# Patient Record
Sex: MALE | Race: WHITE | NOT HISPANIC OR LATINO | Employment: OTHER | URBAN - METROPOLITAN AREA
[De-identification: names, ages, dates, MRNs, and addresses within clinical notes are randomized per-mention and may not be internally consistent; named-entity substitution may affect disease eponyms.]

---

## 2017-11-06 ENCOUNTER — HOSPITAL ENCOUNTER (INPATIENT)
Facility: HOSPITAL | Age: 62
LOS: 1 days | DRG: 871 | End: 2017-11-07
Attending: INTERNAL MEDICINE | Admitting: INTERNAL MEDICINE
Payer: MEDICARE

## 2017-11-06 ENCOUNTER — APPOINTMENT (EMERGENCY)
Dept: RADIOLOGY | Facility: HOSPITAL | Age: 62
DRG: 871 | End: 2017-11-06
Payer: MEDICARE

## 2017-11-06 ENCOUNTER — APPOINTMENT (INPATIENT)
Dept: RADIOLOGY | Facility: HOSPITAL | Age: 62
DRG: 871 | End: 2017-11-06
Payer: MEDICARE

## 2017-11-06 ENCOUNTER — APPOINTMENT (INPATIENT)
Dept: RADIOLOGY | Facility: HOSPITAL | Age: 62
DRG: 871 | End: 2017-11-06
Attending: INTERNAL MEDICINE
Payer: MEDICARE

## 2017-11-06 ENCOUNTER — APPOINTMENT (INPATIENT)
Dept: CT IMAGING | Facility: HOSPITAL | Age: 62
DRG: 871 | End: 2017-11-06
Payer: MEDICARE

## 2017-11-06 ENCOUNTER — APPOINTMENT (INPATIENT)
Dept: NON INVASIVE DIAGNOSTICS | Facility: HOSPITAL | Age: 62
DRG: 871 | End: 2017-11-06
Payer: MEDICARE

## 2017-11-06 DIAGNOSIS — M81.0 OSTEOPOROSIS: Chronic | ICD-10-CM

## 2017-11-06 DIAGNOSIS — A41.9 SEPSIS (HCC): ICD-10-CM

## 2017-11-06 DIAGNOSIS — R60.0 EDEMA EXTREMITIES: ICD-10-CM

## 2017-11-06 DIAGNOSIS — J18.9 PNEUMONIA INVOLVING LEFT LUNG: ICD-10-CM

## 2017-11-06 DIAGNOSIS — D72.829 LEUKOCYTOSIS, UNSPECIFIED TYPE: ICD-10-CM

## 2017-11-06 DIAGNOSIS — N39.8 VOIDING DYSFUNCTION: ICD-10-CM

## 2017-11-06 DIAGNOSIS — J18.9 PNEUMONIA: Primary | ICD-10-CM

## 2017-11-06 PROBLEM — J94.9: Status: ACTIVE | Noted: 2017-11-06

## 2017-11-06 PROBLEM — M40.209 KYPHOSIS: Chronic | Status: ACTIVE | Noted: 2017-11-06

## 2017-11-06 LAB
ALBUMIN SERPL BCP-MCNC: 3.2 G/DL (ref 3.5–5)
ALP SERPL-CCNC: 113 U/L (ref 46–116)
ALT SERPL W P-5'-P-CCNC: 23 U/L (ref 12–78)
ANION GAP BLD CALC-SCNC: 14 MMOL/L (ref 4–13)
ANION GAP SERPL CALCULATED.3IONS-SCNC: 12 MMOL/L (ref 4–13)
APPEARANCE FLD: ABNORMAL
APTT PPP: 37 SECONDS (ref 23–35)
ARTERIAL PATENCY WRIST A: ABNORMAL
AST SERPL W P-5'-P-CCNC: 19 U/L (ref 5–45)
BACTERIA UR QL AUTO: ABNORMAL /HPF
BASE EXCESS BLDA CALC-SCNC: 1 MMOL/L (ref -2–3)
BASOPHILS # BLD MANUAL: 0.37 THOUSAND/UL (ref 0–0.1)
BASOPHILS NFR MAR MANUAL: 1 % (ref 0–1)
BILIRUB SERPL-MCNC: 0.8 MG/DL (ref 0.2–1)
BILIRUB UR QL STRIP: NEGATIVE
BUN BLD-MCNC: 24 MG/DL (ref 5–25)
BUN SERPL-MCNC: 24 MG/DL (ref 5–25)
CA-I BLD-SCNC: 1.21 MMOL/L (ref 1.12–1.32)
CALCIUM SERPL-MCNC: 9.5 MG/DL (ref 8.3–10.1)
CHLORIDE BLD-SCNC: 96 MMOL/L (ref 100–108)
CHLORIDE SERPL-SCNC: 96 MMOL/L (ref 100–108)
CLARITY UR: ABNORMAL
CO2 SERPL-SCNC: 27 MMOL/L (ref 21–32)
COLOR FLD: ABNORMAL
COLOR UR: YELLOW
CREAT BLD-MCNC: 0.9 MG/DL (ref 0.6–1.3)
CREAT SERPL-MCNC: 1.05 MG/DL (ref 0.6–1.3)
EOSINOPHIL # BLD MANUAL: 0 THOUSAND/UL (ref 0–0.4)
EOSINOPHIL NFR BLD MANUAL: 0 % (ref 0–6)
ERYTHROCYTE [DISTWIDTH] IN BLOOD BY AUTOMATED COUNT: 13.9 % (ref 11.6–15.1)
FINE GRAN CASTS URNS QL MICRO: ABNORMAL /LPF
FIO2 GAS DIL.REBREATH: 29 L
GFR SERPL CREATININE-BSD FRML MDRD: 76 ML/MIN/1.73SQ M
GFR SERPL CREATININE-BSD FRML MDRD: 91 ML/MIN/1.73SQ M
GLUCOSE FLD-MCNC: 97 MG/DL
GLUCOSE SERPL-MCNC: 139 MG/DL (ref 65–140)
GLUCOSE SERPL-MCNC: 144 MG/DL (ref 65–140)
GLUCOSE UR STRIP-MCNC: NEGATIVE MG/DL
HCO3 BLDA-SCNC: 26 MMOL/L (ref 22–28)
HCT VFR BLD AUTO: 38.4 % (ref 36.5–49.3)
HCT VFR BLD CALC: 41 % (ref 36.5–49.3)
HGB BLD-MCNC: 12.9 G/DL (ref 12–17)
HGB BLDA-MCNC: 13.9 G/DL (ref 12–17)
HGB UR QL STRIP.AUTO: ABNORMAL
HYALINE CASTS #/AREA URNS LPF: ABNORMAL /LPF
INR PPP: 1.22 (ref 0.86–1.16)
KETONES UR STRIP-MCNC: NEGATIVE MG/DL
L PNEUMO1 AG UR QL IA.RAPID: NEGATIVE
LACTATE SERPL-SCNC: 1.1 MMOL/L (ref 0.5–2)
LACTATE SERPL-SCNC: 2.1 MMOL/L (ref 0.5–2)
LEUKOCYTE ESTERASE UR QL STRIP: NEGATIVE
LYMPHOCYTES # BLD AUTO: 1.83 THOUSAND/UL (ref 0.6–4.47)
LYMPHOCYTES # BLD AUTO: 5 % (ref 14–44)
LYMPHOCYTES NFR BLD AUTO: 1 %
MAGNESIUM SERPL-MCNC: 2.3 MG/DL (ref 1.6–2.6)
MCH RBC QN AUTO: 30 PG (ref 26.8–34.3)
MCHC RBC AUTO-ENTMCNC: 33.6 G/DL (ref 31.4–37.4)
MCV RBC AUTO: 89 FL (ref 82–98)
MONOCYTES # BLD AUTO: 1.46 THOUSAND/UL (ref 0–1.22)
MONOCYTES NFR BLD: 4 % (ref 4–12)
NEUTROPHILS # BLD MANUAL: 32.96 THOUSAND/UL (ref 1.85–7.62)
NEUTS BAND NFR FLD MANUAL: 2 %
NEUTS SEG NFR BLD AUTO: 90 % (ref 43–75)
NEUTS SEG NFR BLD AUTO: 97 %
NITRITE UR QL STRIP: NEGATIVE
NON-SQ EPI CELLS URNS QL MICRO: ABNORMAL /HPF
NT-PROBNP SERPL-MCNC: 383 PG/ML
PCO2 BLD: 27 MMOL/L (ref 21–32)
PCO2 BLD: 29 MMOL/L (ref 21–32)
PCO2 BLD: 39.8 MM HG (ref 36–44)
PH BLD: 7.42 [PH] (ref 7.35–7.45)
PH BODY FLUID: 9
PH UR STRIP.AUTO: 6 [PH] (ref 4.5–8)
PLATELET # BLD AUTO: 597 THOUSANDS/UL (ref 149–390)
PLATELET BLD QL SMEAR: ABNORMAL
PMV BLD AUTO: 8.3 FL (ref 8.9–12.7)
PO2 BLD: 69 MM HG (ref 75–129)
POTASSIUM BLD-SCNC: 4.1 MMOL/L (ref 3.5–5.3)
POTASSIUM SERPL-SCNC: 4.3 MMOL/L (ref 3.5–5.3)
PROT SERPL-MCNC: 7.9 G/DL (ref 6.4–8.2)
PROT UR STRIP-MCNC: ABNORMAL MG/DL
PROTHROMBIN TIME: 15.2 SECONDS (ref 12.1–14.4)
RBC # BLD AUTO: 4.3 MILLION/UL (ref 3.88–5.62)
RBC #/AREA URNS AUTO: ABNORMAL /HPF
RBC MORPH BLD: NORMAL
SAMPLE SITE: ABNORMAL
SAO2 % BLD FROM PO2: 94 % (ref 95–98)
SITE: ABNORMAL
SODIUM BLD-SCNC: 134 MMOL/L (ref 136–145)
SODIUM SERPL-SCNC: 135 MMOL/L (ref 136–145)
SP GR UR STRIP.AUTO: 1.01 (ref 1–1.03)
SPECIMEN SOURCE: ABNORMAL
SPECIMEN SOURCE: ABNORMAL
TOTAL CELLS COUNTED SPEC: 100
TOTAL CELLS COUNTED SPEC: 100
TROPONIN I SERPL-MCNC: <0.02 NG/ML
UROBILINOGEN UR QL STRIP.AUTO: 0.2 E.U./DL
WBC # BLD AUTO: 36.62 THOUSAND/UL (ref 4.31–10.16)
WBC # FLD MANUAL: 878 /UL
WBC #/AREA URNS AUTO: ABNORMAL /HPF

## 2017-11-06 PROCEDURE — 88112 CYTOPATH CELL ENHANCE TECH: CPT | Performed by: INTERNAL MEDICINE

## 2017-11-06 PROCEDURE — 85610 PROTHROMBIN TIME: CPT | Performed by: NURSE PRACTITIONER

## 2017-11-06 PROCEDURE — 87102 FUNGUS ISOLATION CULTURE: CPT | Performed by: INTERNAL MEDICINE

## 2017-11-06 PROCEDURE — 87206 SMEAR FLUORESCENT/ACID STAI: CPT | Performed by: INTERNAL MEDICINE

## 2017-11-06 PROCEDURE — 80047 BASIC METABLC PNL IONIZED CA: CPT

## 2017-11-06 PROCEDURE — 83986 ASSAY PH BODY FLUID NOS: CPT | Performed by: INTERNAL MEDICINE

## 2017-11-06 PROCEDURE — 32557 INSERT CATH PLEURA W/ IMAGE: CPT

## 2017-11-06 PROCEDURE — 71260 CT THORAX DX C+: CPT

## 2017-11-06 PROCEDURE — 87116 MYCOBACTERIA CULTURE: CPT | Performed by: INTERNAL MEDICINE

## 2017-11-06 PROCEDURE — 84166 PROTEIN E-PHORESIS/URINE/CSF: CPT | Performed by: INTERNAL MEDICINE

## 2017-11-06 PROCEDURE — 86334 IMMUNOFIX E-PHORESIS SERUM: CPT | Performed by: INTERNAL MEDICINE

## 2017-11-06 PROCEDURE — 85007 BL SMEAR W/DIFF WBC COUNT: CPT | Performed by: NURSE PRACTITIONER

## 2017-11-06 PROCEDURE — 85014 HEMATOCRIT: CPT

## 2017-11-06 PROCEDURE — 80053 COMPREHEN METABOLIC PANEL: CPT | Performed by: NURSE PRACTITIONER

## 2017-11-06 PROCEDURE — 83883 ASSAY NEPHELOMETRY NOT SPEC: CPT | Performed by: INTERNAL MEDICINE

## 2017-11-06 PROCEDURE — 89051 BODY FLUID CELL COUNT: CPT | Performed by: INTERNAL MEDICINE

## 2017-11-06 PROCEDURE — 71010 HB CHEST X-RAY 1 VIEW FRONTAL (PORTABLE): CPT

## 2017-11-06 PROCEDURE — 93005 ELECTROCARDIOGRAM TRACING: CPT

## 2017-11-06 PROCEDURE — 84484 ASSAY OF TROPONIN QUANT: CPT | Performed by: NURSE PRACTITIONER

## 2017-11-06 PROCEDURE — 87205 SMEAR GRAM STAIN: CPT | Performed by: NURSE PRACTITIONER

## 2017-11-06 PROCEDURE — 99152 MOD SED SAME PHYS/QHP 5/>YRS: CPT

## 2017-11-06 PROCEDURE — 87070 CULTURE OTHR SPECIMN AEROBIC: CPT | Performed by: INTERNAL MEDICINE

## 2017-11-06 PROCEDURE — 96365 THER/PROPH/DIAG IV INF INIT: CPT

## 2017-11-06 PROCEDURE — 36600 WITHDRAWAL OF ARTERIAL BLOOD: CPT

## 2017-11-06 PROCEDURE — 36415 COLL VENOUS BLD VENIPUNCTURE: CPT | Performed by: NURSE PRACTITIONER

## 2017-11-06 PROCEDURE — 83605 ASSAY OF LACTIC ACID: CPT | Performed by: NURSE PRACTITIONER

## 2017-11-06 PROCEDURE — 0W9B30Z DRAINAGE OF LEFT PLEURAL CAVITY WITH DRAINAGE DEVICE, PERCUTANEOUS APPROACH: ICD-10-PCS | Performed by: RADIOLOGY

## 2017-11-06 PROCEDURE — 85730 THROMBOPLASTIN TIME PARTIAL: CPT | Performed by: NURSE PRACTITIONER

## 2017-11-06 PROCEDURE — 84165 PROTEIN E-PHORESIS SERUM: CPT | Performed by: INTERNAL MEDICINE

## 2017-11-06 PROCEDURE — 87205 SMEAR GRAM STAIN: CPT | Performed by: INTERNAL MEDICINE

## 2017-11-06 PROCEDURE — 83735 ASSAY OF MAGNESIUM: CPT | Performed by: NURSE PRACTITIONER

## 2017-11-06 PROCEDURE — 83880 ASSAY OF NATRIURETIC PEPTIDE: CPT | Performed by: NURSE PRACTITIONER

## 2017-11-06 PROCEDURE — 88305 TISSUE EXAM BY PATHOLOGIST: CPT | Performed by: INTERNAL MEDICINE

## 2017-11-06 PROCEDURE — 85027 COMPLETE CBC AUTOMATED: CPT | Performed by: NURSE PRACTITIONER

## 2017-11-06 PROCEDURE — 82945 GLUCOSE OTHER FLUID: CPT | Performed by: INTERNAL MEDICINE

## 2017-11-06 PROCEDURE — 87449 NOS EACH ORGANISM AG IA: CPT | Performed by: INTERNAL MEDICINE

## 2017-11-06 PROCEDURE — C1769 GUIDE WIRE: HCPCS

## 2017-11-06 PROCEDURE — 96375 TX/PRO/DX INJ NEW DRUG ADDON: CPT

## 2017-11-06 PROCEDURE — 82803 BLOOD GASES ANY COMBINATION: CPT

## 2017-11-06 PROCEDURE — C1729 CATH, DRAINAGE: HCPCS

## 2017-11-06 PROCEDURE — 87070 CULTURE OTHR SPECIMN AEROBIC: CPT | Performed by: NURSE PRACTITIONER

## 2017-11-06 PROCEDURE — 81001 URINALYSIS AUTO W/SCOPE: CPT | Performed by: NURSE PRACTITIONER

## 2017-11-06 PROCEDURE — 83615 LACTATE (LD) (LDH) ENZYME: CPT | Performed by: INTERNAL MEDICINE

## 2017-11-06 PROCEDURE — 99285 EMERGENCY DEPT VISIT HI MDM: CPT

## 2017-11-06 PROCEDURE — 94760 N-INVAS EAR/PLS OXIMETRY 1: CPT

## 2017-11-06 PROCEDURE — 93970 EXTREMITY STUDY: CPT

## 2017-11-06 PROCEDURE — 87040 BLOOD CULTURE FOR BACTERIA: CPT | Performed by: NURSE PRACTITIONER

## 2017-11-06 RX ORDER — FENTANYL 50 UG/H
1 PATCH TRANSDERMAL
Status: DISCONTINUED | OUTPATIENT
Start: 2017-11-06 | End: 2017-11-06

## 2017-11-06 RX ORDER — LEVOFLOXACIN 5 MG/ML
750 INJECTION, SOLUTION INTRAVENOUS ONCE
Status: COMPLETED | OUTPATIENT
Start: 2017-11-06 | End: 2017-11-06

## 2017-11-06 RX ORDER — ACETAMINOPHEN 325 MG/1
650 TABLET ORAL EVERY 6 HOURS PRN
Status: DISCONTINUED | OUTPATIENT
Start: 2017-11-06 | End: 2017-11-07 | Stop reason: HOSPADM

## 2017-11-06 RX ORDER — DOCUSATE SODIUM 100 MG/1
100 CAPSULE, LIQUID FILLED ORAL 2 TIMES DAILY
Status: DISCONTINUED | OUTPATIENT
Start: 2017-11-06 | End: 2017-11-06

## 2017-11-06 RX ORDER — FENTANYL CITRATE 50 UG/ML
INJECTION, SOLUTION INTRAMUSCULAR; INTRAVENOUS CODE/TRAUMA/SEDATION MEDICATION
Status: COMPLETED | OUTPATIENT
Start: 2017-11-06 | End: 2017-11-06

## 2017-11-06 RX ORDER — ONDANSETRON 2 MG/ML
4 INJECTION INTRAMUSCULAR; INTRAVENOUS EVERY 4 HOURS PRN
Status: DISCONTINUED | OUTPATIENT
Start: 2017-11-06 | End: 2017-11-06

## 2017-11-06 RX ORDER — DIAZEPAM 5 MG/1
5 TABLET ORAL
Status: DISCONTINUED | OUTPATIENT
Start: 2017-11-06 | End: 2017-11-07 | Stop reason: HOSPADM

## 2017-11-06 RX ORDER — ACETAMINOPHEN 325 MG/1
650 TABLET ORAL ONCE
Status: COMPLETED | OUTPATIENT
Start: 2017-11-06 | End: 2017-11-06

## 2017-11-06 RX ORDER — HYDROCODONE BITARTRATE AND ACETAMINOPHEN 5; 325 MG/1; MG/1
1 TABLET ORAL EVERY 6 HOURS PRN
Status: DISCONTINUED | OUTPATIENT
Start: 2017-11-06 | End: 2017-11-07 | Stop reason: HOSPADM

## 2017-11-06 RX ORDER — ALBUTEROL SULFATE 2.5 MG/3ML
2.5 SOLUTION RESPIRATORY (INHALATION) EVERY 6 HOURS PRN
Status: DISCONTINUED | OUTPATIENT
Start: 2017-11-06 | End: 2017-11-07 | Stop reason: HOSPADM

## 2017-11-06 RX ORDER — 0.9 % SODIUM CHLORIDE 0.9 %
3 VIAL (ML) INJECTION AS NEEDED
Status: DISCONTINUED | OUTPATIENT
Start: 2017-11-06 | End: 2017-11-07 | Stop reason: HOSPADM

## 2017-11-06 RX ORDER — ONDANSETRON 2 MG/ML
4 INJECTION INTRAMUSCULAR; INTRAVENOUS EVERY 6 HOURS PRN
Status: DISCONTINUED | OUTPATIENT
Start: 2017-11-06 | End: 2017-11-07 | Stop reason: HOSPADM

## 2017-11-06 RX ORDER — DOCUSATE SODIUM 100 MG/1
100 CAPSULE, LIQUID FILLED ORAL 2 TIMES DAILY
COMMUNITY
End: 2017-11-22 | Stop reason: HOSPADM

## 2017-11-06 RX ORDER — DOCUSATE SODIUM 100 MG/1
100 CAPSULE, LIQUID FILLED ORAL 2 TIMES DAILY
Status: DISCONTINUED | OUTPATIENT
Start: 2017-11-06 | End: 2017-11-07 | Stop reason: HOSPADM

## 2017-11-06 RX ORDER — ONDANSETRON 2 MG/ML
4 INJECTION INTRAMUSCULAR; INTRAVENOUS ONCE
Status: COMPLETED | OUTPATIENT
Start: 2017-11-06 | End: 2017-11-06

## 2017-11-06 RX ORDER — OFLOXACIN 3 MG/ML
1 SOLUTION/ DROPS OPHTHALMIC 4 TIMES DAILY
Status: ON HOLD | COMMUNITY
End: 2017-11-06 | Stop reason: ALTCHOICE

## 2017-11-06 RX ORDER — FENTANYL 50 UG/H
1 PATCH TRANSDERMAL
COMMUNITY
End: 2017-11-22 | Stop reason: HOSPADM

## 2017-11-06 RX ORDER — DIAZEPAM 5 MG/1
5 TABLET ORAL
COMMUNITY
End: 2017-11-22 | Stop reason: HOSPADM

## 2017-11-06 RX ORDER — MIDAZOLAM HYDROCHLORIDE 1 MG/ML
INJECTION INTRAMUSCULAR; INTRAVENOUS CODE/TRAUMA/SEDATION MEDICATION
Status: COMPLETED | OUTPATIENT
Start: 2017-11-06 | End: 2017-11-06

## 2017-11-06 RX ORDER — HYDROCODONE BITARTRATE AND ACETAMINOPHEN 10; 325 MG/15ML; MG/15ML
SOLUTION ORAL EVERY 6 HOURS PRN
COMMUNITY
End: 2017-11-22 | Stop reason: HOSPADM

## 2017-11-06 RX ORDER — MORPHINE SULFATE 4 MG/ML
4 INJECTION, SOLUTION INTRAMUSCULAR; INTRAVENOUS EVERY 4 HOURS PRN
Status: DISCONTINUED | OUTPATIENT
Start: 2017-11-06 | End: 2017-11-07 | Stop reason: HOSPADM

## 2017-11-06 RX ORDER — FENTANYL 50 UG/H
1 PATCH TRANSDERMAL
Status: DISCONTINUED | OUTPATIENT
Start: 2017-11-07 | End: 2017-11-07 | Stop reason: HOSPADM

## 2017-11-06 RX ORDER — SODIUM CHLORIDE 9 MG/ML
125 INJECTION, SOLUTION INTRAVENOUS CONTINUOUS
Status: DISCONTINUED | OUTPATIENT
Start: 2017-11-06 | End: 2017-11-07 | Stop reason: HOSPADM

## 2017-11-06 RX ORDER — CIPROFLOXACIN AND DEXAMETHASONE 3; 1 MG/ML; MG/ML
4 SUSPENSION/ DROPS AURICULAR (OTIC) 2 TIMES DAILY
Status: ON HOLD | COMMUNITY
End: 2017-11-06 | Stop reason: ALTCHOICE

## 2017-11-06 RX ORDER — B-COMPLEX WITH VITAMIN C
1 TABLET ORAL 2 TIMES DAILY WITH MEALS
Status: DISCONTINUED | OUTPATIENT
Start: 2017-11-06 | End: 2017-11-07 | Stop reason: HOSPADM

## 2017-11-06 RX ADMIN — HYDROMORPHONE HYDROCHLORIDE 1 MG: 1 INJECTION, SOLUTION INTRAMUSCULAR; INTRAVENOUS; SUBCUTANEOUS at 12:25

## 2017-11-06 RX ADMIN — MIDAZOLAM HYDROCHLORIDE 0.5 MG: 1 INJECTION, SOLUTION INTRAMUSCULAR; INTRAVENOUS at 15:10

## 2017-11-06 RX ADMIN — ONDANSETRON 4 MG: 2 INJECTION INTRAMUSCULAR; INTRAVENOUS at 10:47

## 2017-11-06 RX ADMIN — SODIUM CHLORIDE 1000 ML: 0.9 INJECTION, SOLUTION INTRAVENOUS at 12:25

## 2017-11-06 RX ADMIN — SODIUM CHLORIDE 125 ML/HR: 0.9 INJECTION, SOLUTION INTRAVENOUS at 13:18

## 2017-11-06 RX ADMIN — ACETAMINOPHEN 650 MG: 325 TABLET ORAL at 11:38

## 2017-11-06 RX ADMIN — SODIUM CHLORIDE 1000 ML: 0.9 INJECTION, SOLUTION INTRAVENOUS at 10:44

## 2017-11-06 RX ADMIN — MORPHINE SULFATE 4 MG: 4 INJECTION, SOLUTION INTRAMUSCULAR; INTRAVENOUS at 17:59

## 2017-11-06 RX ADMIN — METRONIDAZOLE 500 MG: 500 INJECTION, SOLUTION INTRAVENOUS at 17:53

## 2017-11-06 RX ADMIN — DIAZEPAM 5 MG: 5 TABLET ORAL at 23:29

## 2017-11-06 RX ADMIN — LEVOFLOXACIN 750 MG: 5 INJECTION, SOLUTION INTRAVENOUS at 10:51

## 2017-11-06 RX ADMIN — MORPHINE SULFATE 4 MG: 4 INJECTION, SOLUTION INTRAMUSCULAR; INTRAVENOUS at 10:50

## 2017-11-06 RX ADMIN — CALCIUM CARBONATE 500 MG (1,250 MG)-VITAMIN D3 200 UNIT TABLET 1 TABLET: at 17:53

## 2017-11-06 RX ADMIN — HYDROMORPHONE HYDROCHLORIDE 0.5 MG: 1 INJECTION, SOLUTION INTRAMUSCULAR; INTRAVENOUS; SUBCUTANEOUS at 11:08

## 2017-11-06 RX ADMIN — MORPHINE SULFATE 4 MG: 4 INJECTION, SOLUTION INTRAMUSCULAR; INTRAVENOUS at 21:59

## 2017-11-06 RX ADMIN — IOHEXOL 85 ML: 350 INJECTION, SOLUTION INTRAVENOUS at 12:43

## 2017-11-06 RX ADMIN — DOCUSATE SODIUM 100 MG: 100 CAPSULE, LIQUID FILLED ORAL at 17:53

## 2017-11-06 RX ADMIN — FENTANYL CITRATE 50 MCG: 50 INJECTION, SOLUTION INTRAMUSCULAR; INTRAVENOUS at 15:10

## 2017-11-06 NOTE — ED NOTES
Pt present to ED with kyphosis (reports is his norm), positioned in bed with many pillow for comfort        Oriana Pacheco, BETSY  11/06/17 2685

## 2017-11-06 NOTE — PLAN OF CARE
PAIN - ADULT     Verbalizes/displays adequate comfort level or baseline comfort level Progressing        RESPIRATORY - ADULT     Achieves optimal ventilation and oxygenation Progressing        SAFETY ADULT     Patient will remain free of falls Progressing     Maintain or return to baseline ADL function Progressing     Maintain or return mobility status to optimal level Progressing

## 2017-11-06 NOTE — H&P
H&P Exam - Carolina Hassan 57 y o  male MRN: [id number]    Unit/Bed#: CARINE Encounter: [id number]      Assessment/Plan     Assessment:  Patient Active Problem List   Diagnosis    Pneumonia    Sepsis ([address] )    Pleural disorder    Pedal edema    Osteoporosis    Kyphosis    Leukocytosis         Plan:  1  Left-sided pneumonia with possible pleural effusion and pleuritic pain-CT is ordered await those results-will also consult ID and Pulmonary given his markedly elevated white count at 30,000   Does have a penicillin allergy so is being started on high-dose Levaquin at 7:50 a m  Q 24  2  Sepsis-IV antibiotics and IV fluid boluses are ordered-serial lactate levels to be performed  3  Pedal edema-suspect component of possible CHF versus venous insufficiency-will check echocardiogram as I am hearing S3 on exam and possible MR murmur  4  Osteoporosis with severe kyphosis and multiple compression fractures-will consult Endocrinology regarding the osteoporosis issues-will also check serum protein electrophoresis and urine protein electrophoresis    History of Present Illness     HPI:  Carolina Hassan is a 57 y o  male who presents with shortness of breath and left-sided chest pain which has been present for the last 4 days  He reports that he started with a cough and discolored green sputum several months ago but he never sought medical attention for that  That had been intermittent but now over the last several days he has noted more thick sputum and began with left-sided chest pain  Reports in the 1970s he had had 1 episode of pneumonia but has never had a diagnosis of asthma or COPD or issues with his long since then  Patient has severe kyphosis and multiple compression fractures in the past for which she is on chronic pain meds including a fentanyl patch    He reports that he had been seen by endocrine for workup for osteoporosis in the past but currently is just on calcium and vitamin D and apparently was never on any other supportive meds such as bisphosphonates  He reports that he had a workup in the past for suspected multiple myeloma by Dr Fabiola Posey  but bone marrow biopsy apparently did not confirm this in approximately 2000    Review of Systems   Constitutional: Positive for chills, fatigue and fever  HENT: Negative for congestion, ear pain, sinus pressure and sore throat  Eyes: Negative  Respiratory: Positive for cough and shortness of breath  Cardiovascular: Positive for chest pain  Left-sided pleuritic pain x4 days   Gastrointestinal: Negative  Endocrine: Negative for polyuria  Had previous endocrine evaluation with Dr subramanian in approximately 2008 with episodes of spinal fracture/osteoporosis-apparently had a bone marrow biopsy to rule out multiple myeloma   Genitourinary: Negative  Musculoskeletal: Positive for back pain  Chronic back pain on fentanyl patch and hydrocodone-follows with Dr Lamin gutiérrez his primary physician for this   Neurological: Positive for weakness  Negative for dizziness  Hematological: Negative  Psychiatric/Behavioral: Negative  All other systems reviewed and are negative  Historical Information   Past Medical History:   Diagnosis Date    History of spinal fracture     Kyphosis     Loss of appetite     Loss of weight     Osteoporosis      History reviewed  No pertinent surgical history  Social History   History   Alcohol Use No     History   Drug Use No     History   Smoking Status    Never Smoker   Smokeless Tobacco    Never Used     Family History: History reviewed  No pertinent family history  Meds/Allergies   PTA meds:   Prior to Admission Medications   Prescriptions Last Dose Informant Patient Reported? Taking?    Calcium Carbonate-Vitamin D (CALCIUM 500 + D PO)   Yes Yes   Sig: Take by mouth   HYDROcodone-acetaminophen (ZAMCET)  MG/15ML solution   Yes Yes   Sig: Take by mouth every 6 (six) hours as needed for moderate pain   Omega-3 Fatty Acids (FISH OIL PO)   Yes Yes   Sig: Take by mouth   ciprofloxacin-dexamethasone (CIPRODEX) otic suspension   Yes Yes   Si drops 2 (two) times a day   docusate sodium (COLACE) 100 mg capsule   Yes Yes   Sig: Take 100 mg by mouth 2 (two) times a day   fentaNYL (DURAGESIC) 50 mcg/hr   Yes Yes   Sig: Place 1 patch on the skin every third day   ofloxacin (OCUFLOX) 0 3 % ophthalmic solution   Yes Yes   Si drop 4 (four) times a day      Facility-Administered Medications: None     Allergies   Allergen Reactions    Asa [Aspirin]     Carbapenems     Cephalosporins     Penicillins        Objective   Vitals: Blood pressure 154/77, pulse 101, temperature 98 8 °F (37 1 °C), temperature source Tympanic, resp  rate (!) 24, height 5' 8" (1 727 m), weight 57 2 kg (126 lb), SpO2 99 %  Intake/Output Summary (Last 24 hours) at 17 1241  Last data filed at 17 1224   Gross per 24 hour   Intake             1000 ml   Output                0 ml   Net             1000 ml       Invasive Devices     Peripheral Intravenous Line            Peripheral IV 17 Right Antecubital less than 1 day                Physical Exam   Constitutional: He is oriented to person, place, and time  He appears distressed  Thin ill-appearing male with pain with deep respirations or movement in bed   HENT:   Head: Atraumatic  Mouth/Throat: Oropharynx is clear and moist  No oropharyngeal exudate  Eyes: Conjunctivae are normal  Right eye exhibits no discharge  Left eye exhibits no discharge  No scleral icterus  Neck: No JVD present  No tracheal deviation present  Cardiovascular: Normal rate and regular rhythm  Exam reveals gallop  Exam reveals no friction rub  Murmur heard  Positive S3 gallop-grade 1/6 systolic murmur in the mitral region   Pulmonary/Chest: He is in respiratory distress  He has no wheezes  Decreased breath sounds in the left base- rare upper airway cough   Abdominal: Soft  He exhibits no distension  There is no tenderness  There is no rebound  Musculoskeletal: He exhibits edema  He exhibits no tenderness  Bilateral leg edema to below the knees +3  Some superficial veins noted but no erythema   Neurological: He is alert and oriented to person, place, and time  Coordination normal    Skin: No erythema  No pallor  Psychiatric: He has a normal mood and affect  His behavior is normal  Judgment and thought content normal    Nursing note and vitals reviewed  Lab Results: I have personally reviewed pertinent reports  Imaging: I have personally reviewed pertinent reports  EKG, Pathology, and Other Studies: I have personally reviewed pertinent reports  Code Status: No Order  Advance Directive and Living Will:      Power of :    POLST:    Patient will require greater than 2 midnight stay due to the complexity of his illness  Counseling / Coordination of Care  Total floor / unit time spent today 40 minutes    Greater than 50% of total time was spent with the patient and review records from Dr Anel Montgomery office

## 2017-11-06 NOTE — SOCIAL WORK
Met with patient  Explained role of care management  Patient lives alone in a second floor apartment with 13 ELIF  He is independent adl's, is able to do steps, does not use an assistive device, drives, is on SSI disability  DME - back brace  Past services - denies  He plans on returning home at discharge and at this time does not feel that he will have any needs  Await PT recommendation  Will follow

## 2017-11-06 NOTE — CONSULTS
Consultation - Julianne David 57 y o  male MRN: [id number]    Unit/Bed#: [address] [place] [address] [id number] Encounter: [id number]      Assessment/Plan     Assessment:  Osteoporosis    Plan:  Secondary work up for osteoporosis started by primary including SPEP, UPEP, CMP, PTH, 25 vitD, P underway  I provided information in his discharge papers to follow up with Endocrinology for evaluation of bone density and other secondary work up before osteoporosis treatment initiated  Corrected calcium witht he low albumin on the higher side but may be due to immobilization  Would recheck as outpatient and consider iCa check  Inpatient consult to Endocrinology  Consult performed by: Mesfin Valenzuela ordered by: Tasha Lopez          CC: Osteoporosis    History of Present Illness     HPI: Julianne David is a 57y o  year old male with history of osteoporosis, kyphosis, compression fracture who presents to the hospital with productive cough, fever, chills  He was found to have pneumonia and parapneumonic effusion  He is being followed by Pulmonary, Infectious Disease, Neurology in addition to the primary team   He reports around 2008, he suffered a fracture in his right upper extremity after a fall on ice  He also suffered compression fractures with minimal movement around that time  Since that time he was at other compression fractures  He reports having a DEXA scan in the past that showed severe osteoporosis  He was advised to take a medication which was a daily injection (likely Forteo), but he did not end up taking it  Patient is being transported to [facility] at this time  Review of Systems   Constitutional: Positive for fever  Negative for appetite change and chills  HENT: Negative for congestion and trouble swallowing  Eyes: Negative for visual disturbance  Respiratory: Positive for cough and shortness of breath  Cardiovascular: Positive for chest pain  Negative for palpitations and leg swelling  Gastrointestinal: Negative for abdominal pain, constipation, diarrhea, nausea and vomiting  Endocrine: Negative for polydipsia and polyuria  Genitourinary: Negative for difficulty urinating and frequency  Musculoskeletal: Positive for arthralgias and back pain  Skin: Negative for rash  Neurological: Positive for weakness  Negative for dizziness  Psychiatric/Behavioral: Negative for agitation and confusion  Historical Information   Past Medical History:   Diagnosis Date    History of spinal fracture     Kyphosis     Loss of appetite     Loss of weight     Osteoporosis      History reviewed  No pertinent surgical history  Social History   History   Alcohol Use No     History   Drug Use No     History   Smoking Status    Never Smoker   Smokeless Tobacco    Never Used     Family History: History reviewed  No pertinent family history      Meds/Allergies   Current Facility-Administered Medications   Medication Dose Route Frequency Provider Last Rate Last Dose    acetaminophen (TYLENOL) tablet 650 mg  650 mg Oral Q6H PRN Scarlett Fly, DO        albuterol inhalation solution 2 5 mg  2 5 mg Nebulization Q6H PRN Trinh Zaidi,         calcium carbonate-vitamin D (OSCAL-D) 500 mg-200 units per tablet 1 tablet  1 tablet Oral BID With Meals Scarlett Fly, DO        docusate sodium (COLACE) capsule 100 mg  100 mg Oral BID Scarlett Fly, DO        enoxaparin (LOVENOX) subcutaneous injection 40 mg  40 mg Subcutaneous Daily Scarlett Fly, DO        [START ON 11/7/2017] fentaNYL (DURAGESIC) 50 mcg/hr TD 72 hr patch 1 patch  1 patch Transdermal Q48H Scarlett Fly, DO        fentanyl citrate (PF) 100 MCG/2ML   Intravenous Code/Trauma/Sedation Med William Pedro DO   50 mcg at 11/06/17 1510    HYDROcodone-acetaminophen (NORCO) 5-325 mg per tablet 1 tablet  1 tablet Oral Q6H PRN Scarlett Fly, DO        HYDROmorphone (DILAUDID) 1 mg/mL injection 0 5 mg  0 5 mg Intravenous Q3H PRN MARY Monet        [START ON 11/7/2017] levofloxacin (LEVAQUIN) tablet 750 mg  750 mg Oral Q24H Clovia Clines, DO        midazolam (VERSED) injection    Code/Trauma/Sedation Med Hoy Sides, DO   0 5 mg at 11/06/17 1510    morphine (PF) 4 mg/mL injection 4 mg  4 mg Intravenous Q4H PRN Geradine Ruts, CRNP   4 mg at 11/06/17 1050    ondansetron (ZOFRAN) injection 4 mg  4 mg Intravenous Q6H PRN Scarlett Fly, DO        sodium chloride (PF) 0 9 % injection 3 mL  3 mL Intravenous PRN Geradine Ruts, CRNP        sodium chloride 0 9 % infusion  125 mL/hr Intravenous Continuous Scarlett Fly,  mL/hr at 11/06/17 1318 125 mL/hr at 11/06/17 1318     Allergies   Allergen Reactions    Asa [Aspirin]     Carbapenems     Cephalosporins     Penicillins        Objective   Vitals: Blood pressure 150/89, pulse 93, temperature 98 8 °F (37 1 °C), temperature source Oral, resp  rate 18, height 5' 9 6" (1 768 m), weight 60 kg (132 lb 4 4 oz), SpO2 99 %  Intake/Output Summary (Last 24 hours) at 11/06/17 1516  Last data filed at 11/06/17 1224   Gross per 24 hour   Intake             1000 ml   Output                0 ml   Net             1000 ml     Invasive Devices     Peripheral Intravenous Line            Peripheral IV 11/06/17 Right Antecubital less than 1 day                Physical Exam   Constitutional: He is oriented to person, place, and time  He appears distressed  HENT:   Head: Normocephalic and atraumatic  Mouth/Throat: No oropharyngeal exudate  Eyes: Conjunctivae and EOM are normal  Pupils are equal, round, and reactive to light  No scleral icterus  Neck: Normal range of motion  Neck supple  No thyromegaly present  Cardiovascular: Normal rate and regular rhythm  Pulmonary/Chest: Effort normal and breath sounds normal  No respiratory distress  Abdominal: Soft  Bowel sounds are normal  He exhibits no distension  There is no tenderness  Musculoskeletal: Normal range of motion  He exhibits deformity  He exhibits no edema  Thoracic kyphosis  Lymphadenopathy:     He has no cervical adenopathy  Neurological: He is alert and oriented to person, place, and time  He exhibits normal muscle tone  Skin: Skin is warm and dry  No rash noted  He is not diaphoretic  Psychiatric: He has a normal mood and affect  His behavior is normal        The history was obtained from the review of the chart, patient  Lab Results:       Lab Results   Component Value Date    WBC 36 62 (HH) 11/06/2017    HGB 13 9 11/06/2017    HCT 38 4 11/06/2017    MCV 89 11/06/2017     (H) 11/06/2017     Lab Results   Component Value Date/Time    BUN 24 11/06/2017 10:37 AM     (L) 11/06/2017 10:37 AM    K 4 3 11/06/2017 10:37 AM    CL 96 (L) 11/06/2017 10:37 AM    CO2 27 11/06/2017 10:37 AM    CREATININE 1 05 11/06/2017 10:37 AM    AST 19 11/06/2017 10:37 AM    ALT 23 11/06/2017 10:37 AM     No results for input(s): CHOL, HDL, LDL, TRIG, VLDL in the last 72 hours  No results found for: Arlis Slocumb  No results found for: POCGLU    Imaging Studies: I have personally reviewed pertinent reports  CT chest with contrast [[id number]] Collected: 11/06/17 1315   Order Status: Completed Updated: 11/06/17 1327   Narrative:     CT CHEST WITH IV CONTRAST    INDICATION:  Chest wall pain  COMPARISON: CT chest 8/16/2011  TECHNIQUE: CT examination of the chest was performed      Reformatted images were created in axial, sagittal, and coronal planes       Radiation dose length product (DLP) for this visit:  134 04 mGy-cm    This examination, like all CT scans performed in the [facility], was performed utilizing techniques to minimize radiation dose exposure, including the use of iterative   reconstruction and automated exposure control  IV Contrast:  85 mL of iohexol (OMNIPAQUE)         FINDINGS:    LUNGS:  Left basilar consolidation concerning for pneumonia   Emphysematous changes are noted in the upper lobes      PLEURA:  Large left pleural effusion  HEART/GREAT VESSELS:  Unremarkable for patient's age  MEDIASTINUM AND RAYNA:  Unremarkable  CHEST WALL AND LOWER NECK:       Normal     VISUALIZED STRUCTURES IN THE UPPER ABDOMEN:  Hypodensity in the right lobe of the liver likely a cyst measuring 8 mm   Bilateral simple renal cysts   Upper abdomen otherwise unremarkable  OSSEOUS STRUCTURES:  Severe kyphosis of the thoracic spine with multiple age indeterminate compression deformities   No acute rib fracture  Impression:       Left lower lobe consolidation consistent with pneumonia with large parapneumonic pleural effusion  Liver and bilateral renal cysts  Severe kyphosis with multiple compression deformities in the thoracic spine which are age indeterminate

## 2017-11-06 NOTE — CASE MANAGEMENT
Initial Clinical Review    Admission: Date/Time/Statement: 11/6/17 @ 1157     Orders Placed This Encounter   Procedures    Inpatient Admission (expected length of stay for this patient is greater than two midnights)     Standing Status:   Standing     Number of Occurrences:   1     Order Specific Question:   Admitting Physician     Answer:   Lolita Henry [[id number]]     Order Specific Question:   Level of Care     Answer:   Med Surg [16]     Order Specific Question:   Estimated length of stay     Answer:   More than 2 Midnights     Order Specific Question:   Certification     Answer:   I certify that inpatient services are medically necessary for this patient for a duration of greater than two midnights  See H&P and MD Progress Notes for additional information about the patient's course of treatment  ED: Date/Time/Mode of Arrival:   ED Arrival Information     Expected Arrival Acuity Means of Arrival Escorted By Service Admission Type    - 11/6/2017 10:20 Emergent [facility]    Arrival Complaint    SOB, chest pain          Chief Complaint:   Chief Complaint   Patient presents with    Shortness of Breath     Patient states he has pain when he takes a deep breath  Has been coughing up green phlem  Has been going on for a f ew days        History of Illness: 57 y o  male who presents with shortness of breath and left-sided chest pain which has been present for the last 4 days  He reports that he started with a cough and discolored green sputum several months ago but he never sought medical attention for that  That had been intermittent but now over the last several days he has noted more thick sputum and began with left-sided chest pain    Reports in the 1970s he had had 1 episode of pneumonia but has never had a diagnosis of asthma or COPD or issues with his long since then      Patient has severe kyphosis and multiple compression fractures in the past for which she is on chronic pain meds including a fentanyl patch  He reports that he had been seen by endocrine for workup for osteoporosis in the past but currently is just on calcium and vitamin D and apparently was never on any other supportive meds such as bisphosphonates  He reports that he had a workup in the past for suspected multiple myeloma by Dr Crescencio Garcia  but bone marrow biopsy apparently did not confirm this in approximately 2000    ED Vital Signs:   ED Triage Vitals   Temperature Pulse Respirations Blood Pressure SpO2   11/06/17 1032 11/06/17 1032 11/06/17 1032 11/06/17 1045 11/06/17 1045   99 6 °F (37 6 °C) (!) 109 22 (!) 194/81 99 %      Temp Source Heart Rate Source Patient Position - Orthostatic VS BP Location FiO2 (%)   11/06/17 1032 11/06/17 1032 11/06/17 1045 11/06/17 1045 --   Oral Monitor Lying Left arm       Pain Score       11/06/17 1032       Worst Possible Pain        Wt Readings from Last 1 Encounters:   11/06/17 60 kg (132 lb 4 4 oz)       Vital Signs (abnormal):  Sustained pulse 100 - 109  Respiratory rate 24 - 26  Maximum /81    Abnormal Labs/Diagnostic Test Results:   Wbc 36 62  Lactic acid 2 1  NT-proBNP 383  INR 1 22  Na 135, cl 96  Albumin 3 2  Bun 24  Ct chest - Left lower lobe consolidation consistent with pneumonia with large parapneumonic pleural effusion  Liver and bilateral renal cysts  Severe kyphosis with multiple compression deformities in the thoracic spine which are age indeterminate  CxR- Moderate to large left pleural effusion with overlying airspace opacity which could represent atelectasis or pneumonia  ED Treatment: blood cultures  Oxygen at 2 liters     Medication Administration from 11/06/2017 1020 to 11/06/2017 1252       Date/Time Order Dose Route Action Action by Comments     11/06/2017 1224 sodium chloride 0 9 % bolus 1,000 mL 0 mL Intravenous Stopped Jp Herrera RN      11/06/2017 1044 sodium chloride 0 9 % bolus 1,000 mL 1,000 mL Intravenous New Bag Nieves VANG Batsheva Islas, RN      11/06/2017 1225 sodium chloride 0 9 % bolus 1,000 mL 1,000 mL Intravenous New Bag [facility], RN      11/06/2017 1051 levofloxacin (LEVAQUIN) IVPB (premix) 750 mg 750 mg Intravenous [address] [facility], RN      11/06/2017 1050 morphine (PF) 4 mg/mL injection 4 mg 4 mg Intravenous Given Roselynn Setting, RN      11/06/2017 1047 ondansetron (ZOFRAN) injection 4 mg 4 mg Intravenous Given [facility], RN      11/06/2017 1108 HYDROmorphone (DILAUDID) 1 mg/mL injection 0 5 mg 0 5 mg Intravenous Given Roselynn Setting, RN      11/06/2017 1138 acetaminophen (TYLENOL) tablet 650 mg 650 mg Oral Given Roselynn Setting, RN      11/06/2017 1225 HYDROmorphone (DILAUDID) 1 mg/mL injection 1 mg 1 mg Intravenous Given Roselynn Setting, RN      11/06/2017 1243 iohexol (OMNIPAQUE) 350 MG/ML injection (MULTI-DOSE) 85 mL 85 mL Intravenous Given Rashad Lynn           Past Medical/Surgical History: Active Ambulatory Problems     Diagnosis Date Noted    No Active Ambulatory Problems     Resolved Ambulatory Problems     Diagnosis Date Noted    No Resolved Ambulatory Problems     Past Medical History:   Diagnosis Date    History of spinal fracture     Kyphosis     Loss of appetite     Loss of weight     Osteoporosis        Admitting Diagnosis: Chest pain [R07 9]  Pneumonia [J18 9]  SOB (shortness of breath) [R06 02]  Osteoporosis [M81 0]  Leukocytosis, unspecified type [D72 829]    Age/Sex: 57 y o  male    Assessment/Plan:   Left-sided pneumonia with possible pleural effusion and pleuritic pain-CT is ordered await those results-will also consult ID and Pulmonary given his markedly elevated white count at 30,000   Does have a penicillin allergy so is being started on high-dose Levaquin at 7:50 a m  Q 24  2  Sepsis-IV antibiotics and IV fluid boluses are ordered-serial lactate levels to be performed  3    Pedal edema-suspect component of possible CHF versus venous insufficiency-will check echocardiogram as I am hearing S3 on exam and possible MR murmur  4    Osteoporosis with severe kyphosis and multiple compression fractures-will consult Endocrinology regarding the osteoporosis issues-will also check serum protein electrophoresis and urine protein electrophoresis    Admission Orders:  11/6/2017  1157 INPATIENT   Scheduled Meds:   calcium carbonate-vitamin D 1 tablet Oral BID With Meals   docusate sodium 100 mg Oral BID   enoxaparin 40 mg Subcutaneous Daily   [START ON 11/7/2017] fentaNYL 1 patch Transdermal Q48H   [START ON 11/7/2017] levofloxacin 750 mg Oral Q24H   sodium chloride 1,000 mL Intravenous Once     Continuous Infusions:   sodium chloride 125 mL/hr Last Rate: 125 mL/hr (11/06/17 1318)     PRN Meds:   acetaminophen    albuterol    HYDROcodone-acetaminophen    HYDROmorphone    morphine injection 4 mg iv - used x 1      ondansetron    Insert peripheral IV **AND** sodium chloride (PF)     OTHER ORDERS: respiratory protocol - titrated from 2 liters to room air  Pulse oximetry room air and ambulation  IR   Consult pulmonary; ID; endocrinology  Echo  PT/OT

## 2017-11-06 NOTE — CONSULTS
Pulmonary Consultation   Ozzy Gallagher 57 y o  male MRN: [id number]  Unit/Bed#: [address] [place] [address] [id number] Encounter: [id number]      Reason for consultation: pneumonia    Requesting physician: Dr Karol Galvez:   1  Acute hypoxic respiratory insufficiency  · Titrate O2 to keep SpO2 >88%  · IS, OOB-Chair    2  Abnormal CT Chest - left sided infiltrate with atelectasis and loculated pleural effusion    3  Left sided pneumonia - community acquired vs aspiration given history and narcotic/benzo use  · Continue levofloxacin 750mg daily  · Added flagyl pending ID evaluation  · Agree with strep/legionella ag  · Trend temp, WBC, follow cultures and pleural fluid analysis    4  Loculated left pleural effusion  · Continue CT to [id number] suction  · Repeat CXR in AM  · Follow pleural fluid analysis including cultures and cytology  · May need TPA instillation and/or Thoracic surgery evaluation for VATS - though severe kyphosis complicates any surgical interventions    5  Sepsis - POA secondary to pneumonia and effusion    6  Severe protein calorie malnutrition - Per primary service, nutrition  · Age appropriate cancer screening    7  Severe osteoporosis with kyphosis    8  Chronic opioid and benzodiazepine dependence    History of Present Illness   HPI:  Ozzy Gallagher is a 57 y o  male who has history of severe kyphosis with osteoporosis and thoracic compression fractures and subsequent chronic pain with chronic opioid and benzodiazepine dependence presented for increased left sided pleuritic chest pain and dyspnea  He reports having productive cough for past few months (2+ per his report) with yellow/green sputum  He noted intermittent right sided chest discomfort but felt that was related to his chronic pain  He noted left sided pleurisy and dyspnea starting Friday and progressing thru the weekend  He reported poor sleep and inability to find position of comfort and prompted ED evaluation    He reported being given zpack which he completed last week without improvement in sputum production  He denied rigors, no abdominal pain, no recent gross aspiration events or dysphagia  He did feel he awoke with brackish taste in his mouth a few months ago and may have aspirated at that time  He reported some sensation of night sweats past few months  He reported always being "thin" but could not quantify weight loss  He denied prior history of lung disorders but did have pneumonia in 1970's  He lives alone and performs all his ADLs independently  He has noted increased LE edema but could not quantify over what period of time  He denied LE pain  He denied changes in bowel habits  I was contacted by Dr Bhakti Jean while patient was in ED  Images reviewed and give loculation I recommended IR CT placement for drainage  He has had 10Fr Chest tube placed by IR - bloody fluid reported, quantity not reported  Review of systems:  12 point review of systems was completed and was otherwise negative except as listed in HPI  Historical Information   Past Medical History:   Diagnosis Date    History of spinal fracture     Kyphosis     Loss of appetite     Loss of weight     Osteoporosis      History reviewed  No pertinent surgical history  History reviewed  No pertinent family history   denied any family history of respiratory disorders    Occupational History: retired     Social History: lifelong nonsmoker, lives alone, no pets or exotic animal exposures    Meds/Allergies   Current Facility-Administered Medications   Medication Dose Route Frequency    acetaminophen (TYLENOL) tablet 650 mg  650 mg Oral Q6H PRN    albuterol inhalation solution 2 5 mg  2 5 mg Nebulization Q6H PRN    calcium carbonate-vitamin D (OSCAL-D) 500 mg-200 units per tablet 1 tablet  1 tablet Oral BID With Meals    docusate sodium (COLACE) capsule 100 mg  100 mg Oral BID    enoxaparin (LOVENOX) subcutaneous injection 40 mg  40 mg Subcutaneous Daily    [START ON 11/7/2017] fentaNYL (DURAGESIC) 50 mcg/hr TD 72 hr patch 1 patch  1 patch Transdermal Q48H    HYDROcodone-acetaminophen (NORCO) 5-325 mg per tablet 1 tablet  1 tablet Oral Q6H PRN    HYDROmorphone (DILAUDID) 1 mg/mL injection 0 5 mg  0 5 mg Intravenous Q3H PRN    [START ON 11/7/2017] levofloxacin (LEVAQUIN) tablet 750 mg  750 mg Oral Q24H    metroNIDAZOLE (FLAGYL) IVPB (premix) 500 mg  500 mg Intravenous Q8H    morphine (PF) 4 mg/mL injection 4 mg  4 mg Intravenous Q4H PRN    ondansetron (ZOFRAN) injection 4 mg  4 mg Intravenous Q6H PRN    sodium chloride (PF) 0 9 % injection 3 mL  3 mL Intravenous PRN    sodium chloride 0 9 % infusion  125 mL/hr Intravenous Continuous     Prescriptions Prior to Admission   Medication    Calcium Carbonate-Vitamin D (CALCIUM 500 + D PO)    ciprofloxacin-dexamethasone (CIPRODEX) otic suspension    docusate sodium (COLACE) 100 mg capsule    fentaNYL (DURAGESIC) 50 mcg/hr    HYDROcodone-acetaminophen (ZAMCET)  MG/15ML solution    ofloxacin (OCUFLOX) 0 3 % ophthalmic solution    Omega-3 Fatty Acids (FISH OIL PO)     Allergies   Allergen Reactions    Asa [Aspirin]     Carbapenems     Cephalosporins     Penicillins        Vitals: Blood pressure 155/76, pulse 93, temperature 97 7 °F (36 5 °C), temperature source Oral, resp  rate 18, height 5' 9 6" (1 768 m), weight 60 kg (132 lb 4 4 oz), SpO2 98 % , 2LNC, Body mass index is 19 2 kg/m²        Intake/Output Summary (Last 24 hours) at 11/06/17 1653  Last data filed at 11/06/17 1224   Gross per 24 hour   Intake             1000 ml   Output                0 ml   Net             1000 ml       Physical Exam  General: Frail cachetic male appearing older than stated age, severe kyphosis awake alert and oriented x 3, conversant without conversational dyspnea  HEENT:  PERRL, Sclera noninjected, nonicteric OU, Mucous membranes moist, aphthous ulcer on lower lip, normal dentition  NECK: Trachea midline, no accessory muscle use, no stridor, no cervical or supraclavicular adenopathy, JVP not elevated  CARDIAC: Reg, single s1/2, intermittent S3 vs split S2, no gross murmurs appreciated  PULM: diminished BS left lower 1/2 hemithorax, right lung clear, no wheeze, left CT in place on suction, no output, no air leak  CHEST: severe kyphosis  ABD: Normoactive bowel sounds, soft nontender, nondistended, no rebound, no rigidity, no guarding  EXT: No cyanosis, no clubbing, 2+ edema to knees b/l with CVS changes  SKIN:  CVS changes with lichenification on LEs as listed above  NEURO: no focal neurologic deficits, moving all extremities appropriately    Labs: I have personally reviewed pertinent lab results  Results from last 7 days  Lab Units 11/06/17  1043 11/06/17  1037   WBC Thousand/uL  --  36 62*   HEMOGLOBIN g/dL  --  12 9   I STAT HEMOGLOBIN g/dl 13 9  --    HEMATOCRIT %  --  38 4   PLATELETS Thousands/uL  --  597*   MONO PCT MAN %  --  4      Results from last 7 days  Lab Units 11/06/17  1043 11/06/17  1037   SODIUM mmol/L  --  135*   POTASSIUM mmol/L  --  4 3   CHLORIDE mmol/L  --  96*   CO2 mmol/L  --  27   BUN mg/dL  --  24   CREATININE mg/dL  --  1 05   CALCIUM mg/dL  --  9 5   TOTAL PROTEIN g/dL  --  7 9   BILIRUBIN TOTAL mg/dL  --  0 80   ALK PHOS U/L  --  113   ALT U/L  --  23   AST U/L  --  19   GLUCOSE RANDOM mg/dL  --  139   GLUCOSE, ISTAT mg/dl 144*  --        Results from last 7 days  Lab Units 11/06/17  1037   MAGNESIUM mg/dL 2 3            Results from last 7 days  Lab Units 11/06/17  1037   INR  1 22*   PTT seconds 37*       Results from last 7 days  Lab Units 11/06/17  1125   LACTIC ACID mmol/L 1 1       0  Lab Value Date/Time   TROPONINI <0 02 11/06/2017 1037     Pleural fluid analysis pending - bloody turbid fluid reported     ABG 7 42/40/69    Imaging and other studies: I have personally reviewed pertinent reports     and I have personally reviewed pertinent films in PACS   CXR - images personally reviewed - noted kyphoscoliosis with large left sided pleural effusion and ASD, post IR chest tube in place w/o significant drainage of fluid - no PTX    CT chest - images personally reviewed - left basilar infiltrate and component of atelectasis with loculated left pleural effusion, right hemithorax clear, severe kyphosis with numerous compression fractures, liver and renal cysts    Pulmonary function testing: none available    EKG, Pathology, and Other Studies: I have personally reviewed pertinent reports  and I have personally reviewed pertinent films in PACS  TTE pending    Code Status: Level 1 - Full Code      Eros Delatorre DO, [facility]

## 2017-11-06 NOTE — SEDATION DOCUMENTATION
10 f chest tube inserted into L pneumo  Will attempt to send for all labs as ordered, however, very little fluid collected from sample as it is very loculated  Will contact lab as how to best to proceed with samples provide all the labs ordered

## 2017-11-06 NOTE — ED PROVIDER NOTES
History  Chief Complaint   Patient presents with    Shortness of Breath     Patient states he has pain when he takes a deep breath  Has been coughing up green phlem  Has been going on for a f ew days      Pt presents with chief complaint of L anterior/axillary chest pain that is constant in nature, increases with inspiration, nonproductive cough, night sweats, weakness            Prior to Admission Medications   Prescriptions Last Dose Informant Patient Reported? Taking? Calcium Carbonate-Vitamin D (CALCIUM 500 + D PO)   Yes Yes   Sig: Take by mouth   HYDROcodone-acetaminophen (ZAMCET)  MG/15ML solution   Yes Yes   Sig: Take by mouth every 6 (six) hours as needed for moderate pain   Omega-3 Fatty Acids (FISH OIL PO)   Yes Yes   Sig: Take by mouth   ciprofloxacin-dexamethasone (CIPRODEX) otic suspension   Yes Yes   Si drops 2 (two) times a day   docusate sodium (COLACE) 100 mg capsule   Yes Yes   Sig: Take 100 mg by mouth 2 (two) times a day   fentaNYL (DURAGESIC) 50 mcg/hr   Yes Yes   Sig: Place 1 patch on the skin every third day   ofloxacin (OCUFLOX) 0 3 % ophthalmic solution   Yes Yes   Si drop 4 (four) times a day      Facility-Administered Medications: None       Past Medical History:   Diagnosis Date    History of spinal fracture     Kyphosis     Loss of appetite     Loss of weight     Osteoporosis        History reviewed  No pertinent surgical history  History reviewed  No pertinent family history  I have reviewed and agree with the history as documented  Social History   Substance Use Topics    Smoking status: Never Smoker    Smokeless tobacco: Never Used    Alcohol use No        Review of Systems   Constitutional: Positive for chills, fever and unexpected weight change  Respiratory: Positive for cough and shortness of breath  Cardiovascular: Positive for chest pain  Gastrointestinal: Positive for nausea         Physical Exam  ED Triage Vitals   Temperature Pulse Respirations Blood Pressure SpO2   11/06/17 1032 11/06/17 1032 11/06/17 1032 11/06/17 1045 11/06/17 1045   99 6 °F (37 6 °C) (!) 109 22 (!) 194/81 99 %      Temp Source Heart Rate Source Patient Position - Orthostatic VS BP Location FiO2 (%)   11/06/17 1032 11/06/17 1032 11/06/17 1045 11/06/17 1045 --   Oral Monitor Lying Left arm       Pain Score       11/06/17 1032       Worst Possible Pain           Orthostatic Vital Signs  Vitals:    11/06/17 1130 11/06/17 1145 11/06/17 1200 11/06/17 1215   BP: 155/72 152/84 154/77    Pulse: 100 100 100 101   Patient Position - Orthostatic VS: Lying Lying Lying        Physical Exam   Constitutional: He is oriented to person, place, and time  He appears well-developed and well-nourished  HENT:   Head: Normocephalic and atraumatic  Eyes: Conjunctivae and EOM are normal  Pupils are equal, round, and reactive to light  Neck: Normal range of motion  Neck supple  Cardiovascular: Normal rate, regular rhythm, normal heart sounds and intact distal pulses  Exam reveals no gallop and no friction rub  No murmur heard  Pulmonary/Chest: Effort normal and breath sounds normal          He exhibits deformity  Abdominal: Soft  He exhibits no distension  There is no tenderness  Musculoskeletal: Normal range of motion  He exhibits no edema  Neurological: He is alert and oriented to person, place, and time  Skin: Skin is warm and dry  Capillary refill takes less than 2 seconds  Nursing note and vitals reviewed        ED Medications  Medications   sodium chloride (PF) 0 9 % injection 3 mL (not administered)   sodium chloride 0 9 % bolus 1,000 mL (0 mL Intravenous Stopped 11/6/17 1224)     Followed by   sodium chloride 0 9 % bolus 1,000 mL (1,000 mL Intravenous New Bag 11/6/17 1225)   morphine (PF) 4 mg/mL injection 4 mg (4 mg Intravenous Given 11/6/17 1050)   levofloxacin (LEVAQUIN) IVPB (premix) 750 mg (750 mg Intravenous New Bag 11/6/17 1051)   ondansetron (ZOFRAN) injection 4 mg (4 mg Intravenous Given 11/6/17 1047)   HYDROmorphone (DILAUDID) 1 mg/mL injection 0 5 mg (0 5 mg Intravenous Given 11/6/17 1108)   acetaminophen (TYLENOL) tablet 650 mg (650 mg Oral Given 11/6/17 1138)   HYDROmorphone (DILAUDID) 1 mg/mL injection 1 mg (1 mg Intravenous Given 11/6/17 1225)       Diagnostic Studies  Results Reviewed     Procedure Component Value Units Date/Time    Lactic acid x2 [[id number]]  (Normal) Collected:  11/06/17 1125    Lab Status:  Final result Specimen:  Blood from Arm, Left Updated:  11/06/17 1202     LACTIC ACID 1 1 mmol/L     Narrative:         Result may be elevated if tourniquet was used during collection  CBC and differential [[id number]]  (Abnormal) Collected:  11/06/17 1037    Lab Status:  Final result Specimen:  Blood from Arm, Right Updated:  11/06/17 1123     WBC 36 62 (HH) Thousand/uL      RBC 4 30 Million/uL      Hemoglobin 12 9 g/dL      Hematocrit 38 4 %      MCV 89 fL      MCH 30 0 pg      MCHC 33 6 g/dL      RDW 13 9 %      MPV 8 3 (L) fL      Platelets 576 (H) Thousands/uL     Lactic acid x2 [[id number]]  (Abnormal) Collected:  11/06/17 1037    Lab Status:  Final result Specimen:  Blood from Arm, Right Updated:  11/06/17 1115     LACTIC ACID 2 1 (HH) mmol/L     Narrative:         Result may be elevated if tourniquet was used during collection  NT-BNP PRO [[id number]]  (Abnormal) Collected:  11/06/17 1037    Lab Status:  Final result Specimen:  Blood from Arm, Right Updated:  11/06/17 1115     NT-proBNP 383 (H) pg/mL     Protime-INR [[id number]]  (Abnormal) Collected:  11/06/17 1037    Lab Status:  Final result Specimen:  Blood from Arm, Right Updated:  11/06/17 1112     Protime 15 2 (H) seconds      INR 1 22 (H)    APTT [[id number]]  (Abnormal) Collected:  11/06/17 1037    Lab Status:  Final result Specimen:  Blood from Arm, Right Updated:  11/06/17 1112     PTT 37 (H) seconds     Narrative:          Therapeutic Heparin Range = 60-90 seconds    Troponin I [[id number]] (Normal) Collected:  11/06/17 1037    Lab Status:  Final result Specimen:  Blood from Arm, Right Updated:  11/06/17 1111     Troponin I <0 02 ng/mL     Narrative:         [employer] Chemistry analyzer 99% cutoff is > 0 04 ng/mL in network labs    o cTnI 99% cutoff is useful only when applied to patients in the clinical setting of myocardial ischemia  o cTnI 99% cutoff should be interpreted in the context of clinical history, ECG findings and possibly cardiac imaging to establish correct diagnosis  o cTnI 99% cutoff may be suggestive but clearly not indicative of a coronary event without the clinical setting of myocardial ischemia  Comprehensive metabolic panel [[id number]]  (Abnormal) Collected:  11/06/17 1037    Lab Status:  Final result Specimen:  Blood from Arm, Right Updated:  11/06/17 1109     Sodium 135 (L) mmol/L      Potassium 4 3 mmol/L      Chloride 96 (L) mmol/L      CO2 27 mmol/L      Anion Gap 12 mmol/L      BUN 24 mg/dL      Creatinine 1 05 mg/dL      Glucose 139 mg/dL      Calcium 9 5 mg/dL      AST 19 U/L      ALT 23 U/L      Alkaline Phosphatase 113 U/L      Total Protein 7 9 g/dL      Albumin 3 2 (L) g/dL      Total Bilirubin 0 80 mg/dL      eGFR 76 ml/min/1 73sq m     Narrative:         National Kidney Disease Education Program recommendations are as follows:  GFR calculation is accurate only with a steady state creatinine  Chronic Kidney disease less than 60 ml/min/1 73 sq  meters  Kidney failure less than 15 ml/min/1 73 sq  meters  Magnesium [[id number]]  (Normal) Collected:  11/06/17 1037    Lab Status:  Final result Specimen:  Blood from Arm, Right Updated:  11/06/17 1109     Magnesium 2 3 mg/dL     Blood culture #2 [[id number]] Collected:  11/06/17 1037    Lab Status: In process Specimen:  Blood from Arm, Left Updated:  11/06/17 1049    Blood culture #1 [[id number]] Collected:  11/06/17 1038    Lab Status:   In process Specimen:  Blood from Arm, Right Updated:  11/06/17 1049    POCT Chem 8+ [[id number]]  (Abnormal) Collected:  11/06/17 1043    Lab Status:  Final result Updated:  11/06/17 1049     SODIUM, I-STAT 134 (L) mmol/l      Potassium, i-STAT 4 1 mmol/L      Chloride, istat 96 (L) mmol/L      CO2, i-STAT 29 mmol/L      Anion Gap, Istat 14 (H) mmol/L      Calcium, Ionized i-STAT 1 21 mmol/L      BUN, I-STAT 24 mg/dl      Creatinine, i-STAT 0 9 mg/dl      eGFR 91 ml/min/1 73sq m      Glucose, i-STAT 144 (H) mg/dl      Hct, i-STAT 41 %      Hgb, i-STAT 13 9 g/dl      Specimen Type VENOUS    UA w Reflex to Microscopic w Reflex to Culture [[id number]]     Lab Status:  No result Specimen:  Urine from Urine, Clean Catch     Sputum culture and Gram stain [[id number]]     Lab Status:  No result Specimen:  Sputum                  XR chest portable - 1 view    (Results Pending)   CT chest with contrast    (Results Pending)              Procedures  Procedures       Phone Contacts  ED Phone Contact    ED Course  ED Course as of Nov 06 1242   [facility] Nov 06, 2017   1047 PROCEDURE  ECG 12 Lead Documentation  11/6/2017,Time:1032  ECG reviewed by Antonieta HERNANDEZ  Interpretation: ST    QRS 90  QTc 425  ST elevation none  ST depression none  Pacing none                                MDM  Number of Diagnoses or Management Options  Diagnosis management comments: Large LLL pna, possible effusion/loculation, ct chest to determine extent of pna, ? massess  Wbc 36    No hx ca but did have a Multiple myeloma r/o in past   Admit to medicine   Consult pulmonary  Records received from pcp       Amount and/or Complexity of Data Reviewed  Clinical lab tests: reviewed and ordered  Tests in the radiology section of CPT®: ordered and reviewed  Decide to obtain previous medical records or to obtain history from someone other than the patient: yes    Patient Progress  Patient progress: improved    CritCare Time    Disposition  Final diagnoses:   Pneumonia   Leukocytosis, unspecified type   Osteoporosis     Time reflects when diagnosis was documented in both MDM as applicable and the Disposition within this note     Time User Action Codes Description Comment    11/6/2017 12:31 PM Radha Pierce Add [J18 9] Pneumonia     11/6/2017 12:31 PM Radha Pierce Modify [J18 9] Pneumonia     11/6/2017 12:31 PM Radha Pierce Modify [J18 9] Pneumonia     11/6/2017 12:32 PM Radha Gamboa Add [D72 829] Leukocytosis, unspecified type     11/6/2017 12:32 PM Radha Pierce Modify [D72 829] Leukocytosis, unspecified type     11/6/2017 12:38 PM Radha Pierce Add [M81 0] Osteoporosis     11/6/2017 12:38 PM Radha Pierce Modify [M81 0] Osteoporosis     11/6/2017 12:38 PM Nilam Gamboa Modify [M81 0] Osteoporosis       ED Disposition     ED Disposition Condition Comment    Admit  Case was discussed with Cooper and the patient's admission status was agreed to be Admission Status: inpatient status to the service of Dr Yamileth Todd   Follow-up Information    None       Patient's Medications   Discharge Prescriptions    No medications on file     No discharge procedures on file      ED Provider  Electronically Signed by           MARY Whitfield  11/06/17 [id number], Noam Hunter  11/06/17 1249

## 2017-11-06 NOTE — BRIEF OP NOTE (RAD/CATH)
Left Chest Tube Placement Procedure Note    PATIENT NAME: Alice Grove  : 1955  MRN: [id number]     Pre-op Diagnosis:   1  Pneumonia    2  Leukocytosis, unspecified type    3  Osteoporosis    4  Edema extremities      Post-op Diagnosis:   1  Pneumonia    2  Leukocytosis, unspecified type    3  Osteoporosis    4  Edema extremities        Surgeon:   Jaime Seth DO  Assistants:     No qualified resident was available  Estimated Blood Loss: None  Findings: Complex loculated/septated left pleural effusion  10F pigtail chest tube placed  Connected to Pleurovac  Specimens: Bloody left pleural fluid  Complications:  None apparent      Anesthesia: Local    Jaime Seth DO     Date: 2017  Time: 3:28 PM

## 2017-11-07 ENCOUNTER — APPOINTMENT (INPATIENT)
Dept: RADIOLOGY | Facility: HOSPITAL | Age: 62
DRG: 871 | End: 2017-11-07
Payer: MEDICARE

## 2017-11-07 ENCOUNTER — HOSPITAL ENCOUNTER (INPATIENT)
Facility: HOSPITAL | Age: 62
LOS: 15 days | Discharge: NURSING, REHAB OR LONG-TERM CARE | DRG: 853 | End: 2017-11-22
Attending: INTERNAL MEDICINE | Admitting: INTERNAL MEDICINE
Payer: MEDICARE

## 2017-11-07 ENCOUNTER — APPOINTMENT (INPATIENT)
Dept: NON INVASIVE DIAGNOSTICS | Facility: HOSPITAL | Age: 62
DRG: 871 | End: 2017-11-07
Payer: MEDICARE

## 2017-11-07 ENCOUNTER — GENERIC CONVERSION - ENCOUNTER (OUTPATIENT)
Dept: OTHER | Facility: OTHER | Age: 62
End: 2017-11-07

## 2017-11-07 ENCOUNTER — APPOINTMENT (INPATIENT)
Dept: RADIOLOGY | Facility: HOSPITAL | Age: 62
DRG: 853 | End: 2017-11-07
Payer: MEDICARE

## 2017-11-07 VITALS
BODY MASS INDEX: 18.94 KG/M2 | HEIGHT: 70 IN | SYSTOLIC BLOOD PRESSURE: 152 MMHG | OXYGEN SATURATION: 95 % | WEIGHT: 132.28 LBS | DIASTOLIC BLOOD PRESSURE: 80 MMHG | TEMPERATURE: 98.4 F | RESPIRATION RATE: 18 BRPM | HEART RATE: 170 BPM

## 2017-11-07 DIAGNOSIS — R60.9 EDEMA: ICD-10-CM

## 2017-11-07 DIAGNOSIS — I48.91 ATRIAL FIBRILLATION WITH RVR (HCC): ICD-10-CM

## 2017-11-07 DIAGNOSIS — F32.A DEPRESSION: ICD-10-CM

## 2017-11-07 DIAGNOSIS — R60.0 EDEMA EXTREMITIES: ICD-10-CM

## 2017-11-07 DIAGNOSIS — J90 LOCULATED PLEURAL EFFUSION: Primary | ICD-10-CM

## 2017-11-07 DIAGNOSIS — L60.2 LONG TOENAIL: ICD-10-CM

## 2017-11-07 DIAGNOSIS — R45.851 SUICIDAL IDEATIONS: ICD-10-CM

## 2017-11-07 DIAGNOSIS — E43 SEVERE PROTEIN-CALORIE MALNUTRITION (HCC): Chronic | ICD-10-CM

## 2017-11-07 DIAGNOSIS — J18.9 PNEUMONIA: ICD-10-CM

## 2017-11-07 DIAGNOSIS — D72.829 LEUKOCYTOSIS, UNSPECIFIED TYPE: ICD-10-CM

## 2017-11-07 PROBLEM — L40.9 PSORIASIS: Chronic | Status: ACTIVE | Noted: 2017-11-07

## 2017-11-07 PROBLEM — R33.9 URINARY RETENTION: Status: ACTIVE | Noted: 2017-11-07

## 2017-11-07 PROBLEM — F11.20 CONTINUOUS OPIOID DEPENDENCE (HCC): Chronic | Status: ACTIVE | Noted: 2017-11-07

## 2017-11-07 PROBLEM — N39.8 VOIDING DYSFUNCTION: Chronic | Status: ACTIVE | Noted: 2017-11-07

## 2017-11-07 PROBLEM — R31.9 HEMATURIA: Status: ACTIVE | Noted: 2017-11-07

## 2017-11-07 PROBLEM — G89.29 OTHER CHRONIC PAIN: Chronic | Status: ACTIVE | Noted: 2017-11-07

## 2017-11-07 LAB
25(OH)D3 SERPL-MCNC: 31.9 NG/ML (ref 30–100)
ALBUMIN SERPL BCP-MCNC: 2.3 G/DL (ref 3.5–5)
ALP SERPL-CCNC: 82 U/L (ref 46–116)
ALT SERPL W P-5'-P-CCNC: 17 U/L (ref 12–78)
ANION GAP SERPL CALCULATED.3IONS-SCNC: 8 MMOL/L (ref 4–13)
AST SERPL W P-5'-P-CCNC: 11 U/L (ref 5–45)
ATRIAL RATE: 113 BPM
BILIRUB SERPL-MCNC: 0.8 MG/DL (ref 0.2–1)
BUN SERPL-MCNC: 17 MG/DL (ref 5–25)
CALCIUM SERPL-MCNC: 9.3 MG/DL (ref 8.3–10.1)
CHLORIDE SERPL-SCNC: 101 MMOL/L (ref 100–108)
CO2 SERPL-SCNC: 27 MMOL/L (ref 21–32)
CREAT SERPL-MCNC: 0.75 MG/DL (ref 0.6–1.3)
ERYTHROCYTE [DISTWIDTH] IN BLOOD BY AUTOMATED COUNT: 13.9 % (ref 11.6–15.1)
FLUAV AG SPEC QL: NORMAL
FLUBV AG SPEC QL: NORMAL
GFR SERPL CREATININE-BSD FRML MDRD: 98 ML/MIN/1.73SQ M
GLUCOSE SERPL-MCNC: 115 MG/DL (ref 65–140)
HCT VFR BLD AUTO: 37.5 % (ref 36.5–49.3)
HGB BLD-MCNC: 12.5 G/DL (ref 12–17)
KAPPA LC FREE SER-MCNC: 19.2 MG/L (ref 3.3–19.4)
KAPPA LC FREE/LAMBDA FREE SER: 1.22 {RATIO} (ref 0.26–1.65)
LAMBDA LC FREE SERPL-MCNC: 15.8 MG/L (ref 5.7–26.3)
MCH RBC QN AUTO: 29.6 PG (ref 26.8–34.3)
MCHC RBC AUTO-ENTMCNC: 33.3 G/DL (ref 31.4–37.4)
MCV RBC AUTO: 89 FL (ref 82–98)
P AXIS: 52 DEGREES
PHOSPHATE SERPL-MCNC: 2.7 MG/DL (ref 2.3–4.1)
PLATELET # BLD AUTO: 556 THOUSANDS/UL (ref 149–390)
PMV BLD AUTO: 8.7 FL (ref 8.9–12.7)
POTASSIUM SERPL-SCNC: 4.3 MMOL/L (ref 3.5–5.3)
PR INTERVAL: 128 MS
PROT SERPL-MCNC: 6.9 G/DL (ref 6.4–8.2)
PSA SERPL-MCNC: 1.4 NG/ML (ref 0–4)
PTH-INTACT SERPL-MCNC: 15.5 PG/ML (ref 14–72)
QRS AXIS: 63 DEGREES
QRSD INTERVAL: 90 MS
QT INTERVAL: 310 MS
QTC INTERVAL: 425 MS
RBC # BLD AUTO: 4.23 MILLION/UL (ref 3.88–5.62)
RSV B RNA SPEC QL NAA+PROBE: NORMAL
S PNEUM AG UR QL: NEGATIVE
SODIUM SERPL-SCNC: 136 MMOL/L (ref 136–145)
T WAVE AXIS: 59 DEGREES
VENTRICULAR RATE: 113 BPM
WBC # BLD AUTO: 26.92 THOUSAND/UL (ref 4.31–10.16)

## 2017-11-07 PROCEDURE — 87798 DETECT AGENT NOS DNA AMP: CPT | Performed by: INTERNAL MEDICINE

## 2017-11-07 PROCEDURE — 80053 COMPREHEN METABOLIC PANEL: CPT | Performed by: INTERNAL MEDICINE

## 2017-11-07 PROCEDURE — 84100 ASSAY OF PHOSPHORUS: CPT | Performed by: INTERNAL MEDICINE

## 2017-11-07 PROCEDURE — 71010 HB CHEST X-RAY 1 VIEW FRONTAL (PORTABLE): CPT

## 2017-11-07 PROCEDURE — 83970 ASSAY OF PARATHORMONE: CPT | Performed by: INTERNAL MEDICINE

## 2017-11-07 PROCEDURE — 93306 TTE W/DOPPLER COMPLETE: CPT

## 2017-11-07 PROCEDURE — 82306 VITAMIN D 25 HYDROXY: CPT | Performed by: INTERNAL MEDICINE

## 2017-11-07 PROCEDURE — 85027 COMPLETE CBC AUTOMATED: CPT | Performed by: INTERNAL MEDICINE

## 2017-11-07 PROCEDURE — 93005 ELECTROCARDIOGRAM TRACING: CPT | Performed by: INTERNAL MEDICINE

## 2017-11-07 PROCEDURE — G0103 PSA SCREENING: HCPCS | Performed by: INTERNAL MEDICINE

## 2017-11-07 PROCEDURE — 3E0L3GC INTRODUCTION OF OTHER THERAPEUTIC SUBSTANCE INTO PLEURAL CAVITY, PERCUTANEOUS APPROACH: ICD-10-PCS | Performed by: INTERNAL MEDICINE

## 2017-11-07 RX ORDER — VANCOMYCIN HYDROCHLORIDE 1 G/200ML
15 INJECTION, SOLUTION INTRAVENOUS EVERY 12 HOURS
Status: DISCONTINUED | OUTPATIENT
Start: 2017-11-07 | End: 2017-11-08

## 2017-11-07 RX ORDER — MORPHINE SULFATE 4 MG/ML
4 INJECTION, SOLUTION INTRAMUSCULAR; INTRAVENOUS EVERY 4 HOURS PRN
Status: DISCONTINUED | OUTPATIENT
Start: 2017-11-07 | End: 2017-11-08

## 2017-11-07 RX ORDER — METAPROTERENOL SULFATE 10 MG
TABLET ORAL
COMMUNITY
End: 2017-11-22 | Stop reason: HOSPADM

## 2017-11-07 RX ORDER — ACETAMINOPHEN 325 MG/1
650 TABLET ORAL EVERY 6 HOURS PRN
Status: CANCELLED | OUTPATIENT
Start: 2017-11-07

## 2017-11-07 RX ORDER — DIAZEPAM 5 MG/1
5 TABLET ORAL
Status: DISCONTINUED | OUTPATIENT
Start: 2017-11-07 | End: 2017-11-08

## 2017-11-07 RX ORDER — POLYETHYLENE GLYCOL 3350 17 G/17G
17 POWDER, FOR SOLUTION ORAL DAILY PRN
Status: DISCONTINUED | OUTPATIENT
Start: 2017-11-07 | End: 2017-11-22 | Stop reason: HOSPADM

## 2017-11-07 RX ORDER — METRONIDAZOLE 500 MG/1
500 TABLET ORAL EVERY 8 HOURS SCHEDULED
Status: DISCONTINUED | OUTPATIENT
Start: 2017-11-07 | End: 2017-11-19

## 2017-11-07 RX ORDER — TAMSULOSIN HYDROCHLORIDE 0.4 MG/1
0.4 CAPSULE ORAL
Status: CANCELLED | OUTPATIENT
Start: 2017-11-07

## 2017-11-07 RX ORDER — DILTIAZEM HYDROCHLORIDE 5 MG/ML
15 INJECTION INTRAVENOUS ONCE
Status: COMPLETED | OUTPATIENT
Start: 2017-11-07 | End: 2017-11-07

## 2017-11-07 RX ORDER — CLOBETASOL PROPIONATE 0.5 MG/G
1 CREAM TOPICAL EVERY 12 HOURS SCHEDULED
Status: DISCONTINUED | OUTPATIENT
Start: 2017-11-07 | End: 2017-11-22 | Stop reason: HOSPADM

## 2017-11-07 RX ORDER — DIAZEPAM 5 MG/1
5 TABLET ORAL
Status: CANCELLED | OUTPATIENT
Start: 2017-11-07

## 2017-11-07 RX ORDER — POLYETHYLENE GLYCOL 3350 17 G/17G
17 POWDER, FOR SOLUTION ORAL DAILY PRN
Status: DISCONTINUED | OUTPATIENT
Start: 2017-11-07 | End: 2017-11-07 | Stop reason: HOSPADM

## 2017-11-07 RX ORDER — FENTANYL 50 UG/H
1 PATCH TRANSDERMAL
Status: DISCONTINUED | OUTPATIENT
Start: 2017-11-09 | End: 2017-11-22 | Stop reason: HOSPADM

## 2017-11-07 RX ORDER — MORPHINE SULFATE 4 MG/ML
4 INJECTION, SOLUTION INTRAMUSCULAR; INTRAVENOUS EVERY 4 HOURS PRN
Status: CANCELLED | OUTPATIENT
Start: 2017-11-07

## 2017-11-07 RX ORDER — CLOBETASOL PROPIONATE 0.5 MG/G
CREAM TOPICAL 2 TIMES DAILY
Status: CANCELLED | OUTPATIENT
Start: 2017-11-07

## 2017-11-07 RX ORDER — 0.9 % SODIUM CHLORIDE 0.9 %
3 VIAL (ML) INJECTION AS NEEDED
Status: CANCELLED | OUTPATIENT
Start: 2017-11-07

## 2017-11-07 RX ORDER — VANCOMYCIN HYDROCHLORIDE 1 G/200ML
15 INJECTION, SOLUTION INTRAVENOUS EVERY 12 HOURS
Status: CANCELLED | OUTPATIENT
Start: 2017-11-07

## 2017-11-07 RX ORDER — METRONIDAZOLE 250 MG/1
500 TABLET ORAL EVERY 8 HOURS SCHEDULED
Status: CANCELLED | OUTPATIENT
Start: 2017-11-07

## 2017-11-07 RX ORDER — AMOXICILLIN 250 MG
1 CAPSULE ORAL ONCE
Status: COMPLETED | OUTPATIENT
Start: 2017-11-07 | End: 2017-11-07

## 2017-11-07 RX ORDER — ALBUTEROL SULFATE 2.5 MG/3ML
2.5 SOLUTION RESPIRATORY (INHALATION) EVERY 6 HOURS PRN
Status: CANCELLED | OUTPATIENT
Start: 2017-11-07

## 2017-11-07 RX ORDER — SODIUM CHLORIDE 9 MG/ML
125 INJECTION, SOLUTION INTRAVENOUS CONTINUOUS
Status: DISCONTINUED | OUTPATIENT
Start: 2017-11-07 | End: 2017-11-08

## 2017-11-07 RX ORDER — 0.9 % SODIUM CHLORIDE 0.9 %
3 VIAL (ML) INJECTION AS NEEDED
Status: DISCONTINUED | OUTPATIENT
Start: 2017-11-07 | End: 2017-11-22 | Stop reason: HOSPADM

## 2017-11-07 RX ORDER — AZTREONAM 2 G/1
2000 INJECTION, POWDER, LYOPHILIZED, FOR SOLUTION INTRAMUSCULAR; INTRAVENOUS EVERY 8 HOURS
Status: DISCONTINUED | OUTPATIENT
Start: 2017-11-07 | End: 2017-11-07

## 2017-11-07 RX ORDER — DOCUSATE SODIUM 100 MG/1
200 CAPSULE, LIQUID FILLED ORAL 2 TIMES DAILY
Status: DISCONTINUED | OUTPATIENT
Start: 2017-11-07 | End: 2017-11-22 | Stop reason: HOSPADM

## 2017-11-07 RX ORDER — HYDROCODONE BITARTRATE AND ACETAMINOPHEN 5; 325 MG/1; MG/1
1 TABLET ORAL EVERY 6 HOURS PRN
Status: CANCELLED | OUTPATIENT
Start: 2017-11-07

## 2017-11-07 RX ORDER — HYDROCODONE BITARTRATE AND ACETAMINOPHEN 5; 325 MG/1; MG/1
1 TABLET ORAL EVERY 6 HOURS PRN
Status: DISCONTINUED | OUTPATIENT
Start: 2017-11-07 | End: 2017-11-11

## 2017-11-07 RX ORDER — ALBUTEROL SULFATE 2.5 MG/3ML
2.5 SOLUTION RESPIRATORY (INHALATION) EVERY 6 HOURS PRN
Status: DISCONTINUED | OUTPATIENT
Start: 2017-11-07 | End: 2017-11-15

## 2017-11-07 RX ORDER — B-COMPLEX WITH VITAMIN C
1 TABLET ORAL 2 TIMES DAILY WITH MEALS
Status: CANCELLED | OUTPATIENT
Start: 2017-11-07

## 2017-11-07 RX ORDER — FENTANYL 50 UG/H
1 PATCH TRANSDERMAL
Status: CANCELLED | OUTPATIENT
Start: 2017-11-09

## 2017-11-07 RX ORDER — CIPROFLOXACIN AND DEXAMETHASONE 3; 1 MG/ML; MG/ML
4 SUSPENSION/ DROPS AURICULAR (OTIC) 2 TIMES DAILY
COMMUNITY
End: 2017-11-22 | Stop reason: HOSPADM

## 2017-11-07 RX ORDER — DOCUSATE SODIUM 100 MG/1
100 CAPSULE, LIQUID FILLED ORAL 2 TIMES DAILY
Status: CANCELLED | OUTPATIENT
Start: 2017-11-07

## 2017-11-07 RX ORDER — SODIUM CHLORIDE 9 MG/ML
125 INJECTION, SOLUTION INTRAVENOUS CONTINUOUS
Status: CANCELLED | OUTPATIENT
Start: 2017-11-07

## 2017-11-07 RX ORDER — POLYETHYLENE GLYCOL 3350 17 G/17G
17 POWDER, FOR SOLUTION ORAL DAILY PRN
Status: CANCELLED | OUTPATIENT
Start: 2017-11-07

## 2017-11-07 RX ORDER — ONDANSETRON 2 MG/ML
4 INJECTION INTRAMUSCULAR; INTRAVENOUS EVERY 6 HOURS PRN
Status: DISCONTINUED | OUTPATIENT
Start: 2017-11-07 | End: 2017-11-07

## 2017-11-07 RX ORDER — METRONIDAZOLE 250 MG/1
500 TABLET ORAL EVERY 8 HOURS SCHEDULED
Status: DISCONTINUED | OUTPATIENT
Start: 2017-11-07 | End: 2017-11-07 | Stop reason: HOSPADM

## 2017-11-07 RX ORDER — ONDANSETRON 2 MG/ML
4 INJECTION INTRAMUSCULAR; INTRAVENOUS EVERY 6 HOURS PRN
Status: CANCELLED | OUTPATIENT
Start: 2017-11-07

## 2017-11-07 RX ORDER — VANCOMYCIN HYDROCHLORIDE 1 G/200ML
15 INJECTION, SOLUTION INTRAVENOUS EVERY 12 HOURS
Status: DISCONTINUED | OUTPATIENT
Start: 2017-11-07 | End: 2017-11-07 | Stop reason: HOSPADM

## 2017-11-07 RX ORDER — B-COMPLEX WITH VITAMIN C
1 TABLET ORAL 2 TIMES DAILY WITH MEALS
Status: DISCONTINUED | OUTPATIENT
Start: 2017-11-07 | End: 2017-11-22 | Stop reason: HOSPADM

## 2017-11-07 RX ORDER — ACETAMINOPHEN 325 MG/1
650 TABLET ORAL EVERY 6 HOURS PRN
Status: DISCONTINUED | OUTPATIENT
Start: 2017-11-07 | End: 2017-11-11

## 2017-11-07 RX ORDER — TAMSULOSIN HYDROCHLORIDE 0.4 MG/1
0.4 CAPSULE ORAL
Status: DISCONTINUED | OUTPATIENT
Start: 2017-11-07 | End: 2017-11-22 | Stop reason: HOSPADM

## 2017-11-07 RX ORDER — CLOBETASOL PROPIONATE 0.5 MG/G
CREAM TOPICAL 2 TIMES DAILY
Status: DISCONTINUED | OUTPATIENT
Start: 2017-11-07 | End: 2017-11-07 | Stop reason: HOSPADM

## 2017-11-07 RX ORDER — TAMSULOSIN HYDROCHLORIDE 0.4 MG/1
0.4 CAPSULE ORAL
Status: DISCONTINUED | OUTPATIENT
Start: 2017-11-07 | End: 2017-11-07 | Stop reason: HOSPADM

## 2017-11-07 RX ORDER — OFLOXACIN 3 MG/ML
1 SOLUTION/ DROPS OPHTHALMIC 4 TIMES DAILY
COMMUNITY
End: 2017-11-22 | Stop reason: HOSPADM

## 2017-11-07 RX ADMIN — SODIUM CHLORIDE 125 ML/HR: 0.9 INJECTION, SOLUTION INTRAVENOUS at 08:59

## 2017-11-07 RX ADMIN — HYDROMORPHONE HYDROCHLORIDE 0.5 MG: 1 INJECTION, SOLUTION INTRAMUSCULAR; INTRAVENOUS; SUBCUTANEOUS at 21:34

## 2017-11-07 RX ADMIN — CALCIUM CARBONATE 500 MG (1,250 MG)-VITAMIN D3 200 UNIT TABLET 1 TABLET: at 08:01

## 2017-11-07 RX ADMIN — METRONIDAZOLE 500 MG: 250 TABLET ORAL at 13:35

## 2017-11-07 RX ADMIN — MORPHINE SULFATE 4 MG: 4 INJECTION INTRAVENOUS at 20:42

## 2017-11-07 RX ADMIN — ACETAMINOPHEN 325 MG: 325 TABLET ORAL at 01:31

## 2017-11-07 RX ADMIN — STANDARDIZED SENNA CONCENTRATE AND DOCUSATE SODIUM 1 TABLET: 8.6; 5 TABLET, FILM COATED ORAL at 12:38

## 2017-11-07 RX ADMIN — AZTREONAM 2000 MG: 2 INJECTION, POWDER, LYOPHILIZED, FOR SOLUTION INTRAMUSCULAR; INTRAVENOUS at 12:06

## 2017-11-07 RX ADMIN — FENTANYL 1 PATCH: 50 PATCH, EXTENDED RELEASE TRANSDERMAL at 08:01

## 2017-11-07 RX ADMIN — DOCUSATE SODIUM 200 MG: 100 CAPSULE, LIQUID FILLED ORAL at 18:27

## 2017-11-07 RX ADMIN — TAMSULOSIN HYDROCHLORIDE 0.4 MG: 0.4 CAPSULE ORAL at 18:27

## 2017-11-07 RX ADMIN — DOCUSATE SODIUM 100 MG: 100 CAPSULE, LIQUID FILLED ORAL at 08:01

## 2017-11-07 RX ADMIN — DIAZEPAM 5 MG: 5 TABLET ORAL at 23:46

## 2017-11-07 RX ADMIN — METRONIDAZOLE 500 MG: 500 INJECTION, SOLUTION INTRAVENOUS at 01:03

## 2017-11-07 RX ADMIN — DILTIAZEM HYDROCHLORIDE 15 MG: 5 INJECTION INTRAVENOUS at 15:31

## 2017-11-07 RX ADMIN — HYDROCODONE BITARTRATE AND ACETAMINOPHEN 1 TABLET: 5; 325 TABLET ORAL at 01:30

## 2017-11-07 RX ADMIN — MORPHINE SULFATE 4 MG: 4 INJECTION, SOLUTION INTRAMUSCULAR; INTRAVENOUS at 08:34

## 2017-11-07 RX ADMIN — ACETAMINOPHEN 650 MG: 325 TABLET, FILM COATED ORAL at 23:41

## 2017-11-07 RX ADMIN — DILTIAZEM HYDROCHLORIDE 5 MG/HR: 5 INJECTION INTRAVENOUS at 15:43

## 2017-11-07 RX ADMIN — VANCOMYCIN HYDROCHLORIDE 1000 MG: 1 INJECTION, SOLUTION INTRAVENOUS at 10:45

## 2017-11-07 RX ADMIN — ENOXAPARIN SODIUM 40 MG: 40 INJECTION SUBCUTANEOUS at 08:01

## 2017-11-07 RX ADMIN — CLOBETASOL PROPIONATE: 0.5 CREAM TOPICAL at 12:07

## 2017-11-07 RX ADMIN — SODIUM CHLORIDE 125 ML/HR: 0.9 INJECTION, SOLUTION INTRAVENOUS at 18:12

## 2017-11-07 RX ADMIN — MORPHINE SULFATE 4 MG: 4 INJECTION, SOLUTION INTRAMUSCULAR; INTRAVENOUS at 13:36

## 2017-11-07 RX ADMIN — CALCIUM CARBONATE 500 MG (1,250 MG)-VITAMIN D3 200 UNIT TABLET 1 TABLET: at 18:27

## 2017-11-07 RX ADMIN — DILTIAZEM HYDROCHLORIDE 12.5 MG/HR: 5 INJECTION INTRAVENOUS at 18:34

## 2017-11-07 NOTE — PLAN OF CARE
DISCHARGE PLANNING - CARE MANAGEMENT     Discharge to post-acute care or home with appropriate resources Progressing        Nutrition/Hydration-ADULT     Nutrient/Hydration intake appropriate for improving, restoring or maintaining nutritional needs Progressing        PAIN - ADULT     Verbalizes/displays adequate comfort level or baseline comfort level Progressing        RESPIRATORY - ADULT     Achieves optimal ventilation and oxygenation Progressing        SAFETY ADULT     Patient will remain free of falls Progressing     Maintain or return to baseline ADL function Progressing     Maintain or return mobility status to optimal level Progressing

## 2017-11-07 NOTE — EMTALA/ACUTE CARE TRANSFER
[address] [id number]  Dept: [phone]      ACUTE CARE TRANSFER CONSENT    NAME Julianne David                                         1955                              MRN [id number]    I have been informed of my rights regarding examination, treatment, and transfer   by Dr Jp Salcedo DO    Benefits:      Risks:        Transfer Request:  I acknowledge that my medical condition has been evaluated and explained to me by the treating physician or other qualified medical person and/or my attending physician who has recommended and offered to me further medical examination and treatment  I understand the Hospital's obligation with respect to the treatment and stabilization of my medical condition  I nevertheless request to be transferred  I release the Hospital, the doctor, and any other persons caring for me from all responsibility or liability for any injury or ill effects that may result from my transfer and agree to accept all responsibility for the consequences of my choice to transfer, rather than receive stabilizing treatment at the Hospital  I understand that because the transfer is my request, my insurance may not provide reimbursement for the services  The Hospital will assist and direct me and my family in how to make arrangements for transfer, but the hospital is not liable for any fees charged by the transport service  In spite of this understanding, I refuse to consent to further medical examination and treatment which has been offered to me, and request transfer to    I authorize the performance of emergency medical procedures and treatments upon me in both transit and upon arrival at the receiving facility  Additionally, I authorize the release of any and all medical records to the receiving facility and request they be transported with me, if possible      I authorize the performance of emergency medical procedures and treatments upon me in both transit and upon arrival at the receiving facility  Additionally, I authorize the release of any and all medical records to the receiving facility and request they be transported with me, if possible  I understand that the safest mode of transportation during a medical emergency is an ambulance and that the Hospital advocates the use of this mode of transport  Risks of traveling to the receiving facility by car, including absence of medical control, life sustaining equipment, such as oxygen, and medical personnel has been explained to me and I fully understand them  (TUNDE CORRECT BOX BELOW)  [  ]  I consent to the stated transfer and to be transported by ambulance/helicopter  [  ]  I consent to the stated transfer, but refuse transportation by ambulance and accept full responsibility for my transportation by car  I understand the risks of non-ambulance transfers and I exonerate the Hospital and its staff from any deterioration in my condition that results from this refusal     X___________________________________________    DATE  17  TIME________  Signature of patient or legally responsible individual signing on patient behalf           RELATIONSHIP TO PATIENT_________________________          Provider Certification    NAME Gaby Roy                                        [facility] 1955                              MRN [id number]    A medical screening exam was performed on the above named patient    Based on the examination:    Condition Necessitating Transfer loculated pleural effusion    Patient Condition:      Reason for Transfer:      Transfer Requirements: Facility     · Space available and qualified personnel available for treatment as acknowledged by    · Agreed to accept transfer and to provide appropriate medical treatment as acknowledged by          · Appropriate medical records of the examination and treatment of the patient are provided at the time of transfer   STAFF INITIAL WHEN COMPLETED _______  · Transfer will be performed by qualified personnel from    and appropriate transfer equipment as required, including the use of necessary and appropriate life support measures  Provider Certification: I have examined the patient and explained the following risks and benefits of being transferred/refusing transfer to the patient/family:         Based on these reasonable risks and benefits to the patient and/or the unborn child(doug), and based upon the information available at the time of the patients examination, I certify that the medical benefits reasonably to be expected from the provision of appropriate medical treatments at another medical facility outweigh the increasing risks, if any, to the individuals medical condition, and in the case of labor to the unborn child, from effecting the transfer      X_______________________HAILEY Martinez MD_____________________ DATE 11/07/17        TIME_______      ORIGINAL - SEND TO MEDICAL RECORDS   COPY - SEND WITH PATIENT DURING TRANSFER

## 2017-11-07 NOTE — PROGRESS NOTES
Ems here to transport [facility], when hooked to EMS monitor pt was noted to have a rapid heart rate with HR 170s-180s  bp 150s/80s  Pt offers no complaints, dr Kishan Galvan made aware  Received orders for cardizem bolus and gtt to be started prior to transport  Pt also placed on 2L O2 at that time

## 2017-11-07 NOTE — CONSULTS
Consultation - Urology   Valerie Vasquez 57 y o  male MRN: [id number]  Unit/Bed#: [address] [place] [address] 208-02 Encounter: [id number]      Assessment/Plan      Assessment:  1  Acute urinary retention - multifactorial in origin including constipation, narcotics, sedimentary and likely BPH   2  Microscopic hematuria   Plan:  1  Place farr catheter  This is the best option at this time regarding his current situation and upcoming transport to [facility] today  PVR is now 300 cc from 169 cc around 0830  He is uncomfortable  Constipation and narcotics present  2  Aggressive bowel regimen  Can either start this now or after transfer to [facility]  3  Start Flomax  4  Limit narcotic use if possible   5  Voiding trial after constipation is treated and patient is feeling clinically improved re pneumonia and chest tube  6  Encourage ambulation well clinically appropriate   7  Outpatient follow up for microscopic hematuria and BPH  No evidence of UTI  History of Present Illness   Attending: Armando Ho DO  Reason for Consult / Principal Problem: urinary retention   HPI: Valerie Vasquez is a 57y o  year old male who presents with difficulty urinating and microscopic hematuria  He reports difficulty urinating since his current hospital admission with pneumonia  His baseline voiding is tolerable but admits to frequency and nocturia  He has not seen Urology in "many years " He also admits to constipation, last bowel movement was three days ago  He is on chronic narcotics for musculoskeletal pain  He denies gross hematuria, prior retention and recurrent UTI       Inpatient consult to Urology  Consult performed by: Ritu Rivera ordered by: Rylee Talavera          Review of Systems as above     Historical Information   Past Medical History:   Diagnosis Date    History of spinal fracture     Kyphosis     Loss of appetite     Loss of weight     Osteoarthritis     Osteoporosis      Past Surgical History: Procedure Laterality Date    FINGER SURGERY      SHOULDER DEBRIDEMENT       Social History   History   Alcohol Use No     History   Drug Use No     History   Smoking Status    Never Smoker   Smokeless Tobacco    Never Used     Family History: non-contributory    Meds/Allergies   all current active meds have been reviewed and current meds:   Current Facility-Administered Medications   Medication Dose Route Frequency    acetaminophen (TYLENOL) tablet 650 mg  650 mg Oral Q6H PRN    albuterol inhalation solution 2 5 mg  2 5 mg Nebulization Q6H PRN    aztreonam (AZACTAM) 2,000 mg in sodium chloride 0 9 % 100 mL IVPB  2,000 mg Intravenous Q8H    calcium carbonate-vitamin D (OSCAL-D) 500 mg-200 units per tablet 1 tablet  1 tablet Oral BID With Meals    clobetasol (TEMOVATE) 0 05 % cream   Topical BID    diazepam (VALIUM) tablet 5 mg  5 mg Oral HS    docusate sodium (COLACE) capsule 100 mg  100 mg Oral BID    enoxaparin (LOVENOX) subcutaneous injection 40 mg  40 mg Subcutaneous Daily    fentaNYL (DURAGESIC) 50 mcg/hr TD 72 hr patch 1 patch  1 patch Transdermal Q48H    HYDROcodone-acetaminophen (NORCO) 5-325 mg per tablet 1 tablet  1 tablet Oral Q6H PRN    HYDROmorphone (DILAUDID) 1 mg/mL injection 0 5 mg  0 5 mg Intravenous Q3H PRN    metroNIDAZOLE (FLAGYL) tablet 500 mg  500 mg Oral Q8H [facility]    morphine (PF) 4 mg/mL injection 4 mg  4 mg Intravenous Q4H PRN    ondansetron (ZOFRAN) injection 4 mg  4 mg Intravenous Q6H PRN    polyethylene glycol (MIRALAX) packet 17 g  17 g Oral Daily PRN    senna-docusate sodium (SENOKOT S) 8 6-50 mg per tablet 1 tablet  1 tablet Oral Once    sodium chloride (PF) 0 9 % injection 3 mL  3 mL Intravenous PRN    sodium chloride 0 9 % infusion  125 mL/hr Intravenous Continuous    vancomycin (VANCOCIN) IVPB (premix) 1,000 mg  15 mg/kg Intravenous Q12H     Allergies   Allergen Reactions    Asa [Aspirin]     Carbapenems     Cephalosporins     Penicillins        Objective Vitals: Blood pressure 154/80, pulse (!) 106, temperature 98 4 °F (36 9 °C), temperature source Oral, resp  rate 18, height 5' 9 6" (1 768 m), weight 60 kg (132 lb 4 4 oz), SpO2 95 %  I/O last 24 hours: In: 6332 [P O :420; I V :1000; IV Piggyback:1100]  Out: 850 [Urine:850]    Invasive Devices     Peripheral Intravenous Line            Peripheral IV 11/07/17 Left Forearm less than 1 day          Drain            Chest Tube 1 Left 10 Fr  less than 1 day                Physical Exam     General: mild distress, awake and alert x 3, well-developed   Resp: comfortable, no use of accessory muscles, L chest tube present   Skin: no rash, lesions or edema noted   Psych: normal mood and affect  Normal behavior   : patient actively trying to void during exam  No scrotal edema, penis circumcised, no rash, lesions, ulcers, erythema or urethral discharge        Lab Results: I have personally reviewed pertinent reports  Imaging Studies: I have personally reviewed pertinent reports  EKG, Pathology, and Other Studies: I have personally reviewed pertinent reports  Code Status: Level 1 - Full Code    Counseling / Coordination of Care  Total floor / unit time spent today 30 minutes  Greater than 50% of total time was spent with the patient and / or family counseling and / or coordination of care  A description of the counseling / coordination of care

## 2017-11-07 NOTE — H&P
History and Physical - [facility]    Patient Information: Sylvia Purdy 57 y o  male MRN: [id number]  Unit/Bed#: [facility] 420-01 Encounter: [id number]  Admitting Physician: Yunior Torres MD  PCP: Pj Valadez MD  Date of Admission:  11/07/17    Assessment:  Principal Problem:  ·  Sepsis  d/t pneumonia with loculated parapneumonic effusion, POA - s/p chest tube placement on 11/6/17 prior to transfer  Unclear if TPA was done prior to transfer  Urine Lg, strept Pneumo negative, Influenza/RSV panel negative  Blood cultures negative x 24hrs  Sputum cultures 2+GPC in pairs, 2+GNR    Active Problems:  · Sinus tachycardia - in the setting of above  Patient was started on Cardizem infusion prior to transfer  Patient went back into rapid afib shortly after presentation  Echo revealed EF 69%, grade 1 diastolic dysfunction  On IVF  · Pedal edema - chronic  · Osteoporosis  · Severe Kyphosis with multiple age indeterminate compression deformities in the thoracic spine  · Leukocytosis - d/t pneumonia/effusion  · Urinary retention - was able to void on arrival to floor  Monitor with PVR  Hold of Urology re consult on transfer for now  · Severe protein-calorie malnutrition ([facility] [address] )  · Severe Osteoporosis - was seen by Endocrinology at [facility] and secondary work up for osteoporosis done with results pending  Recommended for outpatient Endocrinology f/u   See consult notes  · Continuous opioid dependence (HCC)  · Other chronic pain    Plan:  · Admit to telemetry, >2MN anticipated  · Continue IV antibiotics with Aztreonam, Vanco, Flagyl pending ID input  · Obtain pulmonary and Thoracic surgery evaluation, Nutrition eval  · Continue pain meds and home meds (patient reports he takes 2 100mg tabs Colace twice daily), continue Cardizem infusion  · Check PVR, urinary retention protocol, hold off on urology re consult for now  · F/u on pleural fluid cultures  · Further plans as in admit orders    VTE Prophylaxis: Enoxaparin (Lovenox)  / sequential compression device     Code Status: Full Code    POLST: POLST form is not discussed and not completed at this time  Anticipated Length of Stay:  Patient will be admitted on an Inpatient basis with an anticipated length of stay of  > 2 midnights  Justification for Hospital Stay: PNA with loculated parapneumonic effusion    Chief Complaint:     Left pleural effusion    History of Present Illness:  Maggie Nielsen is a 57 y o  male who initially presents to [address] [place] [address] with several days history of cough productive of green sputum, sob and chest pain  CT of the chest revealed "Left lower lobe consolidation consistent with pneumonia with large parapneumonic pleural effusion" Patient had chest tube place with minimal drainage and is now transferred to \[facility]\"" for TPA and thoracic surgery evaluation  Main complaint at this time is pain which is chronic  He has been able to void after arrival  He has sob with exertion and mild chest pain  He was recently treated with a 5days course of Azithromycin  He was evaluated by ID at [facility] and started on Aztrteonam, Vanco and flagyl  Review of Systems   Constitutional: Positive for activity change, fatigue and fever  Negative for appetite change  HENT: Negative  Respiratory: Positive for cough and shortness of breath  Cardiovascular: Positive for chest pain and leg swelling  Negative for palpitations  Gastrointestinal: Negative for abdominal distention, abdominal pain, constipation, diarrhea, nausea and vomiting  Genitourinary: Positive for difficulty urinating  Musculoskeletal: Positive for arthralgias, back pain and gait problem  Neurological: Negative for dizziness, syncope and headaches  Psychiatric/Behavioral: The patient is nervous/anxious          Past Medical History:   Diagnosis Date    History of spinal fracture     Kyphosis     Loss of appetite     Loss of weight     Osteoarthritis     Osteoporosis        Past Surgical History:   Procedure Laterality Date    FINGER SURGERY      SHOULDER DEBRIDEMENT         Family History:  non-contributory    Home Meds:  all medications and allergies reviewed    Allergies: Allergies   Allergen Reactions    Asa [Aspirin]     Carbapenems     Cephalosporins     Penicillins          Marital Status:      History   Alcohol Use No     History   Smoking Status    Never Smoker   Smokeless Tobacco    Never Used     History   Drug Use No           Physical Exam:   Vitals:   Blood Pressure: 144/77 (11/07/17 1720)  Pulse: 105 (11/07/17 1720)  Temperature: 99 2 °F (37 3 °C) (11/07/17 1720)  Temp Source: Oral (11/07/17 1720)  SpO2: 94 % (11/07/17 1720)    Physical Exam   Constitutional: He is oriented to person, place, and time  He appears cachectic  He appears ill  No distress  HENT:   Head: Normocephalic  Eyes: Conjunctivae and EOM are normal  Right eye exhibits no discharge  Left eye exhibits no discharge  No scleral icterus  Neck: Decreased range of motion present  Cardiovascular: Regular rhythm and normal heart sounds  Tachycardia present  No murmur heard  Pulmonary/Chest: Effort normal  No accessory muscle usage  No respiratory distress  He has decreased breath sounds in the right lower field, the left middle field and the left lower field  Left chest tube in place   Abdominal: Soft  Bowel sounds are normal  He exhibits no distension  There is no tenderness  Musculoskeletal: He exhibits edema  He exhibits no tenderness  Thoracic back: He exhibits deformity  Severe Kyphosis, Chronic stasis changes bilateral LE   Neurological: He is alert and oriented to person, place, and time  Skin: Skin is warm and dry  He is not diaphoretic  Psychiatric: He has a normal mood and affect  His behavior is normal    Vitals reviewed  Lab Results: I have personally reviewed pertinent reports          Results from last 7 days  Lab Units 11/07/17  0513  11/06/17  1037   WBC Thousand/uL 26 92*  --  36 62*   HEMOGLOBIN g/dL 12 5  --  12 9   I STAT HEMOGLOBIN   --   < >  --    HEMATOCRIT % 37 5  --  38 4   PLATELETS Thousands/uL 556*  --  597*   LYMPHO PCT %  --   --  5*   MONO PCT MAN %  --   --  4   EOSINO PCT MANUAL %  --   --  0   < > = values in this interval not displayed  Results from last 7 days  Lab Units 11/07/17  0513   SODIUM mmol/L 136   POTASSIUM mmol/L 4 3   CHLORIDE mmol/L 101   CO2 mmol/L 27   BUN mg/dL 17   CREATININE mg/dL 0 75   CALCIUM mg/dL 9 3   TOTAL PROTEIN g/dL 6 9   BILIRUBIN TOTAL mg/dL 0 80   ALK PHOS U/L 82   ALT U/L 17   AST U/L 11   GLUCOSE RANDOM mg/dL 115       Results from last 7 days  Lab Units 11/06/17  1037   INR  1 22*       Imaging: I have personally reviewed pertinent reports  Xr Chest Portable    Result Date: 11/7/2017  Narrative: CHEST  INDICATION: Shortness of breath  COMPARISON:  11/6/2017  VIEWS:   AP frontal; 1 image FINDINGS:    The cardiomediastinal silhouette is stable  Left pleural catheter is again seen  Stable left greater than right pleural effusions are identified with associated bibasilar consolidation  Osseous structures are age appropriate  Impression: Stable left greater than right pleural effusions with associated bibasilar consolidation  Workstation performed: [id number]     Xr Chest 1 View Portable [employer]    Result Date: 11/6/2017  Narrative: CHEST INDICATION:  Status post placement of left pleural catheter COMPARISON:  None VIEWS:   AP frontal IMAGES:  1 FINDINGS:     Cardiomediastinal silhouette appears unremarkable  A left pleural catheter is noted medially in the left thorax  No pneumothorax is seen  Left-sided effusion is slightly smaller than the previous examination  Loculated fluid is identified in the left upper posterior pleural space  Visualized osseous structures appear within normal limits for the patient's age  Impression: Status post placement of left pleural catheter    No pneumothorax Workstation performed: [id number]     Xr Chest Portable - 1 View    Result Date: 11/6/2017  Narrative: CHEST INDICATION:  Cough  Fever  COMPARISON:  March 29, 2012  VIEWS:   AP frontal IMAGES:  1 FINDINGS:     Heart shadow is obscured by adjacent opacity  Moderate to large left pleural effusion is noted  Airspace opacity in the left mid and lower chest could represent compressive atelectasis or left basilar pneumonia  Aerated right lung appears clear  Biapical pleural-parenchymal scarring is noted  No  pneumothorax  Visualized osseous structures appear within normal limits for the patient's age  Impression: Moderate to large left pleural effusion with overlying airspace opacity which could represent atelectasis or pneumonia  Workstation performed: [id number]     Ct Chest With Contrast    Result Date: 11/6/2017  Narrative: CT CHEST WITH IV CONTRAST INDICATION:  Chest wall pain  COMPARISON: CT chest 8/16/2011  TECHNIQUE: CT examination of the chest was performed  Reformatted images were created in axial, sagittal, and coronal planes  Radiation dose length product (DLP) for this visit:  134 04 mGy-cm   This examination, like all CT scans performed in the [facility], was performed utilizing techniques to minimize radiation dose exposure, including the use of iterative  reconstruction and automated exposure control  IV Contrast:  85 mL of iohexol (OMNIPAQUE)     FINDINGS: LUNGS:  Left basilar consolidation concerning for pneumonia  Emphysematous changes are noted in the upper lobes  PLEURA:  Large left pleural effusion  HEART/GREAT VESSELS:  Unremarkable for patient's age  MEDIASTINUM AND RAYNA:  Unremarkable  CHEST WALL AND LOWER NECK:       Normal  VISUALIZED STRUCTURES IN THE UPPER ABDOMEN:  Hypodensity in the right lobe of the liver likely a cyst measuring 8 mm  Bilateral simple renal cysts  Upper abdomen otherwise unremarkable   OSSEOUS STRUCTURES:  Severe kyphosis of the thoracic spine with multiple age indeterminate compression deformities  No acute rib fracture  Impression: Left lower lobe consolidation consistent with pneumonia with large parapneumonic pleural effusion  Liver and bilateral renal cysts  Severe kyphosis with multiple compression deformities in the thoracic spine which are age indeterminate  Workstation performed: [id number]     Vascular Results    Result Date: 11/7/2017  Narrative: Ordered by an unspecified provider  Ir Chest Tube    Result Date: 11/7/2017  Narrative: INDICATION: Loculated left pleural effusion  COMPARISON: Same-day CT chest  PROCEDURE: 1   Placement of left pigtail chest tube PROCEDURE DETAILS: Operators: Dr Isacc Laura, [facility] attending, performed the procedure  Anesthesia: Conscious sedation was provided throughout a total intra-service time of     during which the patient's hemodynamic parameters were continuously monitored by an independent trained radiology nurse  1% lidocaine was injected in the skin and subcutaneous tissues overlying the access site  Medications: 1% lidocaine  Contrast: 0 mL of Omnipaque 300 Fluoroscopy time: 0 minutes COMMENTS: Following the discussion of the risks, benefits and alternatives to the procedure, written informed consent was obtained from the patient  The patient was placed in an upright position on the patient's bed leaning over the imaging table  A preprocedure  timeout was performed per [facility] protocol  Ultrasound evaluation of left chest demonstrated a pleural effusion  The left lower back was prepped and draped in the usual sterile fashion  All elements of maximal sterile barrier technique were followed (cap, mask, sterile gown, sterile gloves, large sterile sheet, hand hygiene, and 2% chlorhexidine for cutaneous antisepsis)  Lidocaine was administered to the skin, and a small skin incision  was made   Under direct ultrasound guidance, a 19 gauge needle was advanced into the left complex pleural fluid  Heavy wire was advanced through the needle and seen entering dependently posteriorly on ultrasound  Tract dilation with 8 Com[place]an dilator was performed  After a skin nick over the needle, the needle was removed  A 10-Comoran pigtail drain was advanced over the wire into the left posterior dependent pleural space  Pigtail was formed  A small amount of bloody pleural fluid was removed  The catheter was secured to the skin with 2-0 Prolene and connected to a Pleur-evac  The skin was cleaned and sterile dressings were applied  The patient tolerated the procedure well without immediate complication  Impression: Impression: 1  Successful placement of a 10-Comoran pigtail drain into the left pleural cavity  There is a very complex loculated and septated left pleural effusion  Workstation performed: [id number]       EKG, Pathology, and Other Studies Reviewed on Admission:   · Sinus tachycardia    ** Please Note: Dragon 360 Dictation voice to text software may have been used in the creation of this document   **

## 2017-11-07 NOTE — PROGRESS NOTES
Progress Note - Cornelio Adam 57 y o  male MRN: [id number]    Unit/Bed#: [address] [place] [address] 208-02 Encounter: [id number]      Assessment:  Principal Problem:    Pneumonia  Active Problems:    Sepsis ([address] )    Pleural disorder    Leukocytosis    Pedal edema    Osteoporosis    Kyphosis    Voiding dysfunction    Hematuria    Psoriasis  Resolved Problems:    * No resolved hospital problems  *        Plan:  · Left-sided pneumonia with pleural effusion-Pulmonary input appreciated-minimal output with this loculated effusion and concern is whether he may require thoracic surgery to follow with this issue  Day 2  Of empiric therapy with IV Levaquin  Persistent left-sided chest pain ended is on p r n  IV Dilaudid as well as his usual fentanyl patch-will transfer to [facility] under [facility] service at the request of Dr Solomon Mccloud pulmonary  · Voiding difficulty-bladder scan with 170 mL-will give IV fluid and consult Urology as he does have significant hematuria  Will check retroperitoneal ultrasound to rule out any other significant source of obstruction   · Severe kyphosiswith chronic pain issues  · Skin changes of lower extremities-suspect this is component of psoriasis-will add clobetasol cream to affected areas in the leg as well as in the buttocks crease  · Bowel issues-patient reports that he uses Colace 200 mg b i d  at home due to the narcotic related but constipation    Subjective:   Patient complains of continued pain on the left side with deep respiration  ROS  Comprehensive system review negative other than noted above    Objective:     Vitals: Blood pressure 154/80, pulse (!) 106, temperature 98 4 °F (36 9 °C), temperature source Oral, resp  rate 18, height 5' 9 6" (1 768 m), weight 60 kg (132 lb 4 4 oz), SpO2 95 %  ,Body mass index is 19 2 kg/m²    Current Facility-Administered Medications   Medication Dose Route Frequency Provider Last Rate Last Dose    acetaminophen (TYLENOL) tablet 650 mg  650 mg Oral Q6H PRN Scarlett Fly, DO   325 mg at 11/07/17 0131    albuterol inhalation solution 2 5 mg  2 5 mg Nebulization Q6H PRN Magdalena XMLAW, DO        calcium carbonate-vitamin D (OSCAL-D) 500 mg-200 units per tablet 1 tablet  1 tablet Oral BID With Meals Scarlett Fly, DO   1 tablet at 11/07/17 0801    clobetasol (TEMOVATE) 0 05 % cream   Topical BID Scarlett Fly, DO        diazepam (VALIUM) tablet 5 mg  5 mg Oral HS MARY Lewis   5 mg at 11/06/17 2329    docusate sodium (COLACE) capsule 100 mg  100 mg Oral BID Scarlett Fly, DO   100 mg at 11/07/17 0801    enoxaparin (LOVENOX) subcutaneous injection 40 mg  40 mg Subcutaneous Daily Scarlett Fly, DO   40 mg at 11/07/17 0801    fentaNYL (DURAGESIC) 50 mcg/hr TD 72 hr patch 1 patch  1 patch Transdermal Q48H Scarlett Fly, DO   1 patch at 11/07/17 0801    HYDROcodone-acetaminophen (NORCO) 5-325 mg per tablet 1 tablet  1 tablet Oral Q6H PRN Magdalena XMLAW, DO   1 tablet at 11/07/17 0130    HYDROmorphone (DILAUDID) 1 mg/mL injection 0 5 mg  0 5 mg Intravenous Q3H PRN Tatianna Session, CRNP        levofloxacin (LEVAQUIN) tablet 750 mg  750 mg Oral Q24H Scarlett Fly, DO        metroNIDAZOLE (FLAGYL) tablet 500 mg  500 mg Oral Q8H [address],         morphine (PF) 4 mg/mL injection 4 mg  4 mg Intravenous Q4H PRN Tatianna Session, CRNP   4 mg at 11/07/17 0834    ondansetron (ZOFRAN) injection 4 mg  4 mg Intravenous Q6H PRN Scarlett Fly, DO        sodium chloride (PF) 0 9 % injection 3 mL  3 mL Intravenous PRN Tatianna Session, CRNP        sodium chloride 0 9 % infusion  125 mL/hr Intravenous Continuous Scarlett Fly,  mL/hr at 11/07/17 0859 125 mL/hr at 11/07/17 0859     Prescriptions Prior to Admission   Medication    Calcium Carbonate-Vitamin D (CALCIUM 500 + D PO)    diazepam (VALIUM) 5 mg tablet    docusate sodium (COLACE) 100 mg capsule    fentaNYL (DURAGESIC) 50 mcg/hr    HYDROcodone-acetaminophen (ZAMCET)  MG/15ML solution         Intake/Output Summary (Last 24 hours) at 11/07/17 7577  Last data filed at 11/07/17 0600   Gross per 24 hour   Intake             2280 ml   Output              850 ml   Net             1430 ml       Physical Exam:  General appearance: alert, cooperative and mild distress  Neck: no adenopathy, no JVD and thyroid not enlarged, symmetric, no tenderness/mass/nodules  Lungs: Decreased breath sound on the left base some minimal upper airway rhonchi with deep respiration on the left greater than right  Heart: regular rate and rhythm, S1, S2 normal, no murmur, click, rub or gallop  Abdomen: soft, non-tender; bowel sounds normal; no masses,  no organomegaly  Extremities: Plus one edema which is improving since admission, there is chronic thickening of the skin in the lower extremities consistent with lack  Skin: Scaling in the buttocks crease as well as the lower extremity  Neurologic: Grossly normal       Lab, Imaging and other studies: I have personally reviewed pertinent reports              Results from last 7 days  Lab Units 11/07/17 0513 11/06/17  1043 11/06/17  1037   WBC Thousand/uL 26 92*  --  36 62*   HEMOGLOBIN g/dL 12 5  --  12 9   I STAT HEMOGLOBIN g/dl  --  13 9  --    HEMATOCRIT % 37 5  --  38 4   PLATELETS Thousands/uL 556*  --  597*   LYMPHO PCT %  --   --  5*   MONO PCT MAN %  --   --  4   EOSINO PCT MANUAL %  --   --  0       Results from last 7 days  Lab Units 11/07/17 0513 11/06/17  1043 11/06/17  1037   SODIUM mmol/L 136  --  135*   POTASSIUM mmol/L 4 3  --  4 3   CHLORIDE mmol/L 101  --  96*   CO2 mmol/L 27  --  27   BUN mg/dL 17  --  24   CREATININE mg/dL 0 75  --  1 05   CALCIUM mg/dL 9 3  --  9 5   TOTAL PROTEIN g/dL 6 9  --  7 9   BILIRUBIN TOTAL mg/dL 0 80  --  0 80   ALK PHOS U/L 82  --  113   ALT U/L 17  --  23   AST U/L 11  --  19   GLUCOSE RANDOM mg/dL 115  --  139   GLUCOSE, ISTAT mg/dl  --  144*  --      Lab Results   Component Value Date    TROPONINI <0 02 11/06/2017       Results from last 7 days  Lab Units 11/06/17  1037   INR  1 22* No results found for: Tejas Dwaine, Thony [facility]    Imaging:  Results for orders placed during the hospital encounter of 11/06/17   XR chest 1 view portable [employer]    Narrative CHEST     INDICATION:  Status post placement of left pleural catheter    COMPARISON:  None    VIEWS:   AP frontal    IMAGES:  1    FINDINGS:         Cardiomediastinal silhouette appears unremarkable  A left pleural catheter is noted medially in the left thorax  No pneumothorax is seen  Left-sided effusion is slightly smaller than the previous examination  Loculated fluid is identified in the left upper posterior pleural space  Visualized osseous structures appear within normal limits for the patient's age  Impression Status post placement of left pleural catheter  No pneumothorax      Workstation performed: [id number]       No results found for this or any previous visit  PATIENT CENTERED ROUNDS: I have performed rounds with the nursing staff            Marilia Tang DO

## 2017-11-07 NOTE — PHYSICAL THERAPY NOTE
Physical Therapy Screen    Patient Name: Sandeep Mancini    [facility] Date: 11/7/2017     Problem List  Patient Active Problem List   Diagnosis    Pneumonia    Sepsis ([address] )    Pleural disorder    Pedal edema    Osteoporosis    Kyphosis    Leukocytosis    Psoriasis    Voiding dysfunction    Hematuria        Past Medical History  Past Medical History:   Diagnosis Date    History of spinal fracture     Kyphosis     Loss of appetite     Loss of weight     Osteoarthritis     Osteoporosis         Past Surgical History  Past Surgical History:   Procedure Laterality Date    FINGER SURGERY      SHOULDER DEBRIDEMENT          SUBJECT:     OBJECTIVE:  Pt is being transferred to \[facility]\"" for higher level of care     ASSESSMENT:  No need for skilled PT at this time, pending medical plan of care    PLAN:    recommend PT at B  RECOMMENDATION:  Marta Sheffield, PT

## 2017-11-07 NOTE — RESPIRATORY THERAPY NOTE
RT Protocol Note  Chad Garcia 57 y o  male MRN: [id number]  Unit/Bed#: [address] [place] [address] 208-02 Encounter: [id number]    Assessment    Principal Problem:    Pneumonia  Active Problems:    Sepsis ([address] )    Pleural disorder    Pedal edema    Osteoporosis    Kyphosis    Leukocytosis        Past Medical History:   Diagnosis Date    History of spinal fracture     Kyphosis     Loss of appetite     Loss of weight     Osteoporosis      Social History     Social History    Marital status:      Spouse name: N/A    Number of children: N/A    Years of education: N/A     Social History Main Topics    Smoking status: Never Smoker    Smokeless tobacco: Never Used    Alcohol use No    Drug use: No    Sexual activity: No     Other Topics Concern    None     Social History Narrative    None       Subjective         Objective    Physical Exam:   Assessment Type: Assess only  General Appearance: Alert, Awake  Respiratory Pattern: Normal  Chest Assessment: Chest expansion symmetrical  Bilateral Breath Sounds: Clear, Diminished  R Breath Sounds: Crackles    Vitals:  Blood pressure 157/75, pulse 91, temperature 98 8 °F (37 1 °C), temperature source Tympanic, resp  rate 18, height 5' 9 6" (1 768 m), weight 60 kg (132 lb 4 4 oz), SpO2 99 %  Results from last 7 days  Lab Units 11/06/17  1318   MELVIN TEST  Postive Melvin Test       Imaging and other studies: I have personally reviewed pertinent reports              Plan    Respiratory Plan: Mild Distress pathway

## 2017-11-07 NOTE — DISCHARGE SUMMARY
Discharge Summary - Obdulia Smyth 57 y o  male MRN: [id number]    Unit/Bed#: [address] 208-02 Encounter: [id number]    Admission Date: 11/6/2017     Admitting Diagnosis: Chest pain [R07 9]  Pneumonia [J18 9]  SOB (shortness of breath) [R06 02]  Osteoporosis [M81 0]  Leukocytosis, unspecified type [D72 829]    HPI:  26-year-old male presented with chest pain and cough productive of thick greenish discolored sputum  Consults Pulmonary Dr Juanita Bolden    Procedures Performed: No orders of the defined types were placed in this encounter  Hospital Course:  Patient was admitted and placed on Levaquin for community-acquired pneumonia  He did undergo IR placement of chest tube for his left pleural effusion but only minimal amount of bloody fluid was obtained  Because of the loculated nature of the effusion and ongoing issues with sepsis he was felt to be better managed where thoracic surgery was readily available and may need tPA in the pleural space  Arrangements were made for transfer to [facility] service at [facility] and awaiting bed management currently  Patient was noted to have hematuria and reported that years ago he had been seen by a urologist but not followed through for voiding difficulties at that time  Retroperitoneal ultrasound and urology consult as well as PSA has been ordered    Significant Findings, Care, Treatment and Services Provided:   CT chest with contrast [[id number]] Collected: 11/06/17 1315   Order Status: Completed Updated: 11/06/17 1327   Narrative:     CT CHEST WITH IV CONTRAST    INDICATION:  Chest wall pain  COMPARISON: CT chest 8/16/2011      TECHNIQUE: CT examination of the chest was performed      Reformatted images were created in axial, sagittal, and coronal planes       Radiation dose length product (DLP) for this visit:  134 04 mGy-cm    This examination, like all CT scans performed in the [facility], was performed utilizing techniques to minimize radiation dose exposure, including the use of iterative   reconstruction and automated exposure control  IV Contrast:  85 mL of iohexol (OMNIPAQUE)         FINDINGS:    LUNGS:  Left basilar consolidation concerning for pneumonia   Emphysematous changes are noted in the upper lobes  PLEURA:  Large left pleural effusion  HEART/GREAT VESSELS:  Unremarkable for patient's age  MEDIASTINUM AND RAYNA:  Unremarkable  CHEST WALL AND LOWER NECK:       Normal     VISUALIZED STRUCTURES IN THE UPPER ABDOMEN:  Hypodensity in the right lobe of the liver likely a cyst measuring 8 mm   Bilateral simple renal cysts   Upper abdomen otherwise unremarkable  OSSEOUS STRUCTURES:  Severe kyphosis of the thoracic spine with multiple age indeterminate compression deformities   No acute rib fracture  Impression:       Left lower lobe consolidation consistent with pneumonia with large parapneumonic pleural effusion  Liver and bilateral renal cysts  Severe kyphosis with multiple compression deformities in the thoracic spine which are age indeterminate               Complications: none    Discharge Diagnosis: Principal Problem:    Pneumonia  Active Problems:    Sepsis ([address] )    Pleural disorder    Leukocytosis    Pedal edema    Osteoporosis    Kyphosis    Voiding dysfunction    Hematuria    Psoriasis        Condition at Discharge: good     Discharge instructions/Information to patient and family:   See after visit summary for information provided to patient and family  Provisions for Follow-Up Care:  See after visit summary for information related to follow-up care and any pertinent home health orders  Disposition: [facility]    Planned Readmission: Yes to [facility]    Discharge Statement see my progress note from November 6, 2017  I Discharge Medications:  See after visit summary for reconciled discharge medications provided to patient and family          Hermes Barrett DO

## 2017-11-07 NOTE — PROGRESS NOTES
Pt observed to be sleeping during hrly rounds overnight between assessments/care/meds/voids   PCXR done this am

## 2017-11-07 NOTE — PHYSICIAN ADVISOR
Current patient class: Inpatient  The patient is currently on Hospital Day: 1      The patient was admitted to the hospital at [id number] on 11/6/17 for the following diagnosis:  Chest pain [R07 9]  Pneumonia [J18 9]  SOB (shortness of breath) [R06 02]  Osteoporosis [M81 0]  Leukocytosis, unspecified type [D72 829]       There is documentation in the medical record of an expected length of stay of at least 2 midnights  The patient is therefore expected to satisfy the 2 midnight benchmark and given the 2 midnight presumption is appropriate for INPATIENT ADMISSION  Given this expectation of a satisfying stay, CMS instructs us that the patient is most often appropriate for inpatient admission under part A provided medical necessity is documented in the chart  After review of the relevant documentation, labs, vital signs and test results, the patient is appropriate for INPATIENT ADMISSION  Admission to the hospital as an inpatient is a complex decision making process which requires the practitioner to consider the patients presenting complaint, history and physical examination and all relevant testing  With this in mind, in this case, the patient was deemed appropriate for INPATIENT ADMISSION  After review of the documentation and testing available at the time of the admission I concur with this clinical determination of medical necessity  Rationale is as follows: The patient is a 57 yrs old Male who presented to the ED at 11/6/2017 10:30 AM with a chief complaint of Shortness of Breath (Patient states he has pain when he takes a deep breath  Has been coughing up green phlem   Has been going on for a f ew days )    The patients vitals on arrival were ED Triage Vitals   Temperature Pulse Respirations Blood Pressure SpO2   11/06/17 1032 11/06/17 1032 11/06/17 1032 11/06/17 1045 11/06/17 1045   99 6 °F (37 6 °C) (!) 109 22 (!) 194/81 99 %      Temp Source Heart Rate Source Patient Position - Orthostatic VS BP Location FiO2 (%)   11/06/17 1032 11/06/17 1032 11/06/17 1045 11/06/17 1045 --   Oral Monitor Lying Left arm       Pain Score       11/06/17 1032       Worst Possible Pain           Past Medical History:   Diagnosis Date    History of spinal fracture     Kyphosis     Loss of appetite     Loss of weight     Osteoarthritis     Osteoporosis      Past Surgical History:   Procedure Laterality Date    FINGER SURGERY      SHOULDER DEBRIDEMENT             Consults have been placed to:   IP CONSULT TO PULMONOLOGY  IP CONSULT TO INFECTIOUS DISEASES  IP CONSULT TO CASE MANAGEMENT  IP CONSULT TO ENDOCRINOLOGY    Vitals:    11/06/17 1528 11/06/17 1559 11/06/17 2044 11/06/17 2125   BP: 131/83 155/76 157/75    Pulse: 92 93 91    Resp: 18 18 18    Temp:  97 7 °F (36 5 °C) 98 8 °F (37 1 °C)    TempSrc:  Oral Tympanic    SpO2: 99% 98% 97% 99%   Weight:       Height:           Most recent labs:    Recent Labs      11/06/17   1037  11/06/17   1043   WBC  36 62*   --    HGB  12 9  13 9   HCT  38 4   --    PLT  597*   --    K  4 3   --    NA  135*   --    CALCIUM  9 5   --    BUN  24   --    CREATININE  1 05   --    INR  1 22*   --    TROPONINI  <0 02   --    AST  19   --    ALT  23   --    ALKPHOS  113   --    BILITOT  0 80   --        Scheduled Meds:  calcium carbonate-vitamin D 1 tablet Oral BID With Meals   diazepam 5 mg Oral HS   docusate sodium 100 mg Oral BID   enoxaparin 40 mg Subcutaneous Daily   [START ON 11/7/2017] fentaNYL 1 patch Transdermal Q48H   [START ON 11/7/2017] levofloxacin 750 mg Oral Q24H   metroNIDAZOLE 500 mg Intravenous Q8H     Continuous Infusions:  sodium chloride 125 mL/hr Last Rate: 125 mL/hr (11/06/17 1318)     PRN Meds:   acetaminophen    albuterol    HYDROcodone-acetaminophen    HYDROmorphone    morphine injection    ondansetron    Insert peripheral IV **AND** sodium chloride (PF)    Surgical procedures (if appropriate):

## 2017-11-07 NOTE — CONSULTS
Consultation - Infectious Disease   Nhung Khan 57 y o  male MRN: [id number]  Unit/Bed#: [address] [place] [address] 208-02 Encounter: [id number]      Assessment/Plan     Assessment:  57y o  year old male with PMH of osteoporosis, h/ospinal fracture who presents with dyspnea and L sided chest pain    1  Sepsis due to CAP with large parapneumonic effusion - s/p L chest tube placement  Pleural fluid cx pending and sputum cx grew mixed respiratory arthur  Urine legionella Ag is negative  Only minimal fluid was aspirated so, plan is for transfer to [facility] for tPA instillation    2  Fever, leukocytosis - blood cx are in process    Plan:  1  D/C levofloxacin and start vancomycin, aztreonam  Continue metronidazole while waiting for pleural fluid, sputum cx, and blood cx  2  Plan for tPA instillation to drain the loculated L pleural effusion  History of Present Illness   Physician Requesting Consult: Don Lantigua DO  Reason for Consult / Principal Problem: pneumonia  Hx and PE limited by: none  HPI: Nhung Khan is a 57y o  year old male with PMH of osteoporosis, h/ospinal fracture who presents with dyspnea and L sided chest pain which started 4 days ago associated with productive cough and fever, chills  He reports taking Z-Trae last week without improvement in cough  He denies any sore throat, rhinorrhea, nasal congestion, abdominal pain, N/V/D, dysuria, or skin rashes  On admission, he was initially afebrile but developed temp of 101 2 overnight with WBC count of 36K  CXR showed moderate to large left pleural effusion with overlying airspace opacity which could represent atelectasis or pneumonia  CT chest showed left lower lobe consolidation consistent with pneumonia with large parapneumonic pleural effusion  However, only minimal fluid was aspirated  He was started on levofloxacin and metronidazole in the setting of allergy reported to N with pt not able to remember the reaction he had as a teenager   He underwent L chest tube placement by IR on the day of admission with pleural fluid showing 878 WBC with 97% neutrophils  Body fluid cx is showing no growth to date and sputum cx grew mixed arthur  Blood cx and influenza PCR are in process          Inpatient consult to Infectious Diseases  Consult performed by: Cindy Holcomb ordered by: Dagoberto Ingram          Review of Systems   Constitutional: Positive for chills, fatigue and fever  HENT: Negative for congestion, rhinorrhea and sore throat  Respiratory: Positive for cough, chest tightness and shortness of breath  Gastrointestinal: Negative for abdominal pain, diarrhea, nausea and vomiting  Genitourinary: Negative for dysuria  Skin: Negative for rash  Neurological: Negative for dizziness  Psychiatric/Behavioral: Negative for confusion  Historical Information   Past Medical History:   Diagnosis Date    History of spinal fracture     Kyphosis     Loss of appetite     Loss of weight     Osteoarthritis     Osteoporosis      Past Surgical History:   Procedure Laterality Date    FINGER SURGERY      SHOULDER DEBRIDEMENT       Social History   History   Alcohol Use No     History   Drug Use No     History   Smoking Status    Never Smoker   Smokeless Tobacco    Never Used     Family History: non-contributory    Meds/Allergies   all current active meds have been reviewed    Allergies   Allergen Reactions    Asa [Aspirin]     Carbapenems     Cephalosporins     Penicillins        Objective       Intake/Output Summary (Last 24 hours) at 11/07/17 1340  Last data filed at 11/07/17 1240   Gross per 24 hour   Intake             1520 ml   Output             1100 ml   Net              420 ml       Invasive Devices:   Chest Tube 1 Left 10 Fr  (Active)   Function -20 cm H2O 11/7/2017  9:50 AM   Chest Tube Air Leak No 11/7/2017  9:50 AM   Drainage Description Dark red 11/7/2017  9:50 AM   Dressing Status Clean;Dry; Intact 11/7/2017  9:50 AM   Site Assessment Intact 11/7/2017  9:50 AM   Surrounding Skin Unable to view 11/7/2017  9:50 AM   Output (mL) 0 mL 11/7/2017  6:00 AM       Physical Exam   Constitutional: He appears well-developed and well-nourished  No distress  HENT:   Mouth/Throat: Oropharynx is clear and moist    Cardiovascular: Normal rate and regular rhythm  No murmur heard  Pulmonary/Chest: Effort normal  He has rales  L chest tube in place   Abdominal: Soft  Bowel sounds are normal  He exhibits no distension  There is no tenderness  Musculoskeletal: Normal range of motion  He exhibits edema  Skin: Skin is warm and dry  No rash noted  He is not diaphoretic  No erythema  Psychiatric: He has a normal mood and affect  His behavior is normal    Vitals reviewed  Lab Results: I have personally reviewed pertinent labs  Imaging Studies: I have personally reviewed pertinent reports  EKG, Pathology, and Other Studies: I have personally reviewed pertinent reports  Counseling / Coordination of Care  Total floor / unit time spent today 45 minutes  Greater than 50% of total time was spent with the patient and / or family counseling and / or coordination of care   A description of the counseling / coordination of care:

## 2017-11-07 NOTE — PROGRESS NOTES
Orders received from urology to insert urinary catheter, however pt refused saying "he will pee on his own " Pt did void 250ml after being given some extra time

## 2017-11-07 NOTE — PROGRESS NOTES
Progress Note - Pulmonary   Arsh Mattson 57 y o  male MRN: [id number]  Unit/Bed#: [address] [place] [address] 208-02 Encounter: [id number]      Impressions & RECS:   1  Acute hypoxic respiratory insufficiency  · Titrate O2 to keep SpO2 >88%  · IS, OOB-Chair     2  Abnormal CT Chest - left sided infiltrate with atelectasis and loculated pleural effusion     3  Left sided pneumonia - community acquired vs aspiration given history and narcotic/benzo use  · Continue levofloxacin 750mg daily - given PCN/Cephalosporin allergy  · Cont flagyl pending ID evaluation  · Follow up strep ag  · Trend temp, WBC, follow cultures and pleural fluid analysis     4  Loculated left pleural effusion  · Continue CT to 36moH3Y suction  · Follow pleural fluid analysis including cultures and cytology  · Would recommend transfer to [facility] for thoracic surgery consultation  In interim will attempt TPA instillation in to CT - I will contact pulmonary service at [facility] to coordinate care     5  Sepsis - POA secondary to pneumonia and effusion     6  Severe protein calorie malnutrition - Per primary service, nutrition  · Age appropriate cancer screening     7  Severe osteoporosis with kyphosis     8  Chronic opioid and benzodiazepine dependence    Discussed with Dr Edd Barnett at time of consultation    Subjective:   Reports feeling unwell, continued dyspnea and pleurisy with deep breaths, fever noted last night and noted rigors this am, denied hemoptysis, no emesis  He did report diaphoresis last night  Reports decreasing sputum production with difficulty expectorating    Objective:     Vitals: Blood pressure 154/80, pulse (!) 106, temperature 98 4 °F (36 9 °C), temperature source Oral, resp  rate 18, height 5' 9 6" (1 768 m), weight 60 kg (132 lb 4 4 oz), SpO2 95 %  , 2LNC, Body mass index is 19 2 kg/m²        Intake/Output Summary (Last 24 hours) at 11/07/17 0927  Last data filed at 11/07/17 0600   Gross per 24 hour   Intake             2280 ml   Output 850 ml   Net             1430 ml         Physical Exam  Gen: Elderly frail male, mild distress with deep breaths and appears chronically debilitated, awake and conversant w/o conversational dyspnea  HEENT: Mucous membranes moist, no oral lesions, no thrush  NECK: No accessory muscle use, JVP not elevated  Cardiac: reg, single s1/2, no gross murmurs  Lungs: diminished BS left lower 1/2 hemithorax, no wheeze or rales b/l, severe kyphosis noted, left CT on suction <5cc blood fluid, no air leak - able to flush fluid but no aspiration of fluid obtained  Abdomen: normoactive bowel sounds, soft nontender, nondistended, no rebound or rigidity, no guarding  Extremities: 2+ LE edema with CVS changes and lichenification  Skin: as above    Labs: I have personally reviewed pertinent lab results      Results from last 7 days  Lab Units 11/07/17  0513 11/06/17  1043 11/06/17  1037   WBC Thousand/uL 26 92*  --  36 62*   HEMOGLOBIN g/dL 12 5  --  12 9   I STAT HEMOGLOBIN g/dl  --  13 9  --    HEMATOCRIT % 37 5  --  38 4   PLATELETS Thousands/uL 556*  --  597*   MONO PCT MAN %  --   --  4      Results from last 7 days  Lab Units 11/07/17  0513 11/06/17  1043 11/06/17  1037   SODIUM mmol/L 136  --  135*   POTASSIUM mmol/L 4 3  --  4 3   CHLORIDE mmol/L 101  --  96*   CO2 mmol/L 27  --  27   BUN mg/dL 17  --  24   CREATININE mg/dL 0 75  --  1 05   CALCIUM mg/dL 9 3  --  9 5   TOTAL PROTEIN g/dL 6 9  --  7 9   BILIRUBIN TOTAL mg/dL 0 80  --  0 80   ALK PHOS U/L 82  --  113   ALT U/L 17  --  23   AST U/L 11  --  19   GLUCOSE RANDOM mg/dL 115  --  139   GLUCOSE, ISTAT mg/dl  --  144*  --        Results from last 7 days  Lab Units 11/06/17  1037   MAGNESIUM mg/dL 2 3       Results from last 7 days  Lab Units 11/07/17  0513   PHOSPHORUS mg/dL 2 7        Results from last 7 days  Lab Units 11/06/17  1037   INR  1 22*   PTT seconds 37*       Results from last 7 days  Lab Units 11/06/17  1125   LACTIC ACID mmol/L 1 1       0  Lab Value Date/Time   TROPONINI <0 02 11/06/2017 1037       Microbiology:  Strep Ag pending  Legionella Ag negative  Bld Cx 11/6 x 2 pending    Left pleural fluid  LDH and TP unable to be analyzed   PH 9 0, GLU 97,  turbid and bloody, 97% neutrophils, 2+ PMN no bacteria culture pending  Cytology pending      Imaging and other studies: I have personally reviewed pertinent reports  and I have personally reviewed pertinent films in PACS  CXR 11/7 - continued severe kyphosis - continued left sided infiltrate and effusion - [facility]    Eros Delatorre DO, [id number] [facility]

## 2017-11-08 ENCOUNTER — APPOINTMENT (INPATIENT)
Dept: NON INVASIVE DIAGNOSTICS | Facility: HOSPITAL | Age: 62
DRG: 853 | End: 2017-11-08
Payer: MEDICARE

## 2017-11-08 ENCOUNTER — APPOINTMENT (INPATIENT)
Dept: RADIOLOGY | Facility: HOSPITAL | Age: 62
DRG: 853 | End: 2017-11-08
Attending: INTERNAL MEDICINE
Payer: MEDICARE

## 2017-11-08 LAB
ALBUMIN SERPL BCP-MCNC: 1.8 G/DL (ref 3.5–5)
ALBUMIN SERPL ELPH-MCNC: 2.9 G/DL (ref 3.5–5)
ALBUMIN SERPL ELPH-MCNC: 42.6 % (ref 52–65)
ALBUMIN UR ELPH-MCNC: 25.3 %
ALP SERPL-CCNC: 72 U/L (ref 46–116)
ALPHA1 GLOB MFR UR ELPH: 20.9 %
ALPHA1 GLOB SERPL ELPH-MCNC: 0.82 G/DL (ref 0.1–0.4)
ALPHA1 GLOB SERPL ELPH-MCNC: 12.1 % (ref 2.5–5)
ALPHA2 GLOB MFR UR ELPH: 27.6 %
ALPHA2 GLOB SERPL ELPH-MCNC: 1.35 G/DL (ref 0.4–1.2)
ALPHA2 GLOB SERPL ELPH-MCNC: 19.9 % (ref 7–13)
ALT SERPL W P-5'-P-CCNC: 14 U/L (ref 12–78)
ANION GAP SERPL CALCULATED.3IONS-SCNC: 7 MMOL/L (ref 4–13)
AST SERPL W P-5'-P-CCNC: 9 U/L (ref 5–45)
ATRIAL RATE: 119 BPM
B-GLOBULIN MFR UR ELPH: 10.6 %
BACTERIA SPT RESP CULT: NORMAL
BETA GLOB ABNORMAL SERPL ELPH-MCNC: 0.3 G/DL (ref 0.4–0.8)
BETA1 GLOB SERPL ELPH-MCNC: 4.4 % (ref 5–13)
BETA2 GLOB SERPL ELPH-MCNC: 6.8 % (ref 2–8)
BETA2+GAMMA GLOB SERPL ELPH-MCNC: 0.46 G/DL (ref 0.2–0.5)
BILIRUB SERPL-MCNC: 0.51 MG/DL (ref 0.2–1)
BUN SERPL-MCNC: 17 MG/DL (ref 5–25)
CALCIUM SERPL-MCNC: 8.3 MG/DL (ref 8.3–10.1)
CHLORIDE SERPL-SCNC: 102 MMOL/L (ref 100–108)
CO2 SERPL-SCNC: 26 MMOL/L (ref 21–32)
CREAT SERPL-MCNC: 0.57 MG/DL (ref 0.6–1.3)
ERYTHROCYTE [DISTWIDTH] IN BLOOD BY AUTOMATED COUNT: 13.7 % (ref 11.6–15.1)
GAMMA GLOB ABNORMAL SERPL ELPH-MCNC: 0.97 G/DL (ref 0.5–1.6)
GAMMA GLOB MFR UR ELPH: 15.6 %
GAMMA GLOB SERPL ELPH-MCNC: 14.2 % (ref 12–22)
GFR SERPL CREATININE-BSD FRML MDRD: 110 ML/MIN/1.73SQ M
GLUCOSE SERPL-MCNC: 175 MG/DL (ref 65–140)
GRAM STN SPEC: NORMAL
HCT VFR BLD AUTO: 35.6 % (ref 36.5–49.3)
HGB BLD-MCNC: 12.2 G/DL (ref 12–17)
IGG/ALB SER: 0.74 {RATIO} (ref 1.1–1.8)
INTERPRETATION UR IFE-IMP: NORMAL
M PROTEIN 1 MFR SERPL ELPH: 7 %
M PROTEIN 1 SERPL ELPH-MCNC: 0.48 G/DL
MCH RBC QN AUTO: 29.8 PG (ref 26.8–34.3)
MCHC RBC AUTO-ENTMCNC: 34.3 G/DL (ref 31.4–37.4)
MCV RBC AUTO: 87 FL (ref 82–98)
PLATELET # BLD AUTO: 540 THOUSANDS/UL (ref 149–390)
PMV BLD AUTO: 8.7 FL (ref 8.9–12.7)
POTASSIUM SERPL-SCNC: 3.6 MMOL/L (ref 3.5–5.3)
PROT PATTERN UR ELPH-IMP: ABNORMAL
PROT SERPL-MCNC: 6 G/DL (ref 6.4–8.2)
PROT SERPL-MCNC: 6.8 G/DL (ref 6.4–8.2)
PROT UR-MCNC: 64 MG/DL
QRS AXIS: 35 DEGREES
QRSD INTERVAL: 86 MS
QT INTERVAL: 294 MS
QTC INTERVAL: 450 MS
RBC # BLD AUTO: 4.1 MILLION/UL (ref 3.88–5.62)
SODIUM SERPL-SCNC: 135 MMOL/L (ref 136–145)
T WAVE AXIS: 59 DEGREES
VENTRICULAR RATE: 141 BPM
WBC # BLD AUTO: 27.84 THOUSAND/UL (ref 4.31–10.16)

## 2017-11-08 PROCEDURE — 80053 COMPREHEN METABOLIC PANEL: CPT | Performed by: INTERNAL MEDICINE

## 2017-11-08 PROCEDURE — 85027 COMPLETE CBC AUTOMATED: CPT | Performed by: INTERNAL MEDICINE

## 2017-11-08 PROCEDURE — 3E0L3GC INTRODUCTION OF OTHER THERAPEUTIC SUBSTANCE INTO PLEURAL CAVITY, PERCUTANEOUS APPROACH: ICD-10-PCS | Performed by: INTERNAL MEDICINE

## 2017-11-08 PROCEDURE — 71020 HB CHEST X-RAY 2VW FRONTAL&LATL: CPT

## 2017-11-08 RX ORDER — DIAZEPAM 5 MG/1
5 TABLET ORAL
Status: DISCONTINUED | OUTPATIENT
Start: 2017-11-08 | End: 2017-11-08

## 2017-11-08 RX ORDER — DIAZEPAM 5 MG/1
5 TABLET ORAL
Status: DISCONTINUED | OUTPATIENT
Start: 2017-11-08 | End: 2017-11-22 | Stop reason: HOSPADM

## 2017-11-08 RX ORDER — MORPHINE SULFATE 2 MG/ML
2 INJECTION, SOLUTION INTRAMUSCULAR; INTRAVENOUS EVERY 2 HOUR PRN
Status: DISCONTINUED | OUTPATIENT
Start: 2017-11-09 | End: 2017-11-09

## 2017-11-08 RX ORDER — SODIUM CHLORIDE 9 MG/ML
60 INJECTION, SOLUTION INTRAVENOUS CONTINUOUS
Status: DISCONTINUED | OUTPATIENT
Start: 2017-11-08 | End: 2017-11-09

## 2017-11-08 RX ADMIN — AZTREONAM 2000 MG: 2 INJECTION, POWDER, LYOPHILIZED, FOR SOLUTION INTRAMUSCULAR; INTRAVENOUS at 08:22

## 2017-11-08 RX ADMIN — CEFTRIAXONE SODIUM 2000 MG: 10 INJECTION, POWDER, FOR SOLUTION INTRAVENOUS at 15:53

## 2017-11-08 RX ADMIN — DILTIAZEM HYDROCHLORIDE 30 MG: 30 TABLET, FILM COATED ORAL at 15:52

## 2017-11-08 RX ADMIN — MORPHINE SULFATE 4 MG: 4 INJECTION INTRAVENOUS at 01:27

## 2017-11-08 RX ADMIN — ENOXAPARIN SODIUM 40 MG: 40 INJECTION SUBCUTANEOUS at 08:26

## 2017-11-08 RX ADMIN — DOCUSATE SODIUM 200 MG: 100 CAPSULE, LIQUID FILLED ORAL at 08:14

## 2017-11-08 RX ADMIN — METRONIDAZOLE 500 MG: 500 TABLET ORAL at 00:12

## 2017-11-08 RX ADMIN — HYDROMORPHONE HYDROCHLORIDE 0.5 MG: 1 INJECTION, SOLUTION INTRAMUSCULAR; INTRAVENOUS; SUBCUTANEOUS at 00:12

## 2017-11-08 RX ADMIN — CALCIUM CARBONATE 500 MG (1,250 MG)-VITAMIN D3 200 UNIT TABLET 1 TABLET: at 08:14

## 2017-11-08 RX ADMIN — CLOBETASOL PROPIONATE 1 APPLICATION: 0.5 CREAM TOPICAL at 00:20

## 2017-11-08 RX ADMIN — CALCIUM CARBONATE 500 MG (1,250 MG)-VITAMIN D3 200 UNIT TABLET 1 TABLET: at 15:52

## 2017-11-08 RX ADMIN — SODIUM CHLORIDE 250 ML: 0.9 INJECTION, SOLUTION INTRAVENOUS at 05:09

## 2017-11-08 RX ADMIN — HYDROCODONE BITARTRATE AND ACETAMINOPHEN 1 TABLET: 5; 325 TABLET ORAL at 17:59

## 2017-11-08 RX ADMIN — CLOBETASOL PROPIONATE 1 APPLICATION: 0.5 CREAM TOPICAL at 08:28

## 2017-11-08 RX ADMIN — DILTIAZEM HYDROCHLORIDE 30 MG: 30 TABLET, FILM COATED ORAL at 21:07

## 2017-11-08 RX ADMIN — METOPROLOL TARTRATE 25 MG: 25 TABLET ORAL at 08:26

## 2017-11-08 RX ADMIN — SODIUM CHLORIDE 60 ML/HR: 0.9 INJECTION, SOLUTION INTRAVENOUS at 14:28

## 2017-11-08 RX ADMIN — METRONIDAZOLE 500 MG: 500 TABLET ORAL at 15:53

## 2017-11-08 RX ADMIN — METRONIDAZOLE 500 MG: 500 TABLET ORAL at 21:06

## 2017-11-08 RX ADMIN — METOPROLOL TARTRATE 25 MG: 25 TABLET ORAL at 21:07

## 2017-11-08 RX ADMIN — TAMSULOSIN HYDROCHLORIDE 0.4 MG: 0.4 CAPSULE ORAL at 15:52

## 2017-11-08 RX ADMIN — DOCUSATE SODIUM 200 MG: 100 CAPSULE, LIQUID FILLED ORAL at 17:59

## 2017-11-08 RX ADMIN — AMIODARONE HYDROCHLORIDE 1 MG/MIN: 50 INJECTION, SOLUTION INTRAVENOUS at 04:01

## 2017-11-08 RX ADMIN — DIAZEPAM 5 MG: 5 TABLET ORAL at 21:07

## 2017-11-08 RX ADMIN — HYDROCODONE BITARTRATE AND ACETAMINOPHEN 1 TABLET: 5; 325 TABLET ORAL at 04:37

## 2017-11-08 RX ADMIN — METRONIDAZOLE 500 MG: 500 TABLET ORAL at 06:20

## 2017-11-08 RX ADMIN — VANCOMYCIN HYDROCHLORIDE 1000 MG: 1 INJECTION, SOLUTION INTRAVENOUS at 03:32

## 2017-11-08 RX ADMIN — AZTREONAM 2000 MG: 2 INJECTION, POWDER, LYOPHILIZED, FOR SOLUTION INTRAMUSCULAR; INTRAVENOUS at 00:18

## 2017-11-08 RX ADMIN — DEXTROSE 150 MG: 50 INJECTION, SOLUTION INTRAVENOUS at 03:26

## 2017-11-08 NOTE — PROGRESS NOTES
Progress Note - General Surgery   Ivy Birmingham 57 y o  male MRN: [id number]  Unit/Bed#: [facility] 420-01 Encounter: [id number]    Assessment:  Pt is a 61y  o  M with a loculated left pleural effusion s/p L pigtail CT 11/6 s/p TPA 11/7    Plan:  Regular diet  Am CXR  Repeat CT TPA  F/u CT drainage after TPA  F/u AM labs  IS/OOB  Primary care per SLIM    Subjective/Objective   Chief Complaint:     Subjective: JIM  Pain overnight, has chronic pain  CT clamped for 8hrs with TPA and unclamped, within hour of uncapping and putting back to suction 170ml serous fluid out  One fever to 100 6  Objective:     Blood pressure 100/64, pulse (!) 118, temperature 98 4 °F (36 9 °C), temperature source Oral, resp  rate 18, height 5' 8" (1 727 m), weight 57 2 kg (126 lb 1 7 oz), SpO2 93 %  ,Body mass index is 19 17 kg/m²  Intake/Output Summary (Last 24 hours) at 11/08/17 0352  Last data filed at 11/07/17 2337   Gross per 24 hour   Intake              150 ml   Output              500 ml   Net             -350 ml       Invasive Devices     Peripheral Intravenous Line            Peripheral IV 11/07/17 Left Forearm less than 1 day          Drain            Chest Tube 1 Left 10 Fr  1 day                Physical Exam: NAD  AAOX3  RRR  Normal respiratory effort 2L NC  Soft, NT, ND  Kyphosis  L CT to suction, small air leak    Lab, Imaging and other studies:I have personally reviewed pertinent lab results      VTE Pharmacologic Prophylaxis: Enoxaparin (Lovenox)  VTE Mechanical Prophylaxis: sequential compression device

## 2017-11-08 NOTE — PROGRESS NOTES
Pt  New onset of rapid A-fib  Bp- 109/67 and Hr- 160s  Pt  On cardizem gtt at 12 5mg  Titrated to 15mg  Pt  Sustained in rapid a-fib  EKG done and Notified cardiology fellow  Ordered an amio bolus and gtt  Will continue to monitor

## 2017-11-08 NOTE — NUTRITION
11/08/17 1646   Assessment   Timepoint Initial  (consult: malnutrition)   Labs   List Completed Labs (creat 0 57, alb 1 8, Na 135; meds reviewed)   Feeding Route   PO Independent   Adequacy of Intake   Nutrition Modality PO  (regualr)   Intake Meals 0-25%   Estimated Calorie Intake 0-25%   Estimated Protein Intake  0-25%   Estimated Fluid Intake 25-50%   Estimated calorie intake compared to estimated need pt tried ordering breakfast this AM, however was taken out for a test and when he returned he needed to reheat his breakfast, which he states was unedible  he then wanted to order lunch but he stated the timing was off and he did not order anything  he states he is going to order spaghetti for dinner  he also agreed to have an Ensure Vanilla on ice before dinner  was in process of procuring this for pt when [address] was called  will provide Ensure with dinner  Nutrition Prognosis   Potential Fair   Nutrition Concerns (intake < estimated needs)   Comorbid Concerns (severe malnutrition, kyphosis, a-fib, hx spinal fx)   Nutrition Considerations (will monitor need for diet education)   PES Statement   Problem Intake   Nutrient (5) Inadequate protein-energy intake NI-5 3   Related to Decreased Appetite; Inadequate Intake   As evidenced by: Intake < estimated needs; Temporal Wasting; Loss of muscle mass;Edema/fluid retention   Patient Nutrition Goals   Goal meet PO needs;increase intake   Goal Status initiated   Timeframe to complete goal by next f/u   Recommendations/Interventions   Summary will provide pt with Ensure Enlive vanilla on ice with dinner for pt  severe malnutrition documented in chart  will continue to monitor po intake and any need for supplement adjustment   Interventions Diet: continued as ordered; Supplement initiate   Nutrition Recommendations Continue diet as ordered   Nutrition Complexity Risk   Nutrition complexity level High risk   Follow up date 11/13/17

## 2017-11-08 NOTE — PROGRESS NOTES
Pts  CT cathfloed by thoracic surgery residents at 2100  Approximately 15 min later pt  Was in extreme pain  Pt  Was given 4mg morphine  Pain was not relieved  Notified SLIM  Ordered 0 5mg of Dialudid and stat CXR  Will continue to manage pain and monitor pt

## 2017-11-08 NOTE — SOCIAL WORK
64yo male transferred from [facility] with LLL pneumonia, sepsis  He is alert and oriented, independent ADLs, on disability, drives  He resides alone in second floor apt in [facility] with 13 ELIF  He wears a back brace but has no other dme, no hx hhc, no hx rehab  He denies any need for [facility] or other services  His  is his brother Jabier Hernandez, phone [phone]  Hezekiah Bamberger or his friend will drive pt home at discharge  CM will follow for any discharge needs  CM reviewed d/c planning process including the following: identifying help at home, patient preference for d/c planning needs, Discharge Lounge, [facility] Meds to Bed program, availability of treatment team to discuss questions or concerns patient and/or family may have regarding understanding medications and recognizing signs and symptoms once discharged  CM also encouraged patient to follow up with all recommended appointments after discharge  Patient advised of importance for patient and family to participate in managing patients medical well being

## 2017-11-08 NOTE — RESPIRATORY THERAPY NOTE
RT Protocol Note  Valencia Lealr 57 y o  male MRN: [id number]  Unit/Bed#: [facility] 420-01 Encounter: [id number]    Assessment    Principal Problem:    Loculated pleural effusion  Active Problems:    Pneumonia    Sepsis ([address] )    Pedal edema    Osteoporosis    Kyphosis    Leukocytosis    Urinary retention    Severe protein-calorie malnutrition (HCC)    Continuous opioid dependence (HCC)    Other chronic pain    Atrial fibrillation with RVR (HCC)      Home Pulmonary Medications:  Pt takes no home pulmonary medications  Past Medical History:   Diagnosis Date    History of spinal fracture     Kyphosis     Loss of appetite     Loss of weight     Osteoarthritis     Osteoporosis      Social History     Social History    Marital status:      Spouse name: N/A    Number of children: N/A    Years of education: N/A     Social History Main Topics    Smoking status: Never Smoker    Smokeless tobacco: Never Used    Alcohol use No    Drug use: No    Sexual activity: No     Other Topics Concern    None     Social History Narrative    None       Subjective         Objective    Physical Exam:   Assessment Type: Assess only  General Appearance: Alert, Awake  Respiratory Pattern: Dyspnea with exertion  Chest Assessment: Chest expansion symmetrical  Bilateral Breath Sounds: Clear, Diminished    Vitals:  Blood pressure 116/62, pulse (!) 156, temperature (!) 100 6 °F (38 1 °C), temperature source Oral, resp  rate 18, height 5' 8" (1 727 m), weight 57 2 kg (126 lb 1 7 oz), SpO2 93 %  Results from last 7 days  Lab Units 11/06/17  1318   MELVIN TEST  Postive Melvin Test       Imaging and other studies: I have personally reviewed pertinent reports  Plan    Respiratory Plan: Mild Distress pathway        Resp Comments: pt evaluated for respiratory protocol at this time  pt complains of sharp pain when taking deep breath, and needing to clear his throat  pt bs are clear no prn needed at this time  will continue with the q6prn treatment at this time

## 2017-11-08 NOTE — PROGRESS NOTES
Pt s cardizem d/c  I Gradually Titrated pt  Down to 8 5 mg to take off and pt  Went back into rapid A-fib  Titrated back up to 10  Pressure 98/64  Notified cardiology fellow  Ordered to d/c cardizem and start amio bolus and gtt  Will continue to monitor

## 2017-11-08 NOTE — PLAN OF CARE
Problem: Prexisting or High Potential for Compromised Skin Integrity  Goal: Skin integrity is maintained or improved  INTERVENTIONS:  - Identify patients at risk for skin breakdown  - Assess and monitor skin integrity  - Assess and monitor nutrition and hydration status  - Monitor labs (i e  albumin)  - Assess for incontinence   - Turn and reposition patient  - Assist with mobility/ambulation  - Relieve pressure over bony prominences  - Avoid friction and shearing  - Provide appropriate hygiene as needed including keeping skin clean and dry  - Evaluate need for skin moisturizer/barrier cream  - Collaborate with interdisciplinary team (i e  Nutrition, Rehabilitation, etc )   - Patient/family teaching   Outcome: Progressing      Problem: Potential for Falls  Goal: Patient will remain free of falls  INTERVENTIONS:  - Assess patient frequently for physical needs  -  Identify cognitive and physical deficits and behaviors that affect risk of falls  -  [place] fall precautions as indicated by assessment   - Educate patient/family on patient safety including physical limitations  - Instruct patient to call for assistance with activity based on assessment  - Modify environment to reduce risk of injury  - Consider OT/PT consult to assist with strengthening/mobility    Outcome: Progressing      Problem: Nutrition/Hydration-ADULT  Goal: Nutrient/Hydration intake appropriate for improving, restoring or maintaining nutritional needs  Monitor and assess patient's nutrition/hydration status for malnutrition (ex- brittle hair, bruises, dry skin, pale skin and conjunctiva, muscle wasting, smooth red tongue, and disorientation)  Collaborate with interdisciplinary team and initiate plan and interventions as ordered  Monitor patient's weight and dietary intake as ordered or per policy  Utilize nutrition screening tool and intervene per policy   Determine patient's food preferences and provide high-protein, high-caloric foods as appropriate  INTERVENTIONS:  - Monitor oral intake, urinary output, labs, and treatment plans  - Assess nutrition and hydration status and recommend course of action  - Evaluate amount of meals eaten  - Assist patient with eating if necessary   - Allow adequate time for meals  - Recommend/ encourage appropriate diets, oral nutritional supplements, and vitamin/mineral supplements  - Order, calculate, and assess calorie counts as needed  - Recommend, monitor, and adjust tube feedings and TPN/PPN based on assessed needs  - Assess need for intravenous fluids  - Provide specific nutrition/hydration education as appropriate  - Include patient/family/caregiver in decisions related to nutrition    Outcome: Progressing      Problem: PAIN - ADULT  Goal: Verbalizes/displays adequate comfort level or baseline comfort level  Interventions:  - Encourage patient to monitor pain and request assistance  - Assess pain using appropriate pain scale  - Administer analgesics based on type and severity of pain and evaluate response  - Implement non-pharmacological measures as appropriate and evaluate response  - Consider cultural and social influences on pain and pain management  - Notify physician/advanced practitioner if interventions unsuccessful or patient reports new pain   Outcome: Not Progressing  Pt  Needs pain management  Was given morphine twice, dilaudid twice, and a Vicodin over night  Anytime I went in the room pt  Was asking for pain meds requesting he could get them because he had a "prescription"      Problem: INFECTION - ADULT  Goal: Absence or prevention of progression during hospitalization  INTERVENTIONS:  - Assess and monitor for signs and symptoms of infection  - Monitor lab/diagnostic results  - Monitor all insertion sites, i e  indwelling lines, tubes, and drains  - Monitor endotracheal (as able) and nasal secretions for changes in amount and color  - [place] appropriate cooling/warming therapies per order  - Administer medications as ordered  - Instruct and encourage patient and family to use good hand hygiene technique  - Identify and instruct in appropriate isolation precautions for identified infection/condition   Outcome: Progressing    Goal: Absence of fever/infection during neutropenic period  INTERVENTIONS:  - Monitor WBC  - Implement neutropenic guidelines   Outcome: Progressing      Problem: SAFETY ADULT  Goal: Maintain or return to baseline ADL function  INTERVENTIONS:  -  Assess patient's ability to carry out ADLs; assess patient's baseline for ADL function and identify physical deficits which impact ability to perform ADLs (bathing, care of mouth/teeth, toileting, grooming, dressing, etc )  - Assess/evaluate cause of self-care deficits   - Assess range of motion  - Assess patient's mobility; develop plan if impaired  - Assess patient's need for assistive devices and provide as appropriate  - Encourage maximum independence but intervene and supervise when necessary  ¯ Involve family in performance of ADLs  ¯ Assess for home care needs following discharge   ¯ Request OT consult to assist with ADL evaluation and planning for discharge  ¯ Provide patient education as appropriate   Outcome: Progressing    Goal: Maintain or return mobility status to optimal level  INTERVENTIONS:  - Assess patient's baseline mobility status (ambulation, transfers, stairs, etc )    - Identify cognitive and physical deficits and behaviors that affect mobility  - Identify mobility aids required to assist with transfers and/or ambulation (gait belt, sit-to-stand, lift, walker, cane, etc )  - [place] fall precautions as indicated by assessment  - Record patient progress and toleration of activity level on Mobility SBAR; progress patient to next Phase/Stage  - Instruct patient to call for assistance with activity based on assessment  - Request Rehabilitation consult to assist with strengthening/weightbearing, etc    Outcome: Progressing    Goal: Patient will remain free of falls  INTERVENTIONS:  - Assess patient frequently for physical needs  -  Identify cognitive and physical deficits and behaviors that affect risk of falls  -  [place] fall precautions as indicated by assessment   - Educate patient/family on patient safety including physical limitations  - Instruct patient to call for assistance with activity based on assessment  - Modify environment to reduce risk of injury  - Consider OT/PT consult to assist with strengthening/mobility    Outcome: Progressing      Problem: DISCHARGE PLANNING  Goal: Discharge to home or other facility with appropriate resources  INTERVENTIONS:  - Identify barriers to discharge w/patient and caregiver  - Arrange for needed discharge resources and transportation as appropriate  - Identify discharge learning needs (meds, wound care, etc )  - Arrange for interpretive services to assist at discharge as needed  - Refer to Case Management Department for coordinating discharge planning if the patient needs post-hospital services based on physician/advanced practitioner order or complex needs related to functional status, cognitive ability, or social support system   Outcome: Progressing      Problem: Knowledge Deficit  Goal: Patient/family/caregiver demonstrates understanding of disease process, treatment plan, medications, and discharge instructions  Complete learning assessment and assess knowledge base    Interventions:  - Provide teaching at level of understanding  - Provide teaching via preferred learning methods   Outcome: Progressing

## 2017-11-08 NOTE — MALNUTRITION/BMI
This medical record reflects one or more clinical indicators suggestive of malnutrition and/or morbid obesity  Please indicate the one diagnosis below which you feel best reflects the clinical picture  Malnutrition Findings:   chronic illness  other severe protein calorie malnutrition     pt presents with severe malnutrition related to poor po intake as evidenced by severe fat/muscle wasting present, intake hx indicative of meeting < 50% of estimated needs for > 1 months time, +3 edema documented    BMI Findings:  19-24 9    Body mass index is 19 78 kg/m²  See Nutrition note dated 11/08/2017 for additional details  Completed nutrition assessment is viewable in the nutrition documentation

## 2017-11-08 NOTE — WOUND OSTOMY CARE
Progress Note - Wound   Sylvia Purdy 57 y o  male MRN: [id number]  Unit/Bed#: [facility] 420-01 Encounter: [id number]      Assessment:   Patient is a 57year old male transferred from [address] [place] [address] where he initially presented with c/o productive cough, green sputum and SOB  CT showed LLL consolidate with large pleural effusion and transferred to [facility]  Patient seen by wound care this afternoon for charted stage 1 to spine and noted red non blanchable stage 1 to mid spine measuring as documented below, area of pressure injury is at most pronounce area of kyphosis  No other pressure injury noted  We will place prophylactic skin care orders and protective foam dressing for spine  Plan:   1-Protect mid spine with Allevyn life foam dressing  Change q5d   2-Hydraguard to sacrum, buttocks and heels BID and PRN  3-Soft care cushion when out of bed  4-Moisturize skin daily with skin nourishing cream   5-Elevate heels to offload pressure  6-Turn/reposition q2h for pressure re-distribution on skin  Vitals: Blood pressure 122/68, pulse 85, temperature 98 2 °F (36 8 °C), temperature source Oral, resp  rate 22, height 5' 8" (1 727 m), weight 59 kg (130 lb 1 1 oz), SpO2 93 %  ,Body mass index is 19 78 kg/m²  Pressure Ulcer 11/06/17 Back Upper;Mid RED NON-BLANCHABLE  (Active)   Staging Stage I 11/8/2017 11:30 AM   Wound Description Pink 11/8/2017 11:30 AM   Carol-wound Assessment Intact 11/8/2017 11:30 AM   Wound Length (cm) 2 5 cm 11/8/2017 11:30 AM   Wound Width (cm) 2 cm 11/8/2017 11:30 AM   Wound Depth (cm) 0 11/8/2017 11:30 AM   Calculated Wound Area (cm^2) 5 cm^2 11/8/2017 11:30 AM   Calculated Wound Volume (cm^3) 0 cm^3 11/8/2017 11:30 AM   Drainage Amount None 11/8/2017  8:00 AM   Treatment Cleansed; Turn & reposition 11/6/2017  9:45 PM   Dressing Open to air 11/8/2017  8:00 AM   Dressing Changed Other (Comment) 11/8/2017 11:30 AM   Patient Tolerance Tolerated well 11/8/2017 11:30 AM   Dressing Status Open to air 11/7/2017  5:00 PM           Our recommendations are placed as orders, please call wound care to ext [phone] with questions or concerns  Wound care to continue following, additional foam dressing placed in room      Debbie Frazier RN, BSN, [facility] [employer]

## 2017-11-08 NOTE — PLAN OF CARE
Problem: Nutrition/Hydration-ADULT  Goal: Nutrient/Hydration intake appropriate for improving, restoring or maintaining nutritional needs  Monitor and assess patient's nutrition/hydration status for malnutrition (ex- brittle hair, bruises, dry skin, pale skin and conjunctiva, muscle wasting, smooth red tongue, and disorientation)  Collaborate with interdisciplinary team and initiate plan and interventions as ordered  Monitor patient's weight and dietary intake as ordered or per policy  Utilize nutrition screening tool and intervene per policy  Determine patient's food preferences and provide high-protein, high-caloric foods as appropriate  INTERVENTIONS:  - Monitor oral intake, urinary output, labs, and treatment plans  - Assess nutrition and hydration status and recommend course of action  - Evaluate amount of meals eaten  - Assist patient with eating if necessary   - Allow adequate time for meals  - Recommend/ encourage appropriate diets, oral nutritional supplements, and vitamin/mineral supplements  - Order, calculate, and assess calorie counts as needed  - Recommend, monitor, and adjust tube feedings and TPN/PPN based on assessed needs  - Assess need for intravenous fluids  - Provide specific nutrition/hydration education as appropriate  - Include patient/family/caregiver in decisions related to nutrition    Outcome: Not Progressing  Pt is not meeting estimated needs at this time with poor po intake  Will provide ensure vanilla on ice with dinner

## 2017-11-08 NOTE — CONSULTS
Consultation - Cardiology   Scarlett Jolley 57 y o  male MRN: [id number]  Unit/Bed#: [facility] 420-01 Encounter: [id number]      Assessment:  Principal Problem:    Loculated pleural effusion  Active Problems:    Pneumonia    Sepsis ([facility] [address] )    Pedal edema    Osteoporosis    Kyphosis    Leukocytosis    Urinary retention    Severe protein-calorie malnutrition (HCC)    Continuous opioid dependence (HCC)    Other chronic pain    Atrial fibrillation with RVR ([facility] [address] )    Plan: This is a 19-year-old gentleman with a past medical history kyphosis, compression fracture, protein calorie malnutrition opiate dependence who presented with a left pleural effusion and pneumonia  Overnight he went into rapid atrial fibrillation which responded to IV amiodarone  He is currently on an amiodarone drip  Will start diltiazem 30 mg Q 8 hours for rate control  This is likely in the setting of an infection  Continue to monitor on telemetry  No indication for anticoagulation at this time given his age  [id number]  Echocardiogram done yesterday was unremarkable  History of Present Illness   Physician Requesting Consult: Lu Stewart DO  Reason for Consult / Principal Problem: Afib  HPI: Scarlett Jolley is a 57y o  year old male with a past medical history of severe kyphosis, compression fractures, protein calorie malnutrition and opioid dependence who presents with 1 month of shortness of breath, cough and productive sputum  He had increasing shortness of breath with chest pain the day prior to presentation  He was diagnosed with a left loculated pleural effusion  He has underwent drainag with Interventional Radiology and currently has a chest tube in place  Transfer for Thoracic surgery evaluation  Overnight patient went into rapid atrial fibrillation  Received Cardizem 15 mg x1  No response  Subsequently was bolused with amiodarone 150 mg x1 and then started on an amiodarone drip    Cardioverted to normal sinus rhythm at approximately 4:00 a m  this morning  Currently patient is doing well  Denies lightheadedness, dizziness, palpitations, Presyncope or syncope  Denies any prior cardiac history  Has never needed to see a cardiologist before  Denies any prior history of arrhythmias  Denies strokes or history of CAD  Consults    Review of Systems:  Review of Systems   Constitutional: Negative  Negative for diaphoresis and fatigue  HENT: Negative  Eyes: Negative  Respiratory: Positive for cough and shortness of breath  Negative for apnea, chest tightness and wheezing  Cardiovascular: Positive for chest pain  Negative for leg swelling  Gastrointestinal: Negative  Negative for abdominal distention, nausea and vomiting  Endocrine: Negative  Genitourinary: Negative  Musculoskeletal: Negative  Skin: Negative  Allergic/Immunologic: Negative  Neurological: Negative  Negative for dizziness, weakness, light-headedness and numbness  Hematological: Negative  Psychiatric/Behavioral: Negative  14 systems reviewed and negative with the exception of the above and the following    Historical Information   Past Medical History:   Diagnosis Date    History of spinal fracture     Kyphosis     Loss of appetite     Loss of weight     Osteoarthritis     Osteoporosis      Past Surgical History:   Procedure Laterality Date    FINGER SURGERY      SHOULDER DEBRIDEMENT       History   Alcohol Use No     History   Drug Use No     History   Smoking Status    Never Smoker   Smokeless Tobacco    Never Used     Family History: non-contributory    Meds/Allergies   all current active meds have been reviewed  Allergies   Allergen Reactions    Asa [Aspirin]     Carbapenems     Cephalosporins     Penicillins        Objective   Vitals: Blood pressure 122/68, pulse 85, temperature 98 2 °F (36 8 °C), temperature source Oral, resp  rate 22, height 5' 8" (1 727 m), weight 59 kg (130 lb 1 1 oz), SpO2 93 %  , Body mass index is 19 78 kg/m² , Orthostatic Blood Pressures    Flowsheet Row Most Recent Value   Blood Pressure  122/68 filed at 11/08/2017 0900   Patient Position - Orthostatic VS  Sitting filed at 11/08/2017 0900            Intake/Output Summary (Last 24 hours) at 11/08/17 1051  Last data filed at 11/08/17 0901   Gross per 24 hour   Intake          2581 04 ml   Output             1000 ml   Net          1581 04 ml       Invasive Devices     Peripheral Intravenous Line            Peripheral IV 11/07/17 Left Forearm less than 1 day    Peripheral IV 11/08/17 Right Forearm less than 1 day          Drain            Chest Tube 1 Left 10 Fr  1 day              Physical Exam   Constitutional: He is oriented to person, place, and time  He appears cachectic  He has a sickly appearance  HENT:   Head: Normocephalic  Eyes: Pupils are equal, round, and reactive to light  Neck: Normal range of motion  No JVD present  Cardiovascular: Normal rate and regular rhythm  No murmur heard  Pulmonary/Chest: Effort normal  No respiratory distress  He has no wheezes  Abdominal: Soft  He exhibits no distension  There is no tenderness  Musculoskeletal: Normal range of motion  He exhibits no edema  Neurological: He is alert and oriented to person, place, and time  No cranial nerve deficit  Skin: Skin is warm and dry  Psychiatric: He has a normal mood and affect   His behavior is normal        Lab Results:     Lab Results   Component Value Date    TROPONINI <0 02 11/06/2017       Lab Results   Component Value Date    GLUCOSE 175 (H) 11/08/2017    CALCIUM 8 3 11/08/2017     (L) 11/08/2017    K 3 6 11/08/2017    CO2 26 11/08/2017     11/08/2017    BUN 17 11/08/2017    CREATININE 0 57 (L) 11/08/2017       Lab Results   Component Value Date    WBC 27 84 (H) 11/08/2017    HGB 12 2 11/08/2017    HCT 35 6 (L) 11/08/2017    MCV 87 11/08/2017     (H) 11/08/2017       No results found for: CHOL  No results found for: HDL  No results found for: LDLCALC  No results found for: TRIG    Lab Results   Component Value Date    ALT 14 11/08/2017    AST 9 11/08/2017         Results from last 7 days  Lab Units 11/06/17  1037   INR  1 22*     Imaging: I have personally reviewed pertinent reports        EKG:  Atrial fibrillation RVR

## 2017-11-08 NOTE — CONSULTS
Consultation - General Surgery   Mili Pascual 57 y o  male MRN: [id number]  Unit/Bed#: [facility] 420-01 Encounter: [id number]    Assessment/Plan     Assessment:  Patient is a 74-year-old male who comes in with sepsis secondary to pneumonia and a left loculated pleural effusion status post IR placement of 10 Singaporean chest tube  Plan:  Left loculated pleural effusion-plan for bedside TPA administration and chest tube, then return to section  Monitor white blood cell count and fevers  Per ID at [facility] patient on aztreonam, vancomycin, Flagyl  Follow-up pulmonology  Primary care per Internal Medicine  History of Present Illness     HPI:  Mili Pascual is a 57 y o  male who presents with past medical history of kyphosis, osteoporosis, spinal fractures who presents with over a month of coughing up green sputum, shortness of breath, pain in the left side of his chest   He has scribed this pain is a twisting in knife, he also has chronic back pain  His transfer from [facility] his hand have sepsis secondary to pneumonia  Chest x-ray was performed 11/6 which showed left lower lobe consolidation consistent with pneumonia with large parapneumonic pleural effusion  Subsequently on 11/06 patient went to IR for a left 10 Singaporean pigtail chest tube  Patient is still feverish and spiking fevers to 101 2, intact cardiac, satting 94% on 2 L nasal cannula, and says the chest pain is still present  His WBC is 26K  He is also nauseous, no vomiting, describes a 4 lb weight loss over the last month, and constipation  He was previously treated five day course of Zithromax then and now per ID is being treated with aztreonam, vancomycin, Flagyl  Inpatient consult to Thoracic Surgery  Date/Time: 11/7/2017 7:12 PM  Performed by: Angel Vasquez  Authorized by: Renzo Doherty           Review of Systems   Constitutional: Positive for activity change, appetite change, fever and unexpected weight change   Negative for chills  Respiratory: Positive for cough and shortness of breath  Cardiovascular: Positive for chest pain and leg swelling  Gastrointestinal: Positive for abdominal pain, constipation and nausea  Negative for abdominal distention, diarrhea and vomiting  Genitourinary: Positive for difficulty urinating  Musculoskeletal: Positive for back pain  Skin: Negative for color change  Neurological: Negative for dizziness  Psychiatric/Behavioral: Negative for agitation         Historical Information   Past Medical History:   Diagnosis Date    History of spinal fracture     Kyphosis     Loss of appetite     Loss of weight     Osteoarthritis     Osteoporosis      Past Surgical History:   Procedure Laterality Date    FINGER SURGERY      SHOULDER DEBRIDEMENT       Social History   History   Alcohol Use No     History   Drug Use No     History   Smoking Status    Never Smoker   Smokeless Tobacco    Never Used     Family History: non-contributory    Meds/Allergies   all current active meds have been reviewed  Allergies   Allergen Reactions    Asa [Aspirin]     Carbapenems     Cephalosporins     Penicillins        Objective   First Vitals:   Blood Pressure: 144/77 (11/07/17 1720)  Pulse: 105 (11/07/17 1720)  Temperature: 99 2 °F (37 3 °C) (11/07/17 1720)  Temp Source: Oral (11/07/17 1720)  Respirations: 18 (11/07/17 1720)  Height: 5' 8" (172 7 cm) (11/07/17 1700)  Weight - Scale: 57 2 kg (126 lb 1 7 oz) (11/07/17 1700)  SpO2: 95 % (11/07/17 1700)    Current Vitals:   Blood Pressure: 144/77 (11/07/17 1720)  Pulse: 105 (11/07/17 1720)  Temperature: 99 2 °F (37 3 °C) (11/07/17 1720)  Temp Source: Oral (11/07/17 1720)  Respirations: 18 (11/07/17 1720)  Height: 5' 8" (172 7 cm) (11/07/17 1700)  Weight - Scale: 57 2 kg (126 lb 1 7 oz) (11/07/17 1700)  SpO2: 94 % (11/07/17 1720)      Intake/Output Summary (Last 24 hours) at 11/07/17 1912  Last data filed at 11/07/17 1701   Gross per 24 hour   Intake 0 ml   Output              300 ml   Net             -300 ml       Invasive Devices     Peripheral Intravenous Line            Peripheral IV 11/07/17 Left Forearm less than 1 day          Drain            Chest Tube 1 Left 10 Fr  1 day                Physical Exam   Constitutional: He is oriented to person, place, and time  He appears well-developed and well-nourished  HENT:   Head: Normocephalic and atraumatic  Eyes: Pupils are equal, round, and reactive to light  Cardiovascular:   Tachycardic, regular rhythm    Pulmonary/Chest: Effort normal    2L NC  L CT to suction   Abdominal: He exhibits no distension  There is no tenderness  firm   Musculoskeletal: He exhibits edema  Kyphosis  +2 b/l leg edema with dark discoloration of skin   Neurological: He is alert and oriented to person, place, and time  Skin: Skin is warm and dry  Psychiatric: He has a normal mood and affect  Lab Results:   I have personally reviewed pertinent lab results  , CBC:   Lab Results   Component Value Date    WBC 26 92 (H) 11/07/2017    HGB 12 5 11/07/2017    HCT 37 5 11/07/2017    MCV 89 11/07/2017     (H) 11/07/2017    MCH 29 6 11/07/2017    MCHC 33 3 11/07/2017    RDW 13 9 11/07/2017    MPV 8 7 (L) 11/07/2017   , CMP:   Lab Results   Component Value Date     11/07/2017    K 4 3 11/07/2017     11/07/2017    CO2 27 11/07/2017    ANIONGAP 8 11/07/2017    BUN 17 11/07/2017    CREATININE 0 75 11/07/2017    GLUCOSE 115 11/07/2017    CALCIUM 9 3 11/07/2017    AST 11 11/07/2017    ALT 17 11/07/2017    ALKPHOS 82 11/07/2017    PROT 6 9 11/07/2017    ALBUMIN 2 3 (L) 11/07/2017    BILITOT 0 80 11/07/2017    EGFR 98 11/07/2017     Imaging: I have personally reviewed pertinent reports  EKG, Pathology, and Other Studies: I have personally reviewed pertinent reports

## 2017-11-08 NOTE — PROGRESS NOTES
Pt  Converted to sinus tach by himself with HR in low 100s  Notified cardiology and was told to hold amio gtt and bolus  Will continue to monitor

## 2017-11-08 NOTE — PROGRESS NOTES
[address] Internal Medicine Progress Note  Patient: Leesa Guadalupe 57 y o  male   MRN: [id number]  PCP: Brain De La Garza MD  Unit/Bed#: [facility] 420-01 Encounter: [id number]  Date Of Visit: 11/08/17    Assessment:    Principal Problem:    Loculated pleural effusion  Active Problems:    Pneumonia    Sepsis ([facility] [address] )    Pedal edema    Osteoporosis    Kyphosis    Leukocytosis    Urinary retention    Severe protein-calorie malnutrition (HCC)    Continuous opioid dependence ([facility] )    Other chronic pain    Atrial fibrillation with RVR ([facility] )      Plan:    1  Left-sided pneumonia with loculated parapneumonic effusion , s/p  pigtail catheter placement on 11/06 and TPA on 11/ 7-thoracic surgery/pulmonology are following   ID  consulted for antibiotics management  2  Leukocytosis-secondary to above- improving  3  Osteoporosis/severe kyphosis with multiple age indeterminate compression deformities in the thoracic spine and  chronic pain-continuous opiate dependence- outpatient Endocrine follow-up- continue calcium supplement- check TSH  4  Severe protein calorie malnutrition-nutrition eval  5  Chronic pedal edema  6  Urinary retention-continue Flomax and bladder scan  7  New onset AFib with RVR- heart rate improving on amiodarone drip- cardiology consulted    VTE Pharmacologic Prophylaxis:   Pharmacologic: Enoxaparin (Lovenox)  Mechanical VTE Prophylaxis in Place: Yes    Patient Centered Rounds: I have performed bedside rounds with nursing staff today  Discussions with Specialists or Other Care Team Provider:     Education and Discussions with Family / Patient:  Patient    Time Spent for Care: 30 minutes  More than 50% of total time spent on counseling and coordination of care as described above      Current Length of Stay: 1 day(s)    Current Patient Status: Inpatient   Certification Statement: The patient will continue to require additional inpatient hospital stay due to Management of pneumonia    Discharge Plan / Estimated Discharge Date: not ready yet    Code Status: Level 1 - Full Code      Subjective:   Patient seen and examined  Comfortable in bed  Continued to have short of breath and intermittent cough  No chest pain    Objective:     Vitals:   Temp (24hrs), Av 2 °F (37 3 °C), Min:98 2 °F (36 8 °C), Max:100 6 °F (38 1 °C)    HR:  [] 85  Resp:  [18-22] 22  BP: ()/(59-96) 122/68  SpO2:  [92 %-95 %] 93 %  Body mass index is 19 78 kg/m²  Input and Output Summary (last 24 hours): Intake/Output Summary (Last 24 hours) at 17 1011  Last data filed at 17 0901   Gross per 24 hour   Intake              390 ml   Output              850 ml   Net             -460 ml       Physical Exam:     Physical Exam  Patient is awake alert in no acute distress  Cachectic- appears ill  Lung with decreased breath sounds bilateral left-sided chest tube in place  Heart positive S1-S2 no murmur  Back with severe kyphosis  Abdomen soft nontender positive bowel sounds  Lower extremity chronic venous stasis bilateral    Additional Data:     Labs:      Results from last 7 days  Lab Units 17  0435  17  1037   WBC Thousand/uL 27 84*  < > 36 62*   HEMOGLOBIN g/dL 12 2  < > 12 9   I STAT HEMOGLOBIN   --   < >  --    HEMATOCRIT % 35 6*  < > 38 4   PLATELETS Thousands/uL 540*  < > 597*   LYMPHO PCT %  --   --  5*   MONO PCT MAN %  --   --  4   EOSINO PCT MANUAL %  --   --  0   < > = values in this interval not displayed  Results from last 7 days  Lab Units 17  0435   SODIUM mmol/L 135*   POTASSIUM mmol/L 3 6   CHLORIDE mmol/L 102   CO2 mmol/L 26   BUN mg/dL 17   CREATININE mg/dL 0 57*   CALCIUM mg/dL 8 3   TOTAL PROTEIN g/dL 6 0*   BILIRUBIN TOTAL mg/dL 0 51   ALK PHOS U/L 72   ALT U/L 14   AST U/L 9   GLUCOSE RANDOM mg/dL 175*       Results from last 7 days  Lab Units 17  1037   INR  1 22*       * I Have Reviewed All Lab Data Listed Above  * Additional Pertinent Lab Tests Reviewed:  All Labs For Current Hospital Admission Reviewed    Imaging:    Imaging Reports Reviewed Today Include:   Imaging Personally Reviewed by Myself Includes:      Recent Cultures (last 7 days):       Results from last 7 days  Lab Units 11/07/17  0753 11/06/17  1803 11/06/17  1528 11/06/17  1311 11/06/17  1038 11/06/17  1037   BLOOD CULTURE   --   --   --   --  No Growth at 24 hrs  No Growth at 24 hrs  SPUTUM CULTURE   --   --   --  1+ Growth of   --   --    GRAM STAIN RESULT   --   --  2+ Polys  No bacteria seen Rare Epithelial cells per low power field  1+ Polys  2+ Gram positive cocci in pairs  2+ Gram negative rods  --   --    BODY FLUID CULTURE, STERILE   --   --  No growth  --   --   --    INFLUENZA A PCR  None Detected  --   --   --   --   --    INFLUENZA B PCR  None Detected  --   --   --   --   --    RSV PCR  None Detected  --   --   --   --   --    LEGIONELLA URINARY ANTIGEN   --  Negative  --   --   --   --        Last 24 Hours Medication List:     alteplase (ACTIVASE) 8 mg in 0 9% NaCl 50 mL 8 mg Chest Tube Once   aztreonam 2,000 mg Intravenous Q8H   calcium carbonate-vitamin D 1 tablet Oral BID With Meals   clobetasol 1 application Topical [address]   diazepam 5 mg Oral HS   docusate sodium 200 mg Oral BID   enoxaparin 40 mg Subcutaneous Daily   [START ON 11/9/2017] fentaNYL 1 patch Transdermal Q48H   metoprolol tartrate 25 mg Oral Q12H MICHELET   metroNIDAZOLE 500 mg Oral Q8H MICHELET   tamsulosin 0 4 mg Oral Daily With Dinner   vancomycin 15 mg/kg Intravenous Q12H        Today, Patient Was Seen By: Jason Mcwilliams DO    ** Please Note: This note has been constructed using a voice recognition system   **

## 2017-11-08 NOTE — CASE MANAGEMENT
Initial Clinical Review    Admission: Date/Time/Statement: 11/7/17 @ 1705 Inpatient Written     Orders Placed This Encounter   Procedures    Inpatient Admission     Standing Status:   Standing     Number of Occurrences:   1     Order Specific Question:   Admitting Physician     Answer:   Stacey Haynes [[id number]]     Order Specific Question:   Level of Care     Answer:   Med Surg [16]     Order Specific Question:   Estimated length of stay     Answer:   More than 2 Midnights     Order Specific Question:   Certification     Answer:   I certify that inpatient services are medically necessary for this patient for a duration of greater than two midnights  See H&P and MD Progress Notes for additional information about the patient's course of treatment  Chief Complaint: Left pleural effusion, transfer from [facility]    History of Illness: Tianna Santana is a 57 y o  male who initially presents to [address] [place] [address] with several days history of cough productive of green sputum, sob and chest pain  CT of the chest revealed "Left lower lobe consolidation consistent with pneumonia with large parapneumonic pleural effusion" Patient had chest tube place with minimal drainage and is now transferred to [facility] for TPA and thoracic surgery evaluation  Main complaint at this time is pain which is chronic  He has been able to void after arrival  He has sob with exertion and mild chest pain  He was recently treated with a 5days course of Azithromycin  He was evaluated by ID at [address] [facility] and started on Aztrteonam, Vanco and flagyl      ED Vital Signs:   ED Triage Vitals   Temperature Pulse Respirations Blood Pressure SpO2   11/07/17 1720 11/07/17 1720 11/07/17 1720 11/07/17 1720 11/07/17 1700   99 2 °F (37 3 °C) 105 18 144/77 95 %      Temp Source Heart Rate Source Patient Position - Orthostatic VS BP Location FiO2 (%)   11/07/17 1720 11/08/17 0209 11/07/17 1720 11/07/17 1720 --   Oral Monitor Lying Right arm       Pain Score       11/07/17 1700       6 Wt Readings from Last 1 Encounters:   11/08/17 59 kg (130 lb 1 1 oz)       Vital Signs (abnormal): temp 100 3, , 156, 157    Abnormal Labs/Diagnostic Test Results:   WBC 26 92*   PLATELETS 590*     CXR:  Stable left greater than right pleural effusions with associated bibasilar consolidation  IR chest tube: Impression: 1  Successful placement of a 10-Faroese pigtail drain into the left pleural cavity  There is a very complex loculated and septated left pleural effusion  EKG:  ST    Past Medical/Surgical History: Active Ambulatory Problems     Diagnosis Date Noted    Pneumonia 11/06/2017    Sepsis ([address] ) 11/06/2017    Pleural disorder 11/06/2017    Pedal edema 11/06/2017    Osteoporosis 11/06/2017    Kyphosis 11/06/2017    Leukocytosis 11/06/2017    Psoriasis 11/07/2017    Urinary retention 11/07/2017    Hematuria 11/07/2017     Resolved Ambulatory Problems     Diagnosis Date Noted    No Resolved Ambulatory Problems     Past Medical History:   Diagnosis Date    History of spinal fracture     Kyphosis     Loss of appetite     Loss of weight     Osteoarthritis     Osteoporosis        Admitting Diagnosis: Pleural effusion [J90]    Age/Sex: 57 y o  male    Assessment:  Principal Problem:  ·  Sepsis  d/t pneumonia with loculated parapneumonic effusion, POA - s/p chest tube placement on 11/6/17 prior to transfer  Unclear if TPA was done prior to transfer  Urine Lg, strept Pneumo negative, Influenza/RSV panel negative  Blood cultures negative x 24hrs  Sputum cultures 2+GPC in pairs, 2+GNR     Active Problems:  · Sinus tachycardia - in the setting of above  Patient was started on Cardizem infusion prior to transfer  Patient went back into rapid afib shortly after presentation  Echo revealed EF 17%, grade 1 diastolic dysfunction   On IVF  · Pedal edema - chronic  · Osteoporosis  · Severe Kyphosis with multiple age indeterminate compression deformities in the thoracic spine  · Leukocytosis - d/t pneumonia/effusion  · Urinary retention - was able to void on arrival to floor  Monitor with PVR  Hold of Urology re consult on transfer for now  · Severe protein-calorie malnutrition ([address] )  · Severe Osteoporosis - was seen by Endocrinology at [facility] and secondary work up for osteoporosis done with results pending  Recommended for outpatient Endocrinology f/u  See consult notes  · Continuous opioid dependence (HCC)  · Other chronic pain     Plan:  · Admit to telemetry, >2MN anticipated  · Continue IV antibiotics with Aztreonam, Vanco, Flagyl pending ID input  · Obtain pulmonary and Thoracic surgery evaluation, Nutrition eval  · Continue pain meds and home meds (patient reports he takes 2 100mg tabs Colace twice daily), continue Cardizem infusion  · Check PVR, urinary retention protocol, hold off on urology re consult for now  · F/u on pleural fluid cultures  · Further plans as in admit orders     VTE Prophylaxis: Enoxaparin (Lovenox)  / sequential compression device   Anticipated Length of Stay:  Patient will be admitted on an Inpatient basis with an anticipated length of stay of  > 2 midnights     Justification for Hospital Stay: PNA with loculated parapneumonic effusion    Admission Orders:  Telemetry, ambulating pulse ox  OOB with assistance  Chest tube  Daily weights  I/O  resp protocol  VAS lower limb venous duplex BL  Pt, ot  Consult pulmonology, cardiology, nutr services  O2 NC 2L    Scheduled Meds:   alteplase (ACTIVASE) 8 mg in 0 9% NaCl 50 mL 8 mg Chest Tube Once   aztreonam 2,000 mg Intravenous Q8H   calcium carbonate-vitamin D 1 tablet Oral BID With Meals   clobetasol 1 application Topical U25O [facility]   diazepam 5 mg Oral HS   diltiazem 30 mg Oral Q8H [facility]   docusate sodium 200 mg Oral BID   enoxaparin 40 mg Subcutaneous Daily   [START ON 11/9/2017] fentaNYL 1 patch Transdermal Q48H   metoprolol tartrate 25 mg Oral Q12H [facility]   metroNIDAZOLE 500 mg Oral Q8H [facility]   tamsulosin 0 4 mg Oral Daily With WinView vancomycin 15 mg/kg Intravenous Q12H     Continuous Infusions:   amiodarone 0 5 mg/min Last Rate: 0 5 mg/min (11/08/17 1001)   sodium chloride 60 mL/hr      PRN Meds:   acetaminophen    albuterol    HYDROcodone-acetaminophen 1 tab x1 thus far  Hydromorphone 0 5mg IV x1 thus far    morphine injection 4mg IV x7 thus far    polyethylene glycol    Insert peripheral IV **AND** sodium chloride (PF)    [address] [place] [address] in the [facility] by Maxx Zendejas for 2017  Network Utilization Review Department  Phone: [phone]; Fax [phone]  ATTENTION: The Network Utilization Review Department is now centralized for our 7 Facilities  Make a note that we have a new phone and fax numbers for our Department  Please call with any questions or concerns to [phone] and carefully follow the prompts so that you are directed to the right person  All voicemails are confidential  Fax any determinations, approvals, denials, and requests for initial or continue stay review clinical to [phone]  Due to HIGH CALL volume, it would be easier if you could please send faxed requests to expedite your requests and in part, help us provide discharge notifications faster

## 2017-11-08 NOTE — CONSULTS
Pulmonary Consultation   Lindsey Rdz 57 y o  male MRN: [id number]  Unit/Bed#: [facility] 420-01 Encounter: [id number]      Reason for consultation:   Loculated Pleural effusion    Requesting physician:   Linda Land MD    Impressions:   1- Loculated parapneumonic L pleural effusion  S/P TPA  2- L lung pneumonia  3- severe Kyphosis  4- Leucocytosis  5- severe osteoporosis  6- Chronic pain syndrom  7- Chronic leg edema  8- Tachyarrhythmia      Recommendations:  Thoracic surgery is following  S/P TPA    Repeat Ct for tomorrow   If still loculated will probably need bigger chest tube and may be placed under Ct scan  Will leave that to thoracic surgery to decide on further plan    Will follow patient with you     Keep O2  Keep antibiotics as ordered  Cxr today pending    Will follow with you      History of Present Illness   HPI:  Lindsey Rdz is a 57 y o  male who has been transferred from [facility] for Loculated L pleural effusion S/P chest tube       · s/p chest tube placement on 11/6/17 prior to transfer  Unclear if TPA was done prior to transfer  Urine Lg, strept Pneumo negative, Influenza/RSV panel negative  Blood cultures negative x 24hrs  Sputum cultures 2+GPC in pairs, 2+GNR      Review of systems:  12 point review of systems was completed and was otherwise negative except as listed in HPI        Historical Information   Past Medical History:   Diagnosis Date    History of spinal fracture     Kyphosis     Loss of appetite     Loss of weight     Osteoarthritis     Osteoporosis      Past Surgical History:   Procedure Laterality Date    FINGER SURGERY      SHOULDER DEBRIDEMENT       Family History   Problem Relation Age of Onset    Heart disease Mother     Early death Mother     Cancer Father     Cancer Brother        Occupational History: reviewed  Social History: non smoker    Meds/Allergies   Current Facility-Administered Medications   Medication Dose Route Frequency    acetaminophen (TYLENOL) tablet 650 mg  650 mg Oral Q6H PRN    albuterol inhalation solution 2 5 mg  2 5 mg Nebulization Q6H PRN    alteplase (CATHFLO) 8 mg in sodium chloride 0 9 % 50 mL syringe  8 mg Chest Tube Once    amiodarone (CORDARONE) 900 mg in dextrose 5 % 500 mL infusion  1 mg/min Intravenous Continuous    Followed by   Nori Sarmiento amiodarone (CORDARONE) 900 mg in dextrose 5 % 500 mL infusion  0 5 mg/min Intravenous Continuous    aztreonam (AZACTAM) 2,000 mg in sodium chloride 0 9 % 100 mL IVPB  2,000 mg Intravenous Q8H    calcium carbonate-vitamin D (OSCAL-D) 500 mg-200 units per tablet 1 tablet  1 tablet Oral BID With Meals    clobetasol (TEMOVATE) 4 69 % cream 1 application  1 application Topical [address]    diazepam (VALIUM) tablet 5 mg  5 mg Oral HS    docusate sodium (COLACE) capsule 200 mg  200 mg Oral BID    enoxaparin (LOVENOX) subcutaneous injection 40 mg  40 mg Subcutaneous Daily    [START ON 11/9/2017] fentaNYL (DURAGESIC) 50 mcg/hr TD 72 hr patch 1 patch  1 patch Transdermal Q48H    HYDROcodone-acetaminophen (NORCO) 5-325 mg per tablet 1 tablet  1 tablet Oral Q6H PRN    metoprolol tartrate (LOPRESSOR) tablet 25 mg  25 mg Oral Q12H [facility]    metroNIDAZOLE (FLAGYL) tablet 500 mg  500 mg Oral Q8H [address]    morphine (PF) 4 mg/mL injection 4 mg  4 mg Intravenous Q4H PRN    polyethylene glycol (MIRALAX) packet 17 g  17 g Oral Daily PRN    sodium chloride (PF) 0 9 % injection 3 mL  3 mL Intravenous PRN    sodium chloride 0 9 % infusion  125 mL/hr Intravenous Continuous    tamsulosin (FLOMAX) capsule 0 4 mg  0 4 mg Oral Daily With Dinner    vancomycin (VANCOCIN) IVPB (premix) 1,000 mg  15 mg/kg Intravenous Q12H     Prescriptions Prior to Admission   Medication    Calcium Carbonate-Vitamin D (CALCIUM 500 + D PO)    ciprofloxacin-dexamethasone (CIPRODEX) otic suspension    docusate sodium (COLACE) 100 mg capsule    fentaNYL (DURAGESIC) 50 mcg/hr    HYDROcodone-acetaminophen (ZAMCET)  MG/15ML solution  ofloxacin (OCUFLOX) 0 3 % ophthalmic solution    Omega-3 Fatty Acids (OMEGA-3 FISH OIL) 500 MG CAPS    diazepam (VALIUM) 5 mg tablet     Allergies   Allergen Reactions    Asa [Aspirin]     Carbapenems     Cephalosporins     Penicillins        Vitals: Blood pressure 122/68, pulse 85, temperature 98 2 °F (36 8 °C), temperature source Oral, resp  rate 22, height 5' 8" (1 727 m), weight 59 kg (130 lb 1 1 oz), SpO2 93 % ,2L, Body mass index is 19 78 kg/m²  Intake/Output Summary (Last 24 hours) at 11/08/17 0924  Last data filed at 11/08/17 0901   Gross per 24 hour   Intake              390 ml   Output              850 ml   Net             -460 ml       Physical Exam  General:, Awake alert and oriented x 3, conversant without conversational dyspnea, NAD, normal affect, thin  HEENT:  PERRL, Sclera noninjected, nonicteric OU, Nares patent, no nasal flaring, no nasal drainage, Mucous membranes, moist, no oral lesions, normal dentition  NECK: Trachea midline, no accessory muscle use, no stridor, no cervical or supraclavicular adenopathy, JVP not elevated  CARDIAC: RR  PULM: Decrease BS , chest tube L side noted Draining serosanguineous fluid  CHEST: Kyphosis  ABD: Normoactive bowel sounds, soft nontender, nondistended, no rebound, no rigidity, no guarding    EXT: No cyanosis, no clubbing, no edema, normal capillary refill  SKIN:  No rashes, no lesions  NEURO: no focal neurologic deficits, AAOx3, moving all extremities appropriately    Labs: I have personally reviewed pertinent lab results  , ABG: No results found for: PHART, LXI2MIY, PO2ART, MLD3KZQ, I1NUGTJP, BEART, SOURCE, BNP: No results found for: BNP, CBC:   Lab Results   Component Value Date    WBC 27 84 (H) 11/08/2017    HGB 12 2 11/08/2017    HCT 35 6 (L) 11/08/2017    MCV 87 11/08/2017     (H) 11/08/2017    MCH 29 8 11/08/2017    MCHC 34 3 11/08/2017    RDW 13 7 11/08/2017    MPV 8 7 (L) 11/08/2017   , CMP:   Lab Results   Component Value Date  (L) 11/08/2017    K 3 6 11/08/2017     11/08/2017    CO2 26 11/08/2017    ANIONGAP 7 11/08/2017    BUN 17 11/08/2017    CREATININE 0 57 (L) 11/08/2017    GLUCOSE 175 (H) 11/08/2017    CALCIUM 8 3 11/08/2017    AST 9 11/08/2017    ALT 14 11/08/2017    ALKPHOS 72 11/08/2017    PROT 6 0 (L) 11/08/2017    ALBUMIN 1 8 (L) 11/08/2017    BILITOT 0 51 11/08/2017    EGFR 110 11/08/2017   , PT/INR: No results found for: PT, INR, Troponin: No results found for: TROPONINI    Results from last 7 days  Lab Units 11/08/17  0435 11/07/17  0513 11/06/17  1043 11/06/17  1037   WBC Thousand/uL 27 84* 26 92*  --  36 62*   HEMOGLOBIN g/dL 12 2 12 5  --  12 9   I STAT HEMOGLOBIN g/dl  --   --  13 9  --    HEMATOCRIT % 35 6* 37 5  --  38 4   PLATELETS Thousands/uL 540* 556*  --  597*   MONO PCT MAN %  --   --   --  4      Results from last 7 days  Lab Units 11/08/17  0435 11/07/17  0513 11/06/17  1043 11/06/17  1037   SODIUM mmol/L 135* 136  --  135*   POTASSIUM mmol/L 3 6 4 3  --  4 3   CHLORIDE mmol/L 102 101  --  96*   CO2 mmol/L 26 27  --  27   BUN mg/dL 17 17  --  24   CREATININE mg/dL 0 57* 0 75  --  1 05   CALCIUM mg/dL 8 3 9 3  --  9 5   TOTAL PROTEIN g/dL 6 0* 6 9  --  7 9   BILIRUBIN TOTAL mg/dL 0 51 0 80  --  0 80   ALK PHOS U/L 72 82  --  113   ALT U/L 14 17  --  23   AST U/L 9 11  --  19   GLUCOSE RANDOM mg/dL 175* 115  --  139   GLUCOSE, ISTAT mg/dl  --   --  144*  --        Results from last 7 days  Lab Units 11/06/17  1037   MAGNESIUM mg/dL 2 3       Results from last 7 days  Lab Units 11/07/17  0513   PHOSPHORUS mg/dL 2 7        Results from last 7 days  Lab Units 11/06/17  1037   INR  1 22*   PTT seconds 37*       Results from last 7 days  Lab Units 11/06/17  1125   LACTIC ACID mmol/L 1 1       0  Lab Value Date/Time   TROPONINI <0 02 11/06/2017 1037       Imaging and other studies: I have personally reviewed pertinent reports     and I have personally reviewed pertinent films in PACS        EKG, Pathology, and Other Studies: I have personally reviewed pertinent reports        Code Status: Level 1 - Full Code    MD [facility]

## 2017-11-08 NOTE — CONSULTS
Consultation - Infectious Disease   Vanessa Ferro 57 y o  male MRN: [id number]  Unit/Bed#: [facility] 420-01 Encounter: [id number]      Assessment/Plan     Assessment/Plan:     57y o  year old male with a history of severe kyphosis  who initially presented to [facility] 11/6 with recent onset SOB, L-sided chest pain, fevers/chills in setting of green sputum production over past several months; found to have LLL PNA w/ left loculated pleural effusions s/p L chest tube placement 11/7, transferred to hospitals for thoracic surgery evaluation  S/p chest tube TPA instillation   1  Sepsis POA:  likely secondary to PNA w/ loculated parapneumonic effusion  No isolated organism so far in blood/sputum/pleural fluid, possible anaerobic organism? · Tmax 100 6 last 24H at 1725, WBC 27 84 < 26 92 < 36 62   · Blood cx (10/6): NG24H  · Continue Aztreonam 2g q8H, Vanco 1g q12H (4th dose 2300 today) , flagyl 500 q8H PO for now (day 2 current regimen, day 3 total abx)    2  Left-sided pneumonia: CAP v aspiration PNA  · Legionella ag, strep pneumo ag, influenza/RSV, AFB stain negative  Fungal cx in process  · Sputum cx: mixed respiratory arthur   · Continue abx as above    3  Loculated left pleural effusion s/p chest tube placement 11/6, TPA 11/7  · Pleural fluid:  (97% PMN), pH 9 0, LD not reported as fluid grossly hemolyzed  No bacteria, 2+ poly, cx w/ no growth  · Repeat CXR (11/8): stable bilateral pleural effusions; Pa/lateral pending   · Chest tube w/ 170cc serous drainage after unclamped/to suction     4  Acute urinary retention  · Urology consult at [facility]: farr catheter placed 11/7, Flomax, aggressive bowel regimen     5   Severe kyphosis w/ osteoporosis   · Enocrine consult at [facility]": ordered UPEP, PSA, immunoglobulins, PTH, VitD for work-up osteop       History of Present Illness   Physician Requesting Consult: Virgie Kimball DO  Reason for Consult / Principal Problem: PNA w/ parapneumonic effusion  Hx and PE limited by: --  HPI: Nellie Armando is a 57y o  year old male with a history of severe kyphosis w/ multiple compression fractures who initially presented to [facility] 11/6 with several days of SOB, L-sided chest pain  He states that over the last several months he has felt congested, would force himself to cough up small amounts of green sputum  He was found in ED to have WBC of 36 (90% segs), tachycardia  Initially afebrile  CXR in ED w/ large pleural left effusion and overlying opacity  The patient was admitted to [facility]  Contrast CT chest showed a LLL consolidation and large parapneumonic pleural effusion in addition to liver and bilateral renal cysts  and was initially started on high-dose Levaquin (unspecified Penicillin allergy as teenager)  At [facility] he had a left chest tube placed, noted by IR to have "very complex loculated and septated left pleural effusion," little fluid sample able to be collected  Per Pulm continued on Levo, added flagyl for concern aspiration given hx narcoticv/benzo use  Spiked fever ON to 101 2  The patient was also seen by ID Dr Nellie Bain, who initiated the patient on vancomycin, aztreonam, continued on flagyl  The patient was transferred to [facility] 11/7 for thoracic surgery evaluation for possible tPA/VATs          Inpatient consult to Infectious Diseases  Performed by: DIEGO ROBERTS  Authorized by: Darell Landry           Review of Systems    Historical Information   Past Medical History:   Diagnosis Date    History of spinal fracture     Kyphosis     Loss of appetite     Loss of weight     Osteoarthritis     Osteoporosis      Past Surgical History:   Procedure Laterality Date    FINGER SURGERY      SHOULDER DEBRIDEMENT       Social History   History   Alcohol Use No     History   Drug Use No     History   Smoking Status    Never Smoker   Smokeless Tobacco    Never Used       Meds/Allergies   all current active meds have been reviewed    Allergies   Allergen Reactions    Asa [Aspirin]     Carbapenems     Cephalosporins     Penicillins        Objective       Intake/Output Summary (Last 24 hours) at 11/08/17 0752  Last data filed at 11/08/17 0635   Gross per 24 hour   Intake              390 ml   Output              700 ml   Net             -310 ml       Invasive Devices:   Peripheral IV 11/07/17 Left Forearm (Active)   Site Assessment Dry; Intact; Clean 11/7/2017  5:00 PM   Dressing Type Transparent 11/7/2017  5:00 PM   Line Status Infusing 11/7/2017  5:00 PM   Dressing Status Clean;Dry; Intact 11/7/2017  5:00 PM   Dressing Change Due 11/11/17 11/7/2017  5:00 PM       Chest Tube 1 Left 10 Fr  (Active)   Function -20 cm H2O 11/7/2017  5:00 PM   Chest Tube Air Leak No 11/7/2017  5:00 PM   Drainage Description Dark red 11/7/2017  5:00 PM   Dressing Status Clean;Dry; Intact 11/7/2017  5:00 PM   Site Assessment Unable to assess 11/7/2017  5:00 PM   Surrounding Skin Unable to view 11/7/2017  5:00 PM   Output (mL) 0 mL 11/7/2017  5:00 PM       Physical Exam    Lab Results: I have personally reviewed pertinent labs  Imaging Studies: I have personally reviewed pertinent reports  EKG, Pathology, and Other Studies: I have personally reviewed pertinent reports  Counseling / Coordination of Care  Total floor / unit time spent today 45 minutes  Greater than 50% of total time was spent with the patient and / or family counseling and / or coordination of care   A description of the counseling / coordination of care: 45min

## 2017-11-09 ENCOUNTER — APPOINTMENT (INPATIENT)
Dept: RADIOLOGY | Facility: HOSPITAL | Age: 62
DRG: 853 | End: 2017-11-09
Payer: MEDICARE

## 2017-11-09 LAB
ANISOCYTOSIS BLD QL SMEAR: PRESENT
BACTERIA SPEC BFLD CULT: NO GROWTH
BASOPHILS # BLD MANUAL: 0 THOUSAND/UL (ref 0–0.1)
BASOPHILS NFR MAR MANUAL: 0 % (ref 0–1)
BURR CELLS BLD QL SMEAR: PRESENT
EOSINOPHIL # BLD MANUAL: 0 THOUSAND/UL (ref 0–0.4)
EOSINOPHIL NFR BLD MANUAL: 0 % (ref 0–6)
ERYTHROCYTE [DISTWIDTH] IN BLOOD BY AUTOMATED COUNT: 13.8 % (ref 11.6–15.1)
GRAM STN SPEC: NORMAL
GRAM STN SPEC: NORMAL
HCT VFR BLD AUTO: 29.6 % (ref 36.5–49.3)
HGB BLD-MCNC: 10.2 G/DL (ref 12–17)
LYMPHOCYTES # BLD AUTO: 0 % (ref 14–44)
LYMPHOCYTES # BLD AUTO: 0 THOUSAND/UL (ref 0.6–4.47)
MCH RBC QN AUTO: 29.5 PG (ref 26.8–34.3)
MCHC RBC AUTO-ENTMCNC: 34.5 G/DL (ref 31.4–37.4)
MCV RBC AUTO: 86 FL (ref 82–98)
MONOCYTES # BLD AUTO: 1.49 THOUSAND/UL (ref 0–1.22)
MONOCYTES NFR BLD: 6 % (ref 4–12)
NEUTROPHILS # BLD MANUAL: 23.32 THOUSAND/UL (ref 1.85–7.62)
NEUTS SEG NFR BLD AUTO: 94 % (ref 43–75)
NRBC BLD AUTO-RTO: 0 /100 WBCS
PLATELET # BLD AUTO: 533 THOUSANDS/UL (ref 149–390)
PLATELET BLD QL SMEAR: ABNORMAL
PMV BLD AUTO: 8.5 FL (ref 8.9–12.7)
POIKILOCYTOSIS BLD QL SMEAR: PRESENT
RBC # BLD AUTO: 3.46 MILLION/UL (ref 3.88–5.62)
RBC MORPH BLD: PRESENT
TSH SERPL DL<=0.05 MIU/L-ACNC: 1.57 UIU/ML (ref 0.36–3.74)
WBC # BLD AUTO: 24.81 THOUSAND/UL (ref 4.31–10.16)

## 2017-11-09 PROCEDURE — 84443 ASSAY THYROID STIM HORMONE: CPT | Performed by: INTERNAL MEDICINE

## 2017-11-09 PROCEDURE — G8988 SELF CARE GOAL STATUS: HCPCS

## 2017-11-09 PROCEDURE — 97167 OT EVAL HIGH COMPLEX 60 MIN: CPT

## 2017-11-09 PROCEDURE — 0T9B70Z DRAINAGE OF BLADDER WITH DRAINAGE DEVICE, VIA NATURAL OR ARTIFICIAL OPENING: ICD-10-PCS | Performed by: INTERNAL MEDICINE

## 2017-11-09 PROCEDURE — 85027 COMPLETE CBC AUTOMATED: CPT | Performed by: INTERNAL MEDICINE

## 2017-11-09 PROCEDURE — 97535 SELF CARE MNGMENT TRAINING: CPT

## 2017-11-09 PROCEDURE — 71010 HB CHEST X-RAY 1 VIEW FRONTAL (PORTABLE): CPT

## 2017-11-09 PROCEDURE — G8987 SELF CARE CURRENT STATUS: HCPCS

## 2017-11-09 PROCEDURE — 85007 BL SMEAR W/DIFF WBC COUNT: CPT | Performed by: INTERNAL MEDICINE

## 2017-11-09 RX ORDER — METOPROLOL TARTRATE 5 MG/5ML
5 INJECTION INTRAVENOUS ONCE
Status: COMPLETED | OUTPATIENT
Start: 2017-11-09 | End: 2017-11-09

## 2017-11-09 RX ORDER — LEVALBUTEROL INHALATION SOLUTION 0.63 MG/3ML
0.63 SOLUTION RESPIRATORY (INHALATION)
Status: DISCONTINUED | OUTPATIENT
Start: 2017-11-09 | End: 2017-11-09

## 2017-11-09 RX ORDER — GUAIFENESIN 600 MG
600 TABLET, EXTENDED RELEASE 12 HR ORAL EVERY 12 HOURS SCHEDULED
Status: DISCONTINUED | OUTPATIENT
Start: 2017-11-09 | End: 2017-11-22 | Stop reason: HOSPADM

## 2017-11-09 RX ORDER — DILTIAZEM HYDROCHLORIDE 60 MG/1
60 TABLET, FILM COATED ORAL EVERY 8 HOURS SCHEDULED
Status: DISCONTINUED | OUTPATIENT
Start: 2017-11-09 | End: 2017-11-10

## 2017-11-09 RX ADMIN — DOCUSATE SODIUM 200 MG: 100 CAPSULE, LIQUID FILLED ORAL at 09:04

## 2017-11-09 RX ADMIN — CEFTRIAXONE SODIUM 2000 MG: 10 INJECTION, POWDER, FOR SOLUTION INTRAVENOUS at 14:36

## 2017-11-09 RX ADMIN — SODIUM CHLORIDE 60 ML/HR: 0.9 INJECTION, SOLUTION INTRAVENOUS at 07:25

## 2017-11-09 RX ADMIN — METRONIDAZOLE 500 MG: 500 TABLET ORAL at 13:24

## 2017-11-09 RX ADMIN — METOPROLOL TARTRATE 5 MG: 5 INJECTION, SOLUTION INTRAVENOUS at 17:20

## 2017-11-09 RX ADMIN — AMIODARONE HYDROCHLORIDE 0.5 MG/MIN: 50 INJECTION, SOLUTION INTRAVENOUS at 01:21

## 2017-11-09 RX ADMIN — ALTEPLASE 8 MG: 2.2 INJECTION, POWDER, LYOPHILIZED, FOR SOLUTION INTRAVENOUS at 20:49

## 2017-11-09 RX ADMIN — DILTIAZEM HYDROCHLORIDE 30 MG: 30 TABLET, FILM COATED ORAL at 06:12

## 2017-11-09 RX ADMIN — HYDROCODONE BITARTRATE AND ACETAMINOPHEN 1 TABLET: 5; 325 TABLET ORAL at 04:16

## 2017-11-09 RX ADMIN — HYDROMORPHONE HYDROCHLORIDE 1 MG: 1 INJECTION, SOLUTION INTRAMUSCULAR; INTRAVENOUS; SUBCUTANEOUS at 07:33

## 2017-11-09 RX ADMIN — MORPHINE SULFATE 2 MG: 2 INJECTION, SOLUTION INTRAMUSCULAR; INTRAVENOUS at 00:27

## 2017-11-09 RX ADMIN — HYDROMORPHONE HYDROCHLORIDE 1 MG: 1 INJECTION, SOLUTION INTRAMUSCULAR; INTRAVENOUS; SUBCUTANEOUS at 11:39

## 2017-11-09 RX ADMIN — HYDROMORPHONE HYDROCHLORIDE 1 MG: 1 INJECTION, SOLUTION INTRAMUSCULAR; INTRAVENOUS; SUBCUTANEOUS at 20:06

## 2017-11-09 RX ADMIN — CALCIUM CARBONATE 500 MG (1,250 MG)-VITAMIN D3 200 UNIT TABLET 1 TABLET: at 20:48

## 2017-11-09 RX ADMIN — ALTEPLASE 8 MG: 2.2 INJECTION, POWDER, LYOPHILIZED, FOR SOLUTION INTRAVENOUS at 00:25

## 2017-11-09 RX ADMIN — DOCUSATE SODIUM 200 MG: 100 CAPSULE, LIQUID FILLED ORAL at 17:42

## 2017-11-09 RX ADMIN — DILTIAZEM HYDROCHLORIDE 30 MG: 30 TABLET, FILM COATED ORAL at 13:23

## 2017-11-09 RX ADMIN — MORPHINE SULFATE 2 MG: 2 INJECTION, SOLUTION INTRAMUSCULAR; INTRAVENOUS at 03:07

## 2017-11-09 RX ADMIN — FENTANYL 1 PATCH: 50 PATCH, EXTENDED RELEASE TRANSDERMAL at 09:04

## 2017-11-09 RX ADMIN — METRONIDAZOLE 500 MG: 500 TABLET ORAL at 21:02

## 2017-11-09 RX ADMIN — DILTIAZEM HYDROCHLORIDE 5 MG/HR: 5 INJECTION INTRAVENOUS at 17:20

## 2017-11-09 RX ADMIN — GUAIFENESIN 600 MG: 600 TABLET, EXTENDED RELEASE ORAL at 10:21

## 2017-11-09 RX ADMIN — GUAIFENESIN 600 MG: 600 TABLET, EXTENDED RELEASE ORAL at 21:01

## 2017-11-09 RX ADMIN — METOPROLOL TARTRATE 25 MG: 25 TABLET ORAL at 21:01

## 2017-11-09 RX ADMIN — ENOXAPARIN SODIUM 40 MG: 40 INJECTION SUBCUTANEOUS at 09:04

## 2017-11-09 RX ADMIN — METOPROLOL TARTRATE 25 MG: 25 TABLET ORAL at 09:04

## 2017-11-09 RX ADMIN — HYDROCODONE BITARTRATE AND ACETAMINOPHEN 1 TABLET: 5; 325 TABLET ORAL at 23:46

## 2017-11-09 RX ADMIN — CALCIUM CARBONATE 500 MG (1,250 MG)-VITAMIN D3 200 UNIT TABLET 1 TABLET: at 09:04

## 2017-11-09 RX ADMIN — DILTIAZEM HYDROCHLORIDE 60 MG: 60 TABLET, FILM COATED ORAL at 21:02

## 2017-11-09 RX ADMIN — DIAZEPAM 5 MG: 5 TABLET ORAL at 23:42

## 2017-11-09 RX ADMIN — METRONIDAZOLE 500 MG: 500 TABLET ORAL at 06:12

## 2017-11-09 RX ADMIN — HYDROMORPHONE HYDROCHLORIDE 1 MG: 1 INJECTION, SOLUTION INTRAMUSCULAR; INTRAVENOUS; SUBCUTANEOUS at 16:21

## 2017-11-09 RX ADMIN — TAMSULOSIN HYDROCHLORIDE 0.4 MG: 0.4 CAPSULE ORAL at 16:24

## 2017-11-09 NOTE — RESPIRATORY THERAPY NOTE
RT Protocol Note  Kwaku Lowe 57 y o  male MRN: [id number]  Unit/Bed#: [facility] 420-01 Encounter: [id number]    Assessment    Principal Problem:    Loculated pleural effusion  Active Problems:    Pneumonia    Sepsis ([address] )    Pedal edema    Osteoporosis    Kyphosis    Leukocytosis    Urinary retention    Severe protein-calorie malnutrition (HCC)    Continuous opioid dependence (HCC)    Other chronic pain    Atrial fibrillation with RVR (HCC)      Home Pulmonary Medications:  none    Past Medical History:   Diagnosis Date    History of spinal fracture     Kyphosis     Loss of appetite     Loss of weight     Osteoarthritis     Osteoporosis      Social History     Social History    Marital status:      Spouse name: N/A    Number of children: N/A    Years of education: N/A     Social History Main Topics    Smoking status: Never Smoker    Smokeless tobacco: Never Used    Alcohol use No    Drug use: No    Sexual activity: No     Other Topics Concern    None     Social History Narrative    None       Subjective         Objective    Physical Exam:   Bilateral Breath Sounds: Clear, Diminished    Vitals:  Blood pressure 151/81, pulse 97, temperature 99 2 °F (37 3 °C), temperature source Oral, resp  rate 18, height 5' 8" (1 727 m), weight 59 5 kg (131 lb 2 8 oz), SpO2 93 %  Results from last 7 days  Lab Units 11/06/17  1318   MELVIN TEST  Postive Melvin Test       Imaging and other studies: I have personally reviewed pertinent reports  Plan    Respiratory Plan: (P) No distress/Pulmonary history        Resp Comments: (P) d/c xopenex udn continue albuterol udn prn, bs decreased, pt has pleural effusions with no pulmonary meds @ home  continue albuterol udn prn for sob/wheezing

## 2017-11-09 NOTE — PROGRESS NOTES
Progress Note - Infectious Disease   Leesa Guadalupe 57 y o  male MRN: [id number]  Unit/Bed#: [facility] 420-01 Encounter: [id number]    Assessment/Plan:    57y o  year old male with a history of severe kyphosis  who initially presented to [facility] 11/6 with recent onset SOB, L-sided chest pain, fevers/chills in setting of green sputum production over past several months; found to have LLL PNA w/ left loculated pleural effusions s/p L chest tube placement 11/7, transferred to [facility] for thoracic surgery evaluation  S/p chest tube TPA instillation 11/7, 11/8    1  Sepsis POA:  likely secondary to PNA w/ loculated parapneumonic effusion  No isolated organism so far in blood/sputum/pleural fluid, possible anaerobic organism? · Afebrile >24H, WBC 24 81 < 27 84 < 26 92 < 36 62   · Blood cx (10/6): NG48H  · Ceftriaxone 2g IV 24H, flagyl 500mg q8H PO (day 4 total abx)    2  Left-sided pneumonia: CAP v aspiration PNA  · Legionella ag, strep pneumo ag, influenza/RSV, AFB stain negative  Fungal cx in process  · Sputum cx: mixed respiratory arthur   · Continue abx as above    3  Loculated left pleural effusion s/p chest tube placement 11/6, TPA 11/7, 11/8  · Pleural fluid:  (97% PMN), pH 9 0, LD not reported as fluid grossly hemolyzed  No bacteria, 2+ poly, cx w/ no growth  · Repeat CXR (11/8): Bilateral pleural effusions, large left; rounded density NATANAEL fissural fluid/psuedotumor   · Repeat tPA today total 3 doses Set this    4  Acute urinary retention  · No farr inserted;on Flomax    5  Severe kyphosis w/ osteoporosis  · Enocrine consult at [facility]: ordered UPEP, PSA, immunoglobulins, PTH, VitD for work-up osteop    6  Paroxysmal afib  · Management per cards    Subjective/Objective   Chief Complaint: Pain    Subjective: Patient continues to have pleuritic pain in left mid-lateral region  Less sputum production than prior  Patient asking for better pain control w/ tPA administration  Some nausea with pain   Otherwise denies fevers/chills/abdominal pain/dysuria  Objective:     HR:  [79-99] 89  Resp:  [12-22] 12  BP: (114-142)/(67-79) 134/79  SpO2:  [90 %-94 %] 94 %  Temp (24hrs), Av 7 °F (37 1 °C), Min:98 °F (36 7 °C), Max:99 1 °F (37 3 °C)  Current: Temperature: 98 6 °F (37 °C)    Physical Exam:   Physical Exam   Physical Exam   Constitutional: He is oriented to person, place, and time  No distress  HENT:   Head: Normocephalic and atraumatic  Eyes: Pupils are equal, round, and reactive to light  Pulmonary/Chest:   Decreased lung sounds LLL  Pleuritic CP left-side   Abdominal: Soft  Bowel sounds are normal    Musculoskeletal:   Kyphosis    Neurological: He is alert and oriented to person, place, and time  Skin: Skin is warm and dry  He is not diaphoretic  Invasive Devices     Peripheral Intravenous Line            Peripheral IV 17 Right Forearm 1 day    Peripheral IV 17 Left Forearm less than 1 day          Drain            Chest Tube 1 Left 10 Fr  2 days                Lab, Imaging and other studies: I have personally reviewed pertinent reports

## 2017-11-09 NOTE — OCCUPATIONAL THERAPY NOTE
[address] Evaluation     Patient Name: Maggie Nielsen  URWNR'T Date: 11/9/2017  Problem List  Patient Active Problem List   Diagnosis    Pneumonia    Sepsis ([address]    Pleural disorder    Pedal edema    Osteoporosis    Kyphosis    Leukocytosis    Psoriasis    Urinary retention    Hematuria    Loculated pleural effusion    Severe protein-calorie malnutrition (HCC)    Continuous opioid dependence ([address] )    Other chronic pain    Atrial fibrillation with RVR ([facility] )     Past Medical History  Past Medical History:   Diagnosis Date    History of spinal fracture     Kyphosis     Loss of appetite     Loss of weight     Osteoarthritis     Osteoporosis      Past Surgical History  Past Surgical History:   Procedure Laterality Date    FINGER SURGERY      SHOULDER DEBRIDEMENT             11/09/17 1141   Note Type   Note type Eval/Treat   Restrictions/Precautions   Weight Bearing Precautions Per Order No   Other Precautions Cognitive; Chair Alarm; Bed Alarm;Multiple lines;Telemetry;O2;Fall Risk;Pain  (Chair alarm on at end of therapy session  chest tube )   Pain Assessment   Pain Assessment 0-10   Pain Score Worst Possible Pain   Pain Type Chronic pain   Pain Location Back   Hospital Pain Intervention(s) Ambulation/increased activity;Repositioned   Response to Interventions tolerated-no change    Home Living   Type of Home Apartment   Home Layout Stairs to enter with rails; One level   Bathroom Shower/Tub Tub/shower unit   Bathroom Toilet Standard   Bathroom Equipment (none)   Bathroom Accessibility Accessible   Additional Comments Pt lives alone in a 2nd floor apartment with 13 ELIF  Prior Function   Level of [place] Independent with ADLs and functional mobility   Lives With Alone   ADL Assistance Independent   IADLs Independent   Comments Pt was I w/ ADLS and IADLS, drove, & required no use of DME PTA  Pt reports difficulty performing ADLs PTA I e   Pt reports not performing LB bathing PTA 2* unable to reach feet   Lifestyle   Autonomy Pt reports I w/ ADLs/IADLS PTA + driving  Reports struggling to perform LB ADLs (pt reports he has not bathed his feet/ lower legs "in a long time")  Pt also reports difficulty performing cooking and cleaning tasks PTA    Reciprocal Relationships Pt lives alone and reports no support    Psychosocial   Psychosocial (WDL) X   Patient Behaviors/Mood Flat affect;Irritable   Needs Expressed Physical   Ability to Express Feelings Able to express   Ability to Express Needs Able to express   Ability to Express Thoughts Able to express   Ability to Understand Others Understands   ADL   Eating Assistance 5  Supervision/Setup   Grooming Assistance 4  Minimal Assistance   UB Bathing Assistance 4  Minimal Assistance   LB Bathing Assistance 4  Minimal Assistance   [address] 4  Minimal Assistance   LB Dressing Assistance 3  Moderate Assistance   Toileting Assistance  4  Minimal Assistance   Functional Assistance 4  Minimal Assistance   Bed Mobility   Rolling R 2  Maximal assistance   Additional items Assist x 1; Increased time required;Verbal cues   Supine to Sit 4  Minimal assistance   Additional items Assist x 1;HOB elevated; Increased time required;Verbal cues;LE management   Sit to Supine Unable to assess  (pt oob to chair at end of therapy session )   Transfers   Sit to Stand 5  Supervision   Additional items Assist x 1; Increased time required;Verbal cues   Stand to Sit 5  Supervision   Additional items Assist x 1; Increased time required;Verbal cues   Additional Comments Pt performs sit to stand transfers with close S for safety   Pt declines assistance from therapist and requires increased time to complete   Functional Mobility   Functional Mobility 4  Minimal assistance   Additional Comments Pt performed functional mobility using rw from bed to bedside recliner with CGA for steadying/balance and management of lines    Additional items Rolling walker   Balance   Static Sitting Fair +   Dynamic Sitting Poor +   Static Standing Fair   Dynamic Standing Fair -   Ambulatory Fair -   Activity Tolerance   Activity Tolerance Patient limited by fatigue;Patient limited by pain   Nurse Made Aware RN confirm pt appropriate for OT eval and present during evaluation    RUE Assessment   RUE Assessment WFL   LUE Assessment   LUE Assessment WFL   Hand Function   Gross Motor Coordination Functional   Fine Motor Coordination Functional   Cognition   Overall Cognitive Status Impaired   Arousal/Participation Alert; Responsive   Attention Attends with cues to redirect   Orientation Level Oriented X4   Memory Within functional limits   Following Commands Follows one step commands with increased time or repetition   Comments Pt is alert and oriented x4  Pt irritable requiring cues to engage in session  Pt presents with poor safety awareness and requires cues for safe transfer techniques  Pt reports not performing LB ADLS at home PTA 2* inability to reach feet  Recommend formal cognitive evaluation to assist with safe D/C planning    Assessment   Limitation Decreased ADL status; Decreased Safe judgement during ADL;Decreased endurance;Decreased self-care trans;Decreased high-level ADLs   Prognosis Fair   Assessment Pt is a 56 y/o male seen for OT eval s/p adm to B as a transfer [facility] w/ cough, green sputum, SOB, and chest pain  CT of the chest revealed "Left lower lobe consolidation consistent with pneumonia with large parapneumonic pleural effusion" Patient had chest tube placed and pt transferred for higher level of care  Pt dx'd w/ pleural effusion, and active problems include pneumonia, sepsis, leukocytosis, urinary retention, a-fib w/ RVR and severe protein-calorie malnutrition  Comorbidities include a h/o kyphosis, OA, osteoporosis, h/p spinal fx, chronic pedal edema, and chronic pain w/ continuous opioid dependence  Pt with active OT orders and up with assistance orders   Pt lives alone in a [facility] floor apartment with 13 ELIF  Pt was I w/ ADLS and IADLS, drove, & required no use of DME PTA  Pt reports difficulty performing ADLs PTA I e  Pt reports not performing LB bathing PTA 2* unable to reach feet  Pt is currently demonstrating the following occupational deficits: mod A ADLs, close S functional transfers and CGA functional mobility using rw  Pt is limited 2* pain, decreased endurance/activity tolerance, decreased ADL status, decreased functional forward reach, SOB, decreased mobility status, decreased cognition ?, poor judgement and poor safety awareness  The following Occupational Performance Areas to address include: grooming, bathing/shower, toilet hygiene, dressing, functional mobility, community mobility, clothing management, meal prep and money management  Pt scored overall 50/100 on the Barthel Index  Based on the aforementioned OT evaluation, functional performance deficits, and assessments, pt has been identified as a high complexity evaluation  Recommend short term rehabilitation at this time  Pt to continue to benefit from acute immediate OT services to address the following goals 3-5x/wk to  w/in 7-10 days:   Goals   Patient Goals to go feel better   Plan   Treatment Interventions ADL retraining;Functional transfer training; Endurance training;Cognitive reorientation;Patient/family training;Equipment evaluation/education; Compensatory technique education;Continued evaluation; Energy conservation; Activityengagement   Goal Expiration Date 17   OT Frequency 3-5x/wk   Additional Treatment Session   Start Time 1130   End Time 1141   Treatment Assessment Pt requesting to use urinal in standing at EOB  Pt tolerated standing for ~9 minutes to perform toileting using rw for steadying/balance  Pt performed toileting with supervision and increased time      Additional Treatment Day 1   Recommendation   OT Discharge Recommendation Short Term Rehab   OT - OK to Discharge Yes  (to STR when medically stable )   Barthel Index   Feeding 10   Bathing 0   Grooming Score 0   Dressing Score 5   Bladder Score 10   Bowels Score 10   Toilet Use Score 5   Transfers (Bed/Chair) Score 10   Mobility (Level Surface) Score 0   Stairs Score 0   Barthel Index Score 50       GOALS:    1) Pt will improve activity tolerance to G for min 30 min txment sessions  2) Pt will complete ADLs/self care w/ mod I using adaptive equipment and DME as needed w/ G hyiene/thoroughness w/ min cues fro cog support  3) Pt will complete toileting w/ mod I w/ G hygiene/thoroughness using DME as needed  4) Pt will improve functional transfers on/off all surfaces using DME as needed w/ G balance/safety including toileting w/ mod I  5) Pt will improve functional mobility during ADL/IADL/leisure tasks using DME as needed w/ g balance/safety w/ mod I  6) Pt will engage in ongoing cognitive assessment w/ G participation w/ mod I to assist w/ safe d/c planning/recommendations  7) Pt will demonstrate G carryover of pt/caregiver education and training as appropriate w/ mod I w/o cues w/ G tolerance  8) Pt will demonstrate 100% carryover of learned E C  techniques s/p skilled education w/o cues t/o functional ADL/ IADL/leisure interest tasks w/ mod I          Riri Walter MS, OTR/L

## 2017-11-09 NOTE — PROGRESS NOTES
Progress Note - General Surgery   Johnny Vicente 57 y o  male MRN: [id number]  Unit/Bed#: [facility] 420-01 Encounter: [id number]    Assessment:  Pt is a 61y  o  M with a loculated left pleural effusion s/p L pigtail CT 11/6 s/p TPA 11/7 and 11/8    cxr grossly unchanged    Plan:  Regular diet  CXR today  Repeat tpa today for total 3x doses  Primary care per SLIM    Subjective/Objective   Chief Complaint:     Subjective: JIM  Chest pain s/p tpa dwell, output slightly decreased during day yesterday    Objective:     Blood pressure 129/73, pulse 84, temperature 99 °F (37 2 °C), temperature source Oral, resp  rate 18, height 5' 8" (1 727 m), weight 59 kg (130 lb 1 1 oz), SpO2 90 %  ,Body mass index is 19 78 kg/m²  Intake/Output Summary (Last 24 hours) at 11/09/17 0734  Last data filed at 11/09/17 0600   Gross per 24 hour   Intake          2477 76 ml   Output              898 ml   Net          1579 76 ml       Invasive Devices     Peripheral Intravenous Line            Peripheral IV 11/08/17 Right Forearm 1 day    Peripheral IV 11/08/17 Left Forearm less than 1 day          Drain            Chest Tube 1 Left 10 Fr  2 days                Physical Exam: NAD  AAOX3  RRR  Normal respiratory effort 2L NC  Soft, NT, ND  Kyphosis  L CT to suction, small air leak    Lab, Imaging and other studies:I have personally reviewed pertinent lab results      VTE Pharmacologic Prophylaxis: Enoxaparin (Lovenox)  VTE Mechanical Prophylaxis: sequential compression device

## 2017-11-09 NOTE — PROGRESS NOTES
Spoke with [facility] surgery about patient's chest tube output (340ml) from 1201-5721  [facility] surgery is not concerned and will TPA chest tube again tonight around 2000

## 2017-11-09 NOTE — PROGRESS NOTES
Pulmonary Consultation   Tyra Pollock 57 y o  male MRN: [id number]  Unit/Bed#: [facility] 420-01 Encounter: [id number]      Reason for consultation:   Loculated Pleural effusion    Requesting physician:   Noble Ingram MD    Impressions:   1- Loculated parapneumonic L pleural effusion  S/P TPA x 2 given last night  For another dose today  2- L lung pneumonia  3- severe Kyphosis  4- Leucocytosis  Persist but coming down  5- severe osteoporosis  6- Chronic pain syndrom  7- Chronic leg edema  8- Tachyarrhythmia cardiology following      Recommendations:  Thoracic surgery is following  S/P TPA    Repeat Ct for tomorrow   If still loculated will probably need bigger chest tube and may be placed under Ct scan  Will leave that to thoracic surgery to decide on further plan    Will follow patient with you     Keep O2  Keep antibiotics as ordered  Cxr today pending    Will follow with you      History of Present Illness   HPI:  Tyra Pollock is a 57 y o  male who has been transferred from [facility] for Loculated L pleural effusion S/P chest tube       · s/p chest tube placement on 11/6/17 prior to transfer  Unclear if TPA was done prior to transfer  Urine Lg, strept Pneumo negative, Influenza/RSV panel negative  Blood cultures negative x 24hrs  Sputum cultures 2+GPC in pairs, 2+GNR      Review of systems:  12 point review of systems was completed and was otherwise negative except as listed in HPI        Historical Information   Past Medical History:   Diagnosis Date    History of spinal fracture     Kyphosis     Loss of appetite     Loss of weight     Osteoarthritis     Osteoporosis      Past Surgical History:   Procedure Laterality Date    FINGER SURGERY      SHOULDER DEBRIDEMENT       Family History   Problem Relation Age of Onset    Heart disease Mother     Early death Mother    [de-identified] Cancer Father     Cancer Brother        Occupational History: reviewed  Social History: non smoker    Meds/Allergies   Current Facility-Administered Medications   Medication Dose Route Frequency    acetaminophen (TYLENOL) tablet 650 mg  650 mg Oral Q6H PRN    albuterol inhalation solution 2 5 mg  2 5 mg Nebulization Q6H PRN    alteplase (CATHFLO) 8 mg in sodium chloride 0 9 % 50 mL syringe  8 mg Chest Tube Once    amiodarone (CORDARONE) 900 mg in dextrose 5 % 500 mL infusion  0 5 mg/min Intravenous Continuous    calcium carbonate-vitamin D (OSCAL-D) 500 mg-200 units per tablet 1 tablet  1 tablet Oral BID With Meals    cefTRIAXone (ROCEPHIN) 2,000 mg in dextrose 5 % 50 mL IVPB  2,000 mg Intravenous Q24H    clobetasol (TEMOVATE) 5 57 % cream 1 application  1 application Topical A17F [address]    diazepam (VALIUM) tablet 5 mg  5 mg Oral HS    diltiazem (CARDIZEM) tablet 30 mg  30 mg Oral Q8H [address]    docusate sodium (COLACE) capsule 200 mg  200 mg Oral BID    enoxaparin (LOVENOX) subcutaneous injection 40 mg  40 mg Subcutaneous Daily    fentaNYL (DURAGESIC) 50 mcg/hr TD 72 hr patch 1 patch  1 patch Transdermal Q48H    guaiFENesin (MUCINEX) 12 hr tablet 600 mg  600 mg Oral Q12H MICHELET    HYDROcodone-acetaminophen (NORCO) 5-325 mg per tablet 1 tablet  1 tablet Oral Q6H PRN    HYDROmorphone (DILAUDID) 1 mg/mL injection 1 mg  1 mg Intravenous Q4H PRN    HYDROmorphone (DILAUDID) 1 mg/mL injection 1 mg  1 mg Intravenous Once    levalbuterol (XOPENEX) inhalation solution 0 63 mg  0 63 mg Nebulization TID    metoprolol tartrate (LOPRESSOR) tablet 25 mg  25 mg Oral Q12H MICHELET    metroNIDAZOLE (FLAGYL) tablet 500 mg  500 mg Oral Q8H [address]    polyethylene glycol (MIRALAX) packet 17 g  17 g Oral Daily PRN    sodium chloride (PF) 0 9 % injection 3 mL  3 mL Intravenous PRN    tamsulosin (FLOMAX) capsule 0 4 mg  0 4 mg Oral Daily With Dinner     Prescriptions Prior to Admission   Medication    Calcium Carbonate-Vitamin D (CALCIUM 500 + D PO)    ciprofloxacin-dexamethasone (CIPRODEX) otic suspension    docusate sodium (COLACE) 100 mg capsule    fentaNYL (DURAGESIC) 50 mcg/hr    HYDROcodone-acetaminophen (ZAMCET)  MG/15ML solution    ofloxacin (OCUFLOX) 0 3 % ophthalmic solution    Omega-3 Fatty Acids (OMEGA-3 FISH OIL) 500 MG CAPS    diazepam (VALIUM) 5 mg tablet     Allergies   Allergen Reactions    Asa [Aspirin]     Carbapenems     Cephalosporins     Penicillins        Vitals: Blood pressure 134/79, pulse 89, temperature 98 6 °F (37 °C), temperature source Oral, resp  rate 12, height 5' 8" (1 727 m), weight 59 5 kg (131 lb 2 8 oz), SpO2 94 % ,2L, Body mass index is 19 94 kg/m²  Intake/Output Summary (Last 24 hours) at 11/09/17 1131  Last data filed at 11/09/17 1001   Gross per 24 hour   Intake          3070 77 ml   Output              818 ml   Net          2252 77 ml       Physical Exam  General:, Awake alert and oriented x 3, conversant without conversational dyspnea, NAD, normal affect, thin  Complain of pain that is worse after TPA  HEENT:  PERRL, Sclera noninjected, nonicteric OU, Nares patent, no nasal flaring, no nasal drainage, Mucous membranes, moist, no oral lesions, normal dentition  NECK: Trachea midline, no accessory muscle use, no stridor, no cervical or supraclavicular adenopathy, JVP not elevated  CARDIAC: RR  PULM: Decrease BS , chest tube L side noted Draining serosanguineous fluid  CHEST: Kyphosis  ABD: Normoactive bowel sounds, soft nontender, nondistended, no rebound, no rigidity, no guarding    EXT: No cyanosis, no clubbing, no edema, normal capillary refill  SKIN:  No rashes, no lesions  NEURO: no focal neurologic deficits, AAOx3, moving all extremities appropriately    Labs: I have personally reviewed pertinent lab results  , ABG: No results found for: PHART, WFN1ONN, PO2ART, FIB6FFP, K0ADTAHY, BEART, SOURCE, BNP: No results found for: BNP, CBC:   Lab Results   Component Value Date    WBC 24 81 (H) 11/09/2017    HGB 10 2 (L) 11/09/2017    HCT 29 6 (L) 11/09/2017    MCV 86 11/09/2017     (H) 11/09/2017 MCH 29 5 11/09/2017    MCHC 34 5 11/09/2017    RDW 13 8 11/09/2017    MPV 8 5 (L) 11/09/2017    NRBC 0 11/09/2017   , CMP:   No results found for: NA, K, CL, CO2, ANIONGAP, BUN, CREATININE, GLUCOSE, CALCIUM, AST, ALT, ALKPHOS, PROT, ALBUMIN, BILITOT, EGFR, PT/INR: No results found for: PT, INR, Troponin: No results found for: TROPONINI    Results from last 7 days  Lab Units 11/09/17  0443 11/08/17  0435 11/07/17  0513  11/06/17  1037   WBC Thousand/uL 24 81* 27 84* 26 92*  --  36 62*   HEMOGLOBIN g/dL 10 2* 12 2 12 5  --  12 9   I STAT HEMOGLOBIN   --   --   --   < >  --    HEMATOCRIT % 29 6* 35 6* 37 5  --  38 4   PLATELETS Thousands/uL 533* 540* 556*  --  597*   MONO PCT MAN % 6  --   --   --  4   < > = values in this interval not displayed  Results from last 7 days  Lab Units 11/08/17  0435 11/07/17  0513 11/06/17  1818 11/06/17  1043 11/06/17  1037   SODIUM mmol/L 135* 136  --   --  135*   POTASSIUM mmol/L 3 6 4 3  --   --  4 3   CHLORIDE mmol/L 102 101  --   --  96*   CO2 mmol/L 26 27  --   --  27   BUN mg/dL 17 17  --   --  24   CREATININE mg/dL 0 57* 0 75  --   --  1 05   CALCIUM mg/dL 8 3 9 3  --   --  9 5   TOTAL PROTEIN g/dL 6 0* 6 9 6 8  --  7 9   BILIRUBIN TOTAL mg/dL 0 51 0 80  --   --  0 80   ALK PHOS U/L 72 82  --   --  113   ALT U/L 14 17  --   --  23   AST U/L 9 11  --   --  19   GLUCOSE RANDOM mg/dL 175* 115  --   --  139   GLUCOSE, ISTAT mg/dl  --   --   --  144*  --        Results from last 7 days  Lab Units 11/06/17  1037   MAGNESIUM mg/dL 2 3       Results from last 7 days  Lab Units 11/07/17  0513   PHOSPHORUS mg/dL 2 7        Results from last 7 days  Lab Units 11/06/17  1037   INR  1 22*   PTT seconds 37*       Results from last 7 days  Lab Units 11/06/17  1125   LACTIC ACID mmol/L 1 1       0  Lab Value Date/Time   TROPONINI <0 02 11/06/2017 1037       Imaging and other studies: I have personally reviewed pertinent reports     and I have personally reviewed pertinent films in PACS EKG, Pathology, and Other Studies: I have personally reviewed pertinent reports        Code Status: Level 1 - Full Code    [facility]

## 2017-11-09 NOTE — PROGRESS NOTES
Progress Note - Cardiology   Charleen Green 57 y o  male MRN: [id number]  Unit/Bed#: [facility] 420-01 Encounter: [id number]  11/09/17  11:52 AM        Assessment/Plan:  1  Paroxysmal afib  Complete IV load of amiodarone  Once this is complete, no further p o  amiodarone will be given  Continue p o  diltiazem to help prevent recurrence of atrial fibrillation  As chads Vasc score is 0, he does not need to be on oral anticoagulation  Continue to monitor on telemetry  Subjective/Objective   Chief Complaint: No chief complaint on file  Subjective:  58-year-old man with a history of protein calorie malnutrition, severe kyphosis, compression fractures, and opioid dependence  He was admitted with shortness of breath and found to have a loculated left pleural effusion  He had a chest tube placed and had some issues with paroxysmal atrial fibrillation  Currently on IV amiodarone  He has not had any further episodes of atrial fibrillation  Denies any current chest pain  He does have pain from the chest tube  He reports his breathing is okay  No significant lower extremity edema  No fevers        Patient Active Problem List   Diagnosis    Pneumonia    Sepsis ([address] )    Pleural disorder    Pedal edema    Osteoporosis    Kyphosis    Leukocytosis    Psoriasis    Urinary retention    Hematuria    Loculated pleural effusion    Severe protein-calorie malnutrition (HCC)    Continuous opioid dependence (HCC)    Other chronic pain    Atrial fibrillation with RVR (HCC)     Past Medical History:   Diagnosis Date    History of spinal fracture     Kyphosis     Loss of appetite     Loss of weight     Osteoarthritis     Osteoporosis        Allergies   Allergen Reactions    Asa [Aspirin]     Carbapenems     Cephalosporins     Penicillins        Current Facility-Administered Medications   Medication Dose Route Frequency Provider Last Rate Last Dose    acetaminophen (TYLENOL) tablet 650 mg  650 mg Oral Q6H PRN Yunior Torres MD   650 mg at 11/07/17 2341    albuterol inhalation solution 2 5 mg  2 5 mg Nebulization Q6H PRN Yunior Torres MD        alteplase (CATHFLO) 8 mg in sodium chloride 0 9 % 50 mL syringe  8 mg Chest Tube Once Alee Pacheco MD        amiodarone (CORDARONE) 900 mg in dextrose 5 % 500 mL infusion  0 5 mg/min Intravenous Continuous Lamberto Ley MD 16 7 mL/hr at 11/09/17 0121 0 5 mg/min at 11/09/17 0121    calcium carbonate-vitamin D (OSCAL-D) 500 mg-200 units per tablet 1 tablet  1 tablet Oral BID With Meals Yunior Torres MD   1 tablet at 11/09/17 0904    cefTRIAXone (ROCEPHIN) 2,000 mg in dextrose 5 % 50 mL IVPB  2,000 mg Intravenous Q24H Antonia MD Van 100 mL/hr at 11/08/17 1553 2,000 mg at 11/08/17 1553    clobetasol (TEMOVATE) 6 03 % cream 1 application  1 application Topical E19H [address] Yunior Torres MD   1 application at 85/32/98 0828    diazepam (VALIUM) tablet 5 mg  5 mg Oral HS Musa Carney MD   5 mg at 11/08/17 2107    diltiazem (CARDIZEM) tablet 30 mg  30 mg Oral [facility] Lynnea Canavan, MD   30 mg at 11/09/17 0612    docusate sodium (COLACE) capsule 200 mg  200 mg Oral BID Yunior Torres MD   200 mg at 11/09/17 0904    enoxaparin (LOVENOX) subcutaneous injection 40 mg  40 mg Subcutaneous Daily Yunior Torres MD   40 mg at 11/09/17 0904    fentaNYL (DURAGESIC) 50 mcg/hr TD 72 hr patch 1 patch  1 patch Transdermal Q48H Yunior Torres MD   1 patch at 11/09/17 0904    guaiFENesin (MUCINEX) 12 hr tablet 600 mg  600 mg Oral Q12H [address] Nicole Navarrete DO   600 mg at 11/09/17 1021    HYDROcodone-acetaminophen (NORCO) 5-325 mg per tablet 1 tablet  1 tablet Oral Q6H PRN Yunior Torres MD   1 tablet at 11/09/17 0416    HYDROmorphone (DILAUDID) 1 mg/mL injection 1 mg  1 mg Intravenous Q4H PRN Mecca Tee MD   1 mg at 11/09/17 1139    HYDROmorphone (DILAUDID) 1 mg/mL injection 1 mg  1 mg Intravenous Once Nicole Navarrete DO        levalbuterol Delberta Lean) inhalation solution 0 63 mg  0 63 mg Nebulization TID Robb Lipoma, DO        metoprolol tartrate (LOPRESSOR) tablet 25 mg  25 mg Oral Q12H Chloé Neff MD   25 mg at 11/09/17 0904    metroNIDAZOLE (FLAGYL) tablet 500 mg  500 mg Oral [facility] Bladimir Birch MD   500 mg at 11/09/17 0612    polyethylene glycol (MIRALAX) packet 17 g  17 g Oral Daily PRN Bladimir Birch MD        sodium chloride (PF) 0 9 % injection 3 mL  3 mL Intravenous PRN Bladimir Birch MD        tamsulosin (FLOMAX) capsule 0 4 mg  0 4 mg Oral Daily With Perez Soares MD   0 4 mg at 11/08/17 1552       Vitals: /79 Comment: map 101  Pulse 87   Temp 98 6 °F (37 °C) (Oral)   Resp 18   Ht 5' 8" (1 727 m)   Wt 59 5 kg (131 lb 2 8 oz)   SpO2 94%   BMI 62 54 kg/m²     Systolic (43MFC), VGJ:139 , Min:114 , CHALO:788     Diastolic (40XIF), JSO:91, Min:67, Max:79      Vitals:    11/08/17 1635 11/09/17 0600   Weight: 59 kg (130 lb 1 1 oz) 59 5 kg (131 lb 2 8 oz)     Orthostatic Blood Pressures    Flowsheet Row Most Recent Value   Blood Pressure  132/79 [map 101] filed at 11/09/2017 1139   Patient Position - Orthostatic VS  Sitting filed at 11/09/2017 1139            Intake/Output Summary (Last 24 hours) at 11/09/17 1152  Last data filed at 11/09/17 1001   Gross per 24 hour   Intake          3070 77 ml   Output              818 ml   Net          2252 77 ml       Invasive Devices     Peripheral Intravenous Line            Peripheral IV 11/08/17 Right Forearm 1 day    Peripheral IV 11/08/17 Left Forearm less than 1 day          Drain            Chest Tube 1 Left 10 Fr  2 days                  Physical Exam:     GEN: Awake and alert, in no acute distress  HEENT: Sclera anicteric, conjunctivae pink, mucous membranes moist   NECK: Supple, no carotid bruits, no significant JVD  HEART: Regular rhythm, normal S1 and S2, no murmurs, clicks, gallops or rubs     LUNGS: decreased on left, no wheezes, rales, or rhonchi   ABDOMEN: Soft, nontender, nondistended, normoactive bowel sounds  EXTREMITIES: Skin warm and well perfused, no clubbing, cyanosis, or edema  NEURO: No focal findings  SKIN: Normal without suspicious lesions on exposed skin        Lab Results:     Troponins:   Results from last 7 days  Lab Units 11/06/17  1037   TROPONIN I ng/mL <0 02       CBC with diff:   Results from last 7 days  Lab Units 11/09/17  0443 11/08/17  0435 11/07/17  0513 11/06/17  1043 11/06/17  1037   WBC Thousand/uL 24 81* 27 84* 26 92*  --  36 62*   HEMOGLOBIN g/dL 10 2* 12 2 12 5  --  12 9   I STAT HEMOGLOBIN g/dl  --   --   --  13 9  --    HEMATOCRIT % 29 6* 35 6* 37 5  --  38 4   MCV fL 86 87 89  --  89   PLATELETS Thousands/uL 533* 540* 556*  --  597*   MCH pg 29 5 29 8 29 6  --  30 0   MCHC g/dL 34 5 34 3 33 3  --  33 6   RDW % 13 8 13 7 13 9  --  13 9   MPV fL 8 5* 8 7* 8 7*  --  8 3*   NRBC AUTO /100 WBCs 0  --   --   --   --        CMP:  Results from last 7 days  Lab Units 11/08/17  0435 11/07/17  0513 11/06/17  1818 11/06/17  1043 11/06/17  1037   SODIUM mmol/L 135* 136  --   --  135*   POTASSIUM mmol/L 3 6 4 3  --   --  4 3   CHLORIDE mmol/L 102 101  --   --  96*   CO2 mmol/L 26 27  --   --  27   ANION GAP mmol/L 7 8  --   --  12   BUN mg/dL 17 17  --   --  24   CREATININE mg/dL 0 57* 0 75  --   --  1 05   GLUCOSE RANDOM mg/dL 175* 115  --   --  139   GLUCOSE, ISTAT mg/dl  --   --   --  144*  --    CALCIUM mg/dL 8 3 9 3  --   --  9 5   AST U/L 9 11  --   --  19   ALT U/L 14 17  --   --  23   ALK PHOS U/L 72 82  --   --  113   TOTAL PROTEIN g/dL 6 0* 6 9 6 8  --  7 9   ALBUMIN g/dL 1 8* 2 3*  --   --  3 2*   BILIRUBIN TOTAL mg/dL 0 51 0 80  --   --  0 80   EGFR ml/min/1 73sq m 110 98  --  91 76       Magnesium:   Results from last 7 days  Lab Units 11/06/17  1037   MAGNESIUM mg/dL 2 3       Coags:   Results from last 7 days  Lab Units 11/06/17  1037   PTT seconds 37*   INR  1 22*         Cardiac testing:   Results for orders placed during the hospital encounter of 17   Echo complete with contrast if indicated    Narrative [address]  [place]  [facility], [address]  [phone]    Transthoracic Echocardiogram  2D, M-mode, Doppler, and Color Doppler    Study date:  2017    Patient: Kalli Bowen  MR number: [id number]  Account number: [de-identified]  : 1955  Age: 57 years  Gender: Male  Status: Inpatient  Location: Bedside  Height: 69 in  Weight: 215 lb  BP: 156/ 95 mmHg    Indications: SOB  Diagnoses: R06 02 - Shortness of breath    Sonographer:  Irina Goodman BS, RCS  Primary Physician:  Iraj Galindo MD  Referring Physician:  Cinthia Arango DO  Group:  [facility]  Interpreting Physician:  Denice Hassan MD    SUMMARY    LEFT VENTRICLE:  Systolic function was normal by visual assessment  Ejection fraction was estimated to be 60 %  There were no regional wall motion abnormalities  Doppler parameters were consistent with abnormal left ventricular relaxation (grade 1 diastolic dysfunction)  MITRAL VALVE:  There was mild regurgitation  TRICUSPID VALVE:  There was mild regurgitation  AORTA:  The root exhibited mild dilatation  HISTORY: PRIOR HISTORY: Pneumonia, Sepsis  Risk factors: hypertension and hypercholesterolemia  PROCEDURE: The procedure was performed at the bedside  This was a routine study  The transthoracic approach was used  The study included complete 2D imaging, M-mode, complete spectral Doppler, and color Doppler  Images were obtained from  the parasternal, apical, subcostal, and suprasternal notch acoustic windows  Echocardiographic views were limited  Image quality was adequate  LEFT VENTRICLE: Size was normal  Systolic function was normal by visual assessment  Ejection fraction was estimated to be 60 %  There were no regional wall motion abnormalities   Wall thickness was normal  DOPPLER: There was an increased  relative contribution of atrial contraction to ventricular filling  Doppler parameters were consistent with abnormal left ventricular relaxation (grade 1 diastolic dysfunction)  RIGHT VENTRICLE: The size was normal  Systolic function was normal  DOPPLER: Systolic pressure was within the normal range  Estimated peak pressure was 25 mmHg  LEFT ATRIUM: Size was normal     RIGHT ATRIUM: Size was normal     MITRAL VALVE: Valve structure was normal  There was normal leaflet separation  DOPPLER: The transmitral velocity was within the normal range  There was no evidence for stenosis  There was mild regurgitation  AORTIC VALVE: The valve was trileaflet  Leaflets exhibited normal thickness and normal cuspal separation  DOPPLER: Transaortic velocity was within the normal range  There was no evidence for stenosis  There was no regurgitation  TRICUSPID VALVE: The valve structure was normal  There was normal leaflet separation  DOPPLER: The transtricuspid velocity was within the normal range  There was no evidence for stenosis  There was mild regurgitation  PULMONIC VALVE: Leaflets exhibited normal thickness, no calcification, and normal cuspal separation  DOPPLER: The transpulmonic velocity was within the normal range  There was no regurgitation  PERICARDIUM: There was no pericardial effusion  AORTA: The root exhibited mild dilatation  SYSTEMIC VEINS: IVC: The inferior vena cava was normal in size and course   Respirophasic changes were normal     SYSTEM MEASUREMENT TABLES    2D  %FS: 39 73 %  Ao Diam: 3 73 cm  EDV(Teich): 82 91 ml  EF(Teich): 70 61 %  ESV(Cube): 17 37 ml  ESV(Teich): 24 36 ml  IVSd: 0 98 cm  LA Area: 22 11 cm2  LA Diam: 4 49 cm  LVEDV MOD A4C: 92 99 ml  LVEF MOD A4C: 62 71 %  LVESV MOD A4C: 34 67 ml  LVIDd: 4 3 cm  LVIDs: 2 59 cm  LVLd A4C: 8 15 cm  LVLs A4C: 6 49 cm  LVOT Diam: 2 29 cm  LVPWd: 1 05 cm  RA Area: 13 71 cm2  RV Diam : 4 6 cm  SV MOD A4C: 58 32 ml  SV(Cube): 61 94 ml  SV(Teich): 58 54 ml    CW  TR Vmax: 2 48 m/s  TR maxP 61 mmHg    MM  TAPSE: 1 87 cm    PW  AVC: 270 61 ms  E': 0 13 m/s  E/E': 5 78  MV A Miach: 0 95 m/s  MV Dec Desha: 3 53 m/s2  MV DecT: 212 23 ms  MV E Micah: 0 75 m/s  MV E/A Ratio: 0 79    [facility] Accredited Echocardiography Laboratory    Prepared and electronically signed by    Liliana Mcmahan MD  Signed [id number] 15:46:59         Imaging: I have personally reviewed pertinent reports  Telemetry:  Personally reviewed, SR, no further afib

## 2017-11-09 NOTE — PHYSICAL THERAPY NOTE
Physical Therapy Cancellation Note:    Pt declined PT services for today despite inc motivation and encouragement   Cancel PT services for today and will cont to follow as able to initiate PT eval

## 2017-11-09 NOTE — PLAN OF CARE
Problem: OCCUPATIONAL THERAPY ADULT  Goal: Performs self-care activities at highest level of function for planned discharge setting  See evaluation for individualized goals  Treatment Interventions: ADL retraining, Functional transfer training, Endurance training, Cognitive reorientation, Patient/family training, Equipment evaluation/education, Compensatory technique education, Continued evaluation, Energy conservation, Activityengagement          See flowsheet documentation for full assessment, interventions and recommendations  Limitation: Decreased ADL status, Decreased Safe judgement during ADL, Decreased endurance, Decreased self-care trans, Decreased high-level ADLs  Prognosis: Fair  Assessment: Pt is a 58 y/o male seen for OT eval s/p adm to B as a transfer from [facility] w/ cough, green sputum, SOB, and chest pain  CT of the chest revealed "Left lower lobe consolidation consistent with pneumonia with large parapneumonic pleural effusion" Patient had chest tube placed and pt transferred for higher level of care  Pt dx'd w/ pleural effusion, and active problems include pneumonia, sepsis, leukocytosis, urinary retention, a-fib w/ RVR and severe protein-calorie malnutrition  Comorbidities include a h/o kyphosis, OA, osteoporosis, h/p spinal fx, chronic pedal edema, and chronic pain w/ continuous opioid dependence  Pt with active OT orders and up with assistance orders  Pt lives alone in a 2nd floor apartment with 13 ELIF  Pt was I w/ ADLS and IADLS, drove, & required no use of DME PTA  Pt reports difficulty performing ADLs PTA I e  Pt reports not performing LB bathing PTA 2* unable to reach feet  Pt is currently demonstrating the following occupational deficits: mod A ADLs, close S functional transfers and CGA functional mobility using rw   Pt is limited 2* pain, decreased endurance/activity tolerance, decreased ADL status, decreased functional forward reach, SOB, decreased mobility status, decreased cognition ?, poor judgement and poor safety awareness  The following Occupational Performance Areas to address include: grooming, bathing/shower, toilet hygiene, dressing, functional mobility, community mobility, clothing management, meal prep and money management  Pt scored overall 50/100 on the Barthel Index  Based on the aforementioned OT evaluation, functional performance deficits, and assessments, pt has been identified as a high complexity evaluation  Recommend short term rehabilitation at this time   Pt to continue to benefit from acute immediate OT services to address the following goals 3-5x/wk to  w/in 7-10 days:     OT Discharge Recommendation: Short Term Rehab  OT - OK to Discharge: Yes (to STR when medically stable )      Marianna Briones MS, OTR/L

## 2017-11-09 NOTE — PROGRESS NOTES
[address] Internal Medicine Progress Note  Patient: Shashi Simons 57 y o  male   MRN: [id number]  PCP: Estefani Flannery MD  Unit/Bed#: [facility] 420-01 Encounter: [id number]  Date Of Visit: 11/09/17    Assessment:    Principal Problem:    Loculated pleural effusion  Active Problems:    Pneumonia    Sepsis ([facility] )    Pedal edema    Osteoporosis    Kyphosis    Leukocytosis    Urinary retention    Severe protein-calorie malnutrition (HCC)    Continuous opioid dependence ([place] [address] )    Other chronic pain    Atrial fibrillation with RVR ([place] [address] )      Plan:    1  Left-sided pneumonia with loculated parapneumonic effusion , s/p  pigtail catheter placement on 11/06 -thoracic surgery/pulmonology are following- on daily tPA x3 doses   ID  input appreciated- continue IV ceftriaxone and Flagyl  2  Leukocytosis-secondary to above- improving  3  Osteoporosis/severe kyphosis with multiple age indeterminate compression deformities in the thoracic spine and  chronic pain, on continuous opiate dependence- outpatient Endocrine follow-up- continue calcium supplement  4  Severe protein calorie malnutrition-continue with nutrition supplement  5  Chronic pedal edema  6  Urinary retention-continue Flomax and bladder scan  7  New onset AFib with RVR- cardiology input appreciated -currently patient on amiodarone drip , diltiazem and Lopressor     VTE Pharmacologic Prophylaxis:   Pharmacologic: Enoxaparin (Lovenox)  Mechanical VTE Prophylaxis in Place: Yes    Patient Centered Rounds: I have performed bedside rounds with nursing staff today  Discussions with Specialists or Other Care Team Provider:     Education and Discussions with Family / Patient:  Patient    Time Spent for Care: 30 minutes  More than 50% of total time spent on counseling and coordination of care as described above      Current Length of Stay: 2 day(s)    Current Patient Status: Inpatient   Certification Statement: The patient will continue to require additional inpatient hospital stay due to Management of pneumonia    Discharge Plan / Estimated Discharge Date: not ready yet    Code Status: Level 1 - Full Code      Subjective:   Patient seen and examined  Continued to have shortness of breath and intermittent cough  No chest pain  No nausea vomiting or diarrhea    Objective:     Vitals:   Temp (24hrs), Av 8 °F (37 1 °C), Min:98 °F (36 7 °C), Max:99 1 °F (37 3 °C)    HR:  [79-99] 89  Resp:  [12-20] 12  BP: (114-142)/(67-79) 134/79  SpO2:  [90 %-94 %] 94 %  Body mass index is 19 94 kg/m²  Input and Output Summary (last 24 hours): Intake/Output Summary (Last 24 hours) at 17 0928  Last data filed at 17 0600   Gross per 24 hour   Intake          2152 76 ml   Output              748 ml   Net          1404 76 ml       Physical Exam:     Physical Exam  Patient is awake alert with mild dyspnea  Cachectic  Lung with decreased breath sounds bilateral left-sided chest tube in place  Heart positive S1-S2 no murmur  Back with severe kyphosis  Abdomen soft nontender positive bowel sounds  Lower extremity chronic venous stasis bilateral    Additional Data:     Labs:      Results from last 7 days  Lab Units 17  0443  17  1037   WBC Thousand/uL 24 81*  < > 36 62*   HEMOGLOBIN g/dL 10 2*  < > 12 9   I STAT HEMOGLOBIN   --   < >  --    HEMATOCRIT % 29 6*  < > 38 4   PLATELETS Thousands/uL 533*  < > 597*   LYMPHO PCT %  --   --  5*   MONO PCT MAN %  --   --  4   EOSINO PCT MANUAL %  --   --  0   < > = values in this interval not displayed      Results from last 7 days  Lab Units 17  0435   SODIUM mmol/L 135*   POTASSIUM mmol/L 3 6   CHLORIDE mmol/L 102   CO2 mmol/L 26   BUN mg/dL 17   CREATININE mg/dL 0 57*   CALCIUM mg/dL 8 3   TOTAL PROTEIN g/dL 6 0*   BILIRUBIN TOTAL mg/dL 0 51   ALK PHOS U/L 72   ALT U/L 14   AST U/L 9   GLUCOSE RANDOM mg/dL 175*       Results from last 7 days  Lab Units 17  1037   INR  1 22*       * I Have Reviewed All Lab Data Listed Above   * Additional Pertinent Lab Tests Reviewed: [address] Admission Reviewed    Imaging:    Imaging Reports Reviewed Today Include:   Imaging Personally Reviewed by Myself Includes:      Recent Cultures (last 7 days):       Results from last 7 days  Lab Units 11/07/17  0753 11/06/17  1803 11/06/17  1528 11/06/17  1311 11/06/17  1038 11/06/17  1037   BLOOD CULTURE   --   --   --   --  No Growth at 48 hrs  No Growth at 48 hrs  SPUTUM CULTURE   --   --   --  2+ Growth of   --   --    GRAM STAIN RESULT   --   --  2+ Polys  No bacteria seen Rare Epithelial cells per low power field  1+ Polys  2+ Gram positive cocci in pairs  2+ Gram negative rods  --   --    BODY FLUID CULTURE, STERILE   --   --  No growth  --   --   --    INFLUENZA A PCR  None Detected  --   --   --   --   --    INFLUENZA B PCR  None Detected  --   --   --   --   --    RSV PCR  None Detected  --   --   --   --   --    LEGIONELLA URINARY ANTIGEN   --  Negative  --   --   --   --        Last 24 Hours Medication List:     alteplase (ACTIVASE) 8 mg in 0 9% NaCl 50 mL 8 mg Chest Tube Once   calcium carbonate-vitamin D 1 tablet Oral BID With Meals   cefTRIAXone 2,000 mg Intravenous Q24H   clobetasol 1 application Topical T[address]J [address]   diazepam 5 mg Oral HS   diltiazem 30 mg Oral Q8H [address]   docusate sodium 200 mg Oral BID   enoxaparin 40 mg Subcutaneous Daily   fentaNYL 1 patch Transdermal Q48H   metoprolol tartrate 25 mg Oral Q12H MICHELET   metroNIDAZOLE 500 mg Oral Q8H [address]   tamsulosin 0 4 mg Oral Daily With Dinner        Today, Patient Was Seen By: Yonis Salcido DO    ** Please Note: This note has been constructed using a voice recognition system   **

## 2017-11-09 NOTE — PROGRESS NOTES
8mg TPA infused into chest tube at bedside  Will clamp for 8 hr dwell time   Unclamp in Camila Calvo MD

## 2017-11-10 ENCOUNTER — APPOINTMENT (INPATIENT)
Dept: RADIOLOGY | Facility: HOSPITAL | Age: 62
DRG: 853 | End: 2017-11-10
Payer: MEDICARE

## 2017-11-10 LAB
BASOPHILS # BLD AUTO: 0.02 THOUSANDS/ΜL (ref 0–0.1)
BASOPHILS NFR BLD AUTO: 0 % (ref 0–1)
EOSINOPHIL # BLD AUTO: 0.01 THOUSAND/ΜL (ref 0–0.61)
EOSINOPHIL NFR BLD AUTO: 0 % (ref 0–6)
ERYTHROCYTE [DISTWIDTH] IN BLOOD BY AUTOMATED COUNT: 13.8 % (ref 11.6–15.1)
GLUCOSE SERPL-MCNC: 161 MG/DL (ref 65–140)
HCT VFR BLD AUTO: 35.1 % (ref 36.5–49.3)
HGB BLD-MCNC: 12.6 G/DL (ref 12–17)
LYMPHOCYTES # BLD AUTO: 1.97 THOUSANDS/ΜL (ref 0.6–4.47)
LYMPHOCYTES NFR BLD AUTO: 6 % (ref 14–44)
MCH RBC QN AUTO: 30.4 PG (ref 26.8–34.3)
MCHC RBC AUTO-ENTMCNC: 35.9 G/DL (ref 31.4–37.4)
MCV RBC AUTO: 85 FL (ref 82–98)
MONOCYTES # BLD AUTO: 2.26 THOUSAND/ΜL (ref 0.17–1.22)
MONOCYTES NFR BLD AUTO: 7 % (ref 4–12)
NEUTROPHILS # BLD AUTO: 30.42 THOUSANDS/ΜL (ref 1.85–7.62)
NEUTS SEG NFR BLD AUTO: 87 % (ref 43–75)
NRBC BLD AUTO-RTO: 0 /100 WBCS
PLATELET # BLD AUTO: 637 THOUSANDS/UL (ref 149–390)
PMV BLD AUTO: 8.3 FL (ref 8.9–12.7)
RBC # BLD AUTO: 4.14 MILLION/UL (ref 3.88–5.62)
WBC # BLD AUTO: 35 THOUSAND/UL (ref 4.31–10.16)

## 2017-11-10 PROCEDURE — 94760 N-INVAS EAR/PLS OXIMETRY 1: CPT

## 2017-11-10 PROCEDURE — 71020 HB CHEST X-RAY 2VW FRONTAL&LATL: CPT

## 2017-11-10 PROCEDURE — C1729 CATH, DRAINAGE: HCPCS

## 2017-11-10 PROCEDURE — 85025 COMPLETE CBC W/AUTO DIFF WBC: CPT | Performed by: INTERNAL MEDICINE

## 2017-11-10 PROCEDURE — 97110 THERAPEUTIC EXERCISES: CPT

## 2017-11-10 PROCEDURE — 71260 CT THORAX DX C+: CPT

## 2017-11-10 PROCEDURE — G8979 MOBILITY GOAL STATUS: HCPCS

## 2017-11-10 PROCEDURE — 97163 PT EVAL HIGH COMPLEX 45 MIN: CPT

## 2017-11-10 PROCEDURE — 99152 MOD SED SAME PHYS/QHP 5/>YRS: CPT

## 2017-11-10 PROCEDURE — G8978 MOBILITY CURRENT STATUS: HCPCS

## 2017-11-10 PROCEDURE — 99153 MOD SED SAME PHYS/QHP EA: CPT

## 2017-11-10 PROCEDURE — 82948 REAGENT STRIP/BLOOD GLUCOSE: CPT

## 2017-11-10 PROCEDURE — C1769 GUIDE WIRE: HCPCS

## 2017-11-10 PROCEDURE — 32999 UNLISTED PX LUNGS & PLEURA: CPT

## 2017-11-10 RX ORDER — METOPROLOL TARTRATE 50 MG/1
50 TABLET, FILM COATED ORAL EVERY 12 HOURS SCHEDULED
Status: DISCONTINUED | OUTPATIENT
Start: 2017-11-10 | End: 2017-11-22 | Stop reason: HOSPADM

## 2017-11-10 RX ORDER — HYDROMORPHONE HYDROCHLORIDE 4 MG/ML
INJECTION, SOLUTION INTRAMUSCULAR; INTRAVENOUS; SUBCUTANEOUS CODE/TRAUMA/SEDATION MEDICATION
Status: COMPLETED | OUTPATIENT
Start: 2017-11-10 | End: 2017-11-10

## 2017-11-10 RX ORDER — DILTIAZEM HYDROCHLORIDE 60 MG/1
60 TABLET, FILM COATED ORAL EVERY 6 HOURS SCHEDULED
Status: DISPENSED | OUTPATIENT
Start: 2017-11-10 | End: 2017-11-11

## 2017-11-10 RX ORDER — MIDAZOLAM HYDROCHLORIDE 1 MG/ML
INJECTION INTRAMUSCULAR; INTRAVENOUS CODE/TRAUMA/SEDATION MEDICATION
Status: COMPLETED | OUTPATIENT
Start: 2017-11-10 | End: 2017-11-10

## 2017-11-10 RX ADMIN — HYDROMORPHONE HYDROCHLORIDE 1 MG: 4 INJECTION, SOLUTION INTRAMUSCULAR; INTRAVENOUS; SUBCUTANEOUS at 17:58

## 2017-11-10 RX ADMIN — HYDROCODONE BITARTRATE AND ACETAMINOPHEN 1 TABLET: 5; 325 TABLET ORAL at 12:18

## 2017-11-10 RX ADMIN — CEFTRIAXONE SODIUM 2000 MG: 10 INJECTION, POWDER, FOR SOLUTION INTRAVENOUS at 15:45

## 2017-11-10 RX ADMIN — METRONIDAZOLE 500 MG: 500 TABLET ORAL at 22:49

## 2017-11-10 RX ADMIN — GUAIFENESIN 600 MG: 600 TABLET, EXTENDED RELEASE ORAL at 08:45

## 2017-11-10 RX ADMIN — HYDROMORPHONE HYDROCHLORIDE 1 MG: 1 INJECTION, SOLUTION INTRAMUSCULAR; INTRAVENOUS; SUBCUTANEOUS at 05:43

## 2017-11-10 RX ADMIN — CALCIUM CARBONATE 500 MG (1,250 MG)-VITAMIN D3 200 UNIT TABLET 1 TABLET: at 08:44

## 2017-11-10 RX ADMIN — HYDROMORPHONE HYDROCHLORIDE 1 MG: 1 INJECTION, SOLUTION INTRAMUSCULAR; INTRAVENOUS; SUBCUTANEOUS at 01:34

## 2017-11-10 RX ADMIN — MIDAZOLAM 1 MG: 1 INJECTION INTRAMUSCULAR; INTRAVENOUS at 17:58

## 2017-11-10 RX ADMIN — MIDAZOLAM 1 MG: 1 INJECTION INTRAMUSCULAR; INTRAVENOUS at 18:21

## 2017-11-10 RX ADMIN — HYDROMORPHONE HYDROCHLORIDE 1 MG: 1 INJECTION, SOLUTION INTRAMUSCULAR; INTRAVENOUS; SUBCUTANEOUS at 20:51

## 2017-11-10 RX ADMIN — ALTEPLASE 4 MG: 2.2 INJECTION, POWDER, LYOPHILIZED, FOR SOLUTION INTRAVENOUS at 18:31

## 2017-11-10 RX ADMIN — GUAIFENESIN 600 MG: 600 TABLET, EXTENDED RELEASE ORAL at 22:49

## 2017-11-10 RX ADMIN — METRONIDAZOLE 500 MG: 500 TABLET ORAL at 05:43

## 2017-11-10 RX ADMIN — CALCIUM CARBONATE 500 MG (1,250 MG)-VITAMIN D3 200 UNIT TABLET 1 TABLET: at 20:58

## 2017-11-10 RX ADMIN — HYDROMORPHONE HYDROCHLORIDE 1 MG: 1 INJECTION, SOLUTION INTRAMUSCULAR; INTRAVENOUS; SUBCUTANEOUS at 10:21

## 2017-11-10 RX ADMIN — ENOXAPARIN SODIUM 40 MG: 40 INJECTION SUBCUTANEOUS at 08:45

## 2017-11-10 RX ADMIN — DOCUSATE SODIUM 200 MG: 100 CAPSULE, LIQUID FILLED ORAL at 20:58

## 2017-11-10 RX ADMIN — METOPROLOL TARTRATE 50 MG: 50 TABLET ORAL at 22:48

## 2017-11-10 RX ADMIN — IOHEXOL 85 ML: 350 INJECTION, SOLUTION INTRAVENOUS at 15:10

## 2017-11-10 RX ADMIN — CLOBETASOL PROPIONATE 1 APPLICATION: 0.5 CREAM TOPICAL at 08:48

## 2017-11-10 RX ADMIN — DIAZEPAM 5 MG: 5 TABLET ORAL at 22:49

## 2017-11-10 RX ADMIN — DILTIAZEM HYDROCHLORIDE 60 MG: 60 TABLET, FILM COATED ORAL at 05:43

## 2017-11-10 RX ADMIN — TAMSULOSIN HYDROCHLORIDE 0.4 MG: 0.4 CAPSULE ORAL at 20:57

## 2017-11-10 RX ADMIN — HYDROMORPHONE HYDROCHLORIDE 1 MG: 1 INJECTION, SOLUTION INTRAMUSCULAR; INTRAVENOUS; SUBCUTANEOUS at 14:16

## 2017-11-10 RX ADMIN — METOPROLOL TARTRATE 25 MG: 25 TABLET ORAL at 08:44

## 2017-11-10 RX ADMIN — METRONIDAZOLE 500 MG: 500 TABLET ORAL at 15:44

## 2017-11-10 RX ADMIN — HYDROMORPHONE HYDROCHLORIDE 1 MG: 4 INJECTION, SOLUTION INTRAMUSCULAR; INTRAVENOUS; SUBCUTANEOUS at 18:21

## 2017-11-10 RX ADMIN — DOCUSATE SODIUM 200 MG: 100 CAPSULE, LIQUID FILLED ORAL at 08:45

## 2017-11-10 NOTE — PROGRESS NOTES
Pulmonary Consultation   Marina Jones 57 y o  male MRN: [id number]  Unit/Bed#: [facility] 420-01 Encounter: [id number]      Reason for consultation:   Loculated Pleural effusion S/P TPA x 3    Requesting physician:   Richard Cruz MD    Impressions:   1- Loculated parapneumonic L pleural effusion  S/P TPA x 3 given last night   Thoracic following and managing chest tube  2- L lung pneumonia  3- severe Kyphosis  4- Leucocytosis  Persist but coming down  5- severe osteoporosis  6- Chronic pain syndrom  7- Chronic leg edema  8- Tachyarrhythmia cardiology following      Recommendations:  Thoracic surgery is following  S/P TPA    Repeat Ct for tomorrow   If still loculated will probably need bigger chest tube and may be placed under Ct scan  Will leave that to thoracic surgery to decide on further plan    Will follow patient with you     Keep O2  Keep antibiotics as ordered  Check CXr    Will follow with you      History of Present Illness   HPI:  Marina Jones is a 57 y o  male who has been transferred from [facility] for Loculated L pleural effusion S/P chest tube       · s/p chest tube placement on 11/6/17 prior to transfer  Unclear if TPA was done prior to transfer  Urine Lg, strept Pneumo negative, Influenza/RSV panel negative  Blood cultures negative x 24hrs  Sputum cultures 2+GPC in pairs, 2+GNR      Review of systems:  12 point review of systems was completed and was otherwise negative except as listed in HPI        Historical Information   Past Medical History:   Diagnosis Date    History of spinal fracture     Kyphosis     Loss of appetite     Loss of weight     Osteoarthritis     Osteoporosis      Past Surgical History:   Procedure Laterality Date    FINGER SURGERY      SHOULDER DEBRIDEMENT       Family History   Problem Relation Age of Onset    Heart disease Mother     Early death Mother    [facility] Cancer Father     Cancer Brother        Occupational History: reviewed  Social History: non smoker    Meds/Allergies   Current Facility-Administered Medications   Medication Dose Route Frequency    acetaminophen (TYLENOL) tablet 650 mg  650 mg Oral Q6H PRN    albuterol inhalation solution 2 5 mg  2 5 mg Nebulization Q6H PRN    alteplase (CATHFLO) 8 mg in sodium chloride 0 9 % 50 mL syringe  8 mg Chest Tube Once    calcium carbonate-vitamin D (OSCAL-D) 500 mg-200 units per tablet 1 tablet  1 tablet Oral BID With Meals    cefTRIAXone (ROCEPHIN) 2,000 mg in dextrose 5 % 50 mL IVPB  2,000 mg Intravenous Q24H    clobetasol (TEMOVATE) 0 63 % cream 1 application  1 application Topical Y45V [address]    diazepam (VALIUM) tablet 5 mg  5 mg Oral HS    diltiazem (CARDIZEM) tablet 60 mg  60 mg Oral Q6H [address]    docusate sodium (COLACE) capsule 200 mg  200 mg Oral BID    enoxaparin (LOVENOX) subcutaneous injection 40 mg  40 mg Subcutaneous Daily    fentaNYL (DURAGESIC) 50 mcg/hr TD 72 hr patch 1 patch  1 patch Transdermal Q48H    guaiFENesin (MUCINEX) 12 hr tablet 600 mg  600 mg Oral Q12H MICHELET    HYDROcodone-acetaminophen (NORCO) 5-325 mg per tablet 1 tablet  1 tablet Oral Q6H PRN    HYDROmorphone (DILAUDID) 1 mg/mL injection 1 mg  1 mg Intravenous Q4H PRN    HYDROmorphone (DILAUDID) 1 mg/mL injection 1 mg  1 mg Intravenous Once    metoprolol tartrate (LOPRESSOR) tablet 50 mg  50 mg Oral Q12H MICHELET    metroNIDAZOLE (FLAGYL) tablet 500 mg  500 mg Oral Q8H [address]    polyethylene glycol (MIRALAX) packet 17 g  17 g Oral Daily PRN    sodium chloride (PF) 0 9 % injection 3 mL  3 mL Intravenous PRN    tamsulosin (FLOMAX) capsule 0 4 mg  0 4 mg Oral Daily With Dinner     Prescriptions Prior to Admission   Medication    Calcium Carbonate-Vitamin D (CALCIUM 500 + D PO)    ciprofloxacin-dexamethasone (CIPRODEX) otic suspension    docusate sodium (COLACE) 100 mg capsule    fentaNYL (DURAGESIC) 50 mcg/hr    HYDROcodone-acetaminophen (ZAMCET)  MG/15ML solution    ofloxacin (OCUFLOX) 0 3 % ophthalmic solution    Omega-3 Fatty Acids (OMEGA-3 FISH OIL) 500 MG CAPS    diazepam (VALIUM) 5 mg tablet     Allergies   Allergen Reactions    Asa [Aspirin]     Carbapenems     Cephalosporins     Penicillins        Vitals: Blood pressure 121/75, pulse 81, temperature 98 5 °F (36 9 °C), temperature source Oral, resp  rate 18, height 5' 8" (1 727 m), weight 59 5 kg (131 lb 2 8 oz), SpO2 93 % ,2L, Body mass index is 19 94 kg/m²  Intake/Output Summary (Last 24 hours) at 11/10/17 1332  Last data filed at 11/10/17 1208   Gross per 24 hour   Intake              960 ml   Output             2280 ml   Net            -1320 ml       Physical Exam  General:, Awake alert and oriented x 3, conversant without conversational dyspnea, NAD, normal affect, thin  Complain of pain that is worse after TPA  HEENT:  PERRL, Sclera noninjected, nonicteric OU, Nares patent, no nasal flaring, no nasal drainage, Mucous membranes, moist, no oral lesions, normal dentition  NECK: Trachea midline, no accessory muscle use, no stridor, no cervical or supraclavicular adenopathy, JVP not elevated  CARDIAC: RR  PULM: Decrease BS , chest tube L side noted Draining serosanguineous fluid  CHEST: Kyphosis  ABD: Normoactive bowel sounds, soft nontender, nondistended, no rebound, no rigidity, no guarding    EXT: No cyanosis, no clubbing, no edema, normal capillary refill  SKIN:  No rashes, no lesions  NEURO: no focal neurologic deficits, AAOx3, moving all extremities appropriately    Labs: I have personally reviewed pertinent lab results  , ABG: No results found for: PHART, DLS8LGM, PO2ART, YKX4JDD, P7FGCXWY, BEART, SOURCE, BNP: No results found for: BNP, CBC:   Lab Results   Component Value Date    WBC 35 00 (HH) 11/10/2017    HGB 12 6 11/10/2017    HCT 35 1 (L) 11/10/2017    MCV 85 11/10/2017     (H) 11/10/2017    MCH 30 4 11/10/2017    MCHC 35 9 11/10/2017    RDW 13 8 11/10/2017    MPV 8 3 (L) 11/10/2017    NRBC 0 11/10/2017   , CMP:   No results found for: NA, K, CL, CO2, ANIONGAP, BUN, CREATININE, GLUCOSE, CALCIUM, AST, ALT, ALKPHOS, PROT, ALBUMIN, BILITOT, EGFR, PT/INR: No results found for: PT, INR, Troponin: No results found for: TROPONINI    Results from last 7 days  Lab Units 11/10/17  0523 11/09/17  0443 11/08/17  0435  11/06/17  1037   WBC Thousand/uL 35 00* 24 81* 27 84*  < > 36 62*   HEMOGLOBIN g/dL 12 6 10 2* 12 2  < > 12 9   I STAT HEMOGLOBIN   --   --   --   < >  --    HEMATOCRIT % 35 1* 29 6* 35 6*  < > 38 4   PLATELETS Thousands/uL 637* 533* 540*  < > 597*   NEUTROS PCT % 87*  --   --   --   --    MONOS PCT % 7  --   --   --   --    MONO PCT MAN %  --  6  --   --  4   < > = values in this interval not displayed  Results from last 7 days  Lab Units 11/08/17  0435 11/07/17  0513 11/06/17  1818 11/06/17  1043 11/06/17  1037   SODIUM mmol/L 135* 136  --   --  135*   POTASSIUM mmol/L 3 6 4 3  --   --  4 3   CHLORIDE mmol/L 102 101  --   --  96*   CO2 mmol/L 26 27  --   --  27   BUN mg/dL 17 17  --   --  24   CREATININE mg/dL 0 57* 0 75  --   --  1 05   CALCIUM mg/dL 8 3 9 3  --   --  9 5   TOTAL PROTEIN g/dL 6 0* 6 9 6 8  --  7 9   BILIRUBIN TOTAL mg/dL 0 51 0 80  --   --  0 80   ALK PHOS U/L 72 82  --   --  113   ALT U/L 14 17  --   --  23   AST U/L 9 11  --   --  19   GLUCOSE RANDOM mg/dL 175* 115  --   --  139   GLUCOSE, ISTAT mg/dl  --   --   --  144*  --        Results from last 7 days  Lab Units 11/06/17  1037   MAGNESIUM mg/dL 2 3       Results from last 7 days  Lab Units 11/07/17  0513   PHOSPHORUS mg/dL 2 7        Results from last 7 days  Lab Units 11/06/17  1037   INR  1 22*   PTT seconds 37*       Results from last 7 days  Lab Units 11/06/17  1125   LACTIC ACID mmol/L 1 1       0  Lab Value Date/Time   TROPONINI <0 02 11/06/2017 1037       Imaging and other studies: I have personally reviewed pertinent reports     and I have personally reviewed pertinent films in PACS     EKG, Pathology, and Other Studies: I have personally reviewed pertinent reports        Code Status: Level 1 - Full Code    MD [facility]

## 2017-11-10 NOTE — PLAN OF CARE
Problem: PHYSICAL THERAPY ADULT  Goal: Performs mobility at highest level of function for planned discharge setting  See evaluation for individualized goals  Treatment/Interventions: Functional transfer training, LE strengthening/ROM, Elevations, Therapeutic exercise, Endurance training, Bed mobility, Gait training, Spoke to nursing, Spoke to case management  Equipment Recommended: Madiha Flanagan (w/ (A))       See flowsheet documentation for full assessment, interventions and recommendations  Prognosis: Guarded  Problem List: Decreased strength, Decreased endurance, Impaired balance, Decreased mobility, Orthopedic restrictions, Pain  Assessment: Pt is 57 y o  initially presents to [facility] with several days history of cough productive of green sputum, sob and chest pain  Dx include: loculated pleural effusion, sepsis due to pneumonia, worsening leukocytosis, Paroxysmal afib with RVR  Pt 's comorbidities affecting POC include: severe protein-calorie malnutrition, kyphosis, osteoporosis, spinal fractures and personal factors of: living alone and multiple ELIF  Pt's clinical presentation is currently unstable/unpredictable which is evident in additional procedures pending (IR for tube change), abn lab values being monitored/trending, ongoing telem monitoring and fall risk  Pt presents w/ generalized weakness, incl decreased LE strength, decreased functional endurance and activity tolerance, min impaired balance, gait deviations and fall risk  Will cont to follow pt in PT for progressive mobilization to address above functional deficits and to max level of (I), endurance, and safety  Currently recommend rehab upon D/C  Will cont to follow until then  Barriers to Discharge: Inaccessible home environment, Decreased caregiver support     Recommendation: Post acute IP rehab          See flowsheet documentation for full assessment

## 2017-11-10 NOTE — SEDATION DOCUMENTATION
Left Pleural chest tube upsized to 14 fr APD without complications  4mg Alteplase infused via chest tube to dwell for 2 hours then place to -20cm Suction

## 2017-11-10 NOTE — PROGRESS NOTES
[address] Internal Medicine Progress Note  Patient: Mauro Phillips 57 y o  male   MRN: [id number]  PCP: Chacha Hough MD  Unit/Bed#: [facility] 420-01 Encounter: [id number]  Date Of Visit: 11/10/17    Assessment:    Principal Problem:    Loculated pleural effusion  Active Problems:    Pneumonia    Sepsis ([facility] )    Pedal edema    Osteoporosis    Kyphosis    Leukocytosis    Urinary retention    Severe protein-calorie malnutrition (HCC)    Continuous opioid dependence ([facility] )    Other chronic pain    Atrial fibrillation with RVR ([facility] )      Plan:    1  Left-sided pneumonia with loculated parapneumonic effusion , s/p  pigtail catheter placement on 11/06 -thoracic surgery and pulmonology are following- on IV ceftriaxone and Flagyl by ID-follow on chest x-ray  2  Worsening Leukocytosis today secondary to above- continue to monitor  3  Osteoporosis/severe kyphosis with compression deformities and  chronic pain, on continuous opiate dependence- outpatient Endocrine follow-up- continue calcium supplement  4  Severe protein calorie malnutrition-continue with nutrition supplement  5  New onset AFib -heart rate is better controlled, cardiology is following-on diltiazem and Lopressor     PT OT eval  Patient agreed on Rehab when stable    VTE Pharmacologic Prophylaxis:   Pharmacologic: Enoxaparin (Lovenox)  Mechanical VTE Prophylaxis in Place: Yes    Patient Centered Rounds: I have performed bedside rounds with nursing staff today  Discussions with Specialists or Other Care Team Provider:     Education and Discussions with Family / Patient:  Patient    Time Spent for Care: 30 minutes  More than 50% of total time spent on counseling and coordination of care as described above      Current Length of Stay: 3 day(s)    Current Patient Status: Inpatient   Certification Statement: The patient will continue to require additional inpatient hospital stay due to Management of pneumonia    Discharge Plan / Estimated Discharge Date: not ready yet    Code Status: Level 1 - Full Code      Subjective:   Patient seen and examined  Patient feels better clinically improving  Continue to have shortness of breath and intermittent cough  No nausea vomiting or diarrhea    Objective:     Vitals:   Temp (24hrs), Av 5 °F (36 9 °C), Min:98 °F (36 7 °C), Max:99 2 °F (37 3 °C)    HR:  [78-97] 85  Resp:  [17-20] 20  BP: (114-151)/(72-92) 114/72  SpO2:  [90 %-93 %] 92 %  Body mass index is 19 94 kg/m²  Input and Output Summary (last 24 hours): Intake/Output Summary (Last 24 hours) at 11/10/17 0940  Last data filed at 11/10/17 0846   Gross per 24 hour   Intake              840 ml   Output             1800 ml   Net             -960 ml       Physical Exam:     Physical Exam  Patient is awake alert with mild dyspnea  Cachectic  Lung with decreased breath sounds bilateral- left-sided chest tube in place  Heart positive S1-S2 no murmur  Back with severe kyphosis  Abdomen soft nontender positive bowel sounds  Lower extremity chronic venous stasis bilateral    Additional Data:     Labs:      Results from last 7 days  Lab Units 11/10/17  0523   WBC Thousand/uL 35 00*   HEMOGLOBIN g/dL 12 6   HEMATOCRIT % 35 1*   PLATELETS Thousands/uL 637*   NEUTROS PCT % 87*   LYMPHS PCT % 6*   MONOS PCT % 7   EOS PCT % 0       Results from last 7 days  Lab Units 17  0435   SODIUM mmol/L 135*   POTASSIUM mmol/L 3 6   CHLORIDE mmol/L 102   CO2 mmol/L 26   BUN mg/dL 17   CREATININE mg/dL 0 57*   CALCIUM mg/dL 8 3   TOTAL PROTEIN g/dL 6 0*   BILIRUBIN TOTAL mg/dL 0 51   ALK PHOS U/L 72   ALT U/L 14   AST U/L 9   GLUCOSE RANDOM mg/dL 175*       Results from last 7 days  Lab Units 17  1037   INR  1 22*       * I Have Reviewed All Lab Data Listed Above  * Additional Pertinent Lab Tests Reviewed:  All Labs For Current Hospital Admission Reviewed    Imaging:    Imaging Reports Reviewed Today Include:   Imaging Personally Reviewed by Myself Includes:      Recent Cultures (last 7 days):       Results from last 7 days  Lab Units 11/07/17  0753 11/06/17  1803 11/06/17  1528 11/06/17  1311 11/06/17  1038 11/06/17  1037   BLOOD CULTURE   --   --   --   --  No Growth at 72 hrs  No Growth at 72 hrs  SPUTUM CULTURE   --   --   --  2+ Growth of   --   --    GRAM STAIN RESULT   --   --  2+ Polys  No bacteria seen Rare Epithelial cells per low power field  1+ Polys  2+ Gram positive cocci in pairs  2+ Gram negative rods  --   --    BODY FLUID CULTURE, STERILE   --   --  No growth  --   --   --    INFLUENZA A PCR  None Detected  --   --   --   --   --    INFLUENZA B PCR  None Detected  --   --   --   --   --    RSV PCR  None Detected  --   --   --   --   --    LEGIONELLA URINARY ANTIGEN   --  Negative  --   --   --   --        Last 24 Hours Medication List:     alteplase (ACTIVASE) 8 mg in 0 9% NaCl 50 mL 8 mg Chest Tube Once   calcium carbonate-vitamin D 1 tablet Oral BID With Meals   cefTRIAXone 2,000 mg Intravenous Q24H   clobetasol 1 application Topical X49C [facility]   diazepam 5 mg Oral HS   diltiazem 60 mg Oral Q8H [facility]   docusate sodium 200 mg Oral BID   enoxaparin 40 mg Subcutaneous Daily   fentaNYL 1 patch Transdermal Q48H   guaiFENesin 600 mg Oral Q12H MICHELET   HYDROmorphone 1 mg Intravenous Once   metoprolol tartrate 25 mg Oral Q12H MICHELET   metroNIDAZOLE 500 mg Oral Q8H [facility]   tamsulosin 0 4 mg Oral Daily With Dinner        Today, Patient Was Seen By: Eboni Cervantes DO    ** Please Note: This note has been constructed using a voice recognition system   **

## 2017-11-10 NOTE — PROGRESS NOTES
Progress Note - Cardiology   Socorro Zuleta 57 y o  male MRN: [id number]  Unit/Bed#: [facility] 420-01 Encounter: [id number]  11/10/17  12:57 PM        Assessment/Plan:  1  Paroxysmal afib with RVR  Completed IV load of amiodarone  Continue p o  Diltiazem and metoprolol to help prevent recurrence of atrial fibrillation, will titrate up both as tolerated  As chads Vasc score is 0, he does not need to be on oral anticoagulation  Continue to monitor on telemetry  Subjective/Objective   Chief Complaint: No chief complaint on file  Subjective:  41-year-old man with a history of protein calorie malnutrition, severe kyphosis, compression fractures, and opioid dependence  He was admitted with shortness of breath and found to have a loculated left pleural effusion  He had a chest tube placed and had some issues with paroxysmal atrial fibrillation  He had one episode of afib last night that lasted for approximately 2 5 hours  He reports his symptoms during afib were not as severe as last episode, was able to tolerate it well  Denies any current chest pain  He does have pain from the chest tube  He reports his breathing is okay  No significant lower extremity edema  No fevers        Patient Active Problem List   Diagnosis    Pneumonia    Sepsis ([facility] [address] )    Pleural disorder    Pedal edema    Osteoporosis    Kyphosis    Leukocytosis    Psoriasis    Urinary retention    Hematuria    Loculated pleural effusion    Severe protein-calorie malnutrition (HCC)    Continuous opioid dependence (HCC)    Other chronic pain    At[facility] fibrillation [facility] RVR ([facility])     Past Medical History:   Diagnosis Date    History of spinal fracture     Kyphosis     Loss of appetite     Loss of weight     Osteoarthritis     Osteoporosis        Allergies   Allergen Reactions    Asa [Aspirin]     Carbapenems     Cephalosporins     Penicillins        Current Facility-Administered Medications   Medication Dose Route Frequency Provider Last Rate Last Dose    acetaminophen (TYLENOL) tablet 650 mg  650 mg Oral Q6H PRN Yunior Torres MD   650 mg at 11/07/17 2341    albuterol inhalation solution 2 5 mg  2 5 mg Nebulization Q6H PRN Yunior Torres MD        alteplase (CATHFLO) 8 mg in sodium chloride 0 9 % 50 mL syringe  8 mg Chest Tube Once Alee Pacheco MD        calcium carbonate-vitamin D (OSCAL-D) 500 mg-200 units per tablet 1 tablet  1 tablet Oral BID With Meals Yunior Torres MD   1 tablet at 11/10/17 0844    cefTRIAXone (ROCEPHIN) 2,000 mg in dextrose 5 % 50 mL IVPB  2,000 mg Intravenous Q24H Antonia MD Van 100 mL/hr at 11/09/17 1436 2,000 mg at 11/09/17 1436    clobetasol (TEMOVATE) 5 12 % cream 1 application  1 application Topical L71X [address] Yunior Torres MD   1 application at 42/69/67 0848    diazepam (VALIUM) tablet 5 mg  5 mg Oral HS Musa Carnye MD   5 mg at 11/09/17 2342    diltiazem (CARDIZEM) tablet 60 mg  60 mg Oral [facility] Lynnea Canavan, MD   60 mg at 11/10/17 0543    docusate sodium (COLACE) capsule 200 mg  200 mg Oral BID Yunior Torres MD   200 mg at 11/10/17 0845    enoxaparin (LOVENOX) subcutaneous injection 40 mg  40 mg Subcutaneous Daily Yunior Torres MD   40 mg at 11/10/17 0845    fentaNYL (DURAGESIC) 50 mcg/hr TD 72 hr patch 1 patch  1 patch Transdermal Q48H Yunior Torres MD   1 patch at 11/09/17 0904    guaiFENesin (MUCINEX) 12 hr tablet 600 mg  600 mg Oral Q12H [address] Nicole Navarrete DO   600 mg at 11/10/17 0845    HYDROcodone-acetaminophen (NORCO) 5-325 mg per tablet 1 tablet  1 tablet Oral Q6H PRN Yunior Torres MD   1 tablet at 11/10/17 1218    HYDROmorphone (DILAUDID) 1 mg/mL injection 1 mg  1 mg Intravenous Q4H PRN Mecca Tee MD   1 mg at 11/10/17 1021    HYDROmorphone (DILAUDID) 1 mg/mL injection 1 mg  1 mg Intravenous Once Nicole Navarrete DO        metoprolol tartrate (LOPRESSOR) tablet 25 mg  25 mg Oral Q12H [address] Aicha Treviño MD   25 mg at 11/10/17 0849    metroNIDAZOLE (FLAGYL) tablet 500 mg  500 mg Oral [facility] Willie Olivares MD   500 mg at 11/10/17 0543    polyethylene glycol (MIRALAX) packet 17 g  17 g Oral Daily PRN Willie Olivares MD        sodium chloride (PF) 0 9 % injection 3 mL  3 mL Intravenous PRN Willie Olivares MD        tamsulosin (FLOMAX) capsule 0 4 mg  0 4 mg Oral Daily With Makeda Zaman MD   0 4 mg at 11/09/17 1624       Vitals: /75 Comment: Map 92  Pulse 81   Temp 98 5 °F (36 9 °C) (Oral)   Resp 18   Ht 5' 8" (1 727 m)   Wt 59 5 kg (131 lb 2 8 oz)   SpO2 93%   BMI 84 83 kg/m²     Systolic (67MNT), CUU:189 , Min:114 , KHZ:943     Diastolic (95RLX), BJU:89, Min:72, Max:92      Vitals:    11/08/17 1635 11/09/17 0600   Weight: 59 kg (130 lb 1 1 oz) 59 5 kg (131 lb 2 8 oz)     Orthostatic Blood Pressures    Flowsheet Row Most Recent Value   Blood Pressure  121/75 [Map 92] filed at 11/10/2017 1121   Patient Position - Orthostatic VS  Lying filed at 11/10/2017 1121            Intake/Output Summary (Last 24 hours) at 11/10/17 1257  Last data filed at 11/10/17 1208   Gross per 24 hour   Intake              960 ml   Output             2280 ml   Net            -1320 ml       Invasive Devices     Peripheral Intravenous Line            Peripheral IV 11/08/17 Left Forearm 1 day    Peripheral IV 11/09/17 Right Forearm less than 1 day          Drain            Chest Tube 1 Left 10 Fr  3 days                  Physical Exam:     GEN: Awake and alert, in no acute distress  HEENT: Sclera anicteric, conjunctivae pink, mucous membranes moist   NECK: Supple, no carotid bruits, no significant JVD  HEART: Regular rhythm, normal S1 and S2, no murmurs, clicks, gallops or rubs  LUNGS: decreased on left, no wheezes, rales, or rhonchi   ABDOMEN: Soft, nontender, nondistended, normoactive bowel sounds  EXTREMITIES: Skin warm and well perfused, no clubbing, cyanosis, or edema  Chronic venous stasis changes bilaterally  NEURO: No focal findings     SKIN: Normal without suspicious lesions on exposed skin        Lab Results:     Troponins:     Results from last 7 days  Lab Units 11/06/17  1037   TROPONIN I ng/mL <0 02       CBC with diff:     Results from last 7 days  Lab Units 11/10/17  0523 11/09/17  0443 11/08/17  0435 11/07/17  0513 11/06/17  1043 11/06/17  1037   WBC Thousand/uL 35 00* 24 81* 27 84* 26 92*  --  36 62*   HEMOGLOBIN g/dL 12 6 10 2* 12 2 12 5  --  12 9   I STAT HEMOGLOBIN g/dl  --   --   --   --  13 9  --    HEMATOCRIT % 35 1* 29 6* 35 6* 37 5  --  38 4   MCV fL 85 86 87 89  --  89   PLATELETS Thousands/uL 637* 533* 540* 556*  --  597*   MCH pg 30 4 29 5 29 8 29 6  --  30 0   MCHC g/dL 35 9 34 5 34 3 33 3  --  33 6   RDW % 13 8 13 8 13 7 13 9  --  13 9   MPV fL 8 3* 8 5* 8 7* 8 7*  --  8 3*   NRBC AUTO /100 WBCs 0 0  --   --   --   --        CMP:    Results from last 7 days  Lab Units 11/08/17  0435 11/07/17  0513 11/06/17  1818 11/06/17  1043 11/06/17  1037   SODIUM mmol/L 135* 136  --   --  135*   POTASSIUM mmol/L 3 6 4 3  --   --  4 3   CHLORIDE mmol/L 102 101  --   --  96*   CO2 mmol/L 26 27  --   --  27   ANION GAP mmol/L 7 8  --   --  12   BUN mg/dL 17 17  --   --  24   CREATININE mg/dL 0 57* 0 75  --   --  1 05   GLUCOSE RANDOM mg/dL 175* 115  --   --  139   GLUCOSE, ISTAT mg/dl  --   --   --  144*  --    CALCIUM mg/dL 8 3 9 3  --   --  9 5   AST U/L 9 11  --   --  19   ALT U/L 14 17  --   --  23   ALK PHOS U/L 72 82  --   --  113   TOTAL PROTEIN g/dL 6 0* 6 9 6 8  --  7 9   ALBUMIN g/dL 1 8* 2 3*  --   --  3 2*   BILIRUBIN TOTAL mg/dL 0 51 0 80  --   --  0 80   EGFR ml/min/1 73sq m 110 98  --  91 76       Magnesium:     Results from last 7 days  Lab Units 11/06/17  1037   MAGNESIUM mg/dL 2 3       Coags:     Results from last 7 days  Lab Units 11/06/17  1037   PTT seconds 37*   INR  1 22*         Cardiac testing:   Results for orders placed during the hospital encounter of 11/06/17   Echo complete with contrast if indicated    Narrative [facility]  Lisette Dom, [address]  [phone]    Transthoracic Echocardiogram  2D, M-mode, Doppler, and Color Doppler    Study date:  2017    Patient: Vishnu Ceja  MR number: [id number]  Account number: [de-identified]  : 1955  Age: 57 years  Gender: Male  Status: Inpatient  Location: Bedside  Height: 69 in  Weight: 215 lb  BP: 156/ 95 mmHg    Indications: SOB  Diagnoses: R06 02 - Shortness of breath    Sonographer:  Rubens Monroe BS, RCS  Primary Physician:  Nikkie Rosario MD  Referring Physician:  Lashonda Sweeney DO  Group:  [id number]  Interpreting Physician:  Kaycee Langley MD    SUMMARY    LEFT VENTRICLE:  Systolic function was normal by visual assessment  Ejection fraction was estimated to be 60 %  There were no regional wall motion abnormalities  Doppler parameters were consistent with abnormal left ventricular relaxation (grade 1 diastolic dysfunction)  MITRAL VALVE:  There was mild regurgitation  TRICUSPID VALVE:  There was mild regurgitation  AORTA:  The root exhibited mild dilatation  HISTORY: PRIOR HISTORY: Pneumonia, Sepsis  Risk factors: hypertension and hypercholesterolemia  PROCEDURE: The procedure was performed at the bedside  This was a routine study  The transthoracic approach was used  The study included complete 2D imaging, M-mode, complete spectral Doppler, and color Doppler  Images were obtained from  the parasternal, apical, subcostal, and suprasternal notch acoustic windows  Echocardiographic views were limited  Image quality was adequate  LEFT VENTRICLE: Size was normal  Systolic function was normal by visual assessment  Ejection fraction was estimated to be 60 %  There were no regional wall motion abnormalities  Wall thickness was normal  DOPPLER: There was an increased  relative contribution of atrial contraction to ventricular filling   Doppler parameters were consistent with abnormal left ventricular relaxation (grade 1 diastolic dysfunction)  RIGHT VENTRICLE: The size was normal  Systolic function was normal  DOPPLER: Systolic pressure was within the normal range  Estimated peak pressure was 25 mmHg  LEFT ATRIUM: Size was normal     RIGHT ATRIUM: Size was normal     MITRAL VALVE: Valve structure was normal  There was normal leaflet separation  DOPPLER: The transmitral velocity was within the normal range  There was no evidence for stenosis  There was mild regurgitation  AORTIC VALVE: The valve was trileaflet  Leaflets exhibited normal thickness and normal cuspal separation  DOPPLER: Transaortic velocity was within the normal range  There was no evidence for stenosis  There was no regurgitation  TRICUSPID VALVE: The valve structure was normal  There was normal leaflet separation  DOPPLER: The transtricuspid velocity was within the normal range  There was no evidence for stenosis  There was mild regurgitation  PULMONIC VALVE: Leaflets exhibited normal thickness, no calcification, and normal cuspal separation  DOPPLER: The transpulmonic velocity was within the normal range  There was no regurgitation  PERICARDIUM: There was no pericardial effusion  AORTA: The root exhibited mild dilatation  SYSTEMIC VEINS: IVC: The inferior vena cava was normal in size and course   Respirophasic changes were normal     SYSTEM MEASUREMENT TABLES    2D  %FS: 39 73 %  Ao Diam: 3 73 cm  EDV(Teich): 82 91 ml  EF(Teich): 70 61 %  ESV(Cube): 17 37 ml  ESV(Teich): 24 36 ml  IVSd: 0 98 cm  LA Area: 22 11 cm2  LA Diam: 4 49 cm  LVEDV MOD A4C: 92 99 ml  LVEF MOD A4C: 62 71 %  LVESV MOD A4C: 34 67 ml  LVIDd: 4 3 cm  LVIDs: 2 59 cm  LVLd A4C: 8 15 cm  LVLs A4C: 6 49 cm  LVOT Diam: 2 29 cm  LVPWd: 1 05 cm  RA Area: 13 71 cm2  RV Diam : 4 6 cm  SV MOD A4C: 58 32 ml  SV(Cube): 61 94 ml  SV(Teich): 58 54 ml    CW  TR Vmax: 2 48 m/s  TR maxP 61 mmHg    MM  TAPSE: 1 87 cm    PW  AVC: 270 61 ms  E': 0 13 m/s  E/E': 5 78  MV A Micah: 0 95 m/s  MV Dec [place]: 3 53 m/s2  MV DecT: 212 23 ms  MV E Micah: 0 75 m/s  MV E/A Ratio: 0 79    [employer] Accredited Echocardiography Laboratory    Prepared and electronically signed by    Hollie Peabody, MD  Signed [id number] 15:46:59         Imaging: I have personally reviewed pertinent reports  Telemetry:  Personally reviewed, SR, episode of afib with RVR last night, now back in sR

## 2017-11-10 NOTE — PLAN OF CARE
Problem: PHYSICAL THERAPY ADULT  Goal: Performs mobility at highest level of function for planned discharge setting  See evaluation for individualized goals  Treatment/Interventions: Functional transfer training, LE strengthening/ROM, Elevations, Therapeutic exercise, Endurance training, Bed mobility, Gait training, Spoke to nursing, Spoke to case management  Equipment Recommended: [place] (w/ (A))       See flowsheet documentation for full assessment, interventions and recommendations  Outcome: Progressing  Prognosis: Guarded  Problem List: Decreased strength, Decreased endurance, Impaired balance, Decreased mobility, Orthopedic restrictions, Pain    Assessment: Additional follow up consecutive session performed to initiate LE therex in order to max strength and to facilitate progression w/ functional mobility skills and overall level of (I)  Pt was able to complete the program in an AAROM/AROM mode w/ rest periods provided; no increased discomfort or excessive fatigue expressed; currently, cont to recommend rehab upon D/C when medically cleared; will follow  Barriers to Discharge: Inaccessible home environment, Decreased caregiver support     Recommendation: Post acute IP rehab          See flowsheet documentation for full assessment

## 2017-11-10 NOTE — PHYSICAL THERAPY NOTE
Physical Therapy Evaluation    Patient's Name: Malika Smith    Admitting Diagnosis  Pleural effusion [J90]    Problem List  Patient Active Problem List   Diagnosis    Pneumonia    [facility]epsis ([facility] [address] [facility]    Pleural disorder    Pedal edema    Osteoporosis    Kyphosis    Leukocytosis    Psoriasis    Urinary retention    Hematuria    Loculated pleural effusion    Severe protein-calorie malnutrition ([facility] [address] [facility]    [facility]    Other chronic pain    [facility]       Past Medical History  Past Medical History:   Diagnosis Date    History of spinal fracture     Kyphosis     Loss of appetite     Loss of weight     Osteoarthritis     Osteoporosis        Past Surgical History  Past Surgical History:   Procedure Laterality Date    FINGER SURGERY      SHOULDER DEBRIDEMENT          11/10/17 1600   Note Type   Note type Eval/Treat   Pain Assessment   Pain Assessment FLACC   Pain Type Chronic pain;Acute pain   Pain Location Chest   Pain Orientation Left   Pain Descriptors Aching;Discomfort   Pain Frequency Intermittent   Pain Onset Ongoing   Clinical Progression Not changed   Effect of Pain on Daily Activities guarding w/ mvt   Patient's Stated Pain Goal No pain   Hospital Pain Intervention(s) Medication (See MAR); Repositioned; Ambulation/increased activity; Distraction; Emotional support   Response to Interventions appears to be comfortable post session   Pain Rating: FLACC (Rest) - Face 0   Pain Rating: FLACC (Rest) - Legs 0   Pain Rating: FLACC (Rest) - Activity 0   Pain Rating: FLACC (Rest) - Cry 1   Pain Rating: FLACC (Rest) - Consolability 0   Score: FLACC (Rest) 1   Pain Rating: FLACC (Activity) - Face 0   Pain Rating: FLACC (Activity) - Legs 0   Pain Rating: FLACC (Activity) - Activity 0   Pain Rating: FLACC (Activity) - Cry 1   Pain Rating: FLACC (Activity) - Consolability 0   Score: FLACC (Activity) 1   Home Living   Type of Home Apartment   Home Layout One level  (13 ELIF w/ hand rail)   Prior Function   Level of [place] Independent with ADLs and functional mobility  (amb w/o AD)   Lives With Alone   Restrictions/Precautions   Braces or Orthoses (reports he has been wearing back brace for comfort/support)   Other Precautions Fall Risk;O2;Telemetry;Multiple lines;Pain  (OP; (L) CT to suction;)   General   Additional Pertinent History cleared for assessment/mobilization (spoke to nsg --> present during the session)   Cognition   Overall Cognitive Status [facility]   Arousal/Participation Alert   Attention Attends with cues to redirect   Orientation Level Oriented to person;Oriented to place;Oriented to situation   Memory Decreased recall of precautions   Following Commands Follows one step commands without difficulty   Comments Pt is initially observed standing in the room w/ nsg staff present (coming out of the BR); later -> in the chair; agreeable to demonstrate mobility skillls and return back to bed;   RUE Assessment   RUE Assessment WFL  (AROM)   LUE Assessment   LUE Assessment WFL  (AROM)   RLE Assessment   RLE Assessment X  (decreased AROM at the hip)   Strength RLE   RLE Overall Strength (fair - hip; fair knee and ankle (grossly))   LLE Assessment   LLE Assessment X  (decreased AROM at the hip)   Strength LLE   LLE Overall Strength (fair - hip; fair knee and ankle (grossly))   Bed Mobility   Sit to Supine 3  Moderate assistance   Additional items Assist x 1;Assist x 2; Increased time required;Verbal cues;LE management  (gently repositioned higher in bed w/ (A)x2 after pt's tried)   Transfers   Sit to Stand 4  Minimal assistance   Additional items Assist x 1;Verbal cues; Increased time required  (stand by (A) of 2nd for lines)   Stand to Sit 4  Minimal assistance   Additional items Assist x 1;Verbal cues; Increased time required;Bed elevated  (stand by (A) of 2nd for lines)   Ambulation/Elevation   Gait pattern Excessively slow; Short stride; Inconsistent long   Gait Assistance 4  Minimal assist   Additional items Assist x 1  (stand by (A) of 2nd for lines)   Assistive Device Rolling walker   Distance 2 x 8 ft w/ standing rest stop in between; CT to suction t/o the session   Balance   Static Sitting Fair   Static Standing Fair -   Ambulatory Poor +   Activity Tolerance   Activity Tolerance Patient limited by fatigue   Nurse Made Aware spoke to [facility]   Assessment   Prognosis Guarded   Problem List Decreased strength;Decreased endurance; Impaired balance;Decreased mobility;Orthopedic restrictions;Pain   Assessment Pt is 57 y o  initially presents to [facility] with several days history of cough productive of green sputum, sob and chest pain  Dx include: loculated pleural effusion, sepsis due to pneumonia, worsening leukocytosis, Paroxysmal afib with RVR  Pt 's comorbidities affecting POC include: severe protein-calorie malnutrition, kyphosis, osteoporosis, spinal fractures and personal factors of: living alone and multiple ELIF  Pt's clinical presentation is currently unstable/unpredictable which is evident in additional procedures pending (IR for tube change), abn lab values being monitored/trending, ongoing telem monitoring and fall risk  Pt presents w/ generalized weakness, incl decreased LE strength, decreased functional endurance and activity tolerance, min impaired balance, gait deviations and fall risk  Will cont to follow pt in PT for progressive mobilization to address above functional deficits and to max level of (I), endurance, and safety  Currently recommend rehab upon D/C  Will cont to follow until then  Barriers to Discharge Inaccessible home environment;Decreased caregiver support   Goals   Patient Goals to get stronger   LTG Expiration Date 11/24/17   Long Term Goal #1 10-14 days  Pt will amb 150 ft w/ rw, mod (I)  Pt will achieve (I) level w/ bed mob  Pt will perform transfers w/ mod (I)  Pt will negotiate 3 steps --> 13 steps w/ hand rail and SPC PRN, mod (I)  Plan   Treatment/Interventions Functional transfer training;LE strengthening/ROM; Elevations; Therapeutic exercise; Endurance training;Bed mobility;Gait training;Spoke to nursing;Spoke to case management   PT Frequency 5x/wk  (BID PRN)   Recommendation   Recommendation Post acute IP rehab   Equipment Recommended Walker  (w/ (A))   Modified Summers Scale   Modified Summers Scale 4   Barthel Index   Feeding 10   Bathing 0   Grooming Score 0   Dressing Score 0   Bladder Score 10   Bowels Score 10   Toilet Use Score 5   Transfers (Bed/Chair) Score 10   Mobility (Level Surface) Score 0   Stairs Score 0   Barthel Index Score 45         Jad Bonilla, PT

## 2017-11-10 NOTE — PHYSICAL THERAPY NOTE
PHYSICAL THERAPY NOTE          Patient Name: Valencia Lealr  [id number] Date: 11/10/2017  Time In: 16:00  Time Out: 16:20  Total Time: 20 min      S:  Pt reports being comfortable; agreeable to try LE therex;    O:  O2 sat = 96 % at rest; (B) LE therex performed in supine as following: (B) ankle pumps 2 x 10 reps, AROM; (B) SAQ 2 x 10 reps, AROM/AAROM; (B) hip abd/add 2 x 10 reps, AAROM; LE elevated on the pillow and SCDs back on; call bell and phone w/in reach; HOB at 45 degree and 2 pillows for support --> pt reports being comfortable  A:  Additional follow up consecutive session performed to initiate LE therex in order to max strength and to facilitate progression w/ functional mobility skills and overall level of (I)  Pt was able to complete the program in an AAROM/AROM mode w/ rest periods provided; no increased discomfort or excessive fatigue expressed; currently, cont to recommend rehab upon D/C when medically cleared; will follow        P:  Cont to follow daily --> BID PRN (M-F) for LE therex, functional mobility training, endurance training and pt education per Esdras Treviño, PT

## 2017-11-10 NOTE — PROGRESS NOTES
Progress Note - General Surgery   Vanessa Ferro 57 y o  male MRN: [id number]  Unit/Bed#: [facility] 420-01 Encounter: [id number]    Assessment:  Pt is a 61y  o  M with a loculated left pleural effusion s/p L pigtail CT 11/6 s/p TPA 11/7 and 11/8, 11/9    Plan:  Regular diet  CXR PA/Lateral today  L CT to suction  S/p 3x TPA  Pain control  Primary care per slim    Subjective/Objective   Chief Complaint:     Subjective: JIM  Patient with chronic pain, says pain slightly improved  No N/V/F/C  Objective:     Blood pressure 126/73, pulse 78, temperature 98 3 °F (36 8 °C), temperature source Oral, resp  rate 18, height 5' 8" (1 727 m), weight 59 5 kg (131 lb 2 8 oz), SpO2 92 %  ,Body mass index is 19 94 kg/m²  Intake/Output Summary (Last 24 hours) at 11/10/17 0344  Last data filed at 11/09/17 2252   Gross per 24 hour   Intake          1568 01 ml   Output             1000 ml   Net           568 01 ml       Invasive Devices     Peripheral Intravenous Line            Peripheral IV 11/08/17 Left Forearm 1 day    Peripheral IV 11/09/17 Right Forearm less than 1 day          Drain            Chest Tube 1 Left 10 Fr  3 days                Physical Exam: NAD  AAOX3  RRR  normal respiratory effort  soft, NT, ND  L CT with serous output 610 output yesterday    Lab, Imaging and other studies:I have personally reviewed pertinent lab results      VTE Pharmacologic Prophylaxis: Enoxaparin (Lovenox)  VTE Mechanical Prophylaxis: sequential compression device

## 2017-11-11 ENCOUNTER — APPOINTMENT (INPATIENT)
Dept: RADIOLOGY | Facility: HOSPITAL | Age: 62
DRG: 853 | End: 2017-11-11
Payer: MEDICARE

## 2017-11-11 PROBLEM — A41.9 SEPSIS (HCC): Status: RESOLVED | Noted: 2017-11-06 | Resolved: 2017-11-11

## 2017-11-11 PROBLEM — R33.9 URINARY RETENTION: Status: RESOLVED | Noted: 2017-11-07 | Resolved: 2017-11-11

## 2017-11-11 LAB
ANION GAP SERPL CALCULATED.3IONS-SCNC: 8 MMOL/L (ref 4–13)
BACTERIA BLD CULT: NORMAL
BACTERIA BLD CULT: NORMAL
BASOPHILS # BLD AUTO: 0.04 THOUSANDS/ΜL (ref 0–0.1)
BASOPHILS NFR BLD AUTO: 0 % (ref 0–1)
BUN SERPL-MCNC: 23 MG/DL (ref 5–25)
CALCIUM SERPL-MCNC: 8.7 MG/DL (ref 8.3–10.1)
CHLORIDE SERPL-SCNC: 101 MMOL/L (ref 100–108)
CO2 SERPL-SCNC: 25 MMOL/L (ref 21–32)
CREAT SERPL-MCNC: 0.67 MG/DL (ref 0.6–1.3)
EOSINOPHIL # BLD AUTO: 0.05 THOUSAND/ΜL (ref 0–0.61)
EOSINOPHIL NFR BLD AUTO: 0 % (ref 0–6)
ERYTHROCYTE [DISTWIDTH] IN BLOOD BY AUTOMATED COUNT: 14 % (ref 11.6–15.1)
GFR SERPL CREATININE-BSD FRML MDRD: 103 ML/MIN/1.73SQ M
GLUCOSE SERPL-MCNC: 116 MG/DL (ref 65–140)
HCT VFR BLD AUTO: 36.2 % (ref 36.5–49.3)
HGB BLD-MCNC: 12.9 G/DL (ref 12–17)
LYMPHOCYTES # BLD AUTO: 2.92 THOUSANDS/ΜL (ref 0.6–4.47)
LYMPHOCYTES NFR BLD AUTO: 9 % (ref 14–44)
MAGNESIUM SERPL-MCNC: 2.1 MG/DL (ref 1.6–2.6)
MCH RBC QN AUTO: 30.1 PG (ref 26.8–34.3)
MCHC RBC AUTO-ENTMCNC: 35.6 G/DL (ref 31.4–37.4)
MCV RBC AUTO: 84 FL (ref 82–98)
MONOCYTES # BLD AUTO: 2.56 THOUSAND/ΜL (ref 0.17–1.22)
MONOCYTES NFR BLD AUTO: 8 % (ref 4–12)
NEUTROPHILS # BLD AUTO: 26.25 THOUSANDS/ΜL (ref 1.85–7.62)
NEUTS SEG NFR BLD AUTO: 83 % (ref 43–75)
NRBC BLD AUTO-RTO: 0 /100 WBCS
PHOSPHATE SERPL-MCNC: 2 MG/DL (ref 2.3–4.1)
PLATELET # BLD AUTO: 732 THOUSANDS/UL (ref 149–390)
PMV BLD AUTO: 8.3 FL (ref 8.9–12.7)
POTASSIUM SERPL-SCNC: 4.4 MMOL/L (ref 3.5–5.3)
RBC # BLD AUTO: 4.29 MILLION/UL (ref 3.88–5.62)
SODIUM SERPL-SCNC: 134 MMOL/L (ref 136–145)
WBC # BLD AUTO: 32.21 THOUSAND/UL (ref 4.31–10.16)

## 2017-11-11 PROCEDURE — 84100 ASSAY OF PHOSPHORUS: CPT | Performed by: INTERNAL MEDICINE

## 2017-11-11 PROCEDURE — 71020 HB CHEST X-RAY 2VW FRONTAL&LATL: CPT

## 2017-11-11 PROCEDURE — 94760 N-INVAS EAR/PLS OXIMETRY 1: CPT

## 2017-11-11 PROCEDURE — 85025 COMPLETE CBC W/AUTO DIFF WBC: CPT | Performed by: INTERNAL MEDICINE

## 2017-11-11 PROCEDURE — 80048 BASIC METABOLIC PNL TOTAL CA: CPT | Performed by: INTERNAL MEDICINE

## 2017-11-11 PROCEDURE — 83735 ASSAY OF MAGNESIUM: CPT | Performed by: INTERNAL MEDICINE

## 2017-11-11 RX ORDER — AMIODARONE HYDROCHLORIDE 200 MG/1
200 TABLET ORAL
Status: DISCONTINUED | OUTPATIENT
Start: 2017-11-11 | End: 2017-11-22 | Stop reason: HOSPADM

## 2017-11-11 RX ORDER — METOPROLOL TARTRATE 5 MG/5ML
5 INJECTION INTRAVENOUS ONCE
Status: COMPLETED | OUTPATIENT
Start: 2017-11-11 | End: 2017-11-11

## 2017-11-11 RX ORDER — ACETAMINOPHEN 325 MG/1
650 TABLET ORAL EVERY 6 HOURS PRN
Status: DISCONTINUED | OUTPATIENT
Start: 2017-11-11 | End: 2017-11-15

## 2017-11-11 RX ORDER — HYDROCODONE BITARTRATE AND ACETAMINOPHEN 5; 325 MG/1; MG/1
1 TABLET ORAL EVERY 6 HOURS PRN
Status: DISCONTINUED | OUTPATIENT
Start: 2017-11-11 | End: 2017-11-22 | Stop reason: HOSPADM

## 2017-11-11 RX ORDER — DILTIAZEM HYDROCHLORIDE 240 MG/1
240 CAPSULE, COATED, EXTENDED RELEASE ORAL DAILY
Status: DISCONTINUED | OUTPATIENT
Start: 2017-11-11 | End: 2017-11-11

## 2017-11-11 RX ORDER — DILTIAZEM HYDROCHLORIDE 240 MG/1
240 CAPSULE, COATED, EXTENDED RELEASE ORAL DAILY
Status: DISCONTINUED | OUTPATIENT
Start: 2017-11-12 | End: 2017-11-22 | Stop reason: HOSPADM

## 2017-11-11 RX ADMIN — CALCIUM CARBONATE 500 MG (1,250 MG)-VITAMIN D3 200 UNIT TABLET 1 TABLET: at 17:18

## 2017-11-11 RX ADMIN — METOPROLOL TARTRATE 50 MG: 50 TABLET ORAL at 22:10

## 2017-11-11 RX ADMIN — CEFTRIAXONE SODIUM 2000 MG: 10 INJECTION, POWDER, FOR SOLUTION INTRAVENOUS at 15:03

## 2017-11-11 RX ADMIN — CALCIUM CARBONATE 500 MG (1,250 MG)-VITAMIN D3 200 UNIT TABLET 1 TABLET: at 09:29

## 2017-11-11 RX ADMIN — METRONIDAZOLE 500 MG: 500 TABLET ORAL at 14:48

## 2017-11-11 RX ADMIN — HYDROMORPHONE HYDROCHLORIDE 1 MG: 1 INJECTION, SOLUTION INTRAMUSCULAR; INTRAVENOUS; SUBCUTANEOUS at 22:18

## 2017-11-11 RX ADMIN — ENOXAPARIN SODIUM 40 MG: 40 INJECTION SUBCUTANEOUS at 09:28

## 2017-11-11 RX ADMIN — DOCUSATE SODIUM 200 MG: 100 CAPSULE, LIQUID FILLED ORAL at 09:28

## 2017-11-11 RX ADMIN — AMIODARONE HYDROCHLORIDE 200 MG: 200 TABLET ORAL at 10:31

## 2017-11-11 RX ADMIN — HYDROCODONE BITARTRATE AND ACETAMINOPHEN 1 TABLET: 5; 325 TABLET ORAL at 19:24

## 2017-11-11 RX ADMIN — DILTIAZEM HYDROCHLORIDE 60 MG: 60 TABLET, FILM COATED ORAL at 05:44

## 2017-11-11 RX ADMIN — HYDROMORPHONE HYDROCHLORIDE 1 MG: 1 INJECTION, SOLUTION INTRAMUSCULAR; INTRAVENOUS; SUBCUTANEOUS at 15:00

## 2017-11-11 RX ADMIN — METOPROLOL TARTRATE 50 MG: 50 TABLET ORAL at 09:29

## 2017-11-11 RX ADMIN — HYDROMORPHONE HYDROCHLORIDE 1 MG: 1 INJECTION, SOLUTION INTRAMUSCULAR; INTRAVENOUS; SUBCUTANEOUS at 05:45

## 2017-11-11 RX ADMIN — CLOBETASOL PROPIONATE 1 APPLICATION: 0.5 CREAM TOPICAL at 22:14

## 2017-11-11 RX ADMIN — DIAZEPAM 5 MG: 5 TABLET ORAL at 22:10

## 2017-11-11 RX ADMIN — GUAIFENESIN 600 MG: 600 TABLET, EXTENDED RELEASE ORAL at 22:10

## 2017-11-11 RX ADMIN — HYDROMORPHONE HYDROCHLORIDE 1 MG: 1 INJECTION, SOLUTION INTRAMUSCULAR; INTRAVENOUS; SUBCUTANEOUS at 01:22

## 2017-11-11 RX ADMIN — FENTANYL 1 PATCH: 50 PATCH, EXTENDED RELEASE TRANSDERMAL at 09:17

## 2017-11-11 RX ADMIN — AMIODARONE HYDROCHLORIDE 200 MG: 200 TABLET ORAL at 17:18

## 2017-11-11 RX ADMIN — METRONIDAZOLE 500 MG: 500 TABLET ORAL at 05:44

## 2017-11-11 RX ADMIN — HYDROMORPHONE HYDROCHLORIDE 1 MG: 1 INJECTION, SOLUTION INTRAMUSCULAR; INTRAVENOUS; SUBCUTANEOUS at 10:37

## 2017-11-11 RX ADMIN — DILTIAZEM HYDROCHLORIDE 60 MG: 60 TABLET, FILM COATED ORAL at 00:20

## 2017-11-11 RX ADMIN — DILTIAZEM HYDROCHLORIDE 60 MG: 60 TABLET, FILM COATED ORAL at 14:47

## 2017-11-11 RX ADMIN — DOCUSATE SODIUM 200 MG: 100 CAPSULE, LIQUID FILLED ORAL at 17:18

## 2017-11-11 RX ADMIN — CLOBETASOL PROPIONATE 1 APPLICATION: 0.5 CREAM TOPICAL at 09:28

## 2017-11-11 RX ADMIN — HYDROCODONE BITARTRATE AND ACETAMINOPHEN 1 TABLET: 5; 325 TABLET ORAL at 00:20

## 2017-11-11 RX ADMIN — GUAIFENESIN 600 MG: 600 TABLET, EXTENDED RELEASE ORAL at 09:28

## 2017-11-11 RX ADMIN — METOPROLOL TARTRATE 5 MG: 5 INJECTION, SOLUTION INTRAVENOUS at 10:32

## 2017-11-11 RX ADMIN — TAMSULOSIN HYDROCHLORIDE 0.4 MG: 0.4 CAPSULE ORAL at 17:18

## 2017-11-11 RX ADMIN — METRONIDAZOLE 500 MG: 500 TABLET ORAL at 22:10

## 2017-11-11 RX ADMIN — DILTIAZEM HYDROCHLORIDE 60 MG: 60 TABLET, FILM COATED ORAL at 17:18

## 2017-11-11 RX ADMIN — DIBASIC SODIUM PHOSPHATE, MONOBASIC POTASSIUM PHOSPHATE AND MONOBASIC SODIUM PHOSPHATE 1 TABLET: 852; 155; 130 TABLET ORAL at 17:18

## 2017-11-11 NOTE — PROGRESS NOTES
SLIM made aware of Pt 's elevated WBC count with morning labs  No further interventions at this time

## 2017-11-11 NOTE — PROGRESS NOTES
Progress Note - Cardiology   Bhakti Morris 57 y o  male MRN: [id number]  Unit/Bed#: [facility] 420-01 Encounter: [id number]  11/11/17  9:16 AM        Assessment/Plan:  1  Paroxysmal afib with RVR  Completed IV load of amiodarone  Continue p o  Diltiazem 60 q6hrs and metoprolol 50 bid to help prevent recurrence of atrial fibrillation  Transition to long acting formulation of Diltiazem for 11/12  As chads Vasc score is 0, he does not need to be on oral anticoagulation  Continue to monitor on telemetry  Subjective/Objective   Chief Complaint: No chief complaint on file  Subjective:  65-year-old man with a history of protein calorie malnutrition, severe kyphosis, compression fractures, and opioid dependence  He was admitted with shortness of breath and found to have a loculated left pleural effusion  He had a chest tube placed and had some issues with paroxysmal atrial fibrillation  He had one episode of afib 11/9 PM that lasted for approximately 2 5 hours  He reports his symptoms during this episode of afib were not as severe as last episode, was able to tolerate it well  No further afib  Denies any current chest pain  He does have pain from the chest tube, has a lot of pain when it is injected with tPA  He reports his breathing is okay  No significant lower extremity edema  No fevers  May still require VATS        Patient Active Problem List   Diagnosis    Pneumonia    Sepsis ([address] )    Pleural disorder    Pedal edema    Osteoporosis    Kyphosis    Leukocytosis    Psoriasis    Urinary retention    Hematuria    Loculated pleural effusion    Severe protein-calorie malnutrition (HCC)    Continuous opioid dependence ([address] )    Other chronic pain    Atrial fibrillation with RVR ([facility])     Past Medical History:   Diagnosis Date    History of spinal fracture     Kyphosis     Loss of appetite     Loss of weight     Osteoarthritis     Osteoporosis        Allergies   Allergen Reactions   Roxann Glass [Aspirin]     Carbapenems     Cephalosporins     Penicillins        Current Facility-Administered Medications   Medication Dose Route Frequency Provider Last Rate Last Dose    acetaminophen (TYLENOL) tablet 650 mg  650 mg Oral Q6H PRN Linda Land MD   650 mg at 11/07/17 2341    albuterol inhalation solution 2 5 mg  2 5 mg Nebulization Q6H PRN Linda Land MD        alteplase (CATHFLO) 8 mg in sodium chloride 0 9 % 50 mL syringe  8 mg Chest Tube Once Kaykay Mac MD        calcium carbonate-vitamin D (OSCAL-D) 500 mg-200 units per tablet 1 tablet  1 tablet Oral BID With Meals Linda Land MD   1 tablet at 11/10/17 2058    cefTRIAXone (ROCEPHIN) 2,000 mg in dextrose 5 % 50 mL IVPB  2,000 mg Intravenous Q24H Chetan Moraes  mL/hr at 11/10/17 1545 2,000 mg at 11/10/17 1545    clobetasol (TEMOVATE) 1 06 % cream 1 application  1 application Topical Z82M [facility] Linda Land MD   1 application at 30/80/47 0848    diazepam (VALIUM) tablet 5 mg  5 mg Oral HS José Miguel Gomez MD   5 mg at 11/10/17 2249    diltiazem (CARDIZEM) tablet 60 mg  60 mg Oral Q6H [address], MD   60 mg at 11/11/17 0544    docusate sodium (COLACE) capsule 200 mg  200 mg Oral BID Linda Land MD   200 mg at 11/10/17 2058    enoxaparin (LOVENOX) subcutaneous injection 40 mg  40 mg Subcutaneous Daily Linda Land MD   40 mg at 11/10/17 0845    fentaNYL (DURAGESIC) 50 mcg/hr TD 72 hr patch 1 patch  1 patch Transdermal Q48H Linda Land MD   1 patch at 11/09/17 0904    guaiFENesin (MUCINEX) 12 hr tablet 600 mg  600 mg Oral Q12H [facility] Josefa Epps DO   600 mg at 11/10/17 2249    HYDROcodone-acetaminophen (NORCO) 5-325 mg per tablet 1 tablet  1 tablet Oral Q6H PRN Linda Land MD   1 tablet at 11/11/17 0020    HYDROmorphone (DILAUDID) 1 mg/mL injection 1 mg  1 mg Intravenous Q4H PRN Arnie Bower MD   1 mg at 11/11/17 0545    HYDROmorphone (DILAUDID) 1 mg/mL injection 1 mg  1 mg Intravenous Once Josefa Epps DO        metoprolol tartrate (LOPRESSOR) tablet 50 mg  50 mg Oral Q12H Mayur Castro MD   50 mg at 11/10/17 2248    metroNIDAZOLE (FLAGYL) tablet 500 mg  500 mg Oral [facility] Karin Rawls MD   500 mg at 11/11/17 0544    polyethylene glycol (MIRALAX) packet 17 g  17 g Oral Daily PRN Karin Rawls MD        sodium chloride (PF) 0 9 % injection 3 mL  3 mL Intravenous PRN Karin Rawls MD        tamsulosin (FLOMAX) capsule 0 4 mg  0 4 mg Oral Daily With Abel Perez MD   0 4 mg at 11/10/17 2057       Vitals: /69 Comment: Map 88  Pulse 83   Temp 97 8 °F (36 6 °C) (Oral)   Resp 18   Ht 5' 8" (1 727 m)   Wt 58 8 kg (129 lb 10 1 oz)   SpO2 97%   BMI 03 72 kg/m²     Systolic (93LYQ), INI:860 , Min:96 , MNY:757     Diastolic (50ORG), JJJ:79, Min:54, Max:76      Vitals:    11/09/17 0600 11/11/17 0600   Weight: 59 5 kg (131 lb 2 8 oz) 58 8 kg (129 lb 10 1 oz)     Orthostatic Blood Pressures    Flowsheet Row Most Recent Value   Blood Pressure  119/69 [Map 88] filed at 11/11/2017 0702   Patient Position - Orthostatic VS  Lying filed at 11/11/2017 2883            Intake/Output Summary (Last 24 hours) at 11/11/17 0916  Last data filed at 11/11/17 0600   Gross per 24 hour   Intake              950 ml   Output             1715 ml   Net             -765 ml       Invasive Devices     Peripheral Intravenous Line            Peripheral IV 11/09/17 Right Forearm 1 day          Drain            Chest Tube 1 Left Pleural 14 Fr  less than 1 day                  Physical Exam:     GEN: Awake and alert, in no acute distress  Cachectic  HEENT: Sclera anicteric, conjunctivae pink, mucous membranes moist   NECK: Supple, no carotid bruits, no significant JVD  HEART: Regular rhythm, normal S1 and S2, no murmurs, clicks, gallops or rubs  LUNGS: decreased on left, no wheezes, rales, or rhonchi   ABDOMEN: Soft, nontender, nondistended, normoactive bowel sounds     EXTREMITIES: Skin warm and well perfused, no clubbing, cyanosis, or edema  Chronic venous stasis changes bilaterally  NEURO: No focal findings  SKIN: Normal without suspicious lesions on exposed skin        Lab Results:     Troponins:     Results from last 7 days  Lab Units 11/06/17  1037   TROPONIN I ng/mL <0 02       CBC with diff:     Results from last 7 days  Lab Units 11/11/17  0645 11/10/17  0523 11/09/17  0443 11/08/17  0435 11/07/17  0513 11/06/17  1043 11/06/17  1037   WBC Thousand/uL 32 21* 35 00* 24 81* 27 84* 26 92*  --  36 62*   HEMOGLOBIN g/dL 12 9 12 6 10 2* 12 2 12 5  --  12 9   I STAT HEMOGLOBIN g/dl  --   --   --   --   --  13 9  --    HEMATOCRIT % 36 2* 35 1* 29 6* 35 6* 37 5  --  38 4   MCV fL 84 85 86 87 89  --  89   PLATELETS Thousands/uL 732* 637* 533* 540* 556*  --  597*   MCH pg 30 1 30 4 29 5 29 8 29 6  --  30 0   MCHC g/dL 35 6 35 9 34 5 34 3 33 3  --  33 6   RDW % 14 0 13 8 13 8 13 7 13 9  --  13 9   MPV fL 8 3* 8 3* 8 5* 8 7* 8 7*  --  8 3*   NRBC AUTO /100 WBCs 0 0 0  --   --   --   --        CMP:    Results from last 7 days  Lab Units 11/11/17  0645 11/08/17  0435 11/07/17  0513 11/06/17  1818 11/06/17  1043 11/06/17  1037   SODIUM mmol/L 134* 135* 136  --   --  135*   POTASSIUM mmol/L 4 4 3 6 4 3  --   --  4 3   CHLORIDE mmol/L 101 102 101  --   --  96*   CO2 mmol/L 25 26 27  --   --  27   ANION GAP mmol/L 8 7 8  --   --  12   BUN mg/dL 23 17 17  --   --  24   CREATININE mg/dL 0 67 0 57* 0 75  --   --  1 05   GLUCOSE RANDOM mg/dL 116 175* 115  --   --  139   GLUCOSE, ISTAT mg/dl  --   --   --   --  144*  --    CALCIUM mg/dL 8 7 8 3 9 3  --   --  9 5   AST U/L  --  9 11  --   --  19   ALT U/L  --  14 17  --   --  23   ALK PHOS U/L  --  72 82  --   --  113   TOTAL PROTEIN g/dL  --  6 0* 6 9 6 8  --  7 9   ALBUMIN g/dL  --  1 8* 2 3*  --   --  3 2*   BILIRUBIN TOTAL mg/dL  --  0 51 0 80  --   --  0 80   EGFR ml/min/1 73sq m 103 110 98  --  91 76       Magnesium:     Results from last 7 days  Lab Units 11/11/17  0645 11/06/17  1037   MAGNESIUM mg/dL 2 1 2 3       Coags:     Results from last 7 days  Lab Units 17  1037   PTT seconds 37*   INR  1 22*         Cardiac testing:   Results for orders placed during the hospital encounter of 17   Echo complete with contrast if indicated    Narrative Ngozi Bales, [address]  [phone]    Transthoracic Echocardiogram  2D, M-mode, Doppler, and Color Doppler    Study date:  2017    Patient: Mikael De La Torre  MR number: [id number]  Account number: [de-identified]  : 1955  Age: 57 years  Gender: Male  Status: Inpatient  Location: Bedside  Height: 69 in  Weight: 215 lb  BP: 156/ 95 mmHg    Indications: SOB  Diagnoses: R06 02 - Shortness of breath    Sonographer:  Kj HERNANDEZ, [facility]  Primary Physician:  David La MD  Referring Physician:  Cornelio José DO  Group:  [facility]  Interpreting Physician:  Merlin Rhoades MD    SUMMARY    LEFT VENTRICLE:  Systolic function was normal by visual assessment  Ejection fraction was estimated to be 60 %  There were no regional wall motion abnormalities  Doppler parameters were consistent with abnormal left ventricular relaxation (grade 1 diastolic dysfunction)  MITRAL VALVE:  There was mild regurgitation  TRICUSPID VALVE:  There was mild regurgitation  AORTA:  The root exhibited mild dilatation  HISTORY: PRIOR HISTORY: Pneumonia, Sepsis  Risk factors: hypertension and hypercholesterolemia  PROCEDURE: The procedure was performed at the bedside  This was a routine study  The transthoracic approach was used  The study included complete 2D imaging, M-mode, complete spectral Doppler, and color Doppler  Images were obtained from  the parasternal, apical, subcostal, and suprasternal notch acoustic windows  Echocardiographic views were limited  Image quality was adequate  LEFT VENTRICLE: Size was normal  Systolic function was normal by visual assessment   Ejection fraction was estimated to be 60 %  There were no regional wall motion abnormalities  Wall thickness was normal  DOPPLER: There was an increased  relative contribution of atrial contraction to ventricular filling  Doppler parameters were consistent with abnormal left ventricular relaxation (grade 1 diastolic dysfunction)  RIGHT VENTRICLE: The size was normal  Systolic function was normal  DOPPLER: Systolic pressure was within the normal range  Estimated peak pressure was 25 mmHg  LEFT ATRIUM: Size was normal     RIGHT ATRIUM: Size was normal     MITRAL VALVE: Valve structure was normal  There was normal leaflet separation  DOPPLER: The transmitral velocity was within the normal range  There was no evidence for stenosis  There was mild regurgitation  AORTIC VALVE: The valve was trileaflet  Leaflets exhibited normal thickness and normal cuspal separation  DOPPLER: Transaortic velocity was within the normal range  There was no evidence for stenosis  There was no regurgitation  TRICUSPID VALVE: The valve structure was normal  There was normal leaflet separation  DOPPLER: The transtricuspid velocity was within the normal range  There was no evidence for stenosis  There was mild regurgitation  PULMONIC VALVE: Leaflets exhibited normal thickness, no calcification, and normal cuspal separation  DOPPLER: The transpulmonic velocity was within the normal range  There was no regurgitation  PERICARDIUM: There was no pericardial effusion  AORTA: The root exhibited mild dilatation  SYSTEMIC VEINS: IVC: The inferior vena cava was normal in size and course   Respirophasic changes were normal     SYSTEM MEASUREMENT TABLES    2D  %FS: 39 73 %  Ao Diam: 3 73 cm  EDV(Teich): 82 91 ml  EF(Teich): 70 61 %  ESV(Cube): 17 37 ml  ESV(Teich): 24 36 ml  IVSd: 0 98 cm  LA Area: 22 11 cm2  LA Diam: 4 49 cm  LVEDV MOD A4C: 92 99 ml  LVEF MOD A4C: 62 71 %  LVESV MOD A4C: 34 67 ml  LVIDd: 4 3 cm  LVIDs: 2 59 cm  LVLd A4C: 8 15 cm  LVLs A4C: 6 49 cm  LVOT Diam: 2 29 cm  LVPWd: 1 05 cm  RA Area: 13 71 cm2  RV Diam : 4 6 cm  SV MOD A4C: 58 32 ml  SV(Cube): 61 94 ml  SV(Teich): 58 54 ml    CW  TR Vmax: 2 48 m/s  TR maxP 61 mmHg    MM  TAPSE: 1 87 cm    PW  AVC: 270 61 ms  E': 0 13 m/s  E/E': 5 78  MV A Micah: 0 95 m/s  MV Dec Rosebud: 3 53 m/s2  MV DecT: 212 23 ms  MV E Micah: 0 75 m/s  MV E/A Ratio: 0 79    [facility]    Prepared and electronically signed by    Saran Martin MD  Signed [id number] 15:46:59         Imaging: I have personally reviewed pertinent reports  Telemetry:  Personally reviewed, SR, frequent PACs

## 2017-11-11 NOTE — PROGRESS NOTES
[facility] Progress Note  Patient: Guanaco Brantley 57 y o  male   MRN: [id number]  PCP: Mata Cruz MD  Unit/Bed#: [facility] 420-01 Encounter: [id number]  Date Of Visit: 11/11/17    Assessment:    Principal Problem:    Loculated pleural effusion  Active Problems:    Pneumonia    Sepsis ([facility] )    Pedal edema    Osteoporosis    Kyphosis    Leukocytosis    Urinary retention    Severe protein-calorie malnutrition (HCC)    Continuous opioid dependence ([facility] )    Other chronic pain    Atrial fibrillation with RVR ([facility] )      Plan:    1  Left-sided pneumonia with loculated parapneumonic effusion and persistent effusion, s/p upsizing chest tube yesterday by IR ,Thoracic surgery and pulmonology are following, may require VATS- on IV ceftriaxone and Flagyl by ID  2  Leukocytosis  secondary to above- continue to monitor  3  Osteoporosis/severe kyphosis with compression deformities and  chronic pain, on continuous opiate dependence- outpatient Endocrine follow-up- continue calcium supplement  4  Severe protein calorie malnutrition-continue with nutrition supplement  5  New onset AFib -with intermittent tachycardia,  cardiology is following-on diltiazem and Lopressor, no need for AC  6  Hypophosphatemia replace    PT recommending rehab when stable    VTE Pharmacologic Prophylaxis:   Pharmacologic: Enoxaparin (Lovenox)  Mechanical VTE Prophylaxis in Place: Yes    Patient Centered Rounds: I have performed bedside rounds with nursing staff today  Discussions with Specialists or Other Care Team Provider:     Education and Discussions with Family / Patient:  Patient    Time Spent for Care: 30 minutes  More than 50% of total time spent on counseling and coordination of care as described above      Current Length of Stay: 4 day(s)    Current Patient Status: Inpatient   Certification Statement: The patient will continue to require additional inpatient hospital stay due to Management of pneumonia    Discharge Plan / Estimated Discharge Date: not ready yet    Code Status: Level 1 - Full Code      Subjective:   Patient seen and examined  Patient feels better   clinically improving  Continue to have shortness of breath and intermittent cough  No nausea vomiting or diarrhea    Objective:     Vitals:   Temp (24hrs), Av 2 °F (36 8 °C), Min:97 7 °F (36 5 °C), Max:98 9 °F (37 2 °C)    HR:  [77-92] 83  Resp:  [14-20] 18  BP: ()/(54-76) 119/69  SpO2:  [93 %-100 %] 97 %  Body mass index is 19 71 kg/m²  Input and Output Summary (last 24 hours): Intake/Output Summary (Last 24 hours) at 17 0936  Last data filed at 17 0600   Gross per 24 hour   Intake              950 ml   Output             1715 ml   Net             -765 ml       Physical Exam:     Physical Exam  Patient is awake alert with mild dyspnea  Cachectic  Lung with decreased breath sounds bilateral- left-sided chest tube in place  Heart positive S1-S2 no murmur  Back with severe kyphosis  Abdomen soft nontender positive bowel sounds  Lower extremity chronic venous stasis bilateral    Additional Data:     Labs:      Results from last 7 days  Lab Units 17  0645   WBC Thousand/uL 32 21*   HEMOGLOBIN g/dL 12 9   HEMATOCRIT % 36 2*   PLATELETS Thousands/uL 732*   NEUTROS PCT % 83*   LYMPHS PCT % 9*   MONOS PCT % 8   EOS PCT % 0       Results from last 7 days  Lab Units 17  0645 17  0435   SODIUM mmol/L 134* 135*   POTASSIUM mmol/L 4 4 3 6   CHLORIDE mmol/L 101 102   CO2 mmol/L 25 26   BUN mg/dL 23 17   CREATININE mg/dL 0 67 0 57*   CALCIUM mg/dL 8 7 8 3   TOTAL PROTEIN g/dL  --  6 0*   BILIRUBIN TOTAL mg/dL  --  0 51   ALK PHOS U/L  --  72   ALT U/L  --  14   AST U/L  --  9   GLUCOSE RANDOM mg/dL 116 175*       Results from last 7 days  Lab Units 17  1037   INR  1 22*       * I Have Reviewed All Lab Data Listed Above  * Additional Pertinent Lab Tests Reviewed:  [facility] Admission Reviewed    Imaging:    Imaging Reports Reviewed Today Include:   Imaging Personally Reviewed by Myself Includes:      Recent Cultures (last 7 days):       Results from last 7 days  Lab Units 11/07/17  0753 11/06/17  1803 11/06/17  1528 11/06/17  1311 11/06/17  1038 11/06/17  1037   BLOOD CULTURE   --   --   --   --  No Growth After 4 Days  No Growth After 4 Days  SPUTUM CULTURE   --   --   --  2+ Growth of   --   --    GRAM STAIN RESULT   --   --  2+ Polys  No bacteria seen Rare Epithelial cells per low power field  1+ Polys  2+ Gram positive cocci in pairs  2+ Gram negative rods  --   --    BODY FLUID CULTURE, STERILE   --   --  No growth  --   --   --    INFLUENZA A PCR  None Detected  --   --   --   --   --    INFLUENZA B PCR  None Detected  --   --   --   --   --    RSV PCR  None Detected  --   --   --   --   --    LEGIONELLA URINARY ANTIGEN   --  Negative  --   --   --   --        Last 24 Hours Medication List:     alteplase (ACTIVASE) 8 mg in 0 9% NaCl 50 mL 8 mg Chest Tube Once   calcium carbonate-vitamin D 1 tablet Oral BID With Meals   cefTRIAXone 2,000 mg Intravenous Q24H   clobetasol 1 application Topical F[address]   diazepam 5 mg Oral HS   [START ON 11/12/2017] diltiazem 240 mg Oral Daily   diltiazem 60 mg Oral Q6H MIC[place]   docusate sodium 200 mg Oral BID   enoxaparin 40 mg Subcutaneous Daily   fentaNYL 1 patch Transdermal Q48H   guaiFENesin 600 mg Oral Q12H [place]   HYDROmorphone 1 mg Intravenous Once   metoprolol tartrate 50 mg Oral Q12H [place]   metroNIDAZOLE 500 mg Oral Q8H [address]   tamsulosin 0 4 mg Oral Daily With Dinner        Today, Patient Was Seen By: Bety Carey DO    ** Please Note: This note has been constructed using a voice recognition system   **

## 2017-11-11 NOTE — PROGRESS NOTES
Progress Note - Thoracic Surgery   Andre Finney 57 y o  male MRN: [id number]  Unit/Bed#: [facility] 420-01 Encounter: [id number]    Assessment:  60y o  M with a loculated left pleural effusion s/p L pigtail CT 11/6 s/p TPA 11/7 and 11/8, 11/9  CT upsized 11/10     - CT output 170 cc, no AL    Plan:  Continue CT to water seal  Check CXR this AM (pa/lateral)  PRN pain control  OOB in chair, pulm toilet, IS  Rocephin/Flagyl for pneumonia per primary  Sputum cx showing mixed arthur- will f/u final cx  Trend WBC  Will likely require VATS- will discuss w/ attending    Subjective/Objective   Chief Complaint: Pain    Subjective: Is s/p CT upsizing and tPA infusion to CT yesterday, complains of significant pain since procedure, feels disheartened and feels that this is a set back  CT put out 170 after tPA infusion  Objective:     Blood pressure 113/69, pulse 77, temperature 97 7 °F (36 5 °C), temperature source Axillary, resp  rate 17, height 5' 8" (1 727 m), weight 58 8 kg (129 lb 10 1 oz), SpO2 97 %  ,Body mass index is 19 71 kg/m²  Intake/Output Summary (Last 24 hours) at 11/11/17 0659  Last data filed at 11/11/17 9847   Gross per 24 hour   Intake              950 ml   Output             2085 ml   Net            -1135 ml       Invasive Devices     Peripheral Intravenous Line            Peripheral IV 11/09/17 Right Forearm 1 day          Drain            Chest Tube 1 Left Pleural 14 Fr  less than 1 day                Physical Exam:   Gen: A&O, NAD  Cardio: RRR  Lungs: Decreased b/l   Left CT to suction, no AL  Abd: Soft, non distended, non tender      Lab, Imaging and other studies:CBC: No results found for: WBC, HGB, HCT, MCV, PLT, ADJUSTEDWBC, MCH, MCHC, RDW, MPV, NRBC, CMP: No results found for: NA, K, CL, CO2, ANIONGAP, BUN, CREATININE, GLUCOSE, CALCIUM, AST, ALT, ALKPHOS, PROT, ALBUMIN, BILITOT, EGFR  VTE Pharmacologic Prophylaxis: Enoxaparin (Lovenox)  VTE Mechanical Prophylaxis: sequential compression device

## 2017-11-11 NOTE — PROGRESS NOTES
Pt  Now in rapid a-fib  Pt  Offers no symptoms at the time  Dr Linda Darnell aware, was advised to let cardiology know  Page sent to Dr Siria Parsons

## 2017-11-12 LAB
ANION GAP SERPL CALCULATED.3IONS-SCNC: 6 MMOL/L (ref 4–13)
BUN SERPL-MCNC: 22 MG/DL (ref 5–25)
CALCIUM SERPL-MCNC: 7.8 MG/DL (ref 8.3–10.1)
CHLORIDE SERPL-SCNC: 102 MMOL/L (ref 100–108)
CO2 SERPL-SCNC: 28 MMOL/L (ref 21–32)
CREAT SERPL-MCNC: 0.49 MG/DL (ref 0.6–1.3)
ERYTHROCYTE [DISTWIDTH] IN BLOOD BY AUTOMATED COUNT: 13.9 % (ref 11.6–15.1)
GFR SERPL CREATININE-BSD FRML MDRD: 117 ML/MIN/1.73SQ M
GLUCOSE SERPL-MCNC: 88 MG/DL (ref 65–140)
HCT VFR BLD AUTO: 32.4 % (ref 36.5–49.3)
HGB BLD-MCNC: 11.4 G/DL (ref 12–17)
MCH RBC QN AUTO: 29.7 PG (ref 26.8–34.3)
MCHC RBC AUTO-ENTMCNC: 35.2 G/DL (ref 31.4–37.4)
MCV RBC AUTO: 84 FL (ref 82–98)
PLATELET # BLD AUTO: 675 THOUSANDS/UL (ref 149–390)
PMV BLD AUTO: 8.2 FL (ref 8.9–12.7)
POTASSIUM SERPL-SCNC: 4 MMOL/L (ref 3.5–5.3)
RBC # BLD AUTO: 3.84 MILLION/UL (ref 3.88–5.62)
SODIUM SERPL-SCNC: 136 MMOL/L (ref 136–145)
WBC # BLD AUTO: 20.26 THOUSAND/UL (ref 4.31–10.16)

## 2017-11-12 PROCEDURE — 85027 COMPLETE CBC AUTOMATED: CPT | Performed by: INTERNAL MEDICINE

## 2017-11-12 PROCEDURE — 80048 BASIC METABOLIC PNL TOTAL CA: CPT | Performed by: INTERNAL MEDICINE

## 2017-11-12 PROCEDURE — 94760 N-INVAS EAR/PLS OXIMETRY 1: CPT

## 2017-11-12 RX ADMIN — HYDROMORPHONE HYDROCHLORIDE 1 MG: 1 INJECTION, SOLUTION INTRAMUSCULAR; INTRAVENOUS; SUBCUTANEOUS at 15:34

## 2017-11-12 RX ADMIN — METRONIDAZOLE 500 MG: 500 TABLET ORAL at 06:30

## 2017-11-12 RX ADMIN — DIBASIC SODIUM PHOSPHATE, MONOBASIC POTASSIUM PHOSPHATE AND MONOBASIC SODIUM PHOSPHATE 1 TABLET: 852; 155; 130 TABLET ORAL at 17:04

## 2017-11-12 RX ADMIN — ENOXAPARIN SODIUM 40 MG: 40 INJECTION SUBCUTANEOUS at 09:32

## 2017-11-12 RX ADMIN — HYDROMORPHONE HYDROCHLORIDE 1 MG: 1 INJECTION, SOLUTION INTRAMUSCULAR; INTRAVENOUS; SUBCUTANEOUS at 02:46

## 2017-11-12 RX ADMIN — DOCUSATE SODIUM 200 MG: 100 CAPSULE, LIQUID FILLED ORAL at 09:32

## 2017-11-12 RX ADMIN — AMIODARONE HYDROCHLORIDE 200 MG: 200 TABLET ORAL at 09:32

## 2017-11-12 RX ADMIN — HYDROMORPHONE HYDROCHLORIDE 0.2 MG: 1 INJECTION, SOLUTION INTRAMUSCULAR; INTRAVENOUS; SUBCUTANEOUS at 20:38

## 2017-11-12 RX ADMIN — TAMSULOSIN HYDROCHLORIDE 0.4 MG: 0.4 CAPSULE ORAL at 17:04

## 2017-11-12 RX ADMIN — DIBASIC SODIUM PHOSPHATE, MONOBASIC POTASSIUM PHOSPHATE AND MONOBASIC SODIUM PHOSPHATE 1 TABLET: 852; 155; 130 TABLET ORAL at 09:31

## 2017-11-12 RX ADMIN — DOCUSATE SODIUM 200 MG: 100 CAPSULE, LIQUID FILLED ORAL at 17:04

## 2017-11-12 RX ADMIN — CLOBETASOL PROPIONATE 1 APPLICATION: 0.5 CREAM TOPICAL at 20:57

## 2017-11-12 RX ADMIN — METRONIDAZOLE 500 MG: 500 TABLET ORAL at 21:00

## 2017-11-12 RX ADMIN — HYDROMORPHONE HYDROCHLORIDE 1 MG: 1 INJECTION, SOLUTION INTRAMUSCULAR; INTRAVENOUS; SUBCUTANEOUS at 09:57

## 2017-11-12 RX ADMIN — METOPROLOL TARTRATE 50 MG: 50 TABLET ORAL at 09:32

## 2017-11-12 RX ADMIN — GUAIFENESIN 600 MG: 600 TABLET, EXTENDED RELEASE ORAL at 09:32

## 2017-11-12 RX ADMIN — DIAZEPAM 5 MG: 5 TABLET ORAL at 23:07

## 2017-11-12 RX ADMIN — HYDROMORPHONE HYDROCHLORIDE 1 MG: 1 INJECTION, SOLUTION INTRAMUSCULAR; INTRAVENOUS; SUBCUTANEOUS at 19:37

## 2017-11-12 RX ADMIN — AMIODARONE HYDROCHLORIDE 200 MG: 200 TABLET ORAL at 12:33

## 2017-11-12 RX ADMIN — METOPROLOL TARTRATE 50 MG: 50 TABLET ORAL at 20:56

## 2017-11-12 RX ADMIN — AMIODARONE HYDROCHLORIDE 200 MG: 200 TABLET ORAL at 17:04

## 2017-11-12 RX ADMIN — CEFTRIAXONE SODIUM 2000 MG: 10 INJECTION, POWDER, FOR SOLUTION INTRAVENOUS at 15:38

## 2017-11-12 RX ADMIN — GUAIFENESIN 600 MG: 600 TABLET, EXTENDED RELEASE ORAL at 20:39

## 2017-11-12 RX ADMIN — METRONIDAZOLE 500 MG: 500 TABLET ORAL at 14:12

## 2017-11-12 RX ADMIN — CALCIUM CARBONATE 500 MG (1,250 MG)-VITAMIN D3 200 UNIT TABLET 1 TABLET: at 17:04

## 2017-11-12 RX ADMIN — CALCIUM CARBONATE 500 MG (1,250 MG)-VITAMIN D3 200 UNIT TABLET 1 TABLET: at 09:32

## 2017-11-12 RX ADMIN — DILTIAZEM HYDROCHLORIDE 240 MG: 240 CAPSULE, COATED, EXTENDED RELEASE ORAL at 09:31

## 2017-11-12 RX ADMIN — HYDROMORPHONE HYDROCHLORIDE 0.8 MG: 1 INJECTION, SOLUTION INTRAMUSCULAR; INTRAVENOUS; SUBCUTANEOUS at 20:38

## 2017-11-12 RX ADMIN — CLOBETASOL PROPIONATE 1 APPLICATION: 0.5 CREAM TOPICAL at 09:31

## 2017-11-12 NOTE — PROGRESS NOTES
[facility] Internal Medicine Progress Note  Patient: Leo Pozo 57 y o  male   MRN: [id number]  PCP: Smooth Lopez MD  Unit/Bed#: [facility] 420-01 Encounter: [id number]  Date Of Visit: 11/12/17    Assessment:    Principal Problem:    Loculated pleural effusion  Active Problems:    Pneumonia    Pedal edema    Osteoporosis    Kyphosis    Leukocytosis    Severe protein-calorie malnutrition (HCC)    Continuous opioid dependence ([facility] [address] )    Other chronic pain    At[facility] with [facility] ([facility] )      Plan:    1  Left-sided pneumonia with persistent loculated parapneumonic effusion , s/p upsizing chest tube, per thoracic surgery, patient will require VATS next week, continue on IV ceftriaxone and Flagyl by ID  2  Leukocytosis  secondary to above- improving   3  Osteoporosis/severe kyphosis with compression deformities and  chronic pain, on continuous opiate dependence- outpatient Endocrine follow-up- continue calcium supplement  4  Severe protein calorie malnutrition-continue with nutrition supplement  5  New onset AFib -with intermittent tachycardia,  cardiology is following-on amiodarone, diltiazem and Lopressor, no need for [facility]       rehab when stable    VTE Pharmacologic Prophylaxis:   Pharmacologic: Enoxaparin (Lovenox)  Mechanical VTE Prophylaxis in Place: Yes    Patient Centered Rounds: I have performed bedside rounds with nursing staff today  Discussions with Specialists or Other Care Team Provider:  Thoracic surgery    Education and Discussions with Family / Patient:  Patient    Time Spent for Care: 30 minutes  More than 50% of total time spent on counseling and coordination of care as described above      Current Length of Stay: 5 day(s)    Current Patient Status: Inpatient   Certification Statement: The patient will continue to require additional inpatient hospital stay due to Management of pneumonia    Discharge Plan / Estimated Discharge Date: not ready yet    Code Status: Level 1 - Full Code      Subjective:   Patient seen and examined  Patient feels better   clinically improving  No nausea vomiting   Positive bowel movement today    Objective:     Vitals:   Temp (24hrs), Av 8 °F (36 6 °C), Min:97 6 °F (36 4 °C), Max:97 9 °F (36 6 °C)    HR:  [] 80  Resp:  [18-20] 18  BP: (106-139)/(66-77) 108/71  SpO2:  [96 %-98 %] 97 %  Body mass index is 20 28 kg/m²  Input and Output Summary (last 24 hours): Intake/Output Summary (Last 24 hours) at 17 0945  Last data filed at 17 0938   Gross per 24 hour   Intake              400 ml   Output              980 ml   Net             -580 ml       Physical Exam:     Physical Exam  Patient is awake alert with mild dyspnea  Cachectic  Lung with decreased breath sounds bilateral- left-sided chest tube in place  Heart positive S1-S2 no murmur  Back with severe kyphosis  Abdomen soft nontender positive bowel sounds  Lower extremity chronic venous stasis bilateral    Additional Data:     Labs:      Results from last 7 days  Lab Units 17  0448 17  0645   WBC Thousand/uL 20 26* 32 21*   HEMOGLOBIN g/dL 11 4* 12 9   HEMATOCRIT % 32 4* 36 2*   PLATELETS Thousands/uL 675* 732*   NEUTROS PCT %  --  83*   LYMPHS PCT %  --  9*   MONOS PCT %  --  8   EOS PCT %  --  0       Results from last 7 days  Lab Units 17  0448  17  0435   SODIUM mmol/L 136  < > 135*   POTASSIUM mmol/L 4 0  < > 3 6   CHLORIDE mmol/L 102  < > 102   CO2 mmol/L 28  < > 26   BUN mg/dL 22  < > 17   CREATININE mg/dL 0 49*  < > 0 57*   CALCIUM mg/dL 7 8*  < > 8 3   TOTAL PROTEIN g/dL  --   --  6 0*   BILIRUBIN TOTAL mg/dL  --   --  0 51   ALK PHOS U/L  --   --  72   ALT U/L  --   --  14   AST U/L  --   --  9   GLUCOSE RANDOM mg/dL 88  < > 175*   < > = values in this interval not displayed  Results from last 7 days  Lab Units 17  1037   INR  1 22*       * I Have Reviewed All Lab Data Listed Above  * Additional Pertinent Lab Tests Reviewed:  All Labs For Current Hospital Admission Reviewed    Imaging:    Imaging Reports Reviewed Today Include:   Imaging Personally Reviewed by Myself Includes:      Recent Cultures (last 7 days):       Results from last 7 days  Lab Units 11/07/17  0753 11/06/17  1803 11/06/17  1528 11/06/17  1311 11/06/17  1038 11/06/17  1037   BLOOD CULTURE   --   --   --   --  No Growth After 5 Days  No Growth After 5 Days  SPUTUM CULTURE   --   --   --  2+ Growth of   --   --    GRAM STAIN RESULT   --   --  2+ Polys  No bacteria seen Rare Epithelial cells per low power field  1+ Polys  2+ Gram positive cocci in pairs  2+ Gram negative rods  --   --    BODY FLUID CULTURE, STERILE   --   --  No growth  --   --   --    INFLUENZA A PCR  None Detected  --   --   --   --   --    INFLUENZA B PCR  None Detected  --   --   --   --   --    RSV PCR  None Detected  --   --   --   --   --    LEGIONELLA URINARY ANTIGEN   --  Negative  --   --   --   --        Last 24 Hours Medication List:     alteplase (ACTIVASE) 8 mg in 0 9% NaCl 50 mL 8 mg Chest Tube Once   amiodarone 200 mg Oral TID With Meals   calcium carbonate-vitamin D 1 tablet Oral BID With Meals   cefTRIAXone 2,000 mg Intravenous Q24H   clobetasol 1 application Topical Y61Z MICHELET   diazepam 5 mg Oral HS   diltiazem 240 mg Oral Daily   docusate sodium 200 mg Oral BID   enoxaparin 40 mg Subcutaneous Daily   fentaNYL 1 patch Transdermal Q48H   guaiFENesin 600 mg Oral Q12H MICHELET   HYDROmorphone 1 mg Intravenous Once   metoprolol tartrate 50 mg Oral Q12H MICHELET   metroNIDAZOLE 500 mg Oral Q8H [address]   potassium-sodium phosphateS 1 tablet Oral BID With Meals   tamsulosin 0 4 mg Oral Daily With Dinner        Today, Patient Was Seen By: Otilia Max DO    ** Please Note: This note has been constructed using a voice recognition system   **

## 2017-11-12 NOTE — PROGRESS NOTES
Progress Note - Thoracic Surgery   Richardson Hickman 57 y o  male MRN: [id number]  Unit/Bed#: [facility] 420-01 Encounter: [id number]    Assessment:  60y o  M with a loculated left pleural effusion s/p L pigtail CT 11/6 s/p TPA 11/7 and 11/8, 11/9  CT upsized 11/10   - CT output 330, no air leak    Plan:  - continue antibiotics  - OOB, ambulate  - may need VATS this week  - possible drainage of R pleural effusion  - wean oxygen as tolerated    Subjective/Objective     Subjective: Patient has concerns about possible surgery  Reports poor cough, SOB  Objective:     Vitals: Blood pressure 108/71, pulse 80, temperature 97 9 °F (36 6 °C), temperature source Oral, resp  rate 18, height 5' 8" (1 727 m), weight 60 5 kg (133 lb 6 1 oz), SpO2 97 %  ,Body mass index is 20 28 kg/m²  I/O       11/10 0701 - 11/11 0700 11/11 0701 - 11/12 0700 11/12 0701 - 11/13 0700    P  O  950 400     I V  (mL/kg)       Total Intake(mL/kg) 950 (16 2) 400 (6 6)     Urine (mL/kg/hr) 425 (0 3) 450 (0 3)     Stool 0 (0) 0 (0)     Chest Tube 1820 (1 3) 330 (0 2)     Total Output 2245 780      Net -1295 -380             Unmeasured Urine Occurrence 2 x 1 x     Unmeasured Stool Occurrence 1 x 1 x           Physical Exam:  GEN: NAD  HEENT: MMM  CV: RRR  Lung: Normal effort  Ab: Soft, NT/ND  Extrem: No CCE  Neuro:  A+Ox3    Lab, Imaging and other studies:   CBC with diff:   Lab Results   Component Value Date    WBC 20 26 (H) 11/12/2017    HGB 11 4 (L) 11/12/2017    HCT 32 4 (L) 11/12/2017    MCV 84 11/12/2017     (H) 11/12/2017    MCH 29 7 11/12/2017    MCHC 35 2 11/12/2017    RDW 13 9 11/12/2017    MPV 8 2 (L) 11/12/2017   , BMP/CMP:   Lab Results   Component Value Date     11/12/2017    K 4 0 11/12/2017     11/12/2017    CO2 28 11/12/2017    ANIONGAP 6 11/12/2017    BUN 22 11/12/2017    CREATININE 0 49 (L) 11/12/2017    GLUCOSE 88 11/12/2017    CALCIUM 7 8 (L) 11/12/2017    EGFR 117 11/12/2017   , Magnesium: No components found for: MAG  VTE Pharmacologic Prophylaxis: Enoxaparin (Lovenox)  VTE Mechanical Prophylaxis: sequential compression device

## 2017-11-12 NOTE — PROGRESS NOTES
Progress Note - Cardiology   Maggie Nielsen 57 y o  male MRN: [id number]  Unit/Bed#: [facility] 420-01 Encounter: [id number]  11/12/17  9:20 AM        Assessment/Plan:  1  Paroxysmal afib with RVR  Completed IV load of amiodarone  Continue p o  Diltiazem 60 q6hrs and metoprolol 50 bid to help prevent recurrence of atrial fibrillation  Started Amiodarone orally 200 tid 11/11/17 due to recurrent afib  Transitioned to long acting formulation of Diltiazem for 11/12  As chads Vasc score is 0, he does not need to be on oral anticoagulation  Continue to monitor on telemetry  Subjective/Objective   Chief Complaint: No chief complaint on file  Subjective:  30-year-old man with a history of protein calorie malnutrition, severe kyphosis, compression fractures, and opioid dependence  He was admitted with shortness of breath and found to have a loculated left pleural effusion  He had a chest tube placed and had some issues with paroxysmal atrial fibrillation  He was given IV Amiodarone load, without continuation  However, he had another episode of afib 11/9 PM that lasted for approximately 2 5 hours  He reports his symptoms during this episode of afib were not as severe as last episode, was able to tolerate it well  Metoprolol was added to Diltiazem at this time  He had another episode of afib with RVR on 11/11 AM, converted to SR with 1 dose of IV lopressor  PO Amiodarone was added at this time to help prevent recurrences  Per Thoracic notes, will need decortication likely Wednesday  Attempted to see patient twice today, both times he was in the bathroom and would not let anyone examine him       Patient Active Problem List   Diagnosis    Pneumonia    Pleural disorder    Pedal edema    Osteoporosis    Kyphosis    Leukocytosis    Psoriasis    Hematuria    Loculated pleural effusion    Severe protein-calorie malnutrition (HCC)    Continuous opioid dependence (HCC)    Other chronic pain    Atrial fibrillation with RVR ([facility] [address] )     Past Medical History:   Diagnosis Date    History of spinal fracture     Kyphosis     Loss of appetite     Loss of weight     Osteoarthritis     Osteoporosis        Allergies   Allergen Reactions    Asa [Aspirin]     Carbapenems     Cephalosporins     Penicillins        Current Facility-Administered Medications   Medication Dose Route Frequency Provider Last Rate Last Dose    acetaminophen (TYLENOL) tablet 650 mg  650 mg Oral Q6H PRN Otilia Max DO        albuterol inhalation solution 2 5 mg  2 5 mg Nebulization Q6H PRN Lizet Javier MD        alteplase (CATHFLO) 8 mg in sodium chloride 0 9 % 50 mL syringe  8 mg Chest Tube Once Stacy Estrada MD        amiodarone tablet 200 mg  200 mg Oral TID With Meals Talon Mancia MD   200 mg at 11/11/17 1718    calcium carbonate-vitamin D (OSCAL-D) 500 mg-200 units per tablet 1 tablet  1 tablet Oral BID With Meals Lizet Javier MD   1 tablet at 11/11/17 1718    cefTRIAXone (ROCEPHIN) 2,000 mg in dextrose 5 % 50 mL IVPB  2,000 mg Intravenous Q24H Zakia Balbuena MD   Stopped at 11/11/17 1533    clobetasol (TEMOVATE) 3 01 % cream 1 application  1 application Topical Y34D [address] Lizet Javier MD   1 application at 44/24/95 2214    diazepam (VALIUM) tablet 5 mg  5 mg Oral HS Donna Pan MD   5 mg at 11/11/17 2210    diltiazem (CARDIZEM CD) 24 hr capsule 240 mg  240 mg Oral Daily Talon Mancia MD        docusate sodium (COLACE) capsule 200 mg  200 mg Oral BID Lizet Javier MD   200 mg at 11/11/17 1718    enoxaparin (LOVENOX) subcutaneous injection 40 mg  40 mg Subcutaneous Daily Lizet Javier MD   40 mg at 11/11/17 0928    fentaNYL (DURAGESIC) 50 mcg/hr TD 72 hr patch 1 patch  1 patch Transdermal Q48H Lizet Javier MD   1 patch at 11/11/17 0917    guaiFENesin (MUCINEX) 12 hr tablet 600 mg  600 mg Oral Q12H [address] Otilia Max DO   600 mg at 11/11/17 2210    HYDROcodone-acetaminophen (NORCO) 5-325 mg per tablet 1 tablet  1 tablet Oral Q6H PRN Karina Blotter, DO   1 tablet at 11/11/17 1924    HYDROmorphone (DILAUDID) 1 mg/mL injection 1 mg  1 mg Intravenous Once Karina Blotter, DO        HYDROmorphone (DILAUDID) 1 mg/mL injection 1 mg  1 mg Intravenous Q4H PRN Karina Blotter, DO   1 mg at 11/12/17 0246    metoprolol tartrate (LOPRESSOR) tablet 50 mg  50 mg Oral Q12H Talon Kinney MD   50 mg at 11/11/17 2210    metroNIDAZOLE (FLAGYL) tablet 500 mg  500 mg Oral [facility] Stephanie Beatty MD   500 mg at 11/12/17 0630    polyethylene glycol (MIRALAX) packet 17 g  17 g Oral Daily PRN Stephanie Beatty MD        potassium-sodium phosphateS (K-PHOS,PHOSPHA 250) -529 mg tablet 1 tablet  1 tablet Oral BID With Meals Karina Blotter, DO   1 tablet at 11/11/17 1718    sodium chloride (PF) 0 9 % injection 3 mL  3 mL Intravenous PRN Stephanie Beatty MD        tamsulosin (FLOMAX) capsule 0 4 mg  0 4 mg Oral Daily With Prateek Blunt MD   0 4 mg at 11/11/17 1718       Vitals: /71   Pulse 80   Temp 97 9 °F (36 6 °C) (Oral)   Resp 18   Ht 5' 8" (1 727 m)   Wt 60 5 kg (133 lb 6 1 oz)   SpO2 97%   BMI 10 28 kg/m²     Systolic (16YXQ), QXO:110 , Min:106 , BXO:477     Diastolic (88LLZ), XWM:71, Min:66, Max:77      Vitals:    11/11/17 0600 11/12/17 0300   Weight: 58 8 kg (129 lb 10 1 oz) 60 5 kg (133 lb 6 1 oz)     Orthostatic Blood Pressures    Flowsheet Row Most Recent Value   Blood Pressure  108/71 filed at 11/12/2017 4971   Patient Position - Orthostatic VS  Sitting filed at 11/12/2017 6819            Intake/Output Summary (Last 24 hours) at 11/12/17 0920  Last data filed at 11/12/17 5602   Gross per 24 hour   Intake              400 ml   Output              780 ml   Net             -380 ml       Invasive Devices     Peripheral Intravenous Line            Peripheral IV 11/09/17 Right Forearm 2 days          Drain            Chest Tube 1 Left Pleural 14 Fr  1 day                    Lab Results:     Troponins:     Results from last 7 days  Lab Units 11/06/17  1037   TROPONIN I ng/mL <0 02       CBC with diff:     Results from last 7 days  Lab Units 11/12/17  0448 11/11/17  0645 11/10/17  0523 11/09/17  0443 11/08/17  0435 11/07/17  0513 11/06/17  1043 11/06/17  1037   WBC Thousand/uL 20 26* 32 21* 35 00* 24 81* 27 84* 26 92*  --  36 62*   HEMOGLOBIN g/dL 11 4* 12 9 12 6 10 2* 12 2 12 5  --  12 9   I STAT HEMOGLOBIN g/dl  --   --   --   --   --   --  13 9  --    HEMATOCRIT % 32 4* 36 2* 35 1* 29 6* 35 6* 37 5  --  38 4   MCV fL 84 84 85 86 87 89  --  89   PLATELETS Thousands/uL 675* 732* 637* 533* 540* 556*  --  597*   MCH pg 29 7 30 1 30 4 29 5 29 8 29 6  --  30 0   MCHC g/dL 35 2 35 6 35 9 34 5 34 3 33 3  --  33 6   RDW % 13 9 14 0 13 8 13 8 13 7 13 9  --  13 9   MPV fL 8 2* 8 3* 8 3* 8 5* 8 7* 8 7*  --  8 3*   NRBC AUTO /100 WBCs  --  0 0 0  --   --   --   --        CMP:    Results from last 7 days  Lab Units 11/12/17 0448 11/11/17  0645 11/08/17  0435 11/07/17  0513 11/06/17  1818 11/06/17  1043 11/06/17  1037   SODIUM mmol/L 136 134* 135* 136  --   --  135*   POTASSIUM mmol/L 4 0 4 4 3 6 4 3  --   --  4 3   CHLORIDE mmol/L 102 101 102 101  --   --  96*   CO2 mmol/L 28 25 26 27  --   --  27   ANION GAP mmol/L 6 8 7 8  --   --  12   BUN mg/dL 22 23 17 17  --   --  24   CREATININE mg/dL 0 49* 0 67 0 57* 0 75  --   --  1 05   GLUCOSE RANDOM mg/dL 88 116 175* 115  --   --  139   GLUCOSE, ISTAT mg/dl  --   --   --   --   --  144*  --    CALCIUM mg/dL 7 8* 8 7 8 3 9 3  --   --  9 5   AST U/L  --   --  9 11  --   --  19   ALT U/L  --   --  14 17  --   --  23   ALK PHOS U/L  --   --  72 82  --   --  113   TOTAL PROTEIN g/dL  --   --  6 0* 6 9 6 8  --  7 9   ALBUMIN g/dL  --   --  1 8* 2 3*  --   --  3 2*   BILIRUBIN TOTAL mg/dL  --   --  0 51 0 80  --   --  0 80   EGFR ml/min/1 73sq m 117 103 110 98  --  91 76       Magnesium:     Results from last 7 days  Lab Units 11/11/17  0645 11/06/17  1037   MAGNESIUM mg/dL 2 1 2 3       Coags:     Results from last 7 days  Lab Units 17  1037   PTT seconds 37*   INR  1 22*         Cardiac testing:   Results for orders placed during the hospital encounter of 17   Echo complete with contrast if indicated    Narrative [place]  [facility], [address]  [phone]    Transthoracic Echocardiogram  2D, M-mode, Doppler, and Color Doppler    Study date:  2017    Patient: Tatyana Hicks  MR number: [id number]  Account number: [de-identified]  : 1955  Age: 57 years  Gender: Male  Status: Inpatient  Location: Bedside  Height: 69 in  Weight: 215 lb  BP: 156/ 95 mmHg    Indications: SOB  Diagnoses: R06 02 - Shortness of breath    Sonographer:  Laureen HERNANDEZ, [facility]  Primary Physician:  Rupali Sr MD  Referring Physician:  Yoshi Rodriguez DO  Group:  [facility]  Interpreting Physician:  Lucina Sal MD    SUMMARY    LEFT VENTRICLE:  Systolic function was normal by visual assessment  Ejection fraction was estimated to be 60 %  There were no regional wall motion abnormalities  Doppler parameters were consistent with abnormal left ventricular relaxation (grade 1 diastolic dysfunction)  MITRAL VALVE:  There was mild regurgitation  TRICUSPID VALVE:  There was mild regurgitation  AORTA:  The root exhibited mild dilatation  HISTORY: PRIOR HISTORY: Pneumonia, Sepsis  Risk factors: hypertension and hypercholesterolemia  PROCEDURE: The procedure was performed at the bedside  This was a routine study  The transthoracic approach was used  The study included complete 2D imaging, M-mode, complete spectral Doppler, and color Doppler  Images were obtained from  the parasternal, apical, subcostal, and suprasternal notch acoustic windows  Echocardiographic views were limited  Image quality was adequate  LEFT VENTRICLE: Size was normal  Systolic function was normal by visual assessment  Ejection fraction was estimated to be 60 %   There were no regional wall motion abnormalities  Wall thickness was normal  DOPPLER: There was an increased  relative contribution of atrial contraction to ventricular filling  Doppler parameters were consistent with abnormal left ventricular relaxation (grade 1 diastolic dysfunction)  RIGHT VENTRICLE: The size was normal  Systolic function was normal  DOPPLER: Systolic pressure was within the normal range  Estimated peak pressure was 25 mmHg  LEFT ATRIUM: Size was normal     RIGHT ATRIUM: Size was normal     MITRAL VALVE: Valve structure was normal  There was normal leaflet separation  DOPPLER: The transmitral velocity was within the normal range  There was no evidence for stenosis  There was mild regurgitation  AORTIC VALVE: The valve was trileaflet  Leaflets exhibited normal thickness and normal cuspal separation  DOPPLER: Transaortic velocity was within the normal range  There was no evidence for stenosis  There was no regurgitation  TRICUSPID VALVE: The valve structure was normal  There was normal leaflet separation  DOPPLER: The transtricuspid velocity was within the normal range  There was no evidence for stenosis  There was mild regurgitation  PULMONIC VALVE: Leaflets exhibited normal thickness, no calcification, and normal cuspal separation  DOPPLER: The transpulmonic velocity was within the normal range  There was no regurgitation  PERICARDIUM: There was no pericardial effusion  AORTA: The root exhibited mild dilatation  SYSTEMIC VEINS: IVC: The inferior vena cava was normal in size and course   Respirophasic changes were normal     SYSTEM MEASUREMENT TABLES    2D  %FS: 39 73 %  Ao Diam: 3 73 cm  EDV(Teich): 82 91 ml  EF(Teich): 70 61 %  ESV(Cube): 17 37 ml  ESV(Teich): 24 36 ml  IVSd: 0 98 cm  LA Area: 22 11 cm2  LA Diam: 4 49 cm  LVEDV MOD A4C: 92 99 ml  LVEF MOD A4C: 62 71 %  LVESV MOD A4C: 34 67 ml  LVIDd: 4 3 cm  LVIDs: 2 59 cm  LVLd A4C: 8 15 cm  LVLs A4C: 6 49 cm  LVOT Diam: 2 29 cm  LVPWd: 1 05 cm  RA Area: 13 71 cm2  RV Diam : 4 6 cm  SV MOD A4C: 58 32 ml  SV(Cube): 61 94 ml  SV(Teich): 58 54 ml    CW  TR Vmax: 2 48 m/s  TR maxP 61 mmHg    MM  TAPSE: 1 87 cm    PW  AVC: 270 61 ms  E': 0 13 m/s  E/E': 5 78  MV A Micah: 0 95 m/s  MV Dec [place]: 3 53 m/s2  MV DecT: 212 23 ms  MV E Micah: 0 75 m/s  MV E/A Ratio: 0 79    [employer] Accredited Echocardiography Laboratory    Prepared and electronically signed by    Rojelio Salomon MD  Signed [id number] 15:46:59         Imaging: I have personally reviewed pertinent reports  Telemetry:  Personally reviewed, SR, episode of afib with RVR on 1111 AM, back in SR currently

## 2017-11-13 ENCOUNTER — APPOINTMENT (INPATIENT)
Dept: RADIOLOGY | Facility: HOSPITAL | Age: 62
DRG: 853 | End: 2017-11-13
Payer: MEDICARE

## 2017-11-13 PROCEDURE — 32557 INSERT CATH PLEURA W/ IMAGE: CPT

## 2017-11-13 RX ADMIN — METRONIDAZOLE 500 MG: 500 TABLET ORAL at 05:37

## 2017-11-13 RX ADMIN — CEFTRIAXONE SODIUM 2000 MG: 10 INJECTION, POWDER, FOR SOLUTION INTRAVENOUS at 17:29

## 2017-11-13 RX ADMIN — AMIODARONE HYDROCHLORIDE 200 MG: 200 TABLET ORAL at 08:50

## 2017-11-13 RX ADMIN — GUAIFENESIN 600 MG: 600 TABLET, EXTENDED RELEASE ORAL at 20:20

## 2017-11-13 RX ADMIN — HYDROMORPHONE HYDROCHLORIDE 1 MG: 1 INJECTION, SOLUTION INTRAMUSCULAR; INTRAVENOUS; SUBCUTANEOUS at 01:15

## 2017-11-13 RX ADMIN — CLOBETASOL PROPIONATE 1 APPLICATION: 0.5 CREAM TOPICAL at 20:20

## 2017-11-13 RX ADMIN — GUAIFENESIN 600 MG: 600 TABLET, EXTENDED RELEASE ORAL at 08:42

## 2017-11-13 RX ADMIN — DIBASIC SODIUM PHOSPHATE, MONOBASIC POTASSIUM PHOSPHATE AND MONOBASIC SODIUM PHOSPHATE 1 TABLET: 852; 155; 130 TABLET ORAL at 08:41

## 2017-11-13 RX ADMIN — TAMSULOSIN HYDROCHLORIDE 0.4 MG: 0.4 CAPSULE ORAL at 17:29

## 2017-11-13 RX ADMIN — HYDROMORPHONE HYDROCHLORIDE 1 MG: 1 INJECTION, SOLUTION INTRAMUSCULAR; INTRAVENOUS; SUBCUTANEOUS at 14:57

## 2017-11-13 RX ADMIN — CLOBETASOL PROPIONATE 1 APPLICATION: 0.5 CREAM TOPICAL at 11:51

## 2017-11-13 RX ADMIN — DOCUSATE SODIUM 200 MG: 100 CAPSULE, LIQUID FILLED ORAL at 08:43

## 2017-11-13 RX ADMIN — DIAZEPAM 5 MG: 5 TABLET ORAL at 23:10

## 2017-11-13 RX ADMIN — AMIODARONE HYDROCHLORIDE 200 MG: 200 TABLET ORAL at 17:29

## 2017-11-13 RX ADMIN — METRONIDAZOLE 500 MG: 500 TABLET ORAL at 23:10

## 2017-11-13 RX ADMIN — DOCUSATE SODIUM 200 MG: 100 CAPSULE, LIQUID FILLED ORAL at 17:29

## 2017-11-13 RX ADMIN — DILTIAZEM HYDROCHLORIDE 240 MG: 240 CAPSULE, COATED, EXTENDED RELEASE ORAL at 08:43

## 2017-11-13 RX ADMIN — METOPROLOL TARTRATE 50 MG: 50 TABLET ORAL at 20:20

## 2017-11-13 RX ADMIN — HYDROMORPHONE HYDROCHLORIDE 1 MG: 1 INJECTION, SOLUTION INTRAMUSCULAR; INTRAVENOUS; SUBCUTANEOUS at 05:36

## 2017-11-13 RX ADMIN — CALCIUM CARBONATE 500 MG (1,250 MG)-VITAMIN D3 200 UNIT TABLET 1 TABLET: at 08:49

## 2017-11-13 RX ADMIN — AMIODARONE HYDROCHLORIDE 200 MG: 200 TABLET ORAL at 12:00

## 2017-11-13 RX ADMIN — ENOXAPARIN SODIUM 40 MG: 40 INJECTION SUBCUTANEOUS at 08:44

## 2017-11-13 RX ADMIN — HYDROCODONE BITARTRATE AND ACETAMINOPHEN 1 TABLET: 5; 325 TABLET ORAL at 13:43

## 2017-11-13 RX ADMIN — METRONIDAZOLE 500 MG: 500 TABLET ORAL at 13:37

## 2017-11-13 RX ADMIN — HYDROMORPHONE HYDROCHLORIDE 1 MG: 1 INJECTION, SOLUTION INTRAMUSCULAR; INTRAVENOUS; SUBCUTANEOUS at 19:54

## 2017-11-13 RX ADMIN — FENTANYL 1 PATCH: 50 PATCH, EXTENDED RELEASE TRANSDERMAL at 08:39

## 2017-11-13 RX ADMIN — METOPROLOL TARTRATE 50 MG: 50 TABLET ORAL at 08:44

## 2017-11-13 RX ADMIN — CALCIUM CARBONATE 500 MG (1,250 MG)-VITAMIN D3 200 UNIT TABLET 1 TABLET: at 17:29

## 2017-11-13 RX ADMIN — HYDROMORPHONE HYDROCHLORIDE 1 MG: 1 INJECTION, SOLUTION INTRAMUSCULAR; INTRAVENOUS; SUBCUTANEOUS at 09:42

## 2017-11-13 NOTE — PROGRESS NOTES
Cardiology Progress Note - Lucien Richmond 57 y o  male MRN: [id number]    Unit/Bed#: [facility] 420-01 Encounter: [id number]      Assessment:    1  Paroxysmal afib with RVR  Plan:    Completed IV load of amiodarone  Started Amiodarone orally 200 tid 11/11/17 due to recurrent afib  Transitioned to long acting formulation of Diltiazem for 11/12  Currently on diltiazem 240 mg daily, and metoprolol 50 mg bid in addition to amiodarone  As chads Vasc score is 0, he does not need to be on oral anticoagulation  Continue to monitor on telemetry  Subjective:   Patient seen and examined  No significant events overnight  Denies chest pain, pnd, orthopnea, abdominal pain, nausea    48-year-old man with a history of protein calorie malnutrition, severe kyphosis, compression fractures, and opioid dependence  He was admitted with shortness of breath and found to have a loculated left pleural effusion  He had a chest tube placed and had some issues with paroxysmal atrial fibrillation  He was given IV Amiodarone load, without continuation  However, he had another episode of afib 11/9 PM that lasted for approximately 2 5 hours  He reports his symptoms during this episode of afib were not as severe as last episode, was able to tolerate it well  Metoprolol was added to Diltiazem at this time  He had another episode of afib with RVR on 11/11 AM, converted to SR with 1 dose of IV lopressor  PO Amiodarone was added at this time to help prevent recurrences       Per Thoracic notes, will need decortication likely Wednesday  Objective:     Vitals: Blood pressure 121/76, pulse 82, temperature 98 2 °F (36 8 °C), temperature source Oral, resp  rate 18, height 5' 8" (1 727 m), weight 58 6 kg (129 lb 3 oz), SpO2 95 %  , Body mass index is 19 64 kg/m² , Orthostatic Blood Pressures    Flowsheet Row Most Recent Value   Blood Pressure  121/76 [Map 93] filed at 11/13/2017 1105   Patient Position - Orthostatic VS  Sitting filed at 11/13/2017 1105            Intake/Output Summary (Last 24 hours) at 11/13/17 1315  Last data filed at 11/13/17 0601   Gross per 24 hour   Intake              770 ml   Output             1140 ml   Net             -370 ml       No significant arrhythmias seen on telemetry review      Physical Exam:    GEN: Lucien Richmond appears well, alert and oriented x 3  HEENT: anicteric  NECK: no jvd  HEART: regular rhythm, normal S1 and S2, no murmurs  LUNGS: clear to auscultation bilaterally  ABDOMEN: soft, no tenderness, no distention  EXTREMITIES: peripheral pulses normal; no edema  NEURO: no focal findings   SKIN: normal without suspicious lesions on exposed skin      Current Facility-Administered Medications:     acetaminophen (TYLENOL) tablet 650 mg, 650 mg, Oral, Q6H PRN, Jose Manuel Kellogg DO    albuterol inhalation solution 2 5 mg, 2 5 mg, Nebulization, Q6H PRN, Ronnie Vogt MD    alteplase (CATHFLO) 8 mg in sodium chloride 0 9 % 50 mL syringe, 8 mg, Chest Tube, Once, Brenda Flannery MD    amiodarone tablet 200 mg, 200 mg, Oral, TID With Meals, Mariama Anaya MD, 200 mg at 11/13/17 1200    calcium carbonate-vitamin D (OSCAL-D) 500 mg-200 units per tablet 1 tablet, 1 tablet, Oral, BID With Meals, Ronnie Vogt MD, 1 tablet at 11/13/17 0849    cefTRIAXone (ROCEPHIN) 2,000 mg in dextrose 5 % 50 mL IVPB, 2,000 mg, Intravenous, Q24H, Carole Dunbar MD, Last Rate: 100 mL/hr at 11/12/17 1538, 2,000 mg at 11/12/17 1538    clobetasol (TEMOVATE) 2 99 % cream 1 application, 1 application, Topical, [address], Ronnie Vogt MD, 1 application at 50/25/05 1151    diazepam (VALIUM) tablet 5 mg, 5 mg, Oral, HS, Finesse La MD, 5 mg at 11/12/17 2307    diltiazem (CARDIZEM CD) 24 hr capsule 240 mg, 240 mg, Oral, Daily, Mariama Anaya MD, 240 mg at 11/13/17 0843    docusate sodium (COLACE) capsule 200 mg, 200 mg, Oral, BID, Ronnie Vogt MD, 200 mg at 11/13/17 0843    enoxaparin (LOVENOX) subcutaneous injection 40 mg, 40 mg, Subcutaneous, Daily, Meri Johnson MD, 40 mg at 11/13/17 0844    fentaNYL (DURAGESIC) 50 mcg/hr TD 72 hr patch 1 patch, 1 patch, Transdermal, Q48H, Meri Johnson MD, 1 patch at 11/13/17 0839    guaiFENesin (MUCINEX) 12 hr tablet 600 mg, 600 mg, Oral, Q12H [facility], Shantal Wilkersonelton, DO, 600 mg at 11/13/17 0842    HYDROcodone-acetaminophen (NORCO) 5-325 mg per tablet 1 tablet, 1 tablet, Oral, Q6H PRN, Shantalrobert Escobar DO, 1 tablet at 11/11/17 1924    HYDROmorphone (DILAUDID) 1 mg/mL injection 1 mg, 1 mg, Intravenous, Once, [facility], DO    HYDROmorphone (DILAUDID) 1 mg/mL injection 1 mg, 1 mg, Intravenous, Q4H PRN, Doniphan DO Luz, 1 mg at 11/13/17 0942    metoprolol tartrate (LOPRESSOR) tablet 50 mg, 50 mg, Oral, Q12H [facility], Ammy Britt MD, 50 mg at 11/13/17 0844    metroNIDAZOLE (FLAGYL) tablet 500 mg, 500 mg, Oral, Q8H [facility], Meri Johnson MD, 500 mg at 11/13/17 0537    polyethylene glycol (MIRALAX) packet 17 g, 17 g, Oral, Daily PRN, Meri Johnson MD    Insert peripheral IV, , , Once **AND** sodium chloride (PF) 0 9 % injection 3 mL, 3 mL, Intravenous, PRN, Meri Johnson MD    tamsulosin (FLOMAX) capsule 0 4 mg, 0 4 mg, Oral, Daily With Dinner, Meri Johnson MD, 0 4 mg at 11/12/17 1704    Labs & Results:    Lab Results   Component Value Date    TROPONINI <0 02 11/06/2017       Lab Results   Component Value Date    GLUCOSE 88 11/12/2017    CALCIUM 7 8 (L) 11/12/2017     11/12/2017    K 4 0 11/12/2017    CO2 28 11/12/2017     11/12/2017    BUN 22 11/12/2017    CREATININE 0 49 (L) 11/12/2017       Lab Results   Component Value Date    WBC 20 26 (H) 11/12/2017    HGB 11 4 (L) 11/12/2017    HCT 32 4 (L) 11/12/2017    MCV 84 11/12/2017     (H) 11/12/2017         Lab Results   Component Value Date    ALT 14 11/08/2017    AST 9 11/08/2017

## 2017-11-13 NOTE — PROGRESS NOTES
Progress Note - Pulmonary   Daun Ing 57 y o  male MRN: [id number]  Unit/Bed#: [facility] 420-01 Encounter: [id number]    Assessment/Plan:  1  Loculated left pleural effusion - complicated parapneumonic effusion  · Thoracic surgery recommendations noted  · Agree that the patient requires decortication for definitive management but he has been resistant to surgical intervention  Seems to have reconsider today and surgery is tentatively being planned for this week  · Encourage incentive spirometry and activity  · At this point will defer management to thoracic surgery  Please re-consult if needed  We will sign off       ----------------------------------------------------------------------------------------------------------------------    HPI/Interval History:   Feeling a little bit better  Still having significant left chest wall pain from the chest tube  Concerned about his narcotic regimen  Vitals:   Blood pressure 135/77, pulse 91, temperature 98 1 °F (36 7 °C), temperature source Oral, resp  rate 18, height 5' 8" (1 727 m), weight 58 6 kg (129 lb 3 oz), SpO2 96 %  ,Body mass index is 19 64 kg/m²  SpO2: 96 %  SpO2 Activity: At Rest  O2 Device: None (Room air)    Physical Exam:   Gen:  Up in a chair  Pleasant  HEENT:  Pupils are reactive  Oropharynx moist  Neck:  No JVD  Chest:  Significant thoracic kyphosis  Limited chest wall excursion    Breath sounds are improving  Cardiac:  Regular  Abdomen:  Soft  Extremities:  No appreciable edema      Labs:  No new laboratory data    Imaging studies:  11/11 chest x-ray demonstrates trapped lung with slight decrease in pleural effusion component      Josey Luna MD

## 2017-11-13 NOTE — PROGRESS NOTES
Progress Note - Infectious Disease   Richardson Hickman 57 y o  male MRN: [id number]  Unit/Bed#: [facility] 420-01 Encounter: [id number]    Assessment/Plan:    57y o  year old male with a history of severe kyphosis  who initially presented to [facility] 11/6 with recent onset SOB, L-sided chest pain, fevers/chills in setting of green sputum production over past several months; found to have LLL PNA w/ left loculated pleural effusions s/p L chest tube placement 11/7, transferred to [facility] for thoracic surgery evaluation  S/p chest tube TPA instillation 11/7, 11/8, 11/9    1  Sepsis POA: thought to be secondary to PNA w/ loculated parapneumonic effusion  Blood cx (11/6): negative, sputum cx with mixed resp arthur, pleural fluid cx negative  Appears to be clinically improving, afebrile since 11/7, WBC elevated on admission to 36, downtrended then peaked again to 35, now downtrending again  Patient reports improvement in cough  · On ceftriaxone 2g IV 24H, flagyl 500mg q8H PO (day 8 total abx)  · Continue abx for now until thorascopic decortication Wednesday  2  Left-sided pneumonia: CAP v aspiration PNA  Legionella ag, strep pneumo ag, influenza/RSV, AFB stain negative  Fungal cx in process  Sputum cx with mixed respiratory arthur  Repeat CT 11/10 LLL consolidation favoring compressive atelectasis over PNA  · Continue abx as above for now    3  Loculated left pleural effusion s/p chest tube placement 11/6, TPA 11/7, 11/8, 11/9  Pleural fluid analysis w/ 878 WBC (97% PMN), pH 9 0, no growth bacteria  Left pleural chest tube upsized to 14Fr 11/10  Repeat CT 11/10 w/ similiar size pleural effusion but with increasing loculated pleural fluid anterior to heart  · Per thoracic surgery w/ trapped left lung due to effusion, possible thorascopic decortication Wednesday   · Continue abx as above    4  Acute urinary retention  · No frar inserted;on Flomax    5   Severe kyphosis w/ osteoporosis  · Enocrine consult at [facility]: ordered UPEP, PSA, immunoglobulins, PTH, VitD for work-up osteop  · Immunofixation c/w monoclonal gammopathy IgG kappa     6  Paroxysmal afib  · Management per cards    Subjective/Objective   Chief Complaint: Pain    Subjective: Patient seen and examined, sitting up in chair  States he overall feels better than prior  States his appetite has returned, ate all meals yesterday  Denies fever/chills/nausea  Continues to feel congested, not actively coughing due to chest pain  Normal stools  States he is urinating somewhat less than before, states its due to "psychological reasons "     Objective:     HR:  [63-91] 75  Resp:  [18] 18  BP: ()/(62-70) 123/69  SpO2:  [94 %-98 %] 96 %  Temp (24hrs), Av 2 °F (36 8 °C), Min:97 9 °F (36 6 °C), Max:98 6 °F (37 °C)  Current: Temperature: 98 1 °F (36 7 °C)    Physical Exam:   Physical Exam   Physical Exam   Constitutional: He is oriented to person, place, and time  No distress  HENT:   Head: Normocephalic and atraumatic  Eyes: Pupils are equal, round, and reactive to light  Pulmonary/Chest:   Decreased lung sounds LLL  Pleuritic CP left-side   Abdominal: Soft  Bowel sounds are normal    Musculoskeletal:   Kyphosis    Neurological: He is alert and oriented to person, place, and time  Skin: Skin is warm and dry  He is not diaphoretic  Invasive Devices     Peripheral Intravenous Line            Peripheral IV 17 Left Forearm less than 1 day          Drain            Chest Tube 1 Left Pleural 14 Fr  2 days                Lab, Imaging and other studies: I have personally reviewed pertinent reports

## 2017-11-13 NOTE — PROGRESS NOTES
Progress Note - Thoracic Surgery   Bhakti Morris 57 y o  male MRN: [id number]  Unit/Bed#: [facility] 420-01 Encounter: [id number]    Assessment:  60y o  M with a loculated left pleural effusion s/p L pigtail CT 11/6 s/p TPA 11/7 and 11/8, 11/9  CT upsized 11/10  Plan: Will need decortication this week  F/U IR for R CT  OOB/ambulate  Pain control  Primary care per [facility]    Subjective/Objective     Subjective: JIM  Had 2BM yesterday  Off NC O2  No SOB right now  Objective:     Vitals: Blood pressure 106/67, pulse 73, temperature 98 6 °F (37 °C), temperature source Oral, resp  rate 18, height 5' 8" (1 727 m), weight 60 5 kg (133 lb 6 1 oz), SpO2 95 %  ,Body mass index is 20 28 kg/m²  I/O       11/10 0701 - 11/11 0700 11/11 0701 - 11/12 0700 11/12 0701 - 11/13 0700    P  O  950 400     I V  (mL/kg)       Total Intake(mL/kg) 950 (16 2) 400 (6 6)     Urine (mL/kg/hr) 425 (0 3) 450 (0 3)     Stool 0 (0) 0 (0)     Chest Tube 1820 (1 3) 330 (0 2)     Total Output 2245 780      Net -1295 -380             Unmeasured Urine Occurrence 2 x 1 x     Unmeasured Stool Occurrence 1 x 1 x           Physical Exam:  AAOX3  NAD  RRR  Normal respiratory effort on room air  Soft, NT, ND  Kyphosis  L CT to suction, no air leak    Lab, Imaging and other studies:   CBC with diff:   No results found for: WBC, HGB, HCT, MCV, PLT, ADJUSTEDWBC, MCH, MCHC, RDW, MPV, NRBC, BMP/CMP:   No results found for: NA, K, CL, CO2, ANIONGAP, BUN, CREATININE, GLUCOSE, CALCIUM, AST, ALT, ALKPHOS, PROT, ALBUMIN, BILITOT, EGFR, Magnesium: No components found for: MAG  VTE Pharmacologic Prophylaxis: Enoxaparin (Lovenox)  VTE Mechanical Prophylaxis: sequential compression device

## 2017-11-13 NOTE — PROGRESS NOTES
[facility] Internal Medicine Progress Note  Patient: Marina Jones 57 y o  male   MRN: [id number]  PCP: Herlinda Naranjo MD  Unit/Bed#: [facility] 420-01 Encounter: [id number]  Date Of Visit: 11/13/17    Assessment:    Principal Problem:    Loculated pleural effusion  Active Problems:    Pneumonia    Pedal edema    Osteoporosis    Kyphosis    Leukocytosis    Severe protein-calorie malnutrition (HCC)    Continuous opioid dependence ([facility] [address] )    Other chronic pain    Atrial fibrillation with RVR ([facility] [address] )      Plan:    1  Left-sided pneumonia with persistent loculated parapneumonic effusion , s/p upsizing chest tube, per thoracic surgery, patient will require VATS this week, continue on IV ceftriaxone and Flagyl per ID  2  Right pleural effusion, IR consulted for chest tube placement  3  Leukocytosis  secondary to above- improving   4  Osteoporosis/severe kyphosis with compression deformities and  chronic pain, on continuous opiate dependence- outpatient Endocrine follow-up- continue calcium supplement  5  Severe protein calorie malnutrition-continue with nutrition supplement  6  New onset AFib -with intermittent tachycardia, on amiodarone, diltiazem and Lopressor, cardiology is following, no need for [facility]       rehab when stable    VTE Pharmacologic Prophylaxis:   Pharmacologic: Enoxaparin (Lovenox)  Mechanical VTE Prophylaxis in Place: Yes    Patient Centered Rounds: I have performed bedside rounds with nursing staff today  Discussions with Specialists or Other Care Team Provider:     Education and Discussions with Family / Patient:  Patient    Time Spent for Care: 30 minutes  More than 50% of total time spent on counseling and coordination of care as described above      Current Length of Stay: 6 day(s)    Current Patient Status: Inpatient   Certification Statement: The patient will continue to require additional inpatient hospital stay due to Management of pneumonia and pleural effusion    Discharge Plan / Estimated Discharge Date: not ready yet    Code Status: Level 1 - Full Code      Subjective:   Patient seen and examined  Patient feels better ,clinically improving  No nausea vomiting       Objective:     Vitals:   Temp (24hrs), Av 2 °F (36 8 °C), Min:97 9 °F (36 6 °C), Max:98 6 °F (37 °C)    HR:  [63-91] 91  Resp:  [18] 18  BP: ()/(62-77) 135/77  SpO2:  [94 %-98 %] 96 %  Body mass index is 19 64 kg/m²  Input and Output Summary (last 24 hours): Intake/Output Summary (Last 24 hours) at 17 9404  Last data filed at 17 0601   Gross per 24 hour   Intake              770 ml   Output             1540 ml   Net             -770 ml       Physical Exam:     Physical Exam  Patient is awake alert with mild dyspnea  Cachectic  Lung with decreased breath sounds bilateral- left-sided chest tube in place  Heart positive S1-S2 no murmur  Back with severe kyphosis  Abdomen soft nontender positive bowel sounds  Lower extremity chronic venous stasis bilateral    Additional Data:     Labs:      Results from last 7 days  Lab Units 17  0448 17  0645   WBC Thousand/uL 20 26* 32 21*   HEMOGLOBIN g/dL 11 4* 12 9   HEMATOCRIT % 32 4* 36 2*   PLATELETS Thousands/uL 675* 732*   NEUTROS PCT %  --  83*   LYMPHS PCT %  --  9*   MONOS PCT %  --  8   EOS PCT %  --  0       Results from last 7 days  Lab Units 17  0448  17  0435   SODIUM mmol/L 136  < > 135*   POTASSIUM mmol/L 4 0  < > 3 6   CHLORIDE mmol/L 102  < > 102   CO2 mmol/L 28  < > 26   BUN mg/dL 22  < > 17   CREATININE mg/dL 0 49*  < > 0 57*   CALCIUM mg/dL 7 8*  < > 8 3   TOTAL PROTEIN g/dL  --   --  6 0*   BILIRUBIN TOTAL mg/dL  --   --  0 51   ALK PHOS U/L  --   --  72   ALT U/L  --   --  14   AST U/L  --   --  9   GLUCOSE RANDOM mg/dL 88  < > 175*   < > = values in this interval not displayed  Results from last 7 days  Lab Units 17  1037   INR  1 22*       * I Have Reviewed All Lab Data Listed Above    * Additional Pertinent Lab Tests Reviewed: Elizabeth [facility] Admission Reviewed    Imaging:    Imaging Reports Reviewed Today Include:   Imaging Personally Reviewed by Myself Includes:      Recent Cultures (last 7 days):       Results from last 7 days  Lab Units 11/07/17  0753 11/06/17  1803 11/06/17  1528 11/06/17  1311 11/06/17  1038 11/06/17  1037   BLOOD CULTURE   --   --   --   --  No Growth After 5 Days  No Growth After 5 Days  SPUTUM CULTURE   --   --   --  2+ Growth of   --   --    GRAM STAIN RESULT   --   --  2+ Polys  No bacteria seen Rare Epithelial cells per low power field  1+ Polys  2+ Gram positive cocci in pairs  2+ Gram negative rods  --   --    BODY FLUID CULTURE, STERILE   --   --  No growth  --   --   --    INFLUENZA A PCR  None Detected  --   --   --   --   --    INFLUENZA B PCR  None Detected  --   --   --   --   --    RSV PCR  None Detected  --   --   --   --   --    LEGIONELLA URINARY ANTIGEN   --  Negative  --   --   --   --        Last 24 Hours Medication List:     alteplase (ACTIVASE) 8 mg in 0 9% NaCl 50 mL 8 mg Chest Tube Once   amiodarone 200 mg Oral TID With Meals   calcium carbonate-vitamin D 1 tablet Oral BID With Meals   cefTRIAXone 2,000 mg Intravenous Q24H   clobetasol 1 application Topical N98Y MICHELET   diazepam 5 mg Oral HS   diltiazem 240 mg Oral Daily   docusate sodium 200 mg Oral BID   enoxaparin 40 mg Subcutaneous Daily   fentaNYL 1 patch Transdermal Q48H   guaiFENesin 600 mg Oral Q12H MICHELET   HYDROmorphone 1 mg Intravenous Once   metoprolol tartrate 50 mg Oral Q12H MCIHELET   metroNIDAZOLE 500 mg Oral Q8H [address]   tamsulosin 0 4 mg Oral Daily With Dinner        Today, Patient Was Seen By: Rios Augustin DO    ** Please Note: This note has been constructed using a voice recognition system   **

## 2017-11-13 NOTE — CASE MANAGEMENT
Continued Stay Review    Date: 11/11/17    Vital Signs: /76 Comment: Map 93  Pulse 82   Temp 98 2 °F (36 8 °C) (Oral)   Resp 18   Ht 5' 8" (1 727 m)   Wt 58 6 kg (129 lb 3 oz)   SpO2 95%   BMI 19 64 kg/m²     Medications:   Scheduled Meds:   alteplase (ACTIVASE) 8 mg in 0 9% NaCl 50 mL 8 mg Chest Tube Once   amiodarone 200 mg Oral TID With Meals   calcium carbonate-vitamin D 1 tablet Oral BID With Meals   cefTRIAXone 2,000 mg Intravenous Q24H   clobetasol 1 application Topical C40Y MICHELET   diazepam 5 mg Oral HS   diltiazem 240 mg Oral Daily   docusate sodium 200 mg Oral BID   enoxaparin 40 mg Subcutaneous Daily   fentaNYL 1 patch Transdermal Q48H   guaiFENesin 600 mg Oral Q12H MICHELET   HYDROmorphone 1 mg Intravenous Once   metoprolol tartrate 50 mg Oral Q12H MICHELET   metroNIDAZOLE 500 mg Oral Q8H MICHELET   tamsulosin 0 4 mg Oral Daily With Dinner     Continuous Infusions:    PRN Meds:   acetaminophen    albuterol    HYDROcodone-acetaminophen    HYDROmorphone    polyethylene glycol    Insert peripheral IV **AND** sodium chloride (PF)    Abnormal Labs/Diagnostic Results:   WBC 32 21*   HEMATOCRIT 36 2*   PLATELETS 439*   NEUTROS PCT 83*   LYMPHS PCT 9*      SODIUM 134*      Age/Sex: 57 y o  male     Assessment:     Principal Problem:    Loculated pleural effusion  Active Problems:    Pneumonia    Sepsis (HCC)    Pedal edema    Osteoporosis    Kyphosis    Leukocytosis    Urinary retention    Severe protein-calorie malnutrition (HCC)    Continuous opioid dependence (HCC)    Other chronic pain    Atrial fibrillation with RVR ([facility])        Plan:     1   Left-sided pneumonia with loculated parapneumonic effusion and persistent effusion, s/p upsizing chest tube yesterday by IR ,Thoracic surgery and pulmonology are following, may require VATS- on IV ceftriaxone and Flagyl by ID  2  Leukocytosis  secondary to above- continue to monitor  3    Osteoporosis/severe kyphosis with compression deformities and  chronic pain, on continuous opiate dependence- outpatient Endocrine follow-up- continue calcium supplement  4  Severe protein calorie malnutrition-continue with nutrition supplement  5  New onset AFib -with intermittent tachycardia,  cardiology is following-on diltiazem and Lopressor, no need for AC  6  Hypophosphatemia replace     PT recommending rehab when stable     VTE Pharmacologic Prophylaxis:   Pharmacologic: Enoxaparin (Lovenox)  Mechanical VTE Prophylaxis in Place: Yes     Current Patient Status: Inpatient   Certification Statement: The patient will continue to require additional inpatient hospital stay due to Management of pneumonia     Discharge Plan / Estimated Discharge Date: not ready yet, rehab when stable       [facility] in the [facility] by Maxx Zendejas for 2017  Network Utilization Review Department  Phone: [phone]; Fax [phone]  ATTENTION: The Network Utilization Review Department is now centralized for our 7 Facilities  Make a note that we have a new phone and fax numbers for our Department  Please call with any questions or concerns to [phone] and carefully follow the prompts so that you are directed to the right person  All voicemails are confidential  Fax any determinations, approvals, denials, and requests for initial or continue stay review clinical to [phone]  Due to HIGH CALL volume, it would be easier if you could please send faxed requests to expedite your requests and in part, help us provide discharge notifications faster

## 2017-11-13 NOTE — SOCIAL WORK
CM met w/ pt to follow up on obtaining SNF choices to make referrals to for the pt's recommended short-term skilled rehab for his aftercare plan  Pt reported to CM that he would like to be referred to [facility] located in [facility]  CM made Ecin referral to [facility]  CM to follow

## 2017-11-13 NOTE — PLAN OF CARE
Problem: Prexisting or High Potential for Compromised Skin Integrity  Goal: Skin integrity is maintained or improved  INTERVENTIONS:  - Identify patients at risk for skin breakdown  - Assess and monitor skin integrity  - Assess and monitor nutrition and hydration status  - Monitor labs (i e  albumin)  - Assess for incontinence   - Turn and reposition patient  - Assist with mobility/ambulation  - Relieve pressure over bony prominences  - Avoid friction and shearing  - Provide appropriate hygiene as needed including keeping skin clean and dry  - Evaluate need for skin moisturizer/barrier cream  - Collaborate with interdisciplinary team (i e  Nutrition, Rehabilitation, etc )   - Patient/family teaching   Outcome: Progressing      Problem: Potential for Falls  Goal: Patient will remain free of falls  INTERVENTIONS:  - Assess patient frequently for physical needs  -  Identify cognitive and physical deficits and behaviors that affect risk of falls  -  [place] fall precautions as indicated by assessment   - Educate patient/family on patient safety including physical limitations  - Instruct patient to call for assistance with activity based on assessment  - Modify environment to reduce risk of injury  - Consider OT/PT consult to assist with strengthening/mobility    Outcome: Progressing  Pt performed 30 minutes of independent range of motion  Problem: Nutrition/Hydration-ADULT  Goal: Nutrient/Hydration intake appropriate for improving, restoring or maintaining nutritional needs  Monitor and assess patient's nutrition/hydration status for malnutrition (ex- brittle hair, bruises, dry skin, pale skin and conjunctiva, muscle wasting, smooth red tongue, and disorientation)  Collaborate with interdisciplinary team and initiate plan and interventions as ordered  Monitor patient's weight and dietary intake as ordered or per policy  Utilize nutrition screening tool and intervene per policy   Determine patient's food preferences and provide high-protein, high-caloric foods as appropriate  INTERVENTIONS:  - Monitor oral intake, urinary output, labs, and treatment plans  - Assess nutrition and hydration status and recommend course of action  - Evaluate amount of meals eaten  - Assist patient with eating if necessary   - Allow adequate time for meals  - Recommend/ encourage appropriate diets, oral nutritional supplements, and vitamin/mineral supplements  - Order, calculate, and assess calorie counts as needed  - Recommend, monitor, and adjust tube feedings and TPN/PPN based on assessed needs  - Assess need for intravenous fluids  - Provide specific nutrition/hydration education as appropriate  - Include patient/family/caregiver in decisions related to nutrition    Outcome: Progressing  Pt ate 100% of dinner       Problem: PAIN - ADULT  Goal: Verbalizes/displays adequate comfort level or baseline comfort level  Interventions:  - Encourage patient to monitor pain and request assistance  - Assess pain using appropriate pain scale  - Administer analgesics based on type and severity of pain and evaluate response  - Implement non-pharmacological measures as appropriate and evaluate response  - Consider cultural and social influences on pain and pain management  - Notify physician/advanced practitioner if interventions unsuccessful or patient reports new pain   Outcome: Progressing      Problem: INFECTION - ADULT  Goal: Absence or prevention of progression during hospitalization  INTERVENTIONS:  - Assess and monitor for signs and symptoms of infection  - Monitor lab/diagnostic results  - Monitor all insertion sites, i e  indwelling lines, tubes, and drains  - Monitor endotracheal (as able) and nasal secretions for changes in amount and color  - [place] appropriate cooling/warming therapies per order  - Administer medications as ordered  - Instruct and encourage patient and family to use good hand hygiene technique  - Identify and instruct in appropriate isolation precautions for identified infection/condition   Outcome: Progressing    Goal: Absence of fever/infection during neutropenic period  INTERVENTIONS:  - Monitor WBC  - Implement neutropenic guidelines   Outcome: Progressing      Problem: SAFETY ADULT  Goal: Maintain or return to baseline ADL function  INTERVENTIONS:  -  Assess patient's ability to carry out ADLs; assess patient's baseline for ADL function and identify physical deficits which impact ability to perform ADLs (bathing, care of mouth/teeth, toileting, grooming, dressing, etc )  - Assess/evaluate cause of self-care deficits   - Assess range of motion  - Assess patient's mobility; develop plan if impaired  - Assess patient's need for assistive devices and provide as appropriate  - Encourage maximum independence but intervene and supervise when necessary  ¯ Involve family in performance of ADLs  ¯ Assess for home care needs following discharge   ¯ Request OT consult to assist with ADL evaluation and planning for discharge  ¯ Provide patient education as appropriate   Outcome: Progressing    Goal: Maintain or return mobility status to optimal level  INTERVENTIONS:  - Assess patient's baseline mobility status (ambulation, transfers, stairs, etc )    - Identify cognitive and physical deficits and behaviors that affect mobility  - Identify mobility aids required to assist with transfers and/or ambulation (gait belt, sit-to-stand, lift, walker, cane, etc )  - [place] fall precautions as indicated by assessment  - Record patient progress and toleration of activity level on Mobility SBAR; progress patient to next Phase/Stage  - Instruct patient to call for assistance with activity based on assessment  - Request Rehabilitation consult to assist with strengthening/weightbearing, etc    Outcome: Progressing    Goal: Patient will remain free of falls  INTERVENTIONS:  - Assess patient frequently for physical needs  -  Identify cognitive and physical deficits and behaviors that affect risk of falls  -  [place] fall precautions as indicated by assessment   - Educate patient/family on patient safety including physical limitations  - Instruct patient to call for assistance with activity based on assessment  - Modify environment to reduce risk of injury  - Consider OT/PT consult to assist with strengthening/mobility    Outcome: Progressing  Pt performed 30 minutes of independent range of motion  Problem: DISCHARGE PLANNING  Goal: Discharge to home or other facility with appropriate resources  INTERVENTIONS:  - Identify barriers to discharge w/patient and caregiver  - Arrange for needed discharge resources and transportation as appropriate  - Identify discharge learning needs (meds, wound care, etc )  - Arrange for interpretive services to assist at discharge as needed  - Refer to Case Management Department for coordinating discharge planning if the patient needs post-hospital services based on physician/advanced practitioner order or complex needs related to functional status, cognitive ability, or social support system   Outcome: Progressing      Problem: Knowledge Deficit  Goal: Patient/family/caregiver demonstrates understanding of disease process, treatment plan, medications, and discharge instructions  Complete learning assessment and assess knowledge base    Interventions:  - Provide teaching at level of understanding  - Provide teaching via preferred learning methods   Outcome: Progressing      Problem: DISCHARGE PLANNING - CARE MANAGEMENT  Goal: Discharge to post-acute care or home with appropriate resources  INTERVENTIONS:  - Conduct assessment to determine patient/family and health care team treatment goals, and need for post-acute services based on payer coverage, community resources, and patient preferences, and barriers to discharge  - Address psychosocial, clinical, and financial barriers to discharge as identified in assessment in conjunction with the patient/family and health care team  - Arrange appropriate level of post-acute services according to patient's   needs and preference and payer coverage in collaboration with the physician and health care team  - Communicate with and update the patient/family, physician, and health care team regarding progress on the discharge plan  - Arrange appropriate transportation to post-acute venues    Plan return home with brother or friend transporting, denies need for [facility] [place]      Outcome: Progressing

## 2017-11-14 ENCOUNTER — ANESTHESIA EVENT (INPATIENT)
Dept: PERIOP | Facility: HOSPITAL | Age: 62
DRG: 853 | End: 2017-11-14
Payer: MEDICARE

## 2017-11-14 PROBLEM — R60.0 PEDAL EDEMA: Status: RESOLVED | Noted: 2017-11-06 | Resolved: 2017-11-14

## 2017-11-14 LAB
ABO GROUP BLD: NORMAL
BASOPHILS # BLD MANUAL: 0 THOUSAND/UL (ref 0–0.1)
BASOPHILS NFR MAR MANUAL: 0 % (ref 0–1)
BLD GP AB SCN SERPL QL: NEGATIVE
EOSINOPHIL # BLD MANUAL: 0 THOUSAND/UL (ref 0–0.4)
EOSINOPHIL NFR BLD MANUAL: 0 % (ref 0–6)
ERYTHROCYTE [DISTWIDTH] IN BLOOD BY AUTOMATED COUNT: 14.1 % (ref 11.6–15.1)
HCT VFR BLD AUTO: 31.4 % (ref 36.5–49.3)
HGB BLD-MCNC: 10.7 G/DL (ref 12–17)
LYMPHOCYTES # BLD AUTO: 0.67 THOUSAND/UL (ref 0.6–4.47)
LYMPHOCYTES # BLD AUTO: 4 % (ref 14–44)
MCH RBC QN AUTO: 29.2 PG (ref 26.8–34.3)
MCHC RBC AUTO-ENTMCNC: 34.1 G/DL (ref 31.4–37.4)
MCV RBC AUTO: 86 FL (ref 82–98)
MONOCYTES # BLD AUTO: 0.5 THOUSAND/UL (ref 0–1.22)
MONOCYTES NFR BLD: 3 % (ref 4–12)
MYELOCYTES NFR BLD MANUAL: 1 % (ref 0–1)
NEUTROPHILS # BLD MANUAL: 15.46 THOUSAND/UL (ref 1.85–7.62)
NEUTS SEG NFR BLD AUTO: 92 % (ref 43–75)
NRBC BLD AUTO-RTO: 0 /100 WBCS
PLATELET # BLD AUTO: 814 THOUSANDS/UL (ref 149–390)
PLATELET BLD QL SMEAR: ABNORMAL
PMV BLD AUTO: 7.9 FL (ref 8.9–12.7)
RBC # BLD AUTO: 3.67 MILLION/UL (ref 3.88–5.62)
RH BLD: POSITIVE
SPECIMEN EXPIRATION DATE: NORMAL
WBC # BLD AUTO: 16.8 THOUSAND/UL (ref 4.31–10.16)

## 2017-11-14 PROCEDURE — 86900 BLOOD TYPING SEROLOGIC ABO: CPT | Performed by: PHYSICIAN ASSISTANT

## 2017-11-14 PROCEDURE — 97110 THERAPEUTIC EXERCISES: CPT

## 2017-11-14 PROCEDURE — 86901 BLOOD TYPING SEROLOGIC RH(D): CPT | Performed by: PHYSICIAN ASSISTANT

## 2017-11-14 PROCEDURE — 97116 GAIT TRAINING THERAPY: CPT

## 2017-11-14 PROCEDURE — 94760 N-INVAS EAR/PLS OXIMETRY 1: CPT

## 2017-11-14 PROCEDURE — 86850 RBC ANTIBODY SCREEN: CPT | Performed by: PHYSICIAN ASSISTANT

## 2017-11-14 PROCEDURE — 97530 THERAPEUTIC ACTIVITIES: CPT

## 2017-11-14 PROCEDURE — 85027 COMPLETE CBC AUTOMATED: CPT | Performed by: INTERNAL MEDICINE

## 2017-11-14 PROCEDURE — 85007 BL SMEAR W/DIFF WBC COUNT: CPT | Performed by: INTERNAL MEDICINE

## 2017-11-14 PROCEDURE — 86920 COMPATIBILITY TEST SPIN: CPT

## 2017-11-14 RX ADMIN — HYDROMORPHONE HYDROCHLORIDE 1 MG: 1 INJECTION, SOLUTION INTRAMUSCULAR; INTRAVENOUS; SUBCUTANEOUS at 21:22

## 2017-11-14 RX ADMIN — TAMSULOSIN HYDROCHLORIDE 0.4 MG: 0.4 CAPSULE ORAL at 15:56

## 2017-11-14 RX ADMIN — CEFTRIAXONE SODIUM 2000 MG: 10 INJECTION, POWDER, FOR SOLUTION INTRAVENOUS at 14:43

## 2017-11-14 RX ADMIN — METRONIDAZOLE 500 MG: 500 TABLET ORAL at 13:56

## 2017-11-14 RX ADMIN — METRONIDAZOLE 500 MG: 500 TABLET ORAL at 21:22

## 2017-11-14 RX ADMIN — DILTIAZEM HYDROCHLORIDE 240 MG: 240 CAPSULE, COATED, EXTENDED RELEASE ORAL at 09:20

## 2017-11-14 RX ADMIN — AMIODARONE HYDROCHLORIDE 200 MG: 200 TABLET ORAL at 15:55

## 2017-11-14 RX ADMIN — HYDROMORPHONE HYDROCHLORIDE 1 MG: 1 INJECTION, SOLUTION INTRAMUSCULAR; INTRAVENOUS; SUBCUTANEOUS at 05:28

## 2017-11-14 RX ADMIN — HYDROMORPHONE HYDROCHLORIDE 1 MG: 1 INJECTION, SOLUTION INTRAMUSCULAR; INTRAVENOUS; SUBCUTANEOUS at 09:27

## 2017-11-14 RX ADMIN — GUAIFENESIN 600 MG: 600 TABLET, EXTENDED RELEASE ORAL at 09:19

## 2017-11-14 RX ADMIN — AMIODARONE HYDROCHLORIDE 200 MG: 200 TABLET ORAL at 12:38

## 2017-11-14 RX ADMIN — METOPROLOL TARTRATE 50 MG: 50 TABLET ORAL at 09:20

## 2017-11-14 RX ADMIN — DOCUSATE SODIUM 200 MG: 100 CAPSULE, LIQUID FILLED ORAL at 17:45

## 2017-11-14 RX ADMIN — DOCUSATE SODIUM 200 MG: 100 CAPSULE, LIQUID FILLED ORAL at 09:19

## 2017-11-14 RX ADMIN — HYDROMORPHONE HYDROCHLORIDE 1 MG: 1 INJECTION, SOLUTION INTRAMUSCULAR; INTRAVENOUS; SUBCUTANEOUS at 13:56

## 2017-11-14 RX ADMIN — DIAZEPAM 5 MG: 5 TABLET ORAL at 23:53

## 2017-11-14 RX ADMIN — AMIODARONE HYDROCHLORIDE 200 MG: 200 TABLET ORAL at 09:20

## 2017-11-14 RX ADMIN — HYDROMORPHONE HYDROCHLORIDE 1 MG: 1 INJECTION, SOLUTION INTRAMUSCULAR; INTRAVENOUS; SUBCUTANEOUS at 01:25

## 2017-11-14 RX ADMIN — CALCIUM CARBONATE 500 MG (1,250 MG)-VITAMIN D3 200 UNIT TABLET 1 TABLET: at 09:20

## 2017-11-14 RX ADMIN — METRONIDAZOLE 500 MG: 500 TABLET ORAL at 05:18

## 2017-11-14 RX ADMIN — CLOBETASOL PROPIONATE 1 APPLICATION: 0.5 CREAM TOPICAL at 12:41

## 2017-11-14 RX ADMIN — CALCIUM CARBONATE 500 MG (1,250 MG)-VITAMIN D3 200 UNIT TABLET 1 TABLET: at 15:56

## 2017-11-14 RX ADMIN — HYDROCODONE BITARTRATE AND ACETAMINOPHEN 1 TABLET: 5; 325 TABLET ORAL at 20:38

## 2017-11-14 RX ADMIN — HYDROCODONE BITARTRATE AND ACETAMINOPHEN 1 TABLET: 5; 325 TABLET ORAL at 03:21

## 2017-11-14 RX ADMIN — GUAIFENESIN 600 MG: 600 TABLET, EXTENDED RELEASE ORAL at 21:22

## 2017-11-14 RX ADMIN — CLOBETASOL PROPIONATE 1 APPLICATION: 0.5 CREAM TOPICAL at 21:23

## 2017-11-14 RX ADMIN — METOPROLOL TARTRATE 50 MG: 50 TABLET ORAL at 21:22

## 2017-11-14 NOTE — ASSESSMENT & PLAN NOTE
· Poor access to food at home secondary to financial status  · Gaining weight here in the hospital with good appetite

## 2017-11-14 NOTE — PROGRESS NOTES
Progress Note - Thoracic Surgery   Scarlett Jolley 57 y o  male MRN: [id number]  Unit/Bed#: [facility] 420-01 Encounter: [id number]    Assessment:  60y o  M with a loculated left pleural effusion s/p L pigtail CT 11/6 s/p TPA 11/7 and 11/8, 11/9  CT upsized 11/10  Plan:  Planning of VATS this week  R CT not placed because insufficient fluid per IR  L CT to suction, no AL  IS/OOB  Primary care per SLIM    Subjective/Objective     Subjective: NAD  No SOB on RA  No N/V, tolerating PO  Objective:     Vitals: Blood pressure 111/72, pulse 72, temperature 98 1 °F (36 7 °C), temperature source Oral, resp  rate 18, height 5' 8" (1 727 m), weight 58 6 kg (129 lb 3 oz), SpO2 96 %  ,Body mass index is 19 64 kg/m²  I/O       11/10 0701 - 11/11 0700 11/11 0701 - 11/12 0700 11/12 0701 - 11/13 0700    P  O  950 400     I V  (mL/kg)       Total Intake(mL/kg) 950 (16 2) 400 (6 6)     Urine (mL/kg/hr) 425 (0 3) 450 (0 3)     Stool 0 (0) 0 (0)     Chest Tube 1820 (1 3) 330 (0 2)     Total Output 2245 780      Net -1295 -380             Unmeasured Urine Occurrence 2 x 1 x     Unmeasured Stool Occurrence 1 x 1 x           Physical Exam: AAOX3  NAD  RRR  Normal respiratory effort  Soft, NT, ND  B/l lower extremity edema      Lab, Imaging and other studies:   CBC with diff:   No results found for: WBC, HGB, HCT, MCV, PLT, ADJUSTEDWBC, MCH, MCHC, RDW, MPV, NRBC, BMP/CMP:   No results found for: NA, K, CL, CO2, ANIONGAP, BUN, CREATININE, GLUCOSE, CALCIUM, AST, ALT, ALKPHOS, PROT, ALBUMIN, BILITOT, EGFR, Magnesium: No components found for: MAG  VTE Pharmacologic Prophylaxis: Enoxaparin (Lovenox)  VTE Mechanical Prophylaxis: sequential compression device

## 2017-11-14 NOTE — PROGRESS NOTES
Progress Note - Infectious Disease   Richardson Hickman 57 y o  male MRN: [id number]  Unit/Bed#: [facility] 420-01 Encounter: [id number]    Assessment/Plan:    57y o  year old male with a history of severe kyphosis  who initially presented to [facility] 11/6 with recent onset SOB, L-sided chest pain, fevers/chills in setting of green sputum production over past several months; found to have LLL PNA w/ left loculated pleural effusions s/p L chest tube placement 11/7, transferred to [facility] for thoracic surgery evaluation  S/p chest tube TPA instillation 11/7, 11/8, 11/9    1  Sepsis POA: thought to be secondary to PNA w/ loculated parapneumonic effusion  Blood cx (11/6): negative, sputum cx with mixed resp arthur, pleural fluid cx negative  Appears to be clinically improving, afebrile since 11/7  Patient reports improvement in cough  · WBC downtrending, 16 <20<32  · On ceftriaxone 2g IV 24H, flagyl 500mg q8H PO (day 9 total abx)  · Continue abx for now until VATs Wednesday  2  Left-sided pneumonia: CAP v aspiration PNA  Legionella ag, strep pneumo ag, influenza/RSV, AFB stain negative  Fungal cx in process  Sputum cx with mixed respiratory arthur  Repeat CT 11/10 LLL consolidation favoring compressive atelectasis over PNA  · Continue abx as above for now    3  Loculated left pleural effusion s/p chest tube placement 11/6, TPA 11/7, 11/8, 11/9  Pleural fluid analysis w/ 878 WBC (97% PMN), pH 9 0, no growth bacteria, fungi, AFB  Left pleural chest tube upsized to 14Fr 11/10  Repeat CT 11/10 w/ similiar size pleural effusion but with increasing loculated pleural fluid anterior to heart  · Per thoracic surgery w/ trapped left lung due to effusion, possible thorascopic decortication Wednesday   · No right-sided chest tube per IR as not enough drainable fluid   · Continue abx as above    4  Acute urinary retention  · No farr inserted;on Flomax    5   Severe kyphosis w/ osteoporosis  · Enocrine consult at [facility]: ordered UPEP, PSA, immunoglobulins, PTH, VitD for work-up osteop  · Immunofixation c/w monoclonal gammopathy IgG kappa     6  Paroxysmal afib  · Management per cards    Subjective/Objective   Chief Complaint: Pain    Subjective: Patient seen and examined, sitting up in chair  Denies fever/chills/nausea  Pain better controlled than prior     Objective:     HR:  [63-82] 78  Resp:  [18-20] 20  BP: (106-122)/(56-76) 106/56  SpO2:  [94 %-98 %] 96 %  Temp (24hrs), Av 1 °F (36 7 °C), Min:97 9 °F (36 6 °C), Max:98 4 °F (36 9 °C)  Current: Temperature: 97 9 °F (36 6 °C)    Physical Exam:   Physical Exam   Physical Exam   Constitutional: He is oriented to person, place, and time  No distress  HENT:   Head: Normocephalic and atraumatic  Eyes: Pupils are equal, round, and reactive to light  Pulmonary/Chest:   Decreased lung sounds LLL  Pleuritic CP left-side   Abdominal: Soft  Bowel sounds are normal    Musculoskeletal:   Kyphosis    Neurological: He is alert and oriented to person, place, and time  Skin: Skin is warm and dry  He is not diaphoretic  Invasive Devices     Peripheral Intravenous Line            Peripheral IV 17 Left Forearm 1 day          Drain            Chest Tube 1 Left Pleural 14 Fr  3 days                Lab, Imaging and other studies: I have personally reviewed pertinent reports

## 2017-11-14 NOTE — PROGRESS NOTES
Progress Note - Guanaco Brantley 57 y o  male MRN: [id number]    Unit/Bed#: [facility] 420-01 Encounter: [id number]        Atrial fibrillation with RVR (HCC)   Assessment & Plan    · Rate control with metoprolol and diltiazem  · No anticoagulation due to her Karrie score was less than 2        Continuous opioid dependence (HCC)   Assessment & Plan    · Continue fentanyl  · P r n  Dilaudid for breakthrough pain        Severe protein-calorie malnutrition ([address] )   Assessment & Plan    · Poor access to food at home secondary to financial status  · Gaining weight here in the hospital with good appetite        Pneumonia   Assessment & Plan    · With sepsis, present on admission, and parapneumonic effusion  · Continue ceftriaxone and Flagyl per ID recommendations  · Cultures negative so far  · Vats this week in operating room  · Continue supplemental oxygen taper off as tolerated        * Loculated pleural effusion   Assessment & Plan    · parapneumonic effusion  · Status post chest tube placement on left  · Pleural effusion too small on the right chest tube  · For VATS this week              Pharmacologic: Pharmacologic VTE Prophylaxis contraindicated due to hematuria  Mechanical VTE Prophylaxis in Place: Yes    Patient Centered Rounds: I have performed bedside rounds with nursing staff today  Education and Discussions with Family / Patient: patient    Time Spent for Care: 15 minutes  More than 50% of total time spent on counseling and coordination of care as described above  Current Length of Stay: 7 day(s)    Current Patient Status: Inpatient   Certification Statement: The patient will continue to require additional inpatient hospital stay due to pending VATS    Discharge Plan / Estimated Discharge Date: when cleared by thoracic surgery      Code Status: Level 1 - Full Code      Subjective:   Reports cough with less purulence  SOB improved since admission   Reports severe chest pain which is constant and worsens with cough and movement  Objective:     Vitals:   Temp (24hrs), Av 1 °F (36 7 °C), Min:97 9 °F (36 6 °C), Max:98 4 °F (36 9 °C)    HR:  [63-78] 78  Resp:  [18-20] 20  BP: (103-122)/(56-72) 103/63  SpO2:  [94 %-98 %] 95 %  Body mass index is 20 08 kg/m²  Input and Output Summary (last 24 hours): Intake/Output Summary (Last 24 hours) at 17 1247  Last data filed at 17 0841   Gross per 24 hour   Intake              660 ml   Output              930 ml   Net             -270 ml       Physical Exam:     Physical Exam   Constitutional: He is oriented to person, place, and time  Thin, cachectic   HENT:   Head: Normocephalic and atraumatic  Eyes: EOM are normal  Pupils are equal, round, and reactive to light  Neck: Neck supple  No JVD present  Cardiovascular: Normal rate and regular rhythm  Pulmonary/Chest: He has no wheezes  He has no rales  Decreased breath sounds in bases bilateral   Abdominal: Soft  There is no tenderness  Musculoskeletal: He exhibits no edema  Neurological: He is alert and oriented to person, place, and time  Skin: Skin is warm and dry  Additional Data:     Labs:      Results from last 7 days  Lab Units 17  0438  17  0645   WBC Thousand/uL 16 80*  < > 32 21*   HEMOGLOBIN g/dL 10 7*  < > 12 9   HEMATOCRIT % 31 4*  < > 36 2*   PLATELETS Thousands/uL 814*  < > 732*   NEUTROS PCT %  --   --  83*   LYMPHS PCT %  --   --  9*   LYMPHO PCT % 4*  --   --    MONOS PCT %  --   --  8   MONO PCT MAN % 3*  --   --    EOS PCT %  --   --  0   EOSINO PCT MANUAL % 0  --   --    < > = values in this interval not displayed      Results from last 7 days  Lab Units 17  0448  17  0435   SODIUM mmol/L 136  < > 135*   POTASSIUM mmol/L 4 0  < > 3 6   CHLORIDE mmol/L 102  < > 102   CO2 mmol/L 28  < > 26   BUN mg/dL 22  < > 17   CREATININE mg/dL 0 49*  < > 0 57*   CALCIUM mg/dL 7 8*  < > 8 3   TOTAL PROTEIN g/dL  --   --  6 0*   BILIRUBIN TOTAL mg/dL  --   -- 0  51   ALK PHOS U/L  --   --  72   ALT U/L  --   --  14   AST U/L  --   --  9   GLUCOSE RANDOM mg/dL 88  < > 175*   < > = values in this interval not displayed  Recent Cultures (last 7 days):           Last 24 Hours Medication List:     alteplase (ACTIVASE) 8 mg in 0 9% NaCl 50 mL 8 mg Chest Tube Once   amiodarone 200 mg Oral TID With Meals   calcium carbonate-vitamin D 1 tablet Oral BID With Meals   [START ON 11/15/2017] cefazolin 1,000 mg Intravenous Once   cefTRIAXone 2,000 mg Intravenous Q24H   clobetasol 1 application Topical Z20U [facility]   diazepam 5 mg Oral HS   diltiazem 240 mg Oral Daily   docusate sodium 200 mg Oral BID   fentaNYL 1 patch Transdermal Q48H   guaiFENesin 600 mg Oral Q12H [facility]   HYDROmorphone 1 mg Intravenous Once   metoprolol tartrate 50 mg Oral Q12H [facility]   metroNIDAZOLE 500 mg Oral Q8H [facility]   tamsulosin 0 4 mg Oral Daily With Dinner        Today, Patient Was Seen By: Lázaro Rivero MD    ** Please Note: Dragon 360 Dictation voice to text software may have been used in the creation of this document   **

## 2017-11-14 NOTE — ASSESSMENT & PLAN NOTE
· Rate control with metoprolol and diltiazem  · No anticoagulation due to her Karrie score was less than 2

## 2017-11-14 NOTE — PLAN OF CARE
Problem: PHYSICAL THERAPY ADULT  Goal: Performs mobility at highest level of function for planned discharge setting  See evaluation for individualized goals  Treatment/Interventions: Functional transfer training, LE strengthening/ROM, Elevations, Therapeutic exercise, Endurance training, Bed mobility, Gait training, Spoke to nursing, Spoke to case management  Equipment Recommended: Elmer Gonzalez (w/ (A))       See flowsheet documentation for full assessment, interventions and recommendations  Outcome: Progressing  Prognosis: Guarded  Problem List: Decreased strength, Decreased endurance, Impaired balance, Decreased mobility, Orthopedic restrictions, Pain  Assessment: Pt performed seated/standing exercises with instruction for technique and frequent rest periods required  Pt able to transfer from chair with supervision with increased time required  Pt able to increase ambulation distance with rw however required frequent instruction for pacing/posture as well as multiple standing rests due to fatigue  Extended time required due to physician/nurses present intermittently during session  Pt seated on bedside chair upon completion of session  Pt will continue to benefit from rehab at d/c  Barriers to Discharge: Inaccessible home environment, Decreased caregiver support     Recommendation:  (rehab )     PT - OK to Discharge: Yes (to rehab when medically stable for d/c)    See flowsheet documentation for full assessment

## 2017-11-14 NOTE — ASSESSMENT & PLAN NOTE
· parapneumonic effusion  · Status post chest tube placement on left  · Pleural effusion too small on the right chest tube  · For VATS this week

## 2017-11-14 NOTE — PROGRESS NOTES
Cardiology Progress Note - Marina Jones 57 y o  male MRN: [id number]    Unit/Bed#: [facility] 420-01 Encounter: [id number]      Assessment:  1  Paroxysmal afib with RVR       Plan:    Completed IV load of amiodarone  Started Amiodarone orally 200 tid 11/11/17 due to recurrent afib  Transitioned to long acting formulation of Diltiazem for 11/12  Currently on diltiazem 240 mg daily, and metoprolol 50 mg bid in addition to amiodarone  As chads Vasc score is 0, he does not need to be on oral anticoagulation     Continue to monitor on telemetry  Subjective:   Patient seen and examined  No significant events overnight  Denies chest pain, pnd, orthopnea, abdominal pain, nausea    Objective:     Vitals: Blood pressure 103/63, pulse 78, temperature 98 °F (36 7 °C), temperature source Oral, resp  rate 20, height 5' 8" (1 727 m), weight 59 9 kg (132 lb 0 9 oz), SpO2 95 %  , Body mass index is 20 08 kg/m² , Orthostatic Blood Pressures    Flowsheet Row Most Recent Value   Blood Pressure  103/63 [Map 77] filed at 11/14/2017 1039   Patient Position - Orthostatic VS  Sitting filed at 11/14/2017 1039            Intake/Output Summary (Last 24 hours) at 11/14/17 1416  Last data filed at 11/14/17 1401   Gross per 24 hour   Intake              660 ml   Output              930 ml   Net             -270 ml       No significant arrhythmias seen on telemetry review      Physical Exam:    GEN: Marina Jones appears well, alert and oriented x 3  HEENT: anicteric  NECK: no jvd  HEART: regular rhythm, normal S1 and S2, no murmurs  LUNGS: clear to auscultation bilaterally  ABDOMEN:soft, no tenderness, no distention  EXTREMITIES: peripheral pulses normal; no edema  NEURO: no focal findings   SKIN: normal without suspicious lesions on exposed skin      Current Facility-Administered Medications:     acetaminophen (TYLENOL) tablet 650 mg, 650 mg, Oral, Q6H PRN, Betytorres Levines, DO    albuterol inhalation solution 2 5 mg, 2 5 mg, Nebulization, Q6H PRN, Herman Jimenez MD    alteplase (CATHFLO) 8 mg in sodium chloride 0 9 % 50 mL syringe, 8 mg, Chest Tube, Once, Sarah Kaminski MD    amiodarone tablet 200 mg, 200 mg, Oral, TID With Meals, Randalyn Cabot, MD, 200 mg at 11/14/17 1238    calcium carbonate-vitamin D (OSCAL-D) 500 mg-200 units per tablet 1 tablet, 1 tablet, Oral, BID With Meals, Herman Jimenez MD, 1 tablet at 11/14/17 0920    [START ON 11/15/2017] ceFAZolin (ANCEF) IVPB (premix) 1,000 mg, 1,000 mg, Intravenous, Once, Alex Elkins PA-C    cefTRIAXone (ROCEPHIN) 2,000 mg in dextrose 5 % 50 mL IVPB, 2,000 mg, Intravenous, Q24H, Johanna Shelton MD, Last Rate: 100 mL/hr at 11/13/17 1729, 2,000 mg at 11/13/17 1729    clobetasol (TEMOVATE) 8 26 % cream 1 application, 1 application, Topical, S90G [facility], Herman Jimenez MD, 1 application at 80/96/33 1241    diazepam (VALIUM) tablet 5 mg, 5 mg, Oral, HS, Deepika Bolton MD, 5 mg at 11/13/17 2310    diltiazem (CARDIZEM CD) 24 hr capsule 240 mg, 240 mg, Oral, Daily, Randalyn Cabot, MD, 240 mg at 11/14/17 0920    docusate sodium (COLACE) capsule 200 mg, 200 mg, Oral, BID, Herman Jimenez MD, 200 mg at 11/14/17 0919    fentaNYL (DURAGESIC) 50 mcg/hr TD 72 hr patch 1 patch, 1 patch, Transdermal, Q48H, Herman Jimenez MD, 1 patch at 11/13/17 0839    guaiFENesin (MUCINEX) 12 hr tablet 600 mg, 600 mg, Oral, Q12H [facility], [facility], , 600 mg at 11/14/17 0919    HYDROcodone-acetaminophen ([facility]) 5-325 mg per tablet 1 tablet, 1 tablet, Oral, Q6H PRN, Yonis Salcido, , 1 tablet at 11/14/17 0321    HYDROmorphone (DILAUDID) 1 mg/mL injection 1 mg, 1 mg, Intravenous, Once, Yonis Salcido,     HYDROmorphone (DILAUDID) 1 mg/mL injection 1 mg, 1 mg, Intravenous, Q4H PRN, Yonis Salcido, , 1 mg at 11/14/17 1356    metoprolol tartrate (LOPRESSOR) tablet 50 mg, 50 mg, Oral, Q12H [facility], Randalyn Cabot, MD, 50 mg at 11/14/17 0920    metroNIDAZOLE (FLAGYL) tablet 500 mg, 500 mg, Oral, Q8H [facility], Herman Jimenez MD, 500 mg at 11/14/17 1356   polyethylene glycol (MIRALAX) packet 17 g, 17 g, Oral, Daily PRN, Patrick Galvan MD    Insert peripheral IV, , , Once **AND** sodium chloride (PF) 0 9 % injection 3 mL, 3 mL, Intravenous, PRN, Patrick Galvan MD    tamsulosin (FLOMAX) capsule 0 4 mg, 0 4 mg, Oral, Daily With Dinner, Patrick Galvan MD, 0 4 mg at 11/13/17 1729    Labs & Results:    Lab Results   Component Value Date    TROPONINI <0 02 11/06/2017       Lab Results   Component Value Date    GLUCOSE 88 11/12/2017    CALCIUM 7 8 (L) 11/12/2017     11/12/2017    K 4 0 11/12/2017    CO2 28 11/12/2017     11/12/2017    BUN 22 11/12/2017    CREATININE 0 49 (L) 11/12/2017       Lab Results   Component Value Date    WBC 16 80 (H) 11/14/2017    HGB 10 7 (L) 11/14/2017    HCT 31 4 (L) 11/14/2017    MCV 86 11/14/2017     (H) 11/14/2017       Lab Results   Component Value Date    ALT 14 11/08/2017    AST 9 11/08/2017

## 2017-11-14 NOTE — ASSESSMENT & PLAN NOTE
· With sepsis, present on admission, and parapneumonic effusion  · Continue ceftriaxone and Flagyl per ID recommendations  · Cultures negative so far  · Vats this week in operating room  · Continue supplemental oxygen taper off as tolerated

## 2017-11-14 NOTE — PHYSICAL THERAPY NOTE
Physical Therapy Progress Note     11/14/17 1017   Pain Assessment   Pain Assessment 0-10   Pain Score 7   Pain Type Acute pain;Chronic pain   Pain Location Back;Rib cage   Pain Orientation Left;Bilateral  (left chest tube site)   Hospital Pain Intervention(s) Repositioned; Ambulation/increased activity   Response to Interventions tolerated   Restrictions/Precautions   Weight Bearing Precautions Per Order No   Other Precautions Fall Risk;Pain;Telemetry  (chest tube to wall suction when at rest)   General   Chart Reviewed Yes   Response to Previous Treatment Patient with no complaints from previous session  Family/Caregiver Present No   Cognition   Overall Cognitive Status WFL   Subjective   Subjective pt reports willingness to participate with therapy   Transfers   Sit to Stand 5  Supervision   Additional items Assist x 1; Armrests; Increased time required;Verbal cues   Stand to Sit 5  Supervision   Additional items Assist x 1; Armrests; Verbal cues   Ambulation/Elevation   Gait pattern Forward Flexion;Decreased foot clearance; Excessively slow; Short stride; Inconsistent long   Gait Assistance 4  Minimal assist   Additional items Assist x 1;Verbal cues  (second present for safety)   Assistive Device Rolling walker   Distance 200'x2 with standing rest between and multiple standing rests during due to fatigue/pain   Stair Management Assistance Not tested   Balance   Static Sitting Fair   Static Standing Fair -  (with support of rw)   Ambulatory Poor +  (with support of rw)   Activity Tolerance   Activity Tolerance Patient limited by fatigue;Patient limited by pain   Nurse Made Aware nurses Marcy/Vinny notified of pt's mobility status   Exercises   Hip Adduction Sitting;20 reps;AROM; Bilateral  (adductor sets with use of pillow )   Knee AROM Long Arc Quad Sitting;5 reps;AROM; Bilateral  (x3 sets)   Ankle Pumps Sitting;Standing;15 reps;AROM; Bilateral;20 reps  (heel/toe raises x20 sit and x15 standing)   Marching Standing;15 reps;AROM; Bilateral   Assessment   Prognosis Guarded   Problem List Decreased strength;Decreased endurance; Impaired balance;Decreased mobility;Orthopedic restrictions;Pain   Assessment Pt performed seated/standing exercises with instruction for technique and frequent rest periods required  Pt able to transfer from chair with supervision with increased time required  Pt able to increase ambulation distance with rw however required frequent instruction for pacing/posture as well as multiple standing rests due to fatigue  Extended time required due to physician/nurses present intermittently during session  Pt seated on bedside chair upon completion of session  Pt will continue to benefit from rehab at d/c   Barriers to Discharge Inaccessible home environment;Decreased caregiver support   Goals   Patient Goals to feel better and get the procedure over with   LTG Expiration Date 11/24/17   Treatment Day 2   Plan   Treatment/Interventions Functional transfer training;LE strengthening/ROM; Therapeutic exercise; Endurance training;Patient/family training;Gait training;Spoke to nursing;Spoke to MD   Progress Progressing toward goals   PT Frequency 5x/wk  (bid prn)   Recommendation   Recommendation (rehab )   Equipment Recommended Walker  (roller walker)   PT - OK to Discharge Yes  (to rehab when medically stable for d/c)     Grace Baker, PTA

## 2017-11-15 ENCOUNTER — APPOINTMENT (INPATIENT)
Dept: RADIOLOGY | Facility: HOSPITAL | Age: 62
DRG: 853 | End: 2017-11-15
Payer: MEDICARE

## 2017-11-15 ENCOUNTER — ANESTHESIA (INPATIENT)
Dept: PERIOP | Facility: HOSPITAL | Age: 62
DRG: 853 | End: 2017-11-15
Payer: MEDICARE

## 2017-11-15 PROBLEM — E87.1 HYPONATREMIA: Status: ACTIVE | Noted: 2017-11-15

## 2017-11-15 LAB
ANION GAP SERPL CALCULATED.3IONS-SCNC: 5 MMOL/L (ref 4–13)
ANION GAP SERPL CALCULATED.3IONS-SCNC: 7 MMOL/L (ref 4–13)
BASOPHILS # BLD MANUAL: 0 THOUSAND/UL (ref 0–0.1)
BASOPHILS NFR MAR MANUAL: 0 % (ref 0–1)
BUN SERPL-MCNC: 15 MG/DL (ref 5–25)
BUN SERPL-MCNC: 18 MG/DL (ref 5–25)
CALCIUM SERPL-MCNC: 7.8 MG/DL (ref 8.3–10.1)
CALCIUM SERPL-MCNC: 8.3 MG/DL (ref 8.3–10.1)
CHLORIDE SERPL-SCNC: 102 MMOL/L (ref 100–108)
CHLORIDE SERPL-SCNC: 99 MMOL/L (ref 100–108)
CO2 SERPL-SCNC: 28 MMOL/L (ref 21–32)
CO2 SERPL-SCNC: 28 MMOL/L (ref 21–32)
CREAT SERPL-MCNC: 0.54 MG/DL (ref 0.6–1.3)
CREAT SERPL-MCNC: 0.62 MG/DL (ref 0.6–1.3)
EOSINOPHIL # BLD MANUAL: 0 THOUSAND/UL (ref 0–0.4)
EOSINOPHIL NFR BLD MANUAL: 0 % (ref 0–6)
ERYTHROCYTE [DISTWIDTH] IN BLOOD BY AUTOMATED COUNT: 14.3 % (ref 11.6–15.1)
ERYTHROCYTE [DISTWIDTH] IN BLOOD BY AUTOMATED COUNT: 14.3 % (ref 11.6–15.1)
GFR SERPL CREATININE-BSD FRML MDRD: 106 ML/MIN/1.73SQ M
GFR SERPL CREATININE-BSD FRML MDRD: 113 ML/MIN/1.73SQ M
GLUCOSE SERPL-MCNC: 107 MG/DL (ref 65–140)
GLUCOSE SERPL-MCNC: 90 MG/DL (ref 65–140)
HCT VFR BLD AUTO: 31.2 % (ref 36.5–49.3)
HCT VFR BLD AUTO: 35 % (ref 36.5–49.3)
HGB BLD-MCNC: 10.9 G/DL (ref 12–17)
HGB BLD-MCNC: 12 G/DL (ref 12–17)
LYMPHOCYTES # BLD AUTO: 11 % (ref 14–44)
LYMPHOCYTES # BLD AUTO: 2.25 THOUSAND/UL (ref 0.6–4.47)
MCH RBC QN AUTO: 29.2 PG (ref 26.8–34.3)
MCH RBC QN AUTO: 29.9 PG (ref 26.8–34.3)
MCHC RBC AUTO-ENTMCNC: 34.3 G/DL (ref 31.4–37.4)
MCHC RBC AUTO-ENTMCNC: 34.9 G/DL (ref 31.4–37.4)
MCV RBC AUTO: 85 FL (ref 82–98)
MCV RBC AUTO: 86 FL (ref 82–98)
METAMYELOCYTES NFR BLD MANUAL: 1 % (ref 0–1)
MONOCYTES # BLD AUTO: 1.43 THOUSAND/UL (ref 0–1.22)
MONOCYTES NFR BLD: 7 % (ref 4–12)
NEUTROPHILS # BLD MANUAL: 16.54 THOUSAND/UL (ref 1.85–7.62)
NEUTS SEG NFR BLD AUTO: 81 % (ref 43–75)
NRBC BLD AUTO-RTO: 0 /100 WBCS
PLATELET # BLD AUTO: 814 THOUSANDS/UL (ref 149–390)
PLATELET # BLD AUTO: 918 THOUSANDS/UL (ref 149–390)
PLATELET BLD QL SMEAR: ABNORMAL
PMV BLD AUTO: 7.5 FL (ref 8.9–12.7)
PMV BLD AUTO: 7.9 FL (ref 8.9–12.7)
POTASSIUM SERPL-SCNC: 3.8 MMOL/L (ref 3.5–5.3)
POTASSIUM SERPL-SCNC: 4.1 MMOL/L (ref 3.5–5.3)
RBC # BLD AUTO: 3.64 MILLION/UL (ref 3.88–5.62)
RBC # BLD AUTO: 4.11 MILLION/UL (ref 3.88–5.62)
RBC MORPH BLD: NORMAL
SODIUM SERPL-SCNC: 132 MMOL/L (ref 136–145)
SODIUM SERPL-SCNC: 137 MMOL/L (ref 136–145)
WBC # BLD AUTO: 20.42 THOUSAND/UL (ref 4.31–10.16)
WBC # BLD AUTO: 26.92 THOUSAND/UL (ref 4.31–10.16)

## 2017-11-15 PROCEDURE — 87205 SMEAR GRAM STAIN: CPT | Performed by: THORACIC SURGERY (CARDIOTHORACIC VASCULAR SURGERY)

## 2017-11-15 PROCEDURE — 80048 BASIC METABOLIC PNL TOTAL CA: CPT | Performed by: INTERNAL MEDICINE

## 2017-11-15 PROCEDURE — 85027 COMPLETE CBC AUTOMATED: CPT | Performed by: INTERNAL MEDICINE

## 2017-11-15 PROCEDURE — 87075 CULTR BACTERIA EXCEPT BLOOD: CPT | Performed by: THORACIC SURGERY (CARDIOTHORACIC VASCULAR SURGERY)

## 2017-11-15 PROCEDURE — 87070 CULTURE OTHR SPECIMN AEROBIC: CPT | Performed by: THORACIC SURGERY (CARDIOTHORACIC VASCULAR SURGERY)

## 2017-11-15 PROCEDURE — 80048 BASIC METABOLIC PNL TOTAL CA: CPT | Performed by: PHYSICIAN ASSISTANT

## 2017-11-15 PROCEDURE — 71010 HB CHEST X-RAY 1 VIEW FRONTAL (PORTABLE): CPT

## 2017-11-15 PROCEDURE — 87102 FUNGUS ISOLATION CULTURE: CPT | Performed by: THORACIC SURGERY (CARDIOTHORACIC VASCULAR SURGERY)

## 2017-11-15 PROCEDURE — 0BNL4ZZ RELEASE LEFT LUNG, PERCUTANEOUS ENDOSCOPIC APPROACH: ICD-10-PCS | Performed by: THORACIC SURGERY (CARDIOTHORACIC VASCULAR SURGERY)

## 2017-11-15 PROCEDURE — 85007 BL SMEAR W/DIFF WBC COUNT: CPT | Performed by: INTERNAL MEDICINE

## 2017-11-15 PROCEDURE — 87176 TISSUE HOMOGENIZATION CULTR: CPT | Performed by: THORACIC SURGERY (CARDIOTHORACIC VASCULAR SURGERY)

## 2017-11-15 PROCEDURE — 88305 TISSUE EXAM BY PATHOLOGIST: CPT | Performed by: THORACIC SURGERY (CARDIOTHORACIC VASCULAR SURGERY)

## 2017-11-15 PROCEDURE — 87206 SMEAR FLUORESCENT/ACID STAI: CPT | Performed by: THORACIC SURGERY (CARDIOTHORACIC VASCULAR SURGERY)

## 2017-11-15 PROCEDURE — 94760 N-INVAS EAR/PLS OXIMETRY 1: CPT

## 2017-11-15 PROCEDURE — 85027 COMPLETE CBC AUTOMATED: CPT | Performed by: PHYSICIAN ASSISTANT

## 2017-11-15 PROCEDURE — 87116 MYCOBACTERIA CULTURE: CPT | Performed by: THORACIC SURGERY (CARDIOTHORACIC VASCULAR SURGERY)

## 2017-11-15 RX ORDER — ONDANSETRON 2 MG/ML
INJECTION INTRAMUSCULAR; INTRAVENOUS AS NEEDED
Status: DISCONTINUED | OUTPATIENT
Start: 2017-11-15 | End: 2017-11-15 | Stop reason: SURG

## 2017-11-15 RX ORDER — KETAMINE HYDROCHLORIDE 50 MG/ML
INJECTION, SOLUTION, CONCENTRATE INTRAMUSCULAR; INTRAVENOUS AS NEEDED
Status: DISCONTINUED | OUTPATIENT
Start: 2017-11-15 | End: 2017-11-15 | Stop reason: SURG

## 2017-11-15 RX ORDER — ACETAMINOPHEN 325 MG/1
650 TABLET ORAL EVERY 6 HOURS PRN
Status: DISCONTINUED | OUTPATIENT
Start: 2017-11-15 | End: 2017-11-22 | Stop reason: HOSPADM

## 2017-11-15 RX ORDER — ROCURONIUM BROMIDE 10 MG/ML
INJECTION, SOLUTION INTRAVENOUS AS NEEDED
Status: DISCONTINUED | OUTPATIENT
Start: 2017-11-15 | End: 2017-11-15 | Stop reason: SURG

## 2017-11-15 RX ORDER — ONDANSETRON 2 MG/ML
4 INJECTION INTRAMUSCULAR; INTRAVENOUS ONCE AS NEEDED
Status: DISCONTINUED | OUTPATIENT
Start: 2017-11-15 | End: 2017-11-15 | Stop reason: HOSPADM

## 2017-11-15 RX ORDER — GLYCOPYRROLATE 0.2 MG/ML
INJECTION INTRAMUSCULAR; INTRAVENOUS AS NEEDED
Status: DISCONTINUED | OUTPATIENT
Start: 2017-11-15 | End: 2017-11-15 | Stop reason: SURG

## 2017-11-15 RX ORDER — LIDOCAINE HYDROCHLORIDE 10 MG/ML
INJECTION, SOLUTION INFILTRATION; PERINEURAL AS NEEDED
Status: DISCONTINUED | OUTPATIENT
Start: 2017-11-15 | End: 2017-11-15 | Stop reason: SURG

## 2017-11-15 RX ORDER — SODIUM CHLORIDE 9 MG/ML
75 INJECTION, SOLUTION INTRAVENOUS CONTINUOUS
Status: DISCONTINUED | OUTPATIENT
Start: 2017-11-15 | End: 2017-11-16

## 2017-11-15 RX ORDER — MIDAZOLAM HYDROCHLORIDE 1 MG/ML
INJECTION INTRAMUSCULAR; INTRAVENOUS AS NEEDED
Status: DISCONTINUED | OUTPATIENT
Start: 2017-11-15 | End: 2017-11-15 | Stop reason: SURG

## 2017-11-15 RX ORDER — EPHEDRINE SULFATE 50 MG/ML
INJECTION, SOLUTION INTRAVENOUS AS NEEDED
Status: DISCONTINUED | OUTPATIENT
Start: 2017-11-15 | End: 2017-11-15 | Stop reason: SURG

## 2017-11-15 RX ORDER — PROPOFOL 10 MG/ML
INJECTION, EMULSION INTRAVENOUS AS NEEDED
Status: DISCONTINUED | OUTPATIENT
Start: 2017-11-15 | End: 2017-11-15 | Stop reason: SURG

## 2017-11-15 RX ORDER — FENTANYL CITRATE/PF 50 MCG/ML
25 SYRINGE (ML) INJECTION
Status: COMPLETED | OUTPATIENT
Start: 2017-11-15 | End: 2017-11-15

## 2017-11-15 RX ORDER — FENTANYL CITRATE 50 UG/ML
INJECTION, SOLUTION INTRAMUSCULAR; INTRAVENOUS AS NEEDED
Status: DISCONTINUED | OUTPATIENT
Start: 2017-11-15 | End: 2017-11-15 | Stop reason: SURG

## 2017-11-15 RX ORDER — SODIUM CHLORIDE 9 MG/ML
INJECTION, SOLUTION INTRAVENOUS CONTINUOUS PRN
Status: DISCONTINUED | OUTPATIENT
Start: 2017-11-15 | End: 2017-11-15 | Stop reason: SURG

## 2017-11-15 RX ADMIN — CEFAZOLIN SODIUM 1000 MG: 1 SOLUTION INTRAVENOUS at 18:20

## 2017-11-15 RX ADMIN — SODIUM CHLORIDE 75 ML/HR: 0.9 INJECTION, SOLUTION INTRAVENOUS at 21:00

## 2017-11-15 RX ADMIN — LIDOCAINE HYDROCHLORIDE 50 MG: 10 INJECTION, SOLUTION INFILTRATION; PERINEURAL at 18:00

## 2017-11-15 RX ADMIN — FENTANYL CITRATE 25 MCG: 50 INJECTION, SOLUTION INTRAMUSCULAR; INTRAVENOUS at 19:10

## 2017-11-15 RX ADMIN — TAMSULOSIN HYDROCHLORIDE 0.4 MG: 0.4 CAPSULE ORAL at 22:58

## 2017-11-15 RX ADMIN — HYDROCODONE BITARTRATE AND ACETAMINOPHEN 1 TABLET: 5; 325 TABLET ORAL at 07:40

## 2017-11-15 RX ADMIN — DOCUSATE SODIUM 200 MG: 100 CAPSULE, LIQUID FILLED ORAL at 09:27

## 2017-11-15 RX ADMIN — HYDROCODONE BITARTRATE AND ACETAMINOPHEN 1 TABLET: 5; 325 TABLET ORAL at 22:59

## 2017-11-15 RX ADMIN — FENTANYL CITRATE 25 MCG: 50 INJECTION, SOLUTION INTRAMUSCULAR; INTRAVENOUS at 20:11

## 2017-11-15 RX ADMIN — GUAIFENESIN 600 MG: 600 TABLET, EXTENDED RELEASE ORAL at 09:27

## 2017-11-15 RX ADMIN — ROCURONIUM BROMIDE 10 MG: 10 INJECTION INTRAVENOUS at 18:28

## 2017-11-15 RX ADMIN — METRONIDAZOLE 500 MG: 500 TABLET ORAL at 22:59

## 2017-11-15 RX ADMIN — FENTANYL CITRATE 50 MCG: 50 INJECTION, SOLUTION INTRAMUSCULAR; INTRAVENOUS at 19:32

## 2017-11-15 RX ADMIN — EPHEDRINE SULFATE 10 MG: 50 INJECTION, SOLUTION INTRAMUSCULAR; INTRAVENOUS; SUBCUTANEOUS at 18:02

## 2017-11-15 RX ADMIN — FENTANYL CITRATE 25 MCG: 50 INJECTION, SOLUTION INTRAMUSCULAR; INTRAVENOUS at 19:56

## 2017-11-15 RX ADMIN — AMIODARONE HYDROCHLORIDE 200 MG: 200 TABLET ORAL at 23:03

## 2017-11-15 RX ADMIN — CLOBETASOL PROPIONATE 1 APPLICATION: 0.5 CREAM TOPICAL at 09:36

## 2017-11-15 RX ADMIN — AMIODARONE HYDROCHLORIDE 200 MG: 200 TABLET ORAL at 07:42

## 2017-11-15 RX ADMIN — KETAMINE HYDROCHLORIDE 30 MG: 50 INJECTION, SOLUTION INTRAMUSCULAR; INTRAVENOUS at 18:24

## 2017-11-15 RX ADMIN — HYDROMORPHONE HYDROCHLORIDE 0.5 MG: 1 INJECTION, SOLUTION INTRAMUSCULAR; INTRAVENOUS; SUBCUTANEOUS at 20:25

## 2017-11-15 RX ADMIN — GLYCOPYRROLATE 0.4 MG: 0.2 INJECTION, SOLUTION INTRAMUSCULAR; INTRAVENOUS at 19:12

## 2017-11-15 RX ADMIN — FENTANYL 1 PATCH: 50 PATCH, EXTENDED RELEASE TRANSDERMAL at 09:33

## 2017-11-15 RX ADMIN — MIDAZOLAM HYDROCHLORIDE 2 MG: 1 INJECTION, SOLUTION INTRAMUSCULAR; INTRAVENOUS at 17:43

## 2017-11-15 RX ADMIN — FENTANYL CITRATE 25 MCG: 50 INJECTION, SOLUTION INTRAMUSCULAR; INTRAVENOUS at 19:25

## 2017-11-15 RX ADMIN — CEFTRIAXONE SODIUM 2000 MG: 10 INJECTION, POWDER, FOR SOLUTION INTRAVENOUS at 23:18

## 2017-11-15 RX ADMIN — HYDROMORPHONE HYDROCHLORIDE 0.5 MG: 1 INJECTION, SOLUTION INTRAMUSCULAR; INTRAVENOUS; SUBCUTANEOUS at 20:35

## 2017-11-15 RX ADMIN — HYDROCODONE BITARTRATE AND ACETAMINOPHEN 1 TABLET: 5; 325 TABLET ORAL at 13:08

## 2017-11-15 RX ADMIN — SODIUM CHLORIDE: 0.9 INJECTION, SOLUTION INTRAVENOUS at 18:13

## 2017-11-15 RX ADMIN — NEOSTIGMINE METHYLSULFATE 3 MG: 1 INJECTION, SOLUTION INTRAMUSCULAR; INTRAVENOUS; SUBCUTANEOUS at 19:12

## 2017-11-15 RX ADMIN — HYDROMORPHONE HYDROCHLORIDE 0.5 MG: 1 INJECTION, SOLUTION INTRAMUSCULAR; INTRAVENOUS; SUBCUTANEOUS at 20:30

## 2017-11-15 RX ADMIN — SODIUM CHLORIDE: 0.9 INJECTION, SOLUTION INTRAVENOUS at 17:40

## 2017-11-15 RX ADMIN — HYDROMORPHONE HYDROCHLORIDE 1 MG: 1 INJECTION, SOLUTION INTRAMUSCULAR; INTRAVENOUS; SUBCUTANEOUS at 02:58

## 2017-11-15 RX ADMIN — DILTIAZEM HYDROCHLORIDE 240 MG: 240 CAPSULE, COATED, EXTENDED RELEASE ORAL at 09:27

## 2017-11-15 RX ADMIN — FENTANYL CITRATE 25 MCG: 50 INJECTION, SOLUTION INTRAMUSCULAR; INTRAVENOUS at 20:01

## 2017-11-15 RX ADMIN — AMIODARONE HYDROCHLORIDE 200 MG: 200 TABLET ORAL at 13:08

## 2017-11-15 RX ADMIN — METOPROLOL TARTRATE 50 MG: 50 TABLET ORAL at 09:26

## 2017-11-15 RX ADMIN — ROCURONIUM BROMIDE 40 MG: 10 INJECTION INTRAVENOUS at 18:00

## 2017-11-15 RX ADMIN — ONDANSETRON 4 MG: 2 INJECTION INTRAMUSCULAR; INTRAVENOUS at 18:15

## 2017-11-15 RX ADMIN — HYDROMORPHONE HYDROCHLORIDE 0.5 MG: 1 INJECTION, SOLUTION INTRAMUSCULAR; INTRAVENOUS; SUBCUTANEOUS at 20:20

## 2017-11-15 RX ADMIN — METRONIDAZOLE 500 MG: 500 TABLET ORAL at 05:43

## 2017-11-15 RX ADMIN — PROPOFOL 140 MG: 10 INJECTION, EMULSION INTRAVENOUS at 18:00

## 2017-11-15 RX ADMIN — DEXAMETHASONE SODIUM PHOSPHATE 10 MG: 10 INJECTION INTRAMUSCULAR; INTRAVENOUS at 18:15

## 2017-11-15 RX ADMIN — HYDROMORPHONE HYDROCHLORIDE 1 MG: 1 INJECTION, SOLUTION INTRAMUSCULAR; INTRAVENOUS; SUBCUTANEOUS at 09:20

## 2017-11-15 RX ADMIN — DIAZEPAM 5 MG: 5 TABLET ORAL at 22:59

## 2017-11-15 RX ADMIN — METRONIDAZOLE 500 MG: 500 TABLET ORAL at 13:07

## 2017-11-15 RX ADMIN — SODIUM CHLORIDE 75 ML/HR: 0.9 INJECTION, SOLUTION INTRAVENOUS at 09:22

## 2017-11-15 RX ADMIN — FENTANYL CITRATE 25 MCG: 50 INJECTION, SOLUTION INTRAMUSCULAR; INTRAVENOUS at 20:06

## 2017-11-15 RX ADMIN — EPHEDRINE SULFATE 10 MG: 50 INJECTION, SOLUTION INTRAMUSCULAR; INTRAVENOUS; SUBCUTANEOUS at 18:41

## 2017-11-15 NOTE — PHYSICAL THERAPY NOTE
Physical Therapy Cancellation Note  Cancelled PT session this p m  As patient refused reporting he is going for surgery soon so did not want to do anything now  Will continue to follow as able

## 2017-11-15 NOTE — PROGRESS NOTES
Progress Note - Infectious Disease   Alver Naif 57 y o  male MRN: [id number]  Unit/Bed#: OR POOL Encounter: [id number]      Impression/Plan:  1   Severe sepsis-POA   Fever, leukocytosis, lactic acidosis   Likely all secondary to 2   Clinically improved with resolution of the fever   Thus far the patient is tolerating the antibiotics well without difficulty   The cultures have remained negative  Nhan Mean has remained hemodynamically stable and nontoxic despite the systemic illness   He has tolerated the ceftriaxone without difficulty   He is not bacteremic  Fever has resolved  -continue ceftriaxone and Flagyl for now at current dose  -plan to continue the antibiotics through the time of decortication and follow-up of the operative cultures  -monitor CBC with diff and creatinine  -supportive care     2   Left-sided pneumonia-appears to be an acute acquired pneumoniae with some risk of aspiration   Patient does have poor dentition which also would increase the risk of anaerobes   -antibiotics as above  -monitor respiratory status  -Pulmonary follow-up     3   Loculated left pleural effusion-status post chest tube placement   Does not appear to be an empyema based upon the preliminary results   The patient has had some drainage of the pleural fluid via chest tube and instillation of tPA, however other still significant amount of fluid   The pleural cultures remain negative   Inadequate drainage despite up sizing the chest tube and tPA  -chest tube drainage  -thoracic follow-up  -Pulmonary follow-up  -patient for decortication today  -continue antibiotics for now as above     4   Acute urinary retention-Camp has been removed   Doubt UTI   Patient is now on Flomax  -medical treatment  -Flomax  -no additional ID workup for now     5   Severe osteoporosis with kyphosis-may impact the patient's pulmonary function and mechanics    -workup and management as per the primary service     6   Atrial fibrillation with rapid ventricular response-rate control as per Cardiology     7   Leukocytosis-persistent  Tomas Quirk multifactorial   No new source appreciated   Suspect a pulmonary process and undrained pleural fluid is the driving force   -monitor GFR  -antibiotics as above  -no additional ID workup for now    Antibiotics:  Ceftriaxone 8  Flagyl 9  Subjective:  Patient has no fever, chills, sweats; no nausea, vomiting, diarrhea; no cough, shortness of breath; no increase pain  No new symptoms  He is anxious to get his surgery done  Objective:  Vitals:  HR:  [63-91] 71  Resp:  [18-22] 18  BP: (102-126)/(59-75) 102/59  SpO2:  [95 %-98 %] 96 %  Temp (24hrs), Av 3 °F (36 8 °C), Min:97 7 °F (36 5 °C), Max:99 °F (37 2 °C)  Current: Temperature: 98 5 °F (36 9 °C)    Physical Exam:   General Appearance:  Alert, nontoxic, no acute distress  Throat: Oropharynx moist without lesions  Lungs:   Decreased breath sounds left greater than right; respirations unlabored  Left-sided chest tube in place  Heart:  RRR; no murmur, rub or gallop   Abdomen:   Soft, non-tender, non-distended, positive bowel sounds  Extremities: No clubbing, cyanosis or edema   Skin: No new rashes or lesions  No draining wounds noted         Labs, Imaging, & Other studies:   All pertinent labs and imaging studies were personally reviewed    Results from last 7 days  Lab Units 11/15/17  0655 17  0438 17  0448   WBC Thousand/uL 20 42* 16 80* 20 26*   HEMOGLOBIN g/dL 12 0 10 7* 11 4*   PLATELETS Thousands/uL 918* 814* 675*       Results from last 7 days  Lab Units 11/15/17  0655 17  0448 17  0645   SODIUM mmol/L 132* 136 134*   POTASSIUM mmol/L 4 1 4 0 4 4   CHLORIDE mmol/L 99* 102 101   CO2 mmol/L 28 28 25   ANION GAP mmol/L 5 6 8   BUN mg/dL 18 22 23   CREATININE mg/dL 0 62 0 49* 0 67   EGFR ml/min/1 73sq m 106 117 103   GLUCOSE RANDOM mg/dL 90 88 116   CALCIUM mg/dL 8 3 7 8* 8 7

## 2017-11-15 NOTE — ASSESSMENT & PLAN NOTE
· Parapneumonic effusion  · Status post chest tube placement on left  · Pleural effusion too small on the right chest tube  · For VATS today

## 2017-11-15 NOTE — PROGRESS NOTES
Pt states he is in a constant 10/10 pain- demanding IV dilauded be given prior to po meds  Educated multiple times of need to give po meds prior to dilauded  Dilauded given for 10/10 pain 1 hour after po meds- in the same conversation as pt is expressing his 10/10 pain everywhere (started naming his body parts starting with eyebrows down to toes)- he is asking for the student nurses to come in to assist him in calisthenics

## 2017-11-15 NOTE — PROGRESS NOTES
Progress Note - Malika Smith 57 y o  male MRN: [id number]    Unit/Bed#: [facility] 420-01 Encounter: [id number]        Pneumonia   Assessment & Plan    · With sepsis, present on admission, and parapneumonic effusion  · Continue ceftriaxone and Flagyl per ID recommendations  · Cultures negative so far  · Vats today  · Continue supplemental oxygen taper off as tolerated        * Loculated pleural effusion   Assessment & Plan    · Parapneumonic effusion  · Status post chest tube placement on left  · Pleural effusion too small on the right chest tube  · For VATS today        Hyponatremia   Assessment & Plan    · Due to poor intake and NPO status  · Start gentle IV saline  · Repeat BMP in AM        Atrial fibrillation with RVR (HCC)   Assessment & Plan    · Rate control with metoprolol and diltiazem  · No anticoagulation due to her Karrie score was less than 2        Continuous opioid dependence (HCC)   Assessment & Plan    · Continue fentanyl patch  · P r n  Dilaudid for breakthrough pain        Severe protein-calorie malnutrition ([address] )   Assessment & Plan    · Poor access to food at home secondary to financial status  · Gaining weight here in the hospital with good appetite              Pharmacologic: Pharmacologic VTE Prophylaxis contraindicated due to surgery today  Mechanical VTE Prophylaxis in Place: Yes    Patient Centered Rounds: I have performed bedside rounds with nursing staff today  Education and Discussions with Family / Patient: patient, plan of care    Time Spent for Care: 20 minutes  More than 50% of total time spent on counseling and coordination of care as described above      Current Length of Stay: 8 day(s)    Current Patient Status: Inpatient   Certification Statement: The patient will continue to require additional inpatient hospital stay due to surgery today    Discharge Plan / Estimated Discharge Date: 3-4 days      Code Status: Level 1 - Full Code      Subjective:   "I feel a little better today" cough with sputum, chest pain present     Objective:     Vitals:   Temp (24hrs), Av 4 °F (36 9 °C), Min:98 °F (36 7 °C), Max:99 °F (37 2 °C)    HR:  [63-91] 75  Resp:  [18-20] 20  BP: (103-126)/(62-75) 114/62  SpO2:  [95 %-98 %] 96 %  Body mass index is 20 55 kg/m²  Input and Output Summary (last 24 hours): Intake/Output Summary (Last 24 hours) at 11/15/17 0950  Last data filed at 11/15/17 0330   Gross per 24 hour   Intake              600 ml   Output              700 ml   Net             -100 ml       Physical Exam:     Physical Exam   Constitutional: He is oriented to person, place, and time  Thin, malnourished   HENT:   Head: Normocephalic and atraumatic  Eyes: EOM are normal  Pupils are equal, round, and reactive to light  Neck: Neck supple  No JVD present  Cardiovascular: Normal rate and regular rhythm  Pulmonary/Chest: Effort normal    Decreased breath sounds in bases   Abdominal: Soft  There is no tenderness  Musculoskeletal: Normal range of motion  He exhibits no edema  Neurological: He is alert and oriented to person, place, and time  Skin: Skin is warm and dry  Additional Data:     Labs:      Results from last 7 days  Lab Units 11/15/17  0655  17  0645   WBC Thousand/uL 20 42*  < > 32 21*   HEMOGLOBIN g/dL 12 0  < > 12 9   HEMATOCRIT % 35 0*  < > 36 2*   PLATELETS Thousands/uL 918*  < > 732*   NEUTROS PCT %  --   --  83*   LYMPHS PCT %  --   --  9*   LYMPHO PCT % 11*  < >  --    MONOS PCT %  --   --  8   MONO PCT MAN % 7  < >  --    EOS PCT %  --   --  0   EOSINO PCT MANUAL % 0  < >  --    < > = values in this interval not displayed      Results from last 7 days  Lab Units 11/15/17  0655   SODIUM mmol/L 132*   POTASSIUM mmol/L 4 1   CHLORIDE mmol/L 99*   CO2 mmol/L 28   BUN mg/dL 18   CREATININE mg/dL 0 62   CALCIUM mg/dL 8 3   GLUCOSE RANDOM mg/dL 90             Recent Cultures (last 7 days):           Last 24 Hours Medication List:     alteplase (ACTIVASE) 8 mg in 0 9% NaCl 50 mL 8 mg Chest Tube Once   amiodarone 200 mg Oral TID With Meals   calcium carbonate-vitamin D 1 tablet Oral BID With Meals   cefazolin 1,000 mg Intravenous Once   cefTRIAXone 2,000 mg Intravenous Q24H   clobetasol 1 application Topical V02N MICHELET   diazepam 5 mg Oral HS   diltiazem 240 mg Oral Daily   docusate sodium 200 mg Oral BID   fentaNYL 1 patch Transdermal Q48H   guaiFENesin 600 mg Oral Q12H [facility]   HYDROmorphone 1 mg Intravenous Once   metoprolol tartrate 50 mg Oral Q12H [facility]   metroNIDAZOLE 500 mg Oral Q8H [facility]   tamsulosin 0 4 mg Oral Daily With Dinner        Today, Patient Was Seen By: Adrian Nunez MD    ** Please Note: Dragon 360 Dictation voice to text software may have been used in the creation of this document   **

## 2017-11-15 NOTE — CASE MANAGEMENT
Continued Stay Review    Date: 11/15/17    Vital Signs: /64   Pulse 72   Temp 97 7 °F (36 5 °C) (Oral)   Resp 22   Ht 5' 8" (1 727 m)   Wt 61 3 kg (135 lb 2 3 oz)   SpO2 96%   BMI 20 55 kg/m²     Medications:   Scheduled Meds:   alteplase (ACTIVASE) 8 mg in 0 9% NaCl 50 mL 8 mg Chest Tube Once   amiodarone 200 mg Oral TID With Meals   calcium carbonate-vitamin D 1 tablet Oral BID With Meals   cefazolin 1,000 mg Intravenous Once   cefTRIAXone 2,000 mg Intravenous Q24H   clobetasol 1 application Topical I98T MICHELET   diazepam 5 mg Oral HS   diltiazem 240 mg Oral Daily   docusate sodium 200 mg Oral BID   fentaNYL 1 patch Transdermal Q48H   guaiFENesin 600 mg Oral Q12H MICHELET   HYDROmorphone 1 mg Intravenous Once   metoprolol tartrate 50 mg Oral Q12H [address]   metroNIDAZOLE 500 mg Oral Q8H [address]   tamsulosin 0 4 mg Oral Daily With Dinner     Continuous Infusions:   sodium chloride 75 mL/hr Last Rate: 75 mL/hr (11/15/17 0922)     PRN Meds:   acetaminophen    HYDROcodone-acetaminophen    HYDROmorphone    polyethylene glycol    Insert peripheral IV **AND** sodium chloride (PF)    Abnormal Labs/Diagnostic Results:    CL 99  WBC 20 42     Age/Sex: 57 y o  male     Assessment/Plan:   Pneumonia   Assessment & Plan     · With sepsis, present on admission, and parapneumonic effusion  · Continue ceftriaxone and Flagyl per ID recommendations  · Cultures negative so far  · Vats today  · Continue supplemental oxygen taper off as tolerated       * Loculated pleural effusion   Assessment & Plan     · Parapneumonic effusion  · Status post chest tube placement on left  · Pleural effusion too small on the right chest tube  · For VATS today       Hyponatremia   Assessment & Plan     · Due to poor intake and NPO status  · Start gentle IV saline  · Repeat BMP in AM       Atrial fibrillation with RVR (HCC)   Assessment & Plan     · Rate control with metoprolol and diltiazem  · No anticoagulation due to her Karrie score was less than 2       Continuous opioid dependence (HCC)   Assessment & Plan     · Continue fentanyl patch  · P r n   Dilaudid for breakthrough pain       Severe protein-calorie malnutrition ([address] )   Assessment & Plan     · Poor access to food at home secondary to financial status  · Gaining weight here in the hospital with good appetite         Discharge Plan: STR

## 2017-11-15 NOTE — ANESTHESIA PREPROCEDURE EVALUATION
Review of Systems/Medical History  Patient summary reviewed  Chart reviewed  No history of anesthetic complications     Cardiovascular  EKG reviewed, Dysrhythmias, atrial fibrillation,   Comment: LEFT VENTRICLE:  Systolic function was normal by visual assessment  Ejection fraction was estimated to be 60 %  There were no regional wall motion abnormalities  Doppler parameters were consistent with abnormal left ventricular relaxation (grade 1 diastolic dysfunction)      MITRAL VALVE:  There was mild regurgitation      TRICUSPID VALVE:  There was mild regurgitation      AORTA:  The root exhibited mild dilatation,  Pulmonary  , Pleural effusion (loculayed Plural effusion): left,   Comment: CT chest: IMPRESSION:     1  Interval placement of smallbore left chest tube, tip of the catheter is within the left lower lobe  Overall similar size of moderate to large left pleural effusion apart from increasing loculated pleural fluid anterior to the heart      2  Small left pneumothorax      3  Consolidation within the left lower lobe with crowding of the bronchi, favoring compressive atelectasis over pneumonia  Correlate with clinical symptoms  Atelectatic changes right middle lobe      4  Small to modest sized low density right pleural effusion which has increased  COPD      5  Small pericardial effusion  GI/Hepatic            Endo/Other  Arthritis     GYN       Hematology   Musculoskeletal    Comment: kyphosis      Neurology   Psychology           Physical Exam    Airway    Mallampati score: II  TM Distance: >3 FB  Neck ROM: full     Dental   No notable dental hx     Cardiovascular  Cardiovascular exam normal    Pulmonary  Pulmonary exam normal     Other Findings        Anesthesia Plan  ASA Score- 3       Anesthesia Type- general with ASA Monitors  Additional Monitors: arterial line  Airway Plan: ETT  Comment: Chronic opioid dependence, Severe PEM  Induction- intravenous        Informed Consent- Anesthetic plan and risks discussed with patient  I personally reviewed this patient with the CRNA  Discussed and agreed on the Anesthesia Plan with the CRNA  Pricila Helton

## 2017-11-15 NOTE — PROGRESS NOTES
Progress Note - Thoracic Surgery   Tyra Pollock 57 y o  male MRN: [id number]  Unit/Bed#: [facility] 420-01 Encounter: [id number]    Assessment:  60y o  M with a loculated left pleural effusion s/p L pigtail CT 11/6 s/p TPA 11/7 and 11/8, 11/9  CT upsized 11/10  Plan:  NPO  OR today for left VATS decortication  Pain control  Primary care per SLIM    Subjective/Objective     Subjective:  JIM  No difficulty breathing  No F/C  Objective:     Vitals: Blood pressure 109/65, pulse 65, temperature 98 4 °F (36 9 °C), temperature source Oral, resp  rate 18, height 5' 8" (1 727 m), weight 59 9 kg (132 lb 0 9 oz), SpO2 96 %  ,Body mass index is 20 08 kg/m²  I/O       11/10 0701 - 11/11 0700 11/11 0701 - 11/12 0700 11/12 0701 - 11/13 0700    P  O  950 400     I V  (mL/kg)       Total Intake(mL/kg) 950 (16 2) 400 (6 6)     Urine (mL/kg/hr) 425 (0 3) 450 (0 3)     Stool 0 (0) 0 (0)     Chest Tube 1820 (1 3) 330 (0 2)     Total Output 2245 780      Net -1295 -380             Unmeasured Urine Occurrence 2 x 1 x     Unmeasured Stool Occurrence 1 x 1 x           Physical Exam: NAD  AAOx3  RRR  Normal respiratory effort  Soft, NT, ND  B/l lower extremity edema       Lab, Imaging and other studies:   CBC with diff:   No results found for: WBC, HGB, HCT, MCV, PLT, ADJUSTEDWBC, MCH, MCHC, RDW, MPV, NRBC, BMP/CMP:   No results found for: NA, K, CL, CO2, ANIONGAP, BUN, CREATININE, GLUCOSE, CALCIUM, AST, ALT, ALKPHOS, PROT, ALBUMIN, BILITOT, EGFR, Magnesium: No components found for: MAG  VTE Pharmacologic Prophylaxis: Enoxaparin (Lovenox)  VTE Mechanical Prophylaxis: sequential compression device

## 2017-11-15 NOTE — ASSESSMENT & PLAN NOTE
· With sepsis, present on admission, and parapneumonic effusion  · Continue ceftriaxone and Flagyl per ID recommendations  · Cultures negative so far  · Vats today  · Continue supplemental oxygen taper off as tolerated

## 2017-11-16 ENCOUNTER — APPOINTMENT (INPATIENT)
Dept: RADIOLOGY | Facility: HOSPITAL | Age: 62
DRG: 853 | End: 2017-11-16
Attending: THORACIC SURGERY (CARDIOTHORACIC VASCULAR SURGERY)
Payer: MEDICARE

## 2017-11-16 LAB
ANION GAP SERPL CALCULATED.3IONS-SCNC: 10 MMOL/L (ref 4–13)
BASOPHILS # BLD MANUAL: 0 THOUSAND/UL (ref 0–0.1)
BASOPHILS NFR MAR MANUAL: 0 % (ref 0–1)
BUN SERPL-MCNC: 15 MG/DL (ref 5–25)
CALCIUM SERPL-MCNC: 7.9 MG/DL (ref 8.3–10.1)
CHLORIDE SERPL-SCNC: 98 MMOL/L (ref 100–108)
CO2 SERPL-SCNC: 27 MMOL/L (ref 21–32)
CREAT SERPL-MCNC: 0.54 MG/DL (ref 0.6–1.3)
EOSINOPHIL # BLD MANUAL: 0 THOUSAND/UL (ref 0–0.4)
EOSINOPHIL NFR BLD MANUAL: 0 % (ref 0–6)
ERYTHROCYTE [DISTWIDTH] IN BLOOD BY AUTOMATED COUNT: 14.5 % (ref 11.6–15.1)
GFR SERPL CREATININE-BSD FRML MDRD: 113 ML/MIN/1.73SQ M
GLUCOSE SERPL-MCNC: 118 MG/DL (ref 65–140)
HCT VFR BLD AUTO: 31.4 % (ref 36.5–49.3)
HGB BLD-MCNC: 10.8 G/DL (ref 12–17)
LYMPHOCYTES # BLD AUTO: 0 % (ref 14–44)
LYMPHOCYTES # BLD AUTO: 0 THOUSAND/UL (ref 0.6–4.47)
MCH RBC QN AUTO: 29.7 PG (ref 26.8–34.3)
MCHC RBC AUTO-ENTMCNC: 34.4 G/DL (ref 31.4–37.4)
MCV RBC AUTO: 86 FL (ref 82–98)
MONOCYTES # BLD AUTO: 0.95 THOUSAND/UL (ref 0–1.22)
MONOCYTES NFR BLD: 3 % (ref 4–12)
MYELOCYTES NFR BLD MANUAL: 1 % (ref 0–1)
NEUTROPHILS # BLD MANUAL: 29.99 THOUSAND/UL (ref 1.85–7.62)
NEUTS SEG NFR BLD AUTO: 95 % (ref 43–75)
NRBC BLD AUTO-RTO: 0 /100 WBCS
PLATELET # BLD AUTO: 923 THOUSANDS/UL (ref 149–390)
PLATELET BLD QL SMEAR: ABNORMAL
PMV BLD AUTO: 7.8 FL (ref 8.9–12.7)
POTASSIUM SERPL-SCNC: 4 MMOL/L (ref 3.5–5.3)
RBC # BLD AUTO: 3.64 MILLION/UL (ref 3.88–5.62)
SODIUM SERPL-SCNC: 135 MMOL/L (ref 136–145)
VARIANT LYMPHS # BLD AUTO: 1 %
WBC # BLD AUTO: 31.57 THOUSAND/UL (ref 4.31–10.16)

## 2017-11-16 PROCEDURE — 71010 HB CHEST X-RAY 1 VIEW FRONTAL (PORTABLE): CPT

## 2017-11-16 PROCEDURE — 85027 COMPLETE CBC AUTOMATED: CPT | Performed by: INTERNAL MEDICINE

## 2017-11-16 PROCEDURE — 80048 BASIC METABOLIC PNL TOTAL CA: CPT | Performed by: INTERNAL MEDICINE

## 2017-11-16 PROCEDURE — 85007 BL SMEAR W/DIFF WBC COUNT: CPT | Performed by: INTERNAL MEDICINE

## 2017-11-16 RX ORDER — GABAPENTIN 100 MG/1
100 CAPSULE ORAL 3 TIMES DAILY
Status: DISCONTINUED | OUTPATIENT
Start: 2017-11-16 | End: 2017-11-22 | Stop reason: HOSPADM

## 2017-11-16 RX ADMIN — CLOBETASOL PROPIONATE 1 APPLICATION: 0.5 CREAM TOPICAL at 09:17

## 2017-11-16 RX ADMIN — DOCUSATE SODIUM 200 MG: 100 CAPSULE, LIQUID FILLED ORAL at 09:15

## 2017-11-16 RX ADMIN — AMIODARONE HYDROCHLORIDE 200 MG: 200 TABLET ORAL at 14:00

## 2017-11-16 RX ADMIN — METRONIDAZOLE 500 MG: 500 TABLET ORAL at 14:51

## 2017-11-16 RX ADMIN — GUAIFENESIN 600 MG: 600 TABLET, EXTENDED RELEASE ORAL at 20:55

## 2017-11-16 RX ADMIN — CEFTRIAXONE SODIUM 2000 MG: 10 INJECTION, POWDER, FOR SOLUTION INTRAVENOUS at 16:00

## 2017-11-16 RX ADMIN — METOPROLOL TARTRATE 50 MG: 50 TABLET ORAL at 20:55

## 2017-11-16 RX ADMIN — METRONIDAZOLE 500 MG: 500 TABLET ORAL at 06:15

## 2017-11-16 RX ADMIN — DOCUSATE SODIUM 200 MG: 100 CAPSULE, LIQUID FILLED ORAL at 18:42

## 2017-11-16 RX ADMIN — AMIODARONE HYDROCHLORIDE 200 MG: 200 TABLET ORAL at 09:14

## 2017-11-16 RX ADMIN — DILTIAZEM HYDROCHLORIDE 240 MG: 240 CAPSULE, COATED, EXTENDED RELEASE ORAL at 09:15

## 2017-11-16 RX ADMIN — HYDROMORPHONE HYDROCHLORIDE 1 MG: 1 INJECTION, SOLUTION INTRAMUSCULAR; INTRAVENOUS; SUBCUTANEOUS at 22:36

## 2017-11-16 RX ADMIN — CLOBETASOL PROPIONATE 1 APPLICATION: 0.5 CREAM TOPICAL at 22:45

## 2017-11-16 RX ADMIN — SODIUM CHLORIDE 75 ML/HR: 0.9 INJECTION, SOLUTION INTRAVENOUS at 10:45

## 2017-11-16 RX ADMIN — CALCIUM CARBONATE 500 MG (1,250 MG)-VITAMIN D3 200 UNIT TABLET 1 TABLET: at 09:14

## 2017-11-16 RX ADMIN — CALCIUM CARBONATE 500 MG (1,250 MG)-VITAMIN D3 200 UNIT TABLET 1 TABLET: at 16:13

## 2017-11-16 RX ADMIN — METRONIDAZOLE 500 MG: 500 TABLET ORAL at 22:36

## 2017-11-16 RX ADMIN — DIAZEPAM 5 MG: 5 TABLET ORAL at 22:36

## 2017-11-16 RX ADMIN — HYDROCODONE BITARTRATE AND ACETAMINOPHEN 1 TABLET: 5; 325 TABLET ORAL at 14:40

## 2017-11-16 RX ADMIN — GABAPENTIN 100 MG: 100 CAPSULE ORAL at 20:55

## 2017-11-16 RX ADMIN — AMIODARONE HYDROCHLORIDE 200 MG: 200 TABLET ORAL at 16:13

## 2017-11-16 RX ADMIN — TAMSULOSIN HYDROCHLORIDE 0.4 MG: 0.4 CAPSULE ORAL at 16:13

## 2017-11-16 RX ADMIN — HYDROMORPHONE HYDROCHLORIDE 1 MG: 1 INJECTION, SOLUTION INTRAMUSCULAR; INTRAVENOUS; SUBCUTANEOUS at 10:40

## 2017-11-16 RX ADMIN — HYDROMORPHONE HYDROCHLORIDE 1 MG: 1 INJECTION, SOLUTION INTRAMUSCULAR; INTRAVENOUS; SUBCUTANEOUS at 01:29

## 2017-11-16 RX ADMIN — GUAIFENESIN 600 MG: 600 TABLET, EXTENDED RELEASE ORAL at 09:15

## 2017-11-16 RX ADMIN — GABAPENTIN 100 MG: 100 CAPSULE ORAL at 16:13

## 2017-11-16 RX ADMIN — HYDROCODONE BITARTRATE AND ACETAMINOPHEN 1 TABLET: 5; 325 TABLET ORAL at 20:55

## 2017-11-16 RX ADMIN — HYDROCODONE BITARTRATE AND ACETAMINOPHEN 1 TABLET: 5; 325 TABLET ORAL at 06:15

## 2017-11-16 RX ADMIN — METOPROLOL TARTRATE 50 MG: 50 TABLET ORAL at 09:15

## 2017-11-16 NOTE — PROGRESS NOTES
Progress Note - Scarlett Jolley 57 y o  male MRN: [id number]    Unit/Bed#: [facility] 420-01 Encounter: [id number]        Pneumonia   Assessment & Plan    · With sepsis, present on admission, and parapneumonic effusion  · Continue ceftriaxone and Flagyl per ID recommendations  · Cultures negative so far  · F/u surgical cultures        * Loculated pleural effusion   Assessment & Plan    · Parapneumonic effusion  · Pleural effusion too small on the right   · S/p VATS with decortication with small PTX  · CT surgery managing chest tubes        Hyponatremia   Assessment & Plan    · Improved  · Due to poor intake and NPO status  · Can d/c IV fluids now that diet has been restarted        Atrial fibrillation with RVR ([facility])   Assessment & Plan    · Rates are controlled with metoprolol and diltiazem  · Will d/c tele due to patient preference  · No anticoagulation due to her [place] [address] score was less than 2        [facility]   Assessment & Plan    · Continue fentanyl patch  · P r n  vicodin and Dilaudid for breakthrough pain  · Gabapentin started  · Pain service following now        Severe protein-calorie malnutrition ([facility] )   Assessment & Plan    · Poor access to food at home secondary to financial status  · Gaining weight here in the hospital with good appetite              Pharmacologic: Enoxaparin (Lovenox)  Mechanical VTE Prophylaxis in Place: Yes    Patient Centered Rounds: I have performed bedside rounds with nursing staff today  Education and Discussions with Family / Patient: patient, plan of care    Time Spent for Care: 20 minutes  More than 50% of total time spent on counseling and coordination of care as described above      Current Length of Stay: 9 day(s)    Current Patient Status: Inpatient   Certification Statement: The patient will continue to require additional inpatient hospital stay due to chest tube    Discharge Plan / Estimated Discharge Date: 2-3 days      Code Status: Level 1 - Full Code      Subjective:   "I have pain at the incision site" reports improved SOB, denies significant cough  +BM yesterday, appetite good    Objective:     Vitals:   Temp (24hrs), Av 6 °F (36 4 °C), Min:97 °F (36 1 °C), Max:98 3 °F (36 8 °C)    HR:  [68-84] 70  Resp:  [16-24] 18  BP: (102-126)/(59-72) 117/72  SpO2:  [95 %-100 %] 98 %  Body mass index is 20 55 kg/m²  Input and Output Summary (last 24 hours): Intake/Output Summary (Last 24 hours) at 17 1557  Last data filed at 17 1522   Gross per 24 hour   Intake          2431 25 ml   Output             1085 ml   Net          1346 25 ml       Physical Exam:     Physical Exam   Constitutional: He is oriented to person, place, and time  Thin, malnourished   HENT:   Head: Atraumatic  Eyes: EOM are normal  Pupils are equal, round, and reactive to light  Neck: Neck supple  No JVD present  Cardiovascular: Normal rate and regular rhythm  Pulmonary/Chest: Effort normal    Decreased breath sounds left base  Chest tube draining serosanguinous   Abdominal: Soft  There is no tenderness  Musculoskeletal: He exhibits no edema  Neurological: He is alert and oriented to person, place, and time  Skin: Skin is warm and dry  Additional Data:     Labs:      Results from last 7 days  Lab Units 17  0446  17  0645   WBC Thousand/uL 31 57*  < > 32 21*   HEMOGLOBIN g/dL 10 8*  < > 12 9   HEMATOCRIT % 31 4*  < > 36 2*   PLATELETS Thousands/uL 923*  < > 732*   NEUTROS PCT %  --   --  83*   LYMPHS PCT %  --   --  9*   LYMPHO PCT % 0*  < >  --    MONOS PCT %  --   --  8   MONO PCT MAN % 3*  < >  --    EOS PCT %  --   --  0   EOSINO PCT MANUAL % 0  < >  --    < > = values in this interval not displayed      Results from last 7 days  Lab Units 17  0446   SODIUM mmol/L 135*   POTASSIUM mmol/L 4 0   CHLORIDE mmol/L 98*   CO2 mmol/L 27   BUN mg/dL 15   CREATININE mg/dL 0 54*   CALCIUM mg/dL 7 9*   GLUCOSE RANDOM mg/dL 118 Recent Cultures (last 7 days):       Results from last 7 days  Lab Units 11/15/17  7205   GRAM STAIN RESULT  No Polys or Bacteria seen       Last 24 Hours Medication List:     alteplase (ACTIVASE) 8 mg in 0 9% NaCl 50 mL 8 mg Chest Tube Once   amiodarone 200 mg Oral TID With Meals   calcium carbonate-vitamin D 1 tablet Oral BID With Meals   cefTRIAXone 2,000 mg Intravenous Q24H   clobetasol 1 application Topical P20H [address]   diazepam 5 mg Oral HS   diltiazem 240 mg Oral Daily   docusate sodium 200 mg Oral BID   fentaNYL 1 patch Transdermal Q48H   gabapentin 100 mg Oral TID   guaiFENesin 600 mg Oral Q12H [address]   metoprolol tartrate 50 mg Oral Q12H [address]   metroNIDAZOLE 500 mg Oral Q8H [address]   tamsulosin 0 4 mg Oral Daily With Dinner        Today, Patient Was Seen By: Mell Enriquez MD    ** Please Note: Dragon 360 Dictation voice to text software may have been used in the creation of this document   **

## 2017-11-16 NOTE — CONSULTS
Consultation - Anesthesia Acute Pain Management   Marina Jones 57 y o  male MRN: [id number]  Unit/Bed#: [facility] 420-01 Encounter: [id number]               Admission Diagnosis:  Pleural effusion [J90]     Consult Time: 20 min    SURGERY DATE: 11/15/2017  Post-Op Diagnosis Codes:     * Loculated pleural effusion [J90]    Assessment:   57 y o  male STATUS POST   Procedure(s):  VATS DECORTICATION LUNG  POD# 1 with acute on chronic pain and chronic opioid dependence    Principal Problem:    Loculated pleural effusion  Active Problems:    Pneumonia    Severe protein-calorie malnutrition (HCC)    Continuous opioid dependence (HCC)    Atrial fibrillation with RVR (HCC)    Hyponatremia      Plan:   1  Continue current pain regimen and encourage breakthrough only after PO is taken  2  Would add gabapentin for multimodal analgesia and his chronic pain 100mg PO TID to start  3  To consider ketamine infusion tomorrow if pain not controllable    APS will continue to follow; please call  ( [facility] [phone]) with any questions  These recommendations were communicated to thoracic PA , plan was also discussed with patient and nursing  HPI  Marina Jones is a 57 y o  male presenting status post VATs decortication with acute on chronic pain  Patient takes vicodin two times a day and wears a fentanyl patch 50mcg  Pt states he is actually tolerating pain well now and is satisfied with current regimen  Pt wanted to talk at length about his outpatient plan  Pt agreed that gabapentin would be a good multi modal addition  Encouraged patient to take breakthrough if needed after vicodin    Pt denies recreational drugs, etoh and smoking tobacco    ROS:  review of systems conducted and otherwise unremarkable except as per HPI/PMH/PSH    Historical Information   Past Medical History:   Diagnosis Date    History of spinal fracture     Kyphosis     Loss of appetite     Loss of weight     Osteoarthritis     Osteoporosis Past Surgical History:   Procedure Laterality Date    FINGER SURGERY      LUNG DECORTICATION Left 11/15/2017    Procedure: VATS DECORTICATION LUNG;  Surgeon: Brayan Dubose MD;  [facility]: [facility];  Service: Thoracic    SHOULDER DEBRIDEMENT       Social History   History   Alcohol Use No     History   Drug Use No     History   Smoking Status    Never Smoker   Smokeless Tobacco    Never Used       Meds/Allergies   Prescriptions Prior to Admission   Medication    Calcium Carbonate-Vitamin D (CALCIUM 500 + D PO)    ciprofloxacin-dexamethasone (CIPRODEX) otic suspension    docusate sodium (COLACE) 100 mg capsule    fentaNYL (DURAGESIC) 50 mcg/hr    HYDROcodone-acetaminophen (ZAMCET)  MG/15ML solution    ofloxacin (OCUFLOX) 0 3 % ophthalmic solution    Omega-3 Fatty Acids (OMEGA-3 FISH OIL) 500 MG CAPS    diazepam (VALIUM) 5 mg tablet       Inpatient MEDS  Scheduled Meds:   alteplase (ACTIVASE) 8 mg in 0 9% NaCl 50 mL 8 mg Chest Tube Once   amiodarone 200 mg Oral TID With Meals   calcium carbonate-vitamin D 1 tablet Oral BID With Meals   cefTRIAXone 2,000 mg Intravenous Q24H   clobetasol 1 application Topical [address]   diazepam 5 mg Oral HS   diltiazem 240 mg Oral Daily   docusate sodium 200 mg Oral BID   fentaNYL 1 patch Transdermal Q48H   guaiFENesin 600 mg Oral Q12H [place]   metoprolol tartrate 50 mg Oral Q12H MIC[place]LET   metroNIDAZOLE 500 mg Oral Q8H [place]LET   tamsulosin 0 4 mg Oral Daily With Dinner     Continuous Infusions:    PRN Meds:    acetaminophen 650 mg Q6H PRN   HYDROcodone-acetaminophen 1 tablet Q6H PRN   HYDROmorphone 1 mg Q3H PRN   polyethylene glycol 17 g Daily PRN   sodium chloride (PF) 3 mL PRN       Allergies   Allergen Reactions    Asa [Aspirin]     Carbapenems     Cephalosporins     Penicillins        Objective   /66   Pulse 84   Temp 98 3 °F (36 8 °C) (Oral)   Resp 18   Ht 5' 8" (1 727 m)   Wt 61 3 kg (135 lb 2 3 oz)   SpO2 97%   BMI 20 55 kg/m²   Temp:  [97 °F (36 1 °C)-98 3 °F (36 8 °C)] 98 3 °F (36 8 °C)  HR:  [68-84] 84  Resp:  [16-24] 18  BP: (102-126)/(59-69) 115/66    Physical Exam: Gen: NAD sitting up in chair talking comfortably  Skin: Warm, Dry   HEENT:  PERRLA, EOMI  CV: RRR  Pulm: non labored resp effort  Ext:  No cyanosis; blle edema  Neuro: AAOx3; CN II-XII grossly intact; 5/5 BLUE, 5/5 BLLE; Sensation intact grossly   Psych:  Flat but appropriate and conversant    SIGNATURE: Brady Bellamy MD  DATE: November 16, 2017  TIME: 3:17 PM    Counseling / Coordination of Care  Total floor / unit time spent today 20 minutes minutes  Greater than 50% of total time was spent with the patient and / or family counseling and / or coordination of care  A description of the counseling / coordination of care: pain management and  Chronic pain      Please note that the APS provides consultative services regarding pain management only  With the exception of ketamine and epidural infusions and except when indicated, final decisions regarding starting or changing doses of analgesic medications are at the discretion of the consulting service  Off hours consultation and/or medication management is generally not available

## 2017-11-16 NOTE — ASSESSMENT & PLAN NOTE
· Continue fentanyl patch  · P r n  vicodin and Dilaudid for breakthrough pain  · Gabapentin started  · Pain service following now

## 2017-11-16 NOTE — PROGRESS NOTES
Pt arrived to floor from PACU  Vitals WDL  Pt denies any nausea and able to tolerate sips of water  Chest tube noted to have air leak of 430  Spoke with Dorene Lovett Md and made aware  Discussed held medications and okay to give them now  Will continue to monitor

## 2017-11-16 NOTE — PROGRESS NOTES
Progress Note - Thoracic Surgery   Socorro Zuleta 57 y o  male MRN: [id number]  Unit/Bed#: [facility] 420-01 Encounter: [id number]    Assessment:  60y o  M with a loculated left pleural effusion s/p L pigtail CT 11/6 s/p TPA 11/7 and 11/8, 11/9  CT upsized 11/10 s/p 11/19 L VATS decortication    Plan:  L CTX2 to suction, monitor output, has large AL  IS/OOB/ambulate  Pain control  Primary care per SLIM    Subjective/Objective     Subjective: JIM  Has sharp pain over anterior skin incision  No N/V/F/C  Objective:     Vitals: Blood pressure 126/67, pulse 77, temperature 97 7 °F (36 5 °C), temperature source Oral, resp  rate 18, height 5' 8" (1 727 m), weight 61 3 kg (135 lb 2 3 oz), SpO2 98 %  ,Body mass index is 20 55 kg/m²  I/O       11/10 0701 - 11/11 0700 11/11 0701 - 11/12 0700 11/12 0701 - 11/13 0700    P  O  950 400     I V  (mL/kg)       Total Intake(mL/kg) 950 (16 2) 400 (6 6)     Urine (mL/kg/hr) 425 (0 3) 450 (0 3)     Stool 0 (0) 0 (0)     Chest Tube 1820 (1 3) 330 (0 2)     Total Output 2245 780      Net -1295 -380             Unmeasured Urine Occurrence 2 x 1 x     Unmeasured Stool Occurrence 1 x 1 x           Physical Exam: NAD  AAOX3  RRR  normal respiratory effort   L CT X2 to suction, air leak up to 400, 125ml serosang output  soft, NT, ND  B/l lower extremity and foot swelling      Lab, Imaging and other studies:   CBC with diff:   Lab Results   Component Value Date    WBC 26 92 (H) 11/15/2017    HGB 10 9 (L) 11/15/2017    HCT 31 2 (L) 11/15/2017    MCV 86 11/15/2017     (H) 11/15/2017    MCH 29 9 11/15/2017    MCHC 34 9 11/15/2017    RDW 14 3 11/15/2017    MPV 7 5 (L) 11/15/2017    NRBC 0 11/15/2017   , BMP/CMP:   Lab Results   Component Value Date     11/15/2017    K 3 8 11/15/2017     11/15/2017    CO2 28 11/15/2017    ANIONGAP 7 11/15/2017    BUN 15 11/15/2017    CREATININE 0 54 (L) 11/15/2017    GLUCOSE 107 11/15/2017    CALCIUM 7 8 (L) 11/15/2017    EGFR 113 11/15/2017   , Magnesium: No components found for: MAG  VTE Mechanical Prophylaxis: sequential compression device

## 2017-11-16 NOTE — PROGRESS NOTES
Rounded with Dr Gloria Orta  Phys made aware of patient's request to utilize breakthrough pain medication as primary regimen  Was informed that pain services will be consulted

## 2017-11-16 NOTE — ASSESSMENT & PLAN NOTE
· Improved  · Due to poor intake and NPO status  · Can d/c IV fluids now that diet has been restarted

## 2017-11-16 NOTE — ASSESSMENT & PLAN NOTE
· Parapneumonic effusion  · Pleural effusion too small on the right   · S/p VATS with decortication with small PTX  · CT surgery managing chest tubes

## 2017-11-16 NOTE — ANESTHESIA POSTPROCEDURE EVALUATION
Post-Op Assessment Note      CV Status:  Stable    Mental Status:  Alert and awake    Hydration Status:  Euvolemic    PONV Controlled:  Controlled    Airway Patency:  Patent    Post Op Vitals Reviewed: Yes          Staff: Anesthesiologist, CRNA       Comments: Awake, alert, reflexes intact, VSS          /64 (11/15/17 1945)    Temp (!) 97 °F (36 1 °C) (11/15/17 1945)    Pulse 71 (11/15/17 1945)   Resp (!) 24 (11/15/17 1945)    SpO2 100 % (11/15/17 1945)

## 2017-11-16 NOTE — RESTORATIVE TECHNICIAN NOTE
Restorative Specialist Mobility Note       Activity: Ambulate in nuñez, [place] privileges, Chair     Assistive Device: Front wheel walker

## 2017-11-16 NOTE — PROGRESS NOTES
Cardiology Progress Note - Byron Duncan 57 y o  male MRN: [id number]    Unit/Bed#: [facility] 420-01 Encounter: [id number]      Assessment:    1  Paroxysmal afib with RVR  2  Pleural effusions s/p VATS  3  Pneumonia    Plan:    Completed IV load of amiodarone  Started Amiodarone orally 200 tid 11/11/17 due to recurrent afib  Transitioned to long acting formulation of Diltiazem for 11/12  Currently on diltiazem 240 mg daily, and metoprolol 50 mg bid in addition to amiodarone  As chads Vasc score is 0, he does not need to be on oral anticoagulation     Continue to monitor on telemetry  Subjective:   Patient seen and examined  No significant events overnight  Denies chest pain, pnd, orthopnea, abdominal pain, nausea    Objective:     Vitals: Blood pressure 115/66, pulse 84, temperature 98 3 °F (36 8 °C), temperature source Oral, resp  rate 18, height 5' 8" (1 727 m), weight 61 3 kg (135 lb 2 3 oz), SpO2 97 %  , Body mass index is 20 55 kg/m² , Orthostatic Blood Pressures    Flowsheet Row Most Recent Value   Blood Pressure  115/66 filed at 11/16/2017 1100   Patient Position - Orthostatic VS  Sitting filed at 11/16/2017 1100            Intake/Output Summary (Last 24 hours) at 11/16/17 1420  Last data filed at 11/16/17 1413   Gross per 24 hour   Intake          2431 25 ml   Output              885 ml   Net          1546 25 ml       No significant arrhythmias seen on telemetry review   NSR no significant arrhythmias    Physical Exam:    GEN: Byron Duncan appears well, alert and oriented x 3  HEENT: anicteric  NECK: no jvd  HEART: regular rhythm, normal S1 and S2, no murmurs  LUNGS: clear to auscultation bilaterally; no wheezes  ABDOMEN: soft, no tenderness, no distention  EXTREMITIES: peripheral pulses normal; no edema  NEURO: no focal findings   SKIN: normal without suspicious lesions on exposed skin      Current Facility-Administered Medications:     acetaminophen (TYLENOL) tablet 650 mg, 650 mg, Oral, Q6H PRN, Cathie Adorno PA-C    alteplase (CATHFLO) 8 mg in sodium chloride 0 9 % 50 mL syringe, 8 mg, Chest Tube, Once, Ignacia Marlow MD    amiodarone tablet 200 mg, 200 mg, Oral, TID With Meals, Whitney Betancur MD, 200 mg at 11/16/17 0914    calcium carbonate-vitamin D (OSCAL-D) 500 mg-200 units per tablet 1 tablet, 1 tablet, Oral, BID With Meals, Poli Guy MD, 1 tablet at 11/16/17 0914    cefTRIAXone (ROCEPHIN) 2,000 mg in dextrose 5 % 50 mL IVPB, 2,000 mg, Intravenous, Q24H, Patrick Montgomery MD, Last Rate: 100 mL/hr at 11/15/17 2318, 2,000 mg at 11/15/17 2318    clobetasol (TEMOVATE) 2 27 % cream 1 application, 1 application, Topical, H19V [facility], Poli Guy MD, 1 application at 65/00/94 3956    diazepam (VALIUM) tablet 5 mg, 5 mg, Oral, HS, Juani Walker MD, 5 mg at 11/15/17 2259    diltiazem (CARDIZEM CD) 24 hr capsule 240 mg, 240 mg, Oral, Daily, Whitney Betancur MD, 240 mg at 11/16/17 0915    docusate sodium (COLACE) capsule 200 mg, 200 mg, Oral, BID, Poli Guy MD, 200 mg at 11/16/17 0915    fentaNYL (DURAGESIC) 50 mcg/hr TD 72 hr patch 1 patch, 1 patch, Transdermal, Q48H, Poli Guy MD, 1 patch at 11/15/17 0933    guaiFENesin (MUCINEX) 12 hr tablet 600 mg, 600 mg, Oral, Q12H [facility], Baltazar Briones DO, 600 mg at 11/16/17 0915    HYDROcodone-acetaminophen ([address]) 5-325 mg per tablet 1 tablet, 1 tablet, Oral, Q6H PRN, Baltazar Briones DO, 1 tablet at 11/16/17 0615    HYDROmorphone (DILAUDID) 1 mg/mL injection 1 mg, 1 mg, Intravenous, Q3H PRN, Cathie Adorno PA-C    metoprolol tartrate (LOPRESSOR) tablet 50 mg, 50 mg, Oral, Q12H [facility], Whitney Betancur MD, 50 mg at 11/16/17 0915    metroNIDAZOLE (FLAGYL) tablet 500 mg, 500 mg, Oral, Q8H [facility], Poli Guy MD, 500 mg at 11/16/17 0615    polyethylene glycol (MIRALAX) packet 17 g, 17 g, Oral, Daily PRN, Poli Guy MD    Insert peripheral IV, , , Once **AND** sodium chloride (PF) 0 9 % injection 3 mL, 3 mL, Intravenous, PRN, Poli Guy MD    sodium chloride 0 9 % infusion, 75 mL/hr, Intravenous, Continuous, Derick Whittaker MD, Last Rate: 75 mL/hr at 11/16/17 1045, 75 mL/hr at 11/16/17 1045    tamsulosin (FLOMAX) capsule 0 4 mg, 0 4 mg, Oral, Daily With Dinner, Stephanie Beatty MD, 0 4 mg at 11/15/17 3158    Labs & Results:    Lab Results   Component Value Date    TROPONINI <0 02 11/06/2017       Lab Results   Component Value Date    GLUCOSE 118 11/16/2017    CALCIUM 7 9 (L) 11/16/2017     (L) 11/16/2017    K 4 0 11/16/2017    CO2 27 11/16/2017    CL 98 (L) 11/16/2017    BUN 15 11/16/2017    CREATININE 0 54 (L) 11/16/2017       Lab Results   Component Value Date    WBC 31 57 (HH) 11/16/2017    HGB 10 8 (L) 11/16/2017    HCT 31 4 (L) 11/16/2017    MCV 86 11/16/2017     (H) 11/16/2017       Lab Results   Component Value Date    ALT 14 11/08/2017    AST 9 11/08/2017

## 2017-11-16 NOTE — PROGRESS NOTES
Progress Note - Infectious Disease   Umesh Gray 57 y o  male MRN: [id number]  Unit/Bed#: [facility] 420-01 Encounter: [id number]    Assessment/Plan:    57y o  year old male with a history of severe kyphosis  who initially presented to [facility] 11/6 with recent onset SOB, L-sided chest pain, fevers/chills in setting of green sputum production over past several months; found to have LLL PNA w/ left loculated pleural effusions s/p L chest tube placement 11/7, [facility] evaluation  S/p chest tube TPA instillation 11/7, 11/8, 11/9  POD#1 s/p VATS w/ complete decortication left lung     1  Sepsis POA: likely secondary to PNA w/ loculated parapneumonic effusion  Blood cx (11/6): negative, sputum cx with mixed resp arthur, pleural fluid cx negative  Appears to be clinically improving, afebrile since 11/7  Patient reports improvement in cough  · WBC uptrending, 31 < 20 < 16  Afebrile, clinically well-appearing  Possible reactive leukocytosis in setting of surgery, however also with significant thrombocytosis (923), monoclonal gammopathy, possible hematologic component? Continue to monitor for now  · On ceftriaxone 2g IV 24H, flagyl 500mg q8H PO (day 11 total abx)  · Continue abx as above for now      2  Left-sided pneumonia: CAP v aspiration PNA  Legionella ag, strep pneumo ag, influenza/RSV, AFB stain negative  Fungal cx in process  Sputum cx with mixed respiratory arthur  Repeat CT 11/10 LLL consolidation favoring compressive atelectasis over PNA  · Continue abx as above for now    3  Loculated left pleural effusion s/p chest tube placement 11/6, TPA 11/7, 11/8, 11/9  Pleural fluid analysis w/ 878 WBC (97% PMN), pH 9 0, no growth bacteria, fungi, AFB  Left pleural chest tube upsized to 14Fr 11/10  Repeat CT 11/10 w/ similiar size pleural effusion but with increasing loculated pleural fluid anterior to heart  No right-sided chest tube per IR as not enough drainable fluid   Per thoracic surgery w/ trapped left lung due to effusion  · POD#1 s/p VATS complete decortication lung   · Continue abx as above for now     4  Acute urinary retention  · No farr inserted;on Flomax    5  Severe kyphosis w/ osteoporosis  · Enocrine consult at [facility]: ordered UPEP, PSA, immunoglobulins, PTH, VitD for work-up osteop  · Immunofixation c/w monoclonal gammopathy IgG kappa     6  Paroxysmal afib  · Management per cards    Subjective/Objective   Chief Complaint: Pain    Subjective: Patient seen and examined  Doing well post-op, continues to have pleuritic pain, unchanged from prior  Denies fevers/chills/nasuea/vomiting  Denies CP/SOB  Normal Bm/urination  Objective:     HR:  [68-83] 83  Resp:  [16-24] 20  BP: (102-126)/(59-69) 102/59  SpO2:  [95 %-100 %] 97 %  Temp (24hrs), Av 6 °F (36 4 °C), Min:97 °F (36 1 °C), Max:98 5 °F (36 9 °C)  Current: Temperature: (!) 97 1 °F (36 2 °C)    Physical Exam:   Physical Exam   Physical Exam   Constitutional: He is oriented to person, place, and time  No distress  HENT:   Head: Normocephalic and atraumatic  Eyes: Pupils are equal, round, and reactive to light  Pulmonary/Chest:   Decreased lung sounds LLL  Pleuritic CP left-side   Abdominal: Soft  Bowel sounds are normal    Musculoskeletal:   Kyphosis    Neurological: He is alert and oriented to person, place, and time  Skin: Skin is warm and dry  He is not diaphoretic  Invasive Devices     Peripheral Intravenous Line            Peripheral IV 17 Left Forearm 3 days    Peripheral IV 11/15/17 Left Wrist less than 1 day          Drain            Chest Tube 1 Left Pleural 28 Fr  less than 1 day    Chest Tube 2 Left Pleural 28 Fr  less than 1 day                Lab, Imaging and other studies: I have personally reviewed pertinent reports

## 2017-11-16 NOTE — OP NOTE
OPERATIVE REPORT  PATIENT NAME: Shashi Simons    :  1955  MRN: [id number]  Pt Location: BE OR ROOM 08    SURGERY DATE: 11/15/2017    Surgeon(s) and Role:     * Lachelle Hunt MD - Primary     * Bhaskar Helms MD - Assisting     * Jann Gonzalez PA-C - Assisting    Preop Diagnosis:  Loculated pleural effusion [J90]    Post-Op Diagnosis Codes:     * Loculated pleural effusion [J90]  Trapped lung     Procedure(s) (LRB):  VATS COMPLETE DECORTICATION LUNG (Left)    Specimen(s):  ID Type Source Tests Collected by Time Destination   1 : pleural peel Tissue Pleural, Left TISSUE EXAM Lachelle Hunt MD 11/15/2017 1840    A : left pleural debris  Tissue Lung ANAEROBIC CULTURE AND GRAM STAIN, FUNGAL CULTURE, CULTURE, TISSUE AND GRAM STAIN, AFB CULTURE WITH STAIN Lachelle Hunt MD 11/15/2017 1833        Estimated Blood Loss:   Minimal    Drains:       Anesthesia Type:   General    Operative Indications:  Loculated pleural effusion [J90]  Trapped lung  Mr Hardy Silva is a 65-year-old with multiple medical problems including severe kyphosis  He presented with a parapneumonic effusion with a her to large left pleural effusion that was unable to be completely drained using a chest tube and tPA  With partial drainage of the fluid was clear he had a trapped lung and he is brought to the operating room for decortication  Operative Findings:  Trapped lung    Complications:   None    Procedure and Technique:  Mr Hardy Silva was brought to the operating room and placed in the supine position  After institution of adequate general anesthesia using a double-lumen endotracheal tube, he was placed in the right lateral decubitus position  His entire left chest was prepped and draped into a sterile field  Prior to this his prior chest tube was removed  Standard port incisions were utilized with a 1 cm port in the 7th intercostal space in the posterior axillary line as well as a 3 cm incision more anteriorly    He had a moderate to large pleural effusion and his lower lobe and the posterior segment of his upper lobe were trapped behind a fibrinous peel  All of the fluid was suctioned clear and then the entire lung was circumferentially dissected to be free of the chest wall where it was adherent at the apex and anteriorly  Once the lung was freed circumferentially and the inferior pulmonary ligament was divided a plane between the pleural peel and the visceral pleura in the lower lobe was entered and the peel in the lower lobe was stripped off  This process was continued onto the diaphragmatic surface of the lung  A similar procedure was performed on the posterior segment of the upper lobe  The pleural peel was sent for routine pathology and some pleural debris was sent for culture  The lung appeared adequately decorticated  Hemostasis was assured  A 28 Hungarian right angle chest tube was placed through an anterior stab incision to lie along the diaphragm and a 28 Hungarian straight chest tube was placed through the camera port to lie posteriorly and up towards the apex  The lung is then watched as a completely Re expands  The lower lobe was 95% re-expanded with just the costophrenic recess not fully filled  The anterior access incision was then closed in layers with running absorbable suture to the pectoral fascia, the subcutaneous and subcuticular layers  Dry sterile dressings were applied  The chest tubes were connected to [place] drainage and affixed to the chest wall  The patient was then extubated on the table and taken to the recovery unit in stable condition having tolerated the procedure well  Sponge and instrument counts were correct       I was present for the entire procedure and A physician assistant was required during the procedure for retraction tissue handling,dissection and suturing    Patient Disposition:  PACU     SIGNATURE: Maggie Grijalva MD  DATE: November 15, 2017  TIME: 7:15 PM

## 2017-11-16 NOTE — ASSESSMENT & PLAN NOTE
· Rates are controlled with metoprolol and diltiazem  · Will d/c tele due to patient preference  · No anticoagulation due to her Karrie score was less than 2

## 2017-11-16 NOTE — PERIOPERATIVE NURSING NOTE
Patient states that pain is a "25 " Patient admits that pain has decreased since he arrived to PACU  Pt FLACC 3  Pt agrees to go to floor at this time

## 2017-11-17 ENCOUNTER — APPOINTMENT (INPATIENT)
Dept: NON INVASIVE DIAGNOSTICS | Facility: HOSPITAL | Age: 62
DRG: 853 | End: 2017-11-17
Payer: MEDICARE

## 2017-11-17 PROBLEM — R60.9 EDEMA: Status: ACTIVE | Noted: 2017-11-17

## 2017-11-17 LAB
ANION GAP SERPL CALCULATED.3IONS-SCNC: 4 MMOL/L (ref 4–13)
ANION GAP SERPL CALCULATED.3IONS-SCNC: 8 MMOL/L (ref 4–13)
BASOPHILS # BLD AUTO: 0.01 THOUSANDS/ΜL (ref 0–0.1)
BASOPHILS NFR BLD AUTO: 0 % (ref 0–1)
BUN SERPL-MCNC: 19 MG/DL (ref 5–25)
BUN SERPL-MCNC: 22 MG/DL (ref 5–25)
CALCIUM SERPL-MCNC: 7.8 MG/DL (ref 8.3–10.1)
CALCIUM SERPL-MCNC: 8.1 MG/DL (ref 8.3–10.1)
CHLORIDE SERPL-SCNC: 100 MMOL/L (ref 100–108)
CHLORIDE SERPL-SCNC: 99 MMOL/L (ref 100–108)
CO2 SERPL-SCNC: 22 MMOL/L (ref 21–32)
CO2 SERPL-SCNC: 28 MMOL/L (ref 21–32)
CREAT SERPL-MCNC: 0.57 MG/DL (ref 0.6–1.3)
CREAT SERPL-MCNC: 0.76 MG/DL (ref 0.6–1.3)
EOSINOPHIL # BLD AUTO: 0.02 THOUSAND/ΜL (ref 0–0.61)
EOSINOPHIL NFR BLD AUTO: 0 % (ref 0–6)
ERYTHROCYTE [DISTWIDTH] IN BLOOD BY AUTOMATED COUNT: 14.8 % (ref 11.6–15.1)
GFR SERPL CREATININE-BSD FRML MDRD: 110 ML/MIN/1.73SQ M
GFR SERPL CREATININE-BSD FRML MDRD: 98 ML/MIN/1.73SQ M
GLUCOSE SERPL-MCNC: 119 MG/DL (ref 65–140)
GLUCOSE SERPL-MCNC: 91 MG/DL (ref 65–140)
HCT VFR BLD AUTO: 30.2 % (ref 36.5–49.3)
HGB BLD-MCNC: 10.5 G/DL (ref 12–17)
LYMPHOCYTES # BLD AUTO: 1.62 THOUSANDS/ΜL (ref 0.6–4.47)
LYMPHOCYTES NFR BLD AUTO: 7 % (ref 14–44)
MCH RBC QN AUTO: 29.7 PG (ref 26.8–34.3)
MCHC RBC AUTO-ENTMCNC: 34.8 G/DL (ref 31.4–37.4)
MCV RBC AUTO: 86 FL (ref 82–98)
MONOCYTES # BLD AUTO: 1.51 THOUSAND/ΜL (ref 0.17–1.22)
MONOCYTES NFR BLD AUTO: 6 % (ref 4–12)
NEUTROPHILS # BLD AUTO: 20.01 THOUSANDS/ΜL (ref 1.85–7.62)
NEUTS SEG NFR BLD AUTO: 87 % (ref 43–75)
NRBC BLD AUTO-RTO: 0 /100 WBCS
PLATELET # BLD AUTO: 853 THOUSANDS/UL (ref 149–390)
PMV BLD AUTO: 7.6 FL (ref 8.9–12.7)
POTASSIUM SERPL-SCNC: 4.1 MMOL/L (ref 3.5–5.3)
POTASSIUM SERPL-SCNC: 5.1 MMOL/L (ref 3.5–5.3)
RBC # BLD AUTO: 3.53 MILLION/UL (ref 3.88–5.62)
SODIUM SERPL-SCNC: 129 MMOL/L (ref 136–145)
SODIUM SERPL-SCNC: 132 MMOL/L (ref 136–145)
WBC # BLD AUTO: 23.45 THOUSAND/UL (ref 4.31–10.16)

## 2017-11-17 PROCEDURE — 97535 SELF CARE MNGMENT TRAINING: CPT

## 2017-11-17 PROCEDURE — 93970 EXTREMITY STUDY: CPT

## 2017-11-17 PROCEDURE — 80048 BASIC METABOLIC PNL TOTAL CA: CPT | Performed by: INTERNAL MEDICINE

## 2017-11-17 PROCEDURE — 97110 THERAPEUTIC EXERCISES: CPT

## 2017-11-17 PROCEDURE — 97530 THERAPEUTIC ACTIVITIES: CPT

## 2017-11-17 PROCEDURE — 85025 COMPLETE CBC W/AUTO DIFF WBC: CPT | Performed by: INTERNAL MEDICINE

## 2017-11-17 PROCEDURE — 97116 GAIT TRAINING THERAPY: CPT

## 2017-11-17 RX ORDER — FUROSEMIDE 10 MG/ML
20 INJECTION INTRAMUSCULAR; INTRAVENOUS ONCE
Status: COMPLETED | OUTPATIENT
Start: 2017-11-17 | End: 2017-11-17

## 2017-11-17 RX ORDER — SODIUM CHLORIDE 9 MG/ML
75 INJECTION, SOLUTION INTRAVENOUS CONTINUOUS
Status: DISCONTINUED | OUTPATIENT
Start: 2017-11-17 | End: 2017-11-17

## 2017-11-17 RX ADMIN — CALCIUM CARBONATE 500 MG (1,250 MG)-VITAMIN D3 200 UNIT TABLET 1 TABLET: at 16:43

## 2017-11-17 RX ADMIN — TAMSULOSIN HYDROCHLORIDE 0.4 MG: 0.4 CAPSULE ORAL at 16:43

## 2017-11-17 RX ADMIN — GABAPENTIN 100 MG: 100 CAPSULE ORAL at 16:43

## 2017-11-17 RX ADMIN — DOCUSATE SODIUM 200 MG: 100 CAPSULE, LIQUID FILLED ORAL at 16:43

## 2017-11-17 RX ADMIN — HYDROCODONE BITARTRATE AND ACETAMINOPHEN 1 TABLET: 5; 325 TABLET ORAL at 16:43

## 2017-11-17 RX ADMIN — DIAZEPAM 5 MG: 5 TABLET ORAL at 21:25

## 2017-11-17 RX ADMIN — DOCUSATE SODIUM 200 MG: 100 CAPSULE, LIQUID FILLED ORAL at 09:25

## 2017-11-17 RX ADMIN — CALCIUM CARBONATE 500 MG (1,250 MG)-VITAMIN D3 200 UNIT TABLET 1 TABLET: at 09:35

## 2017-11-17 RX ADMIN — CLOBETASOL PROPIONATE 1 APPLICATION: 0.5 CREAM TOPICAL at 09:26

## 2017-11-17 RX ADMIN — CEFTRIAXONE SODIUM 2000 MG: 10 INJECTION, POWDER, FOR SOLUTION INTRAVENOUS at 13:53

## 2017-11-17 RX ADMIN — METOPROLOL TARTRATE 50 MG: 50 TABLET ORAL at 21:26

## 2017-11-17 RX ADMIN — GABAPENTIN 100 MG: 100 CAPSULE ORAL at 21:26

## 2017-11-17 RX ADMIN — CLOBETASOL PROPIONATE 1 APPLICATION: 0.5 CREAM TOPICAL at 22:47

## 2017-11-17 RX ADMIN — AMIODARONE HYDROCHLORIDE 200 MG: 200 TABLET ORAL at 09:25

## 2017-11-17 RX ADMIN — DILTIAZEM HYDROCHLORIDE 240 MG: 240 CAPSULE, COATED, EXTENDED RELEASE ORAL at 09:25

## 2017-11-17 RX ADMIN — METOPROLOL TARTRATE 50 MG: 50 TABLET ORAL at 09:25

## 2017-11-17 RX ADMIN — GUAIFENESIN 600 MG: 600 TABLET, EXTENDED RELEASE ORAL at 21:25

## 2017-11-17 RX ADMIN — GUAIFENESIN 600 MG: 600 TABLET, EXTENDED RELEASE ORAL at 09:46

## 2017-11-17 RX ADMIN — METRONIDAZOLE 500 MG: 500 TABLET ORAL at 05:44

## 2017-11-17 RX ADMIN — HYDROCODONE BITARTRATE AND ACETAMINOPHEN 1 TABLET: 5; 325 TABLET ORAL at 09:36

## 2017-11-17 RX ADMIN — FUROSEMIDE 20 MG: 10 INJECTION, SOLUTION INTRAMUSCULAR; INTRAVENOUS at 13:53

## 2017-11-17 RX ADMIN — HYDROCODONE BITARTRATE AND ACETAMINOPHEN 1 TABLET: 5; 325 TABLET ORAL at 03:16

## 2017-11-17 RX ADMIN — AMIODARONE HYDROCHLORIDE 200 MG: 200 TABLET ORAL at 16:43

## 2017-11-17 RX ADMIN — FENTANYL 1 PATCH: 50 PATCH, EXTENDED RELEASE TRANSDERMAL at 09:37

## 2017-11-17 RX ADMIN — GABAPENTIN 100 MG: 100 CAPSULE ORAL at 09:25

## 2017-11-17 RX ADMIN — METRONIDAZOLE 500 MG: 500 TABLET ORAL at 13:52

## 2017-11-17 RX ADMIN — HYDROMORPHONE HYDROCHLORIDE 1 MG: 1 INJECTION, SOLUTION INTRAMUSCULAR; INTRAVENOUS; SUBCUTANEOUS at 21:30

## 2017-11-17 RX ADMIN — METRONIDAZOLE 500 MG: 500 TABLET ORAL at 21:26

## 2017-11-17 RX ADMIN — AMIODARONE HYDROCHLORIDE 200 MG: 200 TABLET ORAL at 11:46

## 2017-11-17 NOTE — ASSESSMENT & PLAN NOTE
· Rates are controlled with metoprolol and diltiazem  · No anticoagulation due to his Chads score was less than 2

## 2017-11-17 NOTE — PLAN OF CARE
DISCHARGE PLANNING     Discharge to home or other facility with appropriate resources Progressing        DISCHARGE PLANNING - CARE MANAGEMENT     Discharge to post-acute care or home with appropriate resources Progressing        INFECTION - ADULT     Absence or prevention of progression during hospitalization Progressing     Absence of fever/infection during neutropenic period Progressing        Knowledge Deficit     Patient/family/caregiver demonstrates understanding of disease process, treatment plan, medications, and discharge instructions Progressing        Nutrition/Hydration-ADULT     Nutrient/Hydration intake appropriate for improving, restoring or maintaining nutritional needs Progressing        PAIN - ADULT     Verbalizes/displays adequate comfort level or baseline comfort level Progressing        Potential for Falls     Patient will remain free of falls Progressing        Prexisting or High Potential for Compromised Skin Integrity     Skin integrity is maintained or improved Progressing        SAFETY ADULT     Maintain or return to baseline ADL function Progressing     Maintain or return mobility status to optimal level Progressing     Patient will remain free of falls Progressing

## 2017-11-17 NOTE — PROGRESS NOTES
Progress Note - [facility] 57 y o  male MRN: [id number]    Unit/Bed#: [facility] 420-01 Encounter: [id number]        Edema   Assessment & Plan    · Likely due to hypoalbuminemia and IVF  · Place compression stockings  · Encourage PO intake  · IV lasix one dose today        Pneumonia   Assessment & Plan    · With sepsis, present on admission, and parapneumonic effusion  · Continue ceftriaxone and Flagyl per ID recommendations  · Cultures negative so far  · F/u surgical cultures, still pending  · WBC counts improved  · afebrile        * Loculated pleural effusion   Assessment & Plan    · Parapneumonic effusion  · S/p VATS with decortication with small PTX  · Chest tube still on section  · CT surgery managing chest tubes        Hyponatremia   Assessment & Plan    · Worse today  · Due to poor intake  · Restart IVF  · Check BMP later this afternoon        Atrial fibrillation with RVR (HCC)   Assessment & Plan    · Rates are controlled with metoprolol and diltiazem  · No anticoagulation due to his Chads score was less than 2        Continuous opioid dependence (HCC)   Assessment & Plan    · Continue fentanyl patch  · P r n  vicodin and Dilaudid for breakthrough pain  · Gabapentin started  · Pain service following now        Severe protein-calorie malnutrition ([facility] [address] )   Assessment & Plan    · Poor access to food at home secondary to financial status  · Gaining weight here in the hospital with good appetite          Pharmacologic: Enoxaparin (Lovenox)  Mechanical VTE Prophylaxis in Place: Yes    Patient Centered Rounds: I have performed bedside rounds with nursing staff today  Education and Discussions with Family / Patient: patient, plan of care    Time Spent for Care: 15 minutes  More than 50% of total time spent on counseling and coordination of care as described above      Current Length of Stay: 10 day(s)    Current Patient Status: Inpatient   Certification Statement: The patient will continue to require additional inpatient hospital stay due to chest tube    Discharge Plan / Estimated Discharge Date: when chest tube is removed      Code Status: Level 1 - Full Code      Subjective:   "my breathing is improved, but I still have pain at the incisions and my back pain"    Objective:     Vitals:   Temp (24hrs), Av 1 °F (36 7 °C), Min:98 °F (36 7 °C), Max:98 1 °F (36 7 °C)    HR:  [65-83] 83  Resp:  [18-20] 20  BP: (112-118)/(59-65) 112/59  SpO2:  [96 %-98 %] 96 %  Body mass index is 21 12 kg/m²  Input and Output Summary (last 24 hours): Intake/Output Summary (Last 24 hours) at 17 1548  Last data filed at 17 1501   Gross per 24 hour   Intake           366 25 ml   Output             1225 ml   Net          -858 75 ml       Physical Exam:     Physical Exam   Constitutional: He is oriented to person, place, and time  Thin, malnourished   HENT:   Head: Normocephalic and atraumatic  Mouth/Throat: Oropharynx is clear and moist    Eyes: EOM are normal  Pupils are equal, round, and reactive to light  No scleral icterus  Neck: No JVD present  Cardiovascular: Normal rate and regular rhythm  No murmur heard  Pulmonary/Chest:   Decreased basilar breath sounds  Chest tube to suction   Abdominal: Soft  Bowel sounds are normal    Musculoskeletal: He exhibits edema  Neurological: He is alert and oriented to person, place, and time  Skin: Skin is warm and dry         Additional Data:     Labs:      Results from last 7 days  Lab Units 17  0627   WBC Thousand/uL 23 45*   HEMOGLOBIN g/dL 10 5*   HEMATOCRIT % 30 2*   PLATELETS Thousands/uL 853*   NEUTROS PCT % 87*   LYMPHS PCT % 7*   MONOS PCT % 6   EOS PCT % 0       Results from last 7 days  Lab Units 17  0628   SODIUM mmol/L 132*   POTASSIUM mmol/L 4 1   CHLORIDE mmol/L 100   CO2 mmol/L 28   BUN mg/dL 19   CREATININE mg/dL 0 57*   CALCIUM mg/dL 7 8*   GLUCOSE RANDOM mg/dL 91             Recent Cultures (last 7 days):       Results from last 7 days  Lab Units 11/15/17  3249   GRAM STAIN RESULT  No Polys or Bacteria seen       Last 24 Hours Medication List:     alteplase (ACTIVASE) 8 mg in 0 9% NaCl 50 mL 8 mg Chest Tube Once   amiodarone 200 mg Oral TID With Meals   calcium carbonate-vitamin D 1 tablet Oral BID With Meals   cefTRIAXone 2,000 mg Intravenous Q24H   clobetasol 1 application Topical [address]   diazepam 5 mg Oral HS   diltiazem 240 mg Oral Daily   docusate sodium 200 mg Oral BID   fentaNYL 1 patch Transdermal Q48H   gabapentin 100 mg Oral TID   guaiFENesin 600 mg Oral Q12H [address]   metoprolol tartrate 50 mg Oral Q12H [address]   metroNIDAZOLE 500 mg Oral Q8H [address]   tamsulosin 0 4 mg Oral Daily With Dinner        Today, Patient Was Seen By: Adrian Nunez MD    ** Please Note: Dragon 360 Dictation voice to text software may have been used in the creation of this document   **

## 2017-11-17 NOTE — RESTORATIVE TECHNICIAN NOTE
Restorative Specialist Mobility Note       Activity: Bathroom privileges, Chair     Assistive Device: Front wheel walker

## 2017-11-17 NOTE — ASSESSMENT & PLAN NOTE
· With sepsis, present on admission, and parapneumonic effusion  · Continue ceftriaxone and Flagyl per ID recommendations  · Cultures negative so far  · F/u surgical cultures, still pending  · WBC counts improved  · afebrile

## 2017-11-17 NOTE — PROGRESS NOTES
Progress Note - Infectious Disease   Scarlett Jolley 57 y o  male MRN: [id number]  Unit/Bed#: [facility] 420-01 Encounter: [id number]    Assessment/Plan:    57y o  year old male with a history of severe kyphosis  who initially presented to [facility] 11/6 with recent onset SOB, L-sided chest pain, fevers/chills in setting of green sputum production over past several months; found to have LLL PNA w/ left loculated pleural effusions s/p L chest tube placement 11/7, transferred to [facility] for thoracic surgery evaluation  S/p chest tube TPA instillation 11/7, 11/8, 11/9  POD#2 s/p VATS w/ complete decortication left lung     1  Sepsis POA: likely secondary to PNA w/ loculated parapneumonic effusion  Blood cx (11/6): negative, sputum cx with mixed resp arthur, pleural fluid cx negative  Appears to be clinically improving, afebrile since 11/7  Patient reports improvement in cough  · Afebrile, hemodynamically stable, well-appearing  · WBC downtrending, 23 < 31 < 20 < 16  Leukocytosis likely reactive in setting of VATs  Continue to monitor  · On ceftriaxone 2g IV 24H, flagyl 500mg q8H PO (day 12 total abx)  · Continue abx as above for now      2  Left-sided pneumonia: CAP v aspiration PNA  Legionella ag, strep pneumo ag, influenza/RSV, AFB stain negative  Fungal cx in process  Sputum cx with mixed respiratory arthur  Repeat CT 11/10 LLL consolidation favoring compressive atelectasis over PNA  · Continue abx as above for now    3  Loculated left pleural effusion s/p chest tube placement 11/6, TPA 11/7, 11/8, 11/9  Pleural fluid analysis w/ 878 WBC (97% PMN), pH 9 0, no growth bacteria, fungi, AFB  Left pleural chest tube upsized to 14Fr 11/10  Repeat CT 11/10 w/ similiar size pleural effusion but with increasing loculated pleural fluid anterior to heart  No right-sided chest tube per IR as not enough drainable fluid  Per thoracic surgery w/ trapped left lung due to effusion     · POD#2 s/p VATS complete decortication lung   · Continue abx as above for now     4  Acute urinary retention  · No farr inserted;on Flomax    5  Severe kyphosis w/ osteoporosis  · Enocrine consult at [facility]: ordered UPEP, PSA, immunoglobulins, PTH, VitD for work-up osteop  · Immunofixation c/w monoclonal gammopathy IgG kappa     6  Paroxysmal afib  · Management per cards    Subjective/Objective   Chief Complaint: Pain    Subjective: Patient seen and examined  Denies fevers/chills/nasuea/vomiting  Denies CP/SOB  Normal Bm/urination  Objective:     HR:  [65-84] 65  Resp:  [18-20] 18  BP: (102-118)/(59-72) 118/65  SpO2:  [97 %-98 %] 98 %  Temp (24hrs), Av 9 °F (36 6 °C), Min:97 1 °F (36 2 °C), Max:98 3 °F (36 8 °C)  Current: Temperature: 98 1 °F (36 7 °C)    Physical Exam:   Physical Exam   Physical Exam   Constitutional: He is oriented to person, place, and time  No distress  HENT:   Head: Normocephalic and atraumatic  Eyes: Pupils are equal, round, and reactive to light  Pulmonary/Chest:   Decreased lung sounds LLL  Pleuritic CP left-side   Abdominal: Soft  Bowel sounds are normal    Musculoskeletal:   Kyphosis    Neurological: He is alert and oriented to person, place, and time  Skin: Skin is warm and dry  He is not diaphoretic  Invasive Devices     Peripheral Intravenous Line            Peripheral IV 17 Left;Upper Arm less than 1 day          Drain            Chest Tube 1 Left Pleural 28 Fr  1 day    Chest Tube 2 Left Pleural 28 Fr  1 day                Lab, Imaging and other studies: I have personally reviewed pertinent reports

## 2017-11-17 NOTE — CONSULTS
Progress Note - Anesthesia Acute Pain Management    Leo Pozo 57 y o  male MRN: [id number]  Unit/Bed#: [facility] 420-01 Encounter: [id number]      Assessment:   Principal Problem:    Loculated pleural effusion  Active Problems:    Pneumonia    Severe protein-calorie malnutrition (HCC)    Continuous opioid dependence (HCC)    Atrial fibrillation with RVR (HCC)    Hyponatremia      Pain History  Patient states pain has improved over the past 24 hours  He says it is manageable 7/10 and the worst of it is at the suture sites on his chest wall  He was able to walk and is attempting to do so more often today  He is limiting his requests for IV dilaudid and trying to maintain himself with Vicodin       Meds/Allergies     Allergies   Allergen Reactions    Asa [Aspirin]     Carbapenems     Cephalosporins     Penicillins        Objective     Temp:  [98 °F (36 7 °C)-98 3 °F (36 8 °C)] 98 °F (36 7 °C)  HR:  [65-84] 83  Resp:  [18-20] 20  BP: (112-118)/(59-72) 112/59      Intake/Output Summary (Last 24 hours) at 11/17/17 0907  Last data filed at 11/17/17 0709   Gross per 24 hour   Intake            872 5 ml   Output             1275 ml   Net           -402 5 ml                 Physical Exam: /59 Comment: Map 79  Pulse 83   Temp 98 °F (36 7 °C) (Oral)   Resp 20   Ht 5' 8" (1 727 m)   Wt 63 kg (138 lb 14 2 oz)   SpO2 96%   BMI 21 12 kg/m²   Gen: Well appearing and well nourished, NAD  CV: RRR, +s1/s2, no M/R/G  Pul: Lungs CTAB, tenderness over sutures  Abd: Soft, non-tender, non-distended  Ext:  No cyanosis or edema    Lab Results:  Lab Results   Component Value Date    WBC 23 45 (H) 11/17/2017    HGB 10 5 (L) 11/17/2017    HCT 30 2 (L) 11/17/2017    MCV 86 11/17/2017     (H) 11/17/2017     Lab Results   Component Value Date    GLUCOSE 91 11/17/2017    CALCIUM 7 8 (L) 11/17/2017     (L) 11/17/2017    K 4 1 11/17/2017    CO2 28 11/17/2017     11/17/2017    BUN 19 11/17/2017    CREATININE 0 57 (L) 11/17/2017     Plan:   1) Apply lidocaine patch to chest wall near site of maximal discomfort, change every 72 hours     No other changes to pain regimen, patient is able to tolerate pain and work with physical therapy  He is limiting his requests for IV dilaudid and feels better today    APS will sign off, please call [phone] with additional questions        SIGNATURE: Luz Elena Duque MD  DATE: November 17, 2017  TIME: 9:07 AM

## 2017-11-17 NOTE — PROGRESS NOTES
Pt  Refuses to get oob and work with PT  Will only work with OT in chair  Pt  Informed that he will be taken to vascular and pt  States he does not want to go and would like to postpone testing  Informed pt  That in order to evaluate his foot and lower leg pain and to get him up and walking the tests are necessary  After much discussion pt  Does agree to this  Pt  Medicated with vicodin

## 2017-11-17 NOTE — WOUND OSTOMY CARE
Progress Note - Wound   Richardson Hickman 57 y o  male MRN: [id number]  Unit/Bed#: [facility] 420-01 Encounter: [id number]      Assessment: Patient is seen for a weekly skin and wound care assessment of the skin   The patient is sitting out of bed in the chair and is able to stand independently   Sacral and bilateral heels clean dry and intact   The patient is very kyphotic and has a reddened area that is intact and blanchable   Will continue with the present plan of care to the area with silicone foam to protect   Continue with present skin care orders   Plan:   1  Continue with present plan for skin care orders / prevention and silicone foam to the back for protection / prevention         Objective:    Vitals: Blood pressure 112/59, pulse 83, temperature 98 °F (36 7 °C), temperature source Oral, resp  rate 20, height 5' 8" (1 727 m), weight 63 kg (138 lb 14 2 oz), SpO2 96 %  ,Body mass index is 21 12 kg/m²          Will follow weekly for wound care     Katherine Posadas RN BSN CWOCN

## 2017-11-17 NOTE — PHYSICAL THERAPY NOTE
Physical Therapy Progress Note     11/17/17 7688   Pain Assessment   Pain Assessment 0-10   Pain Score 9   Pain Type Chronic pain;Surgical pain   Pain Location Generalized   Hospital Pain Intervention(s) Repositioned; Ambulation/increased activity; Elevated;Distraction   Response to Interventions TOLERATED   Restrictions/Precautions   Weight Bearing Precautions Per Order No   Other Precautions Fall Risk;Pain  (chest tube to water seal)   General   Chart Reviewed Yes   Response to Previous Treatment Patient with no complaints from previous session  Family/Caregiver Present No   Cognition   Overall Cognitive Status WFL   Subjective   Subjective pt reports having increased pain in bilateral feet with walking   Transfers   Sit to Stand 5  Supervision   Additional items Armrests; Increased time required   Stand to Sit 5  Supervision   Additional items Armrests   Ambulation/Elevation   Gait pattern Forward Flexion;Decreased foot clearance; Wide DANIELLE; Excessively slow; Short stride; Inconsistent long   Gait Assistance 4  Minimal assist   Additional items Assist x 1;Verbal cues   Assistive Device Rolling walker   Distance 6'x2 to and from br and 40'x1    Stair Management Assistance Not tested   Balance   Static Sitting Fair   Static Standing Fair -   Ambulatory Poor +  (with support of rw)   Activity Tolerance   Activity Tolerance Patient limited by pain   Nurse Made Aware spoke with nurse Johnson prior to session  Exercises   Hip Abduction Sitting;AROM;15 reps;Bilateral  (x2 sets)   Hip Adduction Sitting;15 reps;AROM; Bilateral  (x2 sets and adductor sets x30 reps with pillow)   Knee AROM Long Arc Quad Sitting;10 reps;AROM; Bilateral  (x2 sets)   Ankle Pumps Sitting;20 reps;AROM; Bilateral   Marching Sitting;10 reps;AROM; Bilateral  (x2 sets)   Assessment   Prognosis Guarded   Problem List Decreased strength;Decreased endurance; Impaired balance;Decreased mobility;Orthopedic restrictions;Pain   Assessment Pt standing in bathroom with nurse upon my arrival  Pt only willing to ambulate 6' back to bedside chair with rw and min assist  Pt seated on chair with supervision and bue support  Pt performed arom ble in sitting with rest periods between each set due to fatigue  Upon completion of exercises pt reported need to use br  Pt transferred with close supervision and increased time and was able to ambulate 6' with rw and min assist  Pt stood with support of rw to use urinal in br for extended time period  Upon completion in br pt was encouraged to attempt gt  Pt self limited to 36' with rw and min assist due to c/o bilateral foot pain  PCA arrived with zackary stockings  Le elevated on leg rest and assisted PCA to dorys zcakary stockings due to significant edema  Le placed on 2 pillows on floor as pt does not tolerated legs elevated on recliner due to back pain  Extended session due to pt's need for constant distraction from pain to increase activity during session  Pt will continue to benefit from rehab at d/c   Barriers to Discharge Inaccessible home environment;Decreased caregiver support   Goals   Patient Goals to have less pain in his feet   LTG Expiration Date 11/24/17   Treatment Day 3   Plan   Treatment/Interventions Functional transfer training;LE strengthening/ROM; Therapeutic exercise; Endurance training;Patient/family training;Gait training;Spoke to nursing   Progress Slow progress, medical status limitations   PT Frequency 5x/wk  (bid prn)   Recommendation   Recommendation (rehab)   Equipment Recommended Walker  (roller walker)   PT - OK to Discharge Yes  (to rehab when medically stable for d/c )     Denilson Kapoor, PTA

## 2017-11-17 NOTE — PROGRESS NOTES
Progress Note - Thoracic Surgery   Real Ludivina 57 y o  male MRN: [id number]  Unit/Bed#: [facility] 420-01 Encounter: [id number]    Assessment:  60y o  M with a loculated left pleural effusion s/p L pigtail CT 11/6 s/p TPA 11/7 and 11/8, 11/9  CT upsized 11/10 s/p 11/19 L VATS decortication    Plan:  Regular diet  Continue CT to suction  IS/OOB/ambulate     Subjective/Objective     Subjective: JIM  Walked 1X yesterday  Pain better controlled  Air leak at 250 this am  Output 50cc  No F/C  Objective:     Vitals: Blood pressure 118/65, pulse 65, temperature 98 1 °F (36 7 °C), temperature source Oral, resp  rate 18, height 5' 8" (1 727 m), weight 63 kg (138 lb 14 2 oz), SpO2 98 %  ,Body mass index is 21 12 kg/m²  I/O       11/10 0701 - 11/11 0700 11/11 0701 - 11/12 0700 11/12 0701 - 11/13 0700    P  O  950 400     I V  (mL/kg)       Total Intake(mL/kg) 950 (16 2) 400 (6 6)     Urine (mL/kg/hr) 425 (0 3) 450 (0 3)     Stool 0 (0) 0 (0)     Chest Tube 1820 (1 3) 330 (0 2)     Total Output 2245 780      Net -1295 -380             Unmeasured Urine Occurrence 2 x 1 x     Unmeasured Stool Occurrence 1 x 1 x           Physical Exam: NAD  AAOX3  RRR  Normal respiratory effort  Soft, NT, ND  Dressings c/d/i  No c/c/e    Lab, Imaging and other studies: reviewed  Pharm ppx: lovenox  VTE Mechanical Prophylaxis: sequential compression device

## 2017-11-17 NOTE — ASSESSMENT & PLAN NOTE
· Parapneumonic effusion  · S/p VATS with decortication with small PTX  · Chest tube still on section  · CT surgery managing chest tubes

## 2017-11-17 NOTE — ASSESSMENT & PLAN NOTE
· Likely due to hypoalbuminemia and IVF  · Place compression stockings  · Encourage PO intake  · IV lasix one dose today

## 2017-11-18 ENCOUNTER — APPOINTMENT (INPATIENT)
Dept: RADIOLOGY | Facility: HOSPITAL | Age: 62
DRG: 853 | End: 2017-11-18
Payer: MEDICARE

## 2017-11-18 LAB
BACTERIA SPEC ANAEROBE CULT: NO GROWTH
BACTERIA TISS AEROBE CULT: NO GROWTH
ERYTHROCYTE [DISTWIDTH] IN BLOOD BY AUTOMATED COUNT: 15 % (ref 11.6–15.1)
GRAM STN SPEC: NORMAL
HCT VFR BLD AUTO: 32.7 % (ref 36.5–49.3)
HGB BLD-MCNC: 11.5 G/DL (ref 12–17)
MCH RBC QN AUTO: 30 PG (ref 26.8–34.3)
MCHC RBC AUTO-ENTMCNC: 35.2 G/DL (ref 31.4–37.4)
MCV RBC AUTO: 85 FL (ref 82–98)
PLATELET # BLD AUTO: 959 THOUSANDS/UL (ref 149–390)
PMV BLD AUTO: 7.9 FL (ref 8.9–12.7)
RBC # BLD AUTO: 3.83 MILLION/UL (ref 3.88–5.62)
WBC # BLD AUTO: 20.68 THOUSAND/UL (ref 4.31–10.16)

## 2017-11-18 PROCEDURE — 71020 HB CHEST X-RAY 2VW FRONTAL&LATL: CPT

## 2017-11-18 PROCEDURE — 85027 COMPLETE CBC AUTOMATED: CPT | Performed by: PHYSICIAN ASSISTANT

## 2017-11-18 RX ORDER — FUROSEMIDE 10 MG/ML
20 INJECTION INTRAMUSCULAR; INTRAVENOUS DAILY
Status: DISCONTINUED | OUTPATIENT
Start: 2017-11-18 | End: 2017-11-20

## 2017-11-18 RX ADMIN — DOCUSATE SODIUM 200 MG: 100 CAPSULE, LIQUID FILLED ORAL at 16:08

## 2017-11-18 RX ADMIN — METRONIDAZOLE 500 MG: 500 TABLET ORAL at 20:33

## 2017-11-18 RX ADMIN — TAMSULOSIN HYDROCHLORIDE 0.4 MG: 0.4 CAPSULE ORAL at 16:08

## 2017-11-18 RX ADMIN — AMIODARONE HYDROCHLORIDE 200 MG: 200 TABLET ORAL at 12:37

## 2017-11-18 RX ADMIN — GABAPENTIN 100 MG: 100 CAPSULE ORAL at 16:08

## 2017-11-18 RX ADMIN — GABAPENTIN 100 MG: 100 CAPSULE ORAL at 09:29

## 2017-11-18 RX ADMIN — AMIODARONE HYDROCHLORIDE 200 MG: 200 TABLET ORAL at 09:29

## 2017-11-18 RX ADMIN — FUROSEMIDE 20 MG: 10 INJECTION, SOLUTION INTRAMUSCULAR; INTRAVENOUS at 16:27

## 2017-11-18 RX ADMIN — GUAIFENESIN 600 MG: 600 TABLET, EXTENDED RELEASE ORAL at 20:33

## 2017-11-18 RX ADMIN — DOCUSATE SODIUM 200 MG: 100 CAPSULE, LIQUID FILLED ORAL at 09:29

## 2017-11-18 RX ADMIN — METOPROLOL TARTRATE 50 MG: 50 TABLET ORAL at 20:33

## 2017-11-18 RX ADMIN — CLOBETASOL PROPIONATE 1 APPLICATION: 0.5 CREAM TOPICAL at 09:30

## 2017-11-18 RX ADMIN — HYDROCODONE BITARTRATE AND ACETAMINOPHEN 1 TABLET: 5; 325 TABLET ORAL at 09:29

## 2017-11-18 RX ADMIN — HYDROCODONE BITARTRATE AND ACETAMINOPHEN 1 TABLET: 5; 325 TABLET ORAL at 16:08

## 2017-11-18 RX ADMIN — CALCIUM CARBONATE 500 MG (1,250 MG)-VITAMIN D3 200 UNIT TABLET 1 TABLET: at 16:08

## 2017-11-18 RX ADMIN — HYDROMORPHONE HYDROCHLORIDE 1 MG: 1 INJECTION, SOLUTION INTRAMUSCULAR; INTRAVENOUS; SUBCUTANEOUS at 22:22

## 2017-11-18 RX ADMIN — GUAIFENESIN 600 MG: 600 TABLET, EXTENDED RELEASE ORAL at 09:29

## 2017-11-18 RX ADMIN — AMIODARONE HYDROCHLORIDE 200 MG: 200 TABLET ORAL at 16:08

## 2017-11-18 RX ADMIN — CLOBETASOL PROPIONATE 1 APPLICATION: 0.5 CREAM TOPICAL at 20:38

## 2017-11-18 RX ADMIN — METOPROLOL TARTRATE 50 MG: 50 TABLET ORAL at 09:29

## 2017-11-18 RX ADMIN — CEFTRIAXONE SODIUM 2000 MG: 10 INJECTION, POWDER, FOR SOLUTION INTRAVENOUS at 16:18

## 2017-11-18 RX ADMIN — HYDROCODONE BITARTRATE AND ACETAMINOPHEN 1 TABLET: 5; 325 TABLET ORAL at 02:22

## 2017-11-18 RX ADMIN — GABAPENTIN 100 MG: 100 CAPSULE ORAL at 20:33

## 2017-11-18 RX ADMIN — CALCIUM CARBONATE 500 MG (1,250 MG)-VITAMIN D3 200 UNIT TABLET 1 TABLET: at 09:29

## 2017-11-18 RX ADMIN — METRONIDAZOLE 500 MG: 500 TABLET ORAL at 12:37

## 2017-11-18 RX ADMIN — METRONIDAZOLE 500 MG: 500 TABLET ORAL at 06:03

## 2017-11-18 RX ADMIN — DILTIAZEM HYDROCHLORIDE 240 MG: 240 CAPSULE, COATED, EXTENDED RELEASE ORAL at 09:29

## 2017-11-18 RX ADMIN — DIAZEPAM 5 MG: 5 TABLET ORAL at 22:23

## 2017-11-18 NOTE — ASSESSMENT & PLAN NOTE
· Likely due to hypoalbuminemia and IVF  · continue compression stockings  · Encourage PO intake  · IV lasix daily

## 2017-11-18 NOTE — RESTORATIVE TECHNICIAN NOTE
Restorative Specialist Mobility Note       Activity: Ambulate in nuñez, Ambulate in room, Bathroom privileges, Chair, Other (Comment) (per PT ambulate in nuñez, chair exercises)     Assistive Device: Front wheel walker     Ambulation Response: Tolerated well (some weepage from feet   some pain in feet "not as much as yesterday")  Repositioned: Sitting, Up in chair        Heels/Feet: Heels elevated off bed (feet elevated on pillows in chair)  Range of Motion: Active, All extremities

## 2017-11-18 NOTE — ASSESSMENT & PLAN NOTE
· With sepsis, present on admission, and parapneumonic effusion  · Continue ceftriaxone and Flagyl per ID recommendations  · Surgical cultures are negative for growth  · WBC counts improving  · afebrile

## 2017-11-18 NOTE — PROGRESS NOTES
Informed luis alberto goldstein rn on situation  Ray Ulrich called hospital supervisor--no 1:1 needed at this time since pt  Is not actively stating he is suicidal   Psych consult entered  Will continue to monitor

## 2017-11-18 NOTE — PROGRESS NOTES
Progress Note - Jc Castro 57 y o  male MRN: [id number]    Unit/Bed#: [facility] 420-01 Encounter: [id number]        Pneumonia   Assessment & Plan    · With sepsis, present on admission, and parapneumonic effusion  · Continue ceftriaxone and Flagyl per ID recommendations  · Surgical cultures are negative for growth  · WBC counts improving  · afebrile        * Loculated pleural effusion   Assessment & Plan    · Parapneumonic effusion  · S/p VATS with decortication with small PTX  · Chest tube still on section  · CT surgery managing chest tubes        Edema   Assessment & Plan    · Likely due to hypoalbuminemia and IVF  · continue compression stockings  · Encourage PO intake  · IV lasix daily        Hyponatremia   Assessment & Plan    · Worse today,  · Due to volume overload  · Check serum osmo, urine osmo, urine Na  · IV lasix        Atrial fibrillation with RVR (HCC)   Assessment & Plan    · Rates are controlled with metoprolol and diltiazem  · No anticoagulation due to his Chads score was less than 2        Continuous opioid dependence (HCC)   Assessment & Plan    · Continue fentanyl patch  · P r n  vicodin and Dilaudid for breakthrough pain  · Gabapentin started  · Pain service following now        Severe protein-calorie malnutrition ([facility] [address] )   Assessment & Plan    · Poor access to food at home secondary to financial status  · Gaining weight here in the hospital with good appetite            Pharmacologic: Enoxaparin (Lovenox)  Mechanical VTE Prophylaxis in Place: Yes    Patient Centered Rounds: I have performed bedside rounds with nursing staff today  Education and Discussions with Family / Patient: patient, plan of care    Time Spent for Care: 15 minutes  More than 50% of total time spent on counseling and coordination of care as described above      Current Length of Stay: 11 day(s)    Current Patient Status: Inpatient   Certification Statement: The patient will continue to require additional inpatient hospital stay due to IV diuresis, chest tube    Discharge Plan / Estimated Discharge Date: when chest tube is removed      Code Status: Level 1 - Full Code      Subjective:   Reports improved SOB, less edema in legs  Objective:     Vitals:   Temp (24hrs), Av 5 °F (36 9 °C), Min:98 1 °F (36 7 °C), Max:98 8 °F (37 1 °C)    HR:  [67-79] 76  Resp:  [18-20] 18  BP: (102-133)/(58-68) 124/68  SpO2:  [94 %-97 %] 94 %  Body mass index is 21 42 kg/m²  Input and Output Summary (last 24 hours): Intake/Output Summary (Last 24 hours) at 17 1256  Last data filed at 17 0800   Gross per 24 hour   Intake             1200 ml   Output              870 ml   Net              330 ml       Physical Exam:     Physical Exam   Constitutional: He is oriented to person, place, and time  Thin, cachectic   HENT:   Head: Normocephalic and atraumatic  Mouth/Throat: Oropharynx is clear and moist    Eyes: EOM are normal  Pupils are equal, round, and reactive to light  Neck: Neck supple  No JVD present  Cardiovascular: Normal rate and regular rhythm  Pulmonary/Chest: Effort normal    Left chest tube, decreased breath sounds in left base   Abdominal: Soft  There is no tenderness  Musculoskeletal: He exhibits edema  Neurological: He is alert and oriented to person, place, and time  Skin: Skin is warm and dry         Additional Data:     Labs:      Results from last 7 days  Lab Units 17  0521 17  0627   WBC Thousand/uL 20 68* 23 45*   HEMOGLOBIN g/dL 11 5* 10 5*   HEMATOCRIT % 32 7* 30 2*   PLATELETS Thousands/uL 959* 853*   NEUTROS PCT %  --  87*   LYMPHS PCT %  --  7*   MONOS PCT %  --  6   EOS PCT %  --  0       Results from last 7 days  Lab Units 17  1637   SODIUM mmol/L 129*   POTASSIUM mmol/L 5 1   CHLORIDE mmol/L 99*   CO2 mmol/L 22   BUN mg/dL 22   CREATININE mg/dL 0 76   CALCIUM mg/dL 8 1*   GLUCOSE RANDOM mg/dL 119             Recent Cultures (last 7 days):       Results from last 7 days  Lab Units 11/15/17  1369   GRAM STAIN RESULT  No Polys or Bacteria seen       Last 24 Hours Medication List:     alteplase (ACTIVASE) 8 mg in 0 9% NaCl 50 mL 8 mg Chest Tube Once   amiodarone 200 mg Oral TID With Meals   calcium carbonate-vitamin D 1 tablet Oral BID With Meals   cefTRIAXone 2,000 mg Intravenous Q24H   clobetasol 1 application Topical H45F [address]   diazepam 5 mg Oral HS   diltiazem 240 mg Oral Daily   docusate sodium 200 mg Oral BID   fentaNYL 1 patch Transdermal Q48H   furosemide 20 mg Intravenous Daily   gabapentin 100 mg Oral TID   guaiFENesin 600 mg Oral Q12H [address]   metoprolol tartrate 50 mg Oral Q12H [address]   metroNIDAZOLE 500 mg Oral Q8H [address]   tamsulosin 0 4 mg Oral Daily With Dinner        Today, Patient Was Seen By: Cristina Calabrese MD    ** Please Note: Dragon 360 Dictation voice to text software may have been used in the creation of this document   **

## 2017-11-18 NOTE — PROGRESS NOTES
Progress Note - Thoracic Surgery   Real Ludivina 57 y o  male MRN: [id number]  Unit/Bed#: [facility] 420-01 Encounter: [id number]    Assessment:  60y o  M with a loculated left pleural effusion s/p L pigtail CT 11/6 s/p TPA 11/7 and 11/8, 11/9  CT upsized 11/10 s/p 11/19 L VATS decortication; still w/ large airleak    Plan:  Regular diet  Chest tube to H2O seal today  IS/OOB/ambulate     Subjective/Objective     Subjective: JIM  Off O2 this AM      Objective:     Vitals: Blood pressure 133/68, pulse 67, temperature 98 1 °F (36 7 °C), temperature source Oral, resp  rate 18, height 5' 8" (1 727 m), weight 63 9 kg (140 lb 14 oz), SpO2 97 %  ,Body mass index is 21 42 kg/m²  I/O       11/16 0701 - 11/17 0700 11/17 0701 - 11/18 0700    P  O  320 720    I V  (mL/kg) 702 5 (11 2)     Total Intake(mL/kg) 1022 5 (16 2) 720 (11 3)    Urine (mL/kg/hr) 1250 (0 8) 950 (0 6)    Chest Tube 100 (0 1) 20 (0)    Total Output 1350 970    Net -327 5 -250               Physical Exam: NAD  AAOX3  RRR  Normal respiratory effort  Soft, NT, ND  Dressings c/d/i  No c/c/e    Lab, Imaging and other studies: reviewed  Pharm ppx: lovenox  VTE Mechanical Prophylaxis: sequential compression device

## 2017-11-18 NOTE — PROGRESS NOTES
Upon reviewing pt  Chart on admission pt  States he does wish to be dead and does have suicidal thoughts  When asked now to follow up pt  States, "if I wanted to be dead I wouldn't be here getting help "  When asked if pt  Had suicidal thoughts in the past he states, " everyone does at times "  Pt  States he does get depressed at times and sometimes the pain is not bearable  Communicated this with Dr Junior Rinaldi me verbal order to put in psych consult however does not feel that pt  Needs to be on continual observation (1:1) at this time  Will continue to monitor

## 2017-11-19 LAB
ABO GROUP BLD BPU: NORMAL
ABO GROUP BLD BPU: NORMAL
ANION GAP SERPL CALCULATED.3IONS-SCNC: 6 MMOL/L (ref 4–13)
BASOPHILS # BLD AUTO: 0.03 THOUSANDS/ΜL (ref 0–0.1)
BASOPHILS NFR BLD AUTO: 0 % (ref 0–1)
BPU ID: NORMAL
BPU ID: NORMAL
BUN SERPL-MCNC: 19 MG/DL (ref 5–25)
CALCIUM SERPL-MCNC: 8 MG/DL (ref 8.3–10.1)
CHLORIDE SERPL-SCNC: 98 MMOL/L (ref 100–108)
CO2 SERPL-SCNC: 28 MMOL/L (ref 21–32)
CREAT SERPL-MCNC: 0.65 MG/DL (ref 0.6–1.3)
CROSSMATCH: NORMAL
CROSSMATCH: NORMAL
EOSINOPHIL # BLD AUTO: 0.07 THOUSAND/ΜL (ref 0–0.61)
EOSINOPHIL NFR BLD AUTO: 0 % (ref 0–6)
ERYTHROCYTE [DISTWIDTH] IN BLOOD BY AUTOMATED COUNT: 15.3 % (ref 11.6–15.1)
GFR SERPL CREATININE-BSD FRML MDRD: 104 ML/MIN/1.73SQ M
GLUCOSE SERPL-MCNC: 67 MG/DL (ref 65–140)
HCT VFR BLD AUTO: 29.1 % (ref 36.5–49.3)
HGB BLD-MCNC: 10 G/DL (ref 12–17)
LYMPHOCYTES # BLD AUTO: 1.88 THOUSANDS/ΜL (ref 0.6–4.47)
LYMPHOCYTES NFR BLD AUTO: 11 % (ref 14–44)
MCH RBC QN AUTO: 29.4 PG (ref 26.8–34.3)
MCHC RBC AUTO-ENTMCNC: 34.4 G/DL (ref 31.4–37.4)
MCV RBC AUTO: 86 FL (ref 82–98)
MONOCYTES # BLD AUTO: 1.12 THOUSAND/ΜL (ref 0.17–1.22)
MONOCYTES NFR BLD AUTO: 7 % (ref 4–12)
NEUTROPHILS # BLD AUTO: 13.84 THOUSANDS/ΜL (ref 1.85–7.62)
NEUTS SEG NFR BLD AUTO: 82 % (ref 43–75)
NRBC BLD AUTO-RTO: 0 /100 WBCS
OSMOLALITY UR/SERPL-RTO: 277 MMOL/KG (ref 282–298)
OSMOLALITY UR: 451 MMOL/KG
PLATELET # BLD AUTO: 875 THOUSANDS/UL (ref 149–390)
PMV BLD AUTO: 7.7 FL (ref 8.9–12.7)
POTASSIUM SERPL-SCNC: 4 MMOL/L (ref 3.5–5.3)
RBC # BLD AUTO: 3.4 MILLION/UL (ref 3.88–5.62)
SODIUM 24H UR-SCNC: 16 MOL/L
SODIUM SERPL-SCNC: 132 MMOL/L (ref 136–145)
UNIT DISPENSE STATUS: NORMAL
UNIT DISPENSE STATUS: NORMAL
UNIT PRODUCT CODE: NORMAL
UNIT PRODUCT CODE: NORMAL
UNIT RH: NORMAL
UNIT RH: NORMAL
WBC # BLD AUTO: 17.09 THOUSAND/UL (ref 4.31–10.16)

## 2017-11-19 PROCEDURE — 80048 BASIC METABOLIC PNL TOTAL CA: CPT | Performed by: INTERNAL MEDICINE

## 2017-11-19 PROCEDURE — 97116 GAIT TRAINING THERAPY: CPT

## 2017-11-19 PROCEDURE — 83930 ASSAY OF BLOOD OSMOLALITY: CPT | Performed by: INTERNAL MEDICINE

## 2017-11-19 PROCEDURE — 85025 COMPLETE CBC W/AUTO DIFF WBC: CPT | Performed by: INTERNAL MEDICINE

## 2017-11-19 PROCEDURE — 84300 ASSAY OF URINE SODIUM: CPT | Performed by: INTERNAL MEDICINE

## 2017-11-19 PROCEDURE — 97530 THERAPEUTIC ACTIVITIES: CPT

## 2017-11-19 PROCEDURE — 83935 ASSAY OF URINE OSMOLALITY: CPT | Performed by: INTERNAL MEDICINE

## 2017-11-19 RX ADMIN — DILTIAZEM HYDROCHLORIDE 240 MG: 240 CAPSULE, COATED, EXTENDED RELEASE ORAL at 09:02

## 2017-11-19 RX ADMIN — FENTANYL 1 PATCH: 50 PATCH, EXTENDED RELEASE TRANSDERMAL at 09:21

## 2017-11-19 RX ADMIN — HYDROCODONE BITARTRATE AND ACETAMINOPHEN 1 TABLET: 5; 325 TABLET ORAL at 15:19

## 2017-11-19 RX ADMIN — METOPROLOL TARTRATE 50 MG: 50 TABLET ORAL at 09:01

## 2017-11-19 RX ADMIN — HYDROCODONE BITARTRATE AND ACETAMINOPHEN 1 TABLET: 5; 325 TABLET ORAL at 09:00

## 2017-11-19 RX ADMIN — CLOBETASOL PROPIONATE 1 APPLICATION: 0.5 CREAM TOPICAL at 09:02

## 2017-11-19 RX ADMIN — AMIODARONE HYDROCHLORIDE 200 MG: 200 TABLET ORAL at 15:20

## 2017-11-19 RX ADMIN — TAMSULOSIN HYDROCHLORIDE 0.4 MG: 0.4 CAPSULE ORAL at 15:24

## 2017-11-19 RX ADMIN — FUROSEMIDE 20 MG: 10 INJECTION, SOLUTION INTRAMUSCULAR; INTRAVENOUS at 09:01

## 2017-11-19 RX ADMIN — CLOBETASOL PROPIONATE 1 APPLICATION: 0.5 CREAM TOPICAL at 22:22

## 2017-11-19 RX ADMIN — GUAIFENESIN 600 MG: 600 TABLET, EXTENDED RELEASE ORAL at 09:00

## 2017-11-19 RX ADMIN — GUAIFENESIN 600 MG: 600 TABLET, EXTENDED RELEASE ORAL at 21:20

## 2017-11-19 RX ADMIN — AMIODARONE HYDROCHLORIDE 200 MG: 200 TABLET ORAL at 13:37

## 2017-11-19 RX ADMIN — CALCIUM CARBONATE 500 MG (1,250 MG)-VITAMIN D3 200 UNIT TABLET 1 TABLET: at 09:01

## 2017-11-19 RX ADMIN — GABAPENTIN 100 MG: 100 CAPSULE ORAL at 21:19

## 2017-11-19 RX ADMIN — METRONIDAZOLE 500 MG: 500 TABLET ORAL at 05:51

## 2017-11-19 RX ADMIN — DOCUSATE SODIUM 200 MG: 100 CAPSULE, LIQUID FILLED ORAL at 15:19

## 2017-11-19 RX ADMIN — HYDROCODONE BITARTRATE AND ACETAMINOPHEN 1 TABLET: 5; 325 TABLET ORAL at 21:26

## 2017-11-19 RX ADMIN — CALCIUM CARBONATE 500 MG (1,250 MG)-VITAMIN D3 200 UNIT TABLET 1 TABLET: at 15:20

## 2017-11-19 RX ADMIN — HYDROCODONE BITARTRATE AND ACETAMINOPHEN 1 TABLET: 5; 325 TABLET ORAL at 00:53

## 2017-11-19 RX ADMIN — DOCUSATE SODIUM 100 MG: 100 CAPSULE, LIQUID FILLED ORAL at 09:00

## 2017-11-19 RX ADMIN — GABAPENTIN 100 MG: 100 CAPSULE ORAL at 15:19

## 2017-11-19 RX ADMIN — GABAPENTIN 100 MG: 100 CAPSULE ORAL at 09:00

## 2017-11-19 RX ADMIN — DIAZEPAM 5 MG: 5 TABLET ORAL at 21:26

## 2017-11-19 RX ADMIN — METOPROLOL TARTRATE 50 MG: 50 TABLET ORAL at 21:20

## 2017-11-19 RX ADMIN — AMIODARONE HYDROCHLORIDE 200 MG: 200 TABLET ORAL at 09:01

## 2017-11-19 NOTE — PHYSICAL THERAPY NOTE
Physical Therapy Progress Note     11/19/17 1510   Pain Assessment   Pain Assessment 0-10   Pain Score 9   Pain Type Chronic pain   Pain Location Chest   Pain Orientation Left   Hospital Pain Intervention(s) Ambulation/increased activity   Response to Interventions unchanged   (rn aware)   Restrictions/Precautions   Weight Bearing Precautions Per Order No   Other Precautions Multiple lines;Pain   General   Chart Reviewed Yes   Response to Previous Treatment Patient with no complaints from previous session  Family/Caregiver Present No   Cognition   Overall Cognitive Status WFL   Arousal/Participation Alert; Cooperative   Orientation Level Oriented X4   Following Commands Follows one step commands without difficulty   Subjective   Subjective I want to walk    Bed Mobility   Supine to Sit Unable to assess  (received pt sitting in  chr )   Transfers   Sit to Stand 5  Supervision   Additional items Armrests; Increased time required   Stand to Sit 5  Supervision   Additional items Armrests   Ambulation/Elevation   Gait pattern Narrow DANIELLE; Decreased foot clearance; Foward flexed   Gait Assistance 4  Minimal assist   Additional items Assist x 1;Verbal cues   Assistive Device Rolling walker   Distance 10ftx2 125 ft x2   Balance   Static Sitting Fair +   Static Standing Fair   Ambulatory Fair -   Endurance Deficit   Endurance Deficit Yes   Endurance Deficit Description fatigue   Activity Tolerance   Activity Tolerance Patient limited by fatigue;Patient limited by pain   Nurse Made Aware Elizabeth rn    Exercises   THR Sitting;15 reps;AROM; Bilateral   Assessment   Prognosis Guarded   Problem List Decreased strength;Decreased range of motion;Decreased endurance; Impaired balance;Decreased mobility;Pain   Assessment pt   eager to  walk today      he did  complete  trnasfer  c superv and  amb c min A   increased amb  endurance to 125 ft   using  rw   gt is  steady  cuse of  rw    he tends to    be impulsive and  quick    verbal instruction needed  for  safety   pt  did  go to BR x2 to attempt  voiding   c superv   pt is  hindered  by swollen weeping legs   and  chest discomfort  from  CT   he  will benefit  from continued PT intervention  to improve strenght and functional   mob    Barriers to Discharge Decreased caregiver support; Inaccessible home environment   Goals   Patient Goals go to  rehab    LTG Expiration Date 11/24/17   Treatment Day 4   Plan   Treatment/Interventions Functional transfer training;LE strengthening/ROM; Patient/family training;Gait training;Spoke to nursing   Progress Slow progress, medical status limitations   PT Frequency 5x/wk; Weekend   Recommendation   Recommendation (rehab)   Equipment Recommended Beverly Allison   PT - OK to Discharge Yes  (when med ready )     Maria Luz Vilchis, PTA

## 2017-11-19 NOTE — ASSESSMENT & PLAN NOTE
· Rates are controlled with amio, metoprolol and diltiazem  · No anticoagulation due to his Chads score was less than 2

## 2017-11-19 NOTE — PROGRESS NOTES
Progress Note - Thoracic Surgery   Lucien Richmond 57 y o  male MRN: [id number]  Unit/Bed#: [facility] 420-01 Encounter: [id number]    Assessment:  60y o  M with a loculated left pleural effusion s/p L pigtail CT 11/6 s/p TPA 11/7 and 11/8, 11/9  CT upsized 11/10 s/p 11/19 L VATS decortication    No output, still w/ airleak of 120  Off O2  Cultures still negative    Plan:  Regular diet  Continue chest tube to H2O seal  IS/OOB/ambulate     Subjective/Objective     Subjective: JIM  C/o back pain      Objective:     Vitals: Blood pressure 103/57, pulse 61, temperature 98 °F (36 7 °C), temperature source Oral, resp  rate 18, height 5' 8" (1 727 m), weight 59 4 kg (130 lb 15 3 oz), SpO2 97 %  ,Body mass index is 19 91 kg/m²  I/O       11/16 0701 - 11/17 0700 11/17 0701 - 11/18 0700    P  O  320 720    I V  (mL/kg) 702 5 (11 2)     Total Intake(mL/kg) 1022 5 (16 2) 720 (11 3)    Urine (mL/kg/hr) 1250 (0 8) 950 (0 6)    Chest Tube 100 (0 1) 20 (0)    Total Output 1350 970    Net -327 5 -250               Physical Exam: NAD  AAOX3  RRR  Normal respiratory effort  Soft, NT, ND  Dressings c/d/i  No c/c/e    Lab, Imaging and other studies: reviewed  Pharm ppx: lovenox  VTE Mechanical Prophylaxis: sequential compression device

## 2017-11-19 NOTE — ASSESSMENT & PLAN NOTE
· Continue fentanyl patch  · P r n  vicodin and Dilaudid for breakthrough pain  · Gabapentin started this admission  · Acute pain service following

## 2017-11-19 NOTE — PROGRESS NOTES
Progress Note - Simeon Auguste 57 y o  male MRN: [id number]    Unit/Bed#: [facility] 420-01 Encounter: [id number]        Pneumonia   Assessment & Plan    · With sepsis, present on admission, and parapneumonic effusion  · Surgical cultures are negative for growth, as per ID recommendations will stop antibiotics and observe  · WBC counts improving  · afebrile        * Loculated pleural effusion   Assessment & Plan    · Parapneumonic effusion  · S/p VATS with decortication  · Chest tube still on suction for air leak  · CT surgery managing chest tubes        Edema   Assessment & Plan    · Likely due to hypoalbuminemia and IVF  · continue compression stockings  · Encourage PO intake  · IV lasix daily        Hyponatremia   Assessment & Plan    · Hypervolemic, hypoosmolar  · Improved  · IV lasix with compression stockings to legs        Atrial fibrillation with RVR (HCC)   Assessment & Plan    · Rates are controlled with amio, metoprolol and diltiazem  · No anticoagulation due to his Chads score was less than 2        Continuous opioid dependence (HCC)   Assessment & Plan    · Continue fentanyl patch  · P r n  vicodin and Dilaudid for breakthrough pain  · Gabapentin started this admission  · Acute pain service following        Severe protein-calorie malnutrition ([facility] [address] )   Assessment & Plan    · Poor access to food at home secondary to financial status  · Gaining weight here in the hospital with good appetite            Pharmacologic: Enoxaparin (Lovenox)  Mechanical VTE Prophylaxis in Place: Yes    Patient Centered Rounds: I have performed bedside rounds with nursing staff today  Education and Discussions with Family / Patient: patient, plan of care    Time Spent for Care: 20 minutes  More than 50% of total time spent on counseling and coordination of care as described above      Current Length of Stay: 12 day(s)    Current Patient Status: Inpatient   Certification Statement: The patient will continue to require additional inpatient hospital stay due to chest tube managment    Discharge Plan / Estimated Discharge Date: to be determined      Code Status: Level 1 - Full Code      Subjective:   Improved SOB, denies significant cough or sputum  Notes improvedment in LE edema    Objective:     Vitals:   Temp (24hrs), Av 7 °F (37 1 °C), Min:98 °F (36 7 °C), Max:99 2 °F (37 3 °C)    HR:  [61-80] 80  Resp:  [16-20] 20  BP: (102-117)/(57-77) 117/68  SpO2:  [94 %-97 %] 94 %  Body mass index is 19 91 kg/m²  Input and Output Summary (last 24 hours): Intake/Output Summary (Last 24 hours) at 17 1311  Last data filed at 17 1101   Gross per 24 hour   Intake             1020 ml   Output             1800 ml   Net             -780 ml       Physical Exam:     Physical Exam   Constitutional: He is oriented to person, place, and time  Thin, malnourished   HENT:   Head: Normocephalic and atraumatic  Eyes: EOM are normal  Pupils are equal, round, and reactive to light  Neck: Neck supple  No JVD present  Cardiovascular: Normal rate and regular rhythm  Pulmonary/Chest: Effort normal and breath sounds normal    Decreased breath sounds in left base   Abdominal: There is no tenderness  Musculoskeletal: He exhibits edema  He exhibits no tenderness  Neurological: He is alert and oriented to person, place, and time  Skin: Skin is warm         Additional Data:     Labs:      Results from last 7 days  Lab Units 17  0448   WBC Thousand/uL 17 09*   HEMOGLOBIN g/dL 10 0*   HEMATOCRIT % 29 1*   PLATELETS Thousands/uL 875*   NEUTROS PCT % 82*   LYMPHS PCT % 11*   MONOS PCT % 7   EOS PCT % 0       Results from last 7 days  Lab Units 17  0448   SODIUM mmol/L 132*   POTASSIUM mmol/L 4 0   CHLORIDE mmol/L 98*   CO2 mmol/L 28   BUN mg/dL 19   CREATININE mg/dL 0 65   CALCIUM mg/dL 8 0*   GLUCOSE RANDOM mg/dL 67             Recent Cultures (last 7 days):       Results from last 7 days  Lab Units 11/15/17  2937 GRAM STAIN RESULT  No Polys or Bacteria seen       Last 24 Hours Medication List:     alteplase (ACTIVASE) 8 mg in 0 9% NaCl 50 mL 8 mg Chest Tube Once   amiodarone 200 mg Oral TID With Meals   calcium carbonate-vitamin D 1 tablet Oral BID With Meals   clobetasol 1 application Topical V87A [address]   diazepam 5 mg Oral HS   diltiazem 240 mg Oral Daily   docusate sodium 200 mg Oral BID   fentaNYL 1 patch Transdermal Q48H   furosemide 20 mg Intravenous Daily   gabapentin 100 mg Oral TID   guaiFENesin 600 mg Oral Q12H [address]   metoprolol tartrate 50 mg Oral Q12H [address]   tamsulosin 0 4 mg Oral Daily With Dinner        Today, Patient Was Seen By: Kiko Lange MD    ** Please Note: Dragon 360 Dictation voice to text software may have been used in the creation of this document   **

## 2017-11-19 NOTE — PROGRESS NOTES
11/19/17    Procedure: Chest tube removal    Left anterior chest tube removed in routine fashion without incident  The patient tolerated the procedure well  A dry, sterile dressing was placed  Posterior Chest tube remains to water seal  Will check a pa/lat chest x-ray tomorrow morning       Elder Miranda PA-C

## 2017-11-19 NOTE — CASE MANAGEMENT
Continued Stay Review    Date: 11/19/17    Vital Signs: /64 Comment: Map 85  Pulse 73   Temp 98 2 °F (36 8 °C) (Oral)   Resp 20   Ht 5' 8" (1 727 m)   Wt 59 4 kg (130 lb 15 3 oz)   SpO2 96%   BMI 19 91 kg/m²     Medications:   Scheduled Meds:   alteplase (ACTIVASE) 8 mg in 0 9% NaCl 50 mL 8 mg Chest Tube Once   amiodarone 200 mg Oral TID With Meals   calcium carbonate-vitamin D 1 tablet Oral BID With Meals   clobetasol 1 application Topical K92H [address]   diazepam 5 mg Oral HS   diltiazem 240 mg Oral Daily   docusate sodium 200 mg Oral BID   fentaNYL 1 patch Transdermal Q48H   furosemide 20 mg Intravenous Daily   gabapentin 100 mg Oral TID   guaiFENesin 600 mg Oral Q12H MICHELET   metoprolol tartrate 50 mg Oral Q12H MICHELET   tamsulosin 0 4 mg Oral Daily With Dinner     Continuous Infusions:    PRN Meds:   acetaminophen    HYDROcodone-acetaminophen    HYDROmorphone    polyethylene glycol    Insert peripheral IV **AND** sodium chloride (PF)    Abnormal Labs/Diagnostic Results:   Results from last 7 days  Lab 11/19/17  0448   WBC 17 09*   HEMOGLOBIN 10 0*   HEMATOCRIT 29 1*   PLATELETS 857*   NEUTROS PCT 82*   LYMPHS PCT 11*     Lab 11/19/17  0448   SODIUM 132*   CHLORIDE 98*   CALCIUM 8 0*     Age/Sex: 57 y o  male     Assessment/Plan:   Pneumonia   Assessment & Plan     · With sepsis, present on admission, and parapneumonic effusion  · Surgical cultures are negative for growth, as per ID recommendations will stop antibiotics and observe  · WBC counts improving  · afebrile       * Loculated pleural effusion   Assessment & Plan     · Parapneumonic effusion  · S/p VATS with decortication  · Chest tube still on suction for air leak  · CT surgery managing chest tubes       Edema   Assessment & Plan     · Likely due to hypoalbuminemia and IVF  · continue compression stockings  · Encourage PO intake  · IV lasix daily       Hyponatremia   Assessment & Plan     · Hypervolemic, hypoosmolar  · Improved  · IV lasix with compression stockings to legs       Atrial fibrillation with RVR (HCC)   Assessment & Plan     · Rates are controlled with amio, metoprolol and diltiazem  · No anticoagulation due to his Chads score was less than 2       Continuous opioid dependence (HCC)   Assessment & Plan     · Continue fentanyl patch  · P r n  vicodin and Dilaudid for breakthrough pain  · Gabapentin started this admission  · Acute pain service following       Severe protein-calorie malnutrition ([address] )   Assessment & Plan     · Poor access to food at home secondary to financial status  · Gaining weight here in the hospital with good appetite             Pharmacologic: Enoxaparin (Lovenox)  Mechanical VTE Prophylaxis in Place: Yes    Current Patient Status: Inpatient   Certification Statement: The patient will continue to require additional inpatient hospital stay due to chest tube managment     Discharge Plan / Estimated Discharge Date: to be determined         Discharge Plan: [address] in the [facility] by Maxx Zendejas for 2017  Network Utilization Review Department  Phone: [phone]; Fax [phone]  ATTENTION: The Network Utilization Review Department is now centralized for our 7 Facilities  Make a note that we have a new phone and fax numbers for our Department  Please call with any questions or concerns to [phone] and carefully follow the prompts so that you are directed to the right person  All voicemails are confidential  Fax any determinations, approvals, denials, and requests for initial or continue stay review clinical to [phone]  Due to HIGH CALL volume, it would be easier if you could please send faxed requests to expedite your requests and in part, help us provide discharge notifications faster

## 2017-11-19 NOTE — PLAN OF CARE
Problem: PHYSICAL THERAPY ADULT  Goal: Performs mobility at highest level of function for planned discharge setting  See evaluation for individualized goals  Treatment/Interventions: Functional transfer training, LE strengthening/ROM, Elevations, Therapeutic exercise, Endurance training, Bed mobility, Gait training, Spoke to nursing, Spoke to case management  Equipment Recommended: Astrid Benitez (w/ (A))       See flowsheet documentation for full assessment, interventions and recommendations  Outcome: Progressing  Prognosis: Guarded  Problem List: Decreased strength, Decreased range of motion, Decreased endurance, Impaired balance, Decreased mobility, Pain  Assessment: pt   eager to  walk today      he did  complete  trnasfer  c superv and  amb c min A   increased amb  endurance to 125 ft   using  rw   gt is  steady  cuse of  rw    he tends to    be impulsive and  quick  verbal instruction needed  for  safety   pt  did  go to BR x2 to attempt  voiding   c superv   pt is  hindered  by swollen weeping legs   and  chest discomfort  from  CT   he  will benefit  from continued PT intervention  to improve strenght and functional   mob   Barriers to Discharge: Decreased caregiver support, Inaccessible home environment     Recommendation:  (rehab)     PT - OK to Discharge: Yes (when med ready )    See flowsheet documentation for full assessment

## 2017-11-19 NOTE — ASSESSMENT & PLAN NOTE
· With sepsis, present on admission, and parapneumonic effusion  · Surgical cultures are negative for growth, as per ID recommendations will stop antibiotics and observe  · WBC counts improving  · afebrile

## 2017-11-19 NOTE — ASSESSMENT & PLAN NOTE
· Parapneumonic effusion  · S/p VATS with decortication  · Chest tube still on suction for air leak  · CT surgery managing chest tubes

## 2017-11-20 ENCOUNTER — APPOINTMENT (INPATIENT)
Dept: RADIOLOGY | Facility: HOSPITAL | Age: 62
DRG: 853 | End: 2017-11-20
Attending: THORACIC SURGERY (CARDIOTHORACIC VASCULAR SURGERY)
Payer: MEDICARE

## 2017-11-20 LAB
ANION GAP SERPL CALCULATED.3IONS-SCNC: 8 MMOL/L (ref 4–13)
BUN SERPL-MCNC: 21 MG/DL (ref 5–25)
CALCIUM SERPL-MCNC: 7.7 MG/DL (ref 8.3–10.1)
CHLORIDE SERPL-SCNC: 98 MMOL/L (ref 100–108)
CO2 SERPL-SCNC: 28 MMOL/L (ref 21–32)
CREAT SERPL-MCNC: 0.54 MG/DL (ref 0.6–1.3)
ERYTHROCYTE [DISTWIDTH] IN BLOOD BY AUTOMATED COUNT: 15.3 % (ref 11.6–15.1)
GFR SERPL CREATININE-BSD FRML MDRD: 113 ML/MIN/1.73SQ M
GLUCOSE SERPL-MCNC: 81 MG/DL (ref 65–140)
HCT VFR BLD AUTO: 29.1 % (ref 36.5–49.3)
HGB BLD-MCNC: 9.9 G/DL (ref 12–17)
MCH RBC QN AUTO: 28.9 PG (ref 26.8–34.3)
MCHC RBC AUTO-ENTMCNC: 34 G/DL (ref 31.4–37.4)
MCV RBC AUTO: 85 FL (ref 82–98)
PLATELET # BLD AUTO: 825 THOUSANDS/UL (ref 149–390)
PMV BLD AUTO: 7.6 FL (ref 8.9–12.7)
POTASSIUM SERPL-SCNC: 3.5 MMOL/L (ref 3.5–5.3)
RBC # BLD AUTO: 3.42 MILLION/UL (ref 3.88–5.62)
SODIUM SERPL-SCNC: 134 MMOL/L (ref 136–145)
WBC # BLD AUTO: 15.63 THOUSAND/UL (ref 4.31–10.16)

## 2017-11-20 PROCEDURE — 97116 GAIT TRAINING THERAPY: CPT

## 2017-11-20 PROCEDURE — 71020 HB CHEST X-RAY 2VW FRONTAL&LATL: CPT

## 2017-11-20 PROCEDURE — 97110 THERAPEUTIC EXERCISES: CPT

## 2017-11-20 PROCEDURE — 85027 COMPLETE CBC AUTOMATED: CPT | Performed by: INTERNAL MEDICINE

## 2017-11-20 PROCEDURE — 97535 SELF CARE MNGMENT TRAINING: CPT

## 2017-11-20 PROCEDURE — 80048 BASIC METABOLIC PNL TOTAL CA: CPT | Performed by: INTERNAL MEDICINE

## 2017-11-20 RX ORDER — POTASSIUM CHLORIDE 20 MEQ/1
40 TABLET, EXTENDED RELEASE ORAL DAILY
Status: DISCONTINUED | OUTPATIENT
Start: 2017-11-20 | End: 2017-11-22 | Stop reason: HOSPADM

## 2017-11-20 RX ORDER — FUROSEMIDE 10 MG/ML
20 INJECTION INTRAMUSCULAR; INTRAVENOUS
Status: DISCONTINUED | OUTPATIENT
Start: 2017-11-21 | End: 2017-11-21

## 2017-11-20 RX ADMIN — GUAIFENESIN 600 MG: 600 TABLET, EXTENDED RELEASE ORAL at 08:23

## 2017-11-20 RX ADMIN — AMIODARONE HYDROCHLORIDE 200 MG: 200 TABLET ORAL at 11:36

## 2017-11-20 RX ADMIN — GABAPENTIN 100 MG: 100 CAPSULE ORAL at 21:40

## 2017-11-20 RX ADMIN — METOPROLOL TARTRATE 50 MG: 50 TABLET ORAL at 08:23

## 2017-11-20 RX ADMIN — CLOBETASOL PROPIONATE 1 APPLICATION: 0.5 CREAM TOPICAL at 08:23

## 2017-11-20 RX ADMIN — HYDROCODONE BITARTRATE AND ACETAMINOPHEN 1 TABLET: 5; 325 TABLET ORAL at 16:21

## 2017-11-20 RX ADMIN — POTASSIUM CHLORIDE 40 MEQ: 1500 TABLET, EXTENDED RELEASE ORAL at 11:35

## 2017-11-20 RX ADMIN — METOPROLOL TARTRATE 50 MG: 50 TABLET ORAL at 21:40

## 2017-11-20 RX ADMIN — HYDROCODONE BITARTRATE AND ACETAMINOPHEN 1 TABLET: 5; 325 TABLET ORAL at 22:36

## 2017-11-20 RX ADMIN — HYDROMORPHONE HYDROCHLORIDE 1 MG: 1 INJECTION, SOLUTION INTRAMUSCULAR; INTRAVENOUS; SUBCUTANEOUS at 01:48

## 2017-11-20 RX ADMIN — CLOBETASOL PROPIONATE 1 APPLICATION: 0.5 CREAM TOPICAL at 21:50

## 2017-11-20 RX ADMIN — CALCIUM CARBONATE 500 MG (1,250 MG)-VITAMIN D3 200 UNIT TABLET 1 TABLET: at 08:23

## 2017-11-20 RX ADMIN — GABAPENTIN 100 MG: 100 CAPSULE ORAL at 16:02

## 2017-11-20 RX ADMIN — DOCUSATE SODIUM 200 MG: 100 CAPSULE, LIQUID FILLED ORAL at 08:23

## 2017-11-20 RX ADMIN — AMIODARONE HYDROCHLORIDE 200 MG: 200 TABLET ORAL at 08:23

## 2017-11-20 RX ADMIN — CALCIUM CARBONATE 500 MG (1,250 MG)-VITAMIN D3 200 UNIT TABLET 1 TABLET: at 16:02

## 2017-11-20 RX ADMIN — GABAPENTIN 100 MG: 100 CAPSULE ORAL at 08:23

## 2017-11-20 RX ADMIN — AMIODARONE HYDROCHLORIDE 200 MG: 200 TABLET ORAL at 16:02

## 2017-11-20 RX ADMIN — DOCUSATE SODIUM 200 MG: 100 CAPSULE, LIQUID FILLED ORAL at 17:57

## 2017-11-20 RX ADMIN — TAMSULOSIN HYDROCHLORIDE 0.4 MG: 0.4 CAPSULE ORAL at 16:02

## 2017-11-20 RX ADMIN — GUAIFENESIN 600 MG: 600 TABLET, EXTENDED RELEASE ORAL at 21:49

## 2017-11-20 RX ADMIN — DIAZEPAM 5 MG: 5 TABLET ORAL at 21:40

## 2017-11-20 RX ADMIN — HYDROCODONE BITARTRATE AND ACETAMINOPHEN 1 TABLET: 5; 325 TABLET ORAL at 10:12

## 2017-11-20 RX ADMIN — DILTIAZEM HYDROCHLORIDE 240 MG: 240 CAPSULE, COATED, EXTENDED RELEASE ORAL at 08:24

## 2017-11-20 RX ADMIN — HYDROCODONE BITARTRATE AND ACETAMINOPHEN 1 TABLET: 5; 325 TABLET ORAL at 04:17

## 2017-11-20 RX ADMIN — FUROSEMIDE 20 MG: 10 INJECTION, SOLUTION INTRAMUSCULAR; INTRAVENOUS at 08:23

## 2017-11-20 NOTE — CONSULTS
Psychiatric Evaluation - Behavioral Health       Assessment/Plan  Principal Problem:  F41 9 Anxiety DO NOS  F33 9 Depressive DO NOS     PLAN:   1) Patient is refusing to take any medications  2) Patient is not suicidal and does not need to be on one on one observation  3)) Communicated with patients' RN      Chief Complaint: "I am a little anxious as I am in the hospital  "    History of Present Illness:  57 CM who was admitted form [facility]   He is here since 11/7 for chest cold and pneumonia  He is recovering and getting IV aniitbiotic  He made a comments during nursing assessment that he had suicidal idea and that is why psych consul was requested  Patient said that he is not sad or depressed but  just anxious as he is in the hospital  He said he sleeps well  He denied any hallucinations or delusions  No other concerns  He sad he just mad ea comment when he was asked that question that, "Weel, everyone is suicidal at some point"  He said he is not suicidal and he never was  PAST PSYCH HISTORY:    Denied any previous history of treatments  Substance Abuse History:    History   Alcohol Use No         Family Psychiatric History:   None    Social History:  Education:  One year of college     Learning Disabilities: None  Marital history:   and has no kids  Living arrangement, social support:  Patient lives at home alone  Occupational History:  Patient use to work at a new [employer]       Functioning Relationships:  Poor support system  Other Pertinent History: None      Traumatic History:   Abuse: None  Other Traumatic Events: None  Past Medical History:   Diagnosis Date    History of spinal fracture     Kyphosis     Loss of appetite     Loss of weight     Osteoarthritis     Osteoporosis        Medical Review Of Systems:  All 12 point review of system is normal except for what is mention in medical hisotry      Scheduled Meds:  alteplase (ACTIVASE) 8 mg in 0 9% NaCl 50 mL 8 mg Chest Tube Once   amiodarone 200 mg Oral TID With Meals   calcium carbonate-vitamin D 1 tablet Oral BID With Meals   clobetasol 1 application Topical V28N [address]   diazepam 5 mg Oral HS   diltiazem 240 mg Oral Daily   docusate sodium 200 mg Oral BID   fentaNYL 1 patch Transdermal Q48H   furosemide 20 mg Intravenous Daily   gabapentin 100 mg Oral TID   guaiFENesin 600 mg Oral Q12H MICHELET   metoprolol tartrate 50 mg Oral Q12H MICHELET   tamsulosin 0 4 mg Oral Daily With Dinner     Continuous Infusions:   PRN Meds:   acetaminophen    HYDROcodone-acetaminophen    HYDROmorphone    polyethylene glycol    Insert peripheral IV **AND** sodium chloride (PF)     Allergies   Allergen Reactions    Asa [Aspirin]     Carbapenems     Cephalosporins     Penicillins        Vitals:    11/19/17 1520   BP: 116/64   Pulse: 73   Resp: 20   Temp: 98 2 °F (36 8 °C)   SpO2: 96%             Mental Status Evaluation:  Appearance:   appeared of age and had good hygiene    Behavior:   behavior was cooperative but guarded  Speech:   speech is normal goal directed    Mood:  OK   Affect Quite   Language: naming objects and Goal directed  Thought Process:   logical and linear    Thought Content:  Normal   Perceptual Disturbances:  denying any auditory and visual hallucinations  Risk Potential: None   Sensorium:  person, place, time/date, situation, day of week, month of year and year   Cognition:  grossly intact   Consciousness:   alert, awake, and oriented ×3    Attention: Good attention span   Intellect: Normal   Fund of Knowledge: awareness of current events: normal    Insight:  Good   Judgment: Good   Muscle Strength and Tone: Normal    Gait/Station:  gait was not assessed    Motor Activity: no abnormal movements     Lab Results: I have personally reviewed pertinent lab results  NOTE:  Total of 40 minutes were spent in talking to patient completing this medical record reviewing medical chart medical decision making    Ryan Deal MD

## 2017-11-20 NOTE — PHYSICAL THERAPY NOTE
Physical Therapy Progress Note     11/20/17 1200   Pain Assessment   Pain Assessment 0-10   Pain Score 7   Pain Type Chronic pain   Pain Location Incision  (chest tube site)   Pain Orientation Left   Hospital Pain Intervention(s) Repositioned; Ambulation/increased activity   Response to Interventions satisfied   Restrictions/Precautions   Weight Bearing Precautions Per Order No   Other Precautions Fall Risk;Pain;Multiple lines  (chest tube to water seal)   General   Chart Reviewed Yes   Response to Previous Treatment Patient with no complaints from previous session  Family/Caregiver Present No   Cognition   Overall Cognitive Status WFL   Subjective   Subjective pt reports willingness to work with therapy   Transfers   Stand to Sit 5  Supervision   Additional items Armrests; Increased time required   Toilet transfer 6  Modified independent   Additional items Armrests; Increased time required;Commode   Ambulation/Elevation   Gait pattern Forward Flexion;Decreased foot clearance;Narrow DANIELLE   Gait Assistance 4  Minimal assist  (close supervision/min assist)   Additional items Assist x 1;Verbal cues  (posture/pacing)   Assistive Device Rolling walker   Distance 200'x3 with brief standing rest between    Stair Management Assistance Not tested   Balance   Static Sitting Fair +   Static Standing Farhad Marin 4196 -  (with support of rw)   Activity Tolerance   Activity Tolerance Patient tolerated treatment well   Nurse [address] notified of pt's mobility status   Exercises   Hamstring Sets Standing;AROM; Bilateral;10 reps  (hamstring curls with support of rw)   Hip Abduction Standing;15 reps;AROM; Bilateral   Hip Adduction Standing;15 reps;AROM; Bilateral   Knee AROM [employer]; Bilateral;15 reps  (x2 sets)   Ankle Pumps Standing;20 reps;AROM; Bilateral   Marching Standing;15 reps;AROM; Bilateral   Assessment   Prognosis Guarded   Problem List Decreased strength;Decreased range of motion;Decreased endurance; Impaired balance;Decreased mobility;Pain   Assessment Pt standing in room with support of rw as he exited from br without assist  Pt ambulated increased distances with rw and close supervision/min assist with occasional unsteadiness however no loss of balance noted  Pt required standing rest periods between each gt trial as well as instruction during gt for posture/pacing  Pt performed seated/standing exercises with rest periods between each set  Pt seated on bedside chair with pillows under ble upon completion of session  May trial stairs next session  Pt will continue to benefit from rehab at d/c   Barriers to Discharge Decreased caregiver support; Inaccessible home environment   Goals   Patient Goals to go to rehab to get stronger prior to going home   LTG Expiration Date 11/24/17   Treatment Day 5   Plan   Treatment/Interventions Functional transfer training;LE strengthening/ROM; Therapeutic exercise; Endurance training;Patient/family training;Gait training;Spoke to nursing   Progress Progressing toward goals   PT Frequency 5x/wk  (bid prn)   Recommendation   Recommendation (rehab )   Equipment Recommended Walker  (roller walker)   PT - OK to Discharge Yes  (to rehab when medically stable for d/c )     Grace Baker, PTA

## 2017-11-20 NOTE — ASSESSMENT & PLAN NOTE
· Likely due to hypoalbuminemia and IVF  · continue compression stockings  · Encourage PO intake  · IV lasix BID

## 2017-11-20 NOTE — PROGRESS NOTES
Progress Note - Thoracic Surgery   Kwaku Lowe 57 y o  male MRN: [id number]  Unit/Bed#: [facility] 420-01 Encounter: [id number]    Assessment:  60y o  M with a loculated left pleural effusion s/p L pigtail CT 11/6 s/p TPA 11/7 and 11/8, 11/9  CT upsized 11/10 s/p 11/19 L VATS decortication    Anterior CT d/c'ed yesterday    Minimal output from posterior tube, however persistent air leak  Cultures still negative    Plan:  Regular diet  Continue posterior chest tube to H2O seal until resolution of air leak  IS/OOB/ambulate     Subjective/Objective     Subjective: JIM  C/o back pain      Objective:     Vitals: Blood pressure 127/61, pulse 62, temperature 98 3 °F (36 8 °C), temperature source Oral, resp  rate 18, height 5' 8" (1 727 m), weight 58 1 kg (128 lb 1 4 oz), SpO2 98 %  ,Body mass index is 19 48 kg/m²  I/O       11/18 0701 - 11/19 0700 11/19 0701 - 11/20 0700    P  O  780 660    Total Intake(mL/kg) 780 (13 1) 660 (11 4)    Urine (mL/kg/hr) 1425 (1) 1900 (1 4)    Chest Tube 0 (0) 0 (0)    Total Output 1425 1900    Net -645 -1240               Physical Exam: NAD  AAOX3  RRR  Normal respiratory effort  Soft, NT, ND  Dressings c/d/i  No c/c/e    Lab, Imaging and other studies: reviewed  Pharm ppx: lovenox  VTE Mechanical Prophylaxis: sequential compression device

## 2017-11-20 NOTE — PROGRESS NOTES
Progress Note - Byron Masker 57 y o  male MRN: [id number]    Unit/Bed#: [facility] 420-01 Encounter: [id number]        Pneumonia   Assessment & Plan    · With sepsis, present on admission, and parapneumonic effusion  · Surgical cultures are negative for growth,   · Completed antibiotics, observe off antibiotics at this time  · WBC counts improving  · afebrile        * Loculated pleural effusion   Assessment & Plan    · Parapneumonic effusion  · S/p VATS with decortication  · Chest tube still on suction for air leak  · CT surgery managing chest tubes        Edema   Assessment & Plan    · Likely due to hypoalbuminemia and IVF  · continue compression stockings  · Encourage PO intake  · IV lasix BID        Hyponatremia   Assessment & Plan    · Hypervolemic, hypoosmolar  · Improved  · IV lasix with compression stockings to legs  · Will increase lasix to BID        Atrial fibrillation with RVR (HCC)   Assessment & Plan    · Rates are controlled with amio, metoprolol and diltiazem  · No anticoagulation due to his Chads score was less than 2        Continuous opioid dependence (HCC)   Assessment & Plan    · Continue fentanyl patch  · P r n  vicodin and Dilaudid for breakthrough pain  · Gabapentin started this admission  · Acute pain service following        Severe protein-calorie malnutrition [address] [address] )   Assessment & Plan    · Poor access to food at home secondary to financial status  · Gaining weight here in the hospital with good appetite            Pharmacologic: Enoxaparin (Lovenox)  Mechanical VTE Prophylaxis in Place: Yes    Patient Centered Rounds: I have performed bedside rounds with nursing staff today  Education and Discussions with Family / Patient: patient    Time Spent for Care: 20 minutes  More than 50% of total time spent on counseling and coordination of care as described above      Current Length of Stay: 13 day(s)    Current Patient Status: Inpatient   Certification Statement: The patient will continue to require additional inpatient hospital stay due to chest tube managment    Discharge Plan / Estimated Discharge Date: to be determined      Code Status: Level 1 - Full Code      Subjective:   improved SOB, improved edema but still present    Objective:     Vitals:   Temp (24hrs), Av 2 °F (36 8 °C), Min:97 9 °F (36 6 °C), Max:98 4 °F (36 9 °C)    HR:  [62-71] 68  Resp:  [18] 18  BP: (114-127)/(61-70) 114/70  SpO2:  [95 %-98 %] 95 %  Body mass index is 19 48 kg/m²  Input and Output Summary (last 24 hours): Intake/Output Summary (Last 24 hours) at 17 1829  Last data filed at 17 1657   Gross per 24 hour   Intake             1180 ml   Output             1880 ml   Net             -700 ml       Physical Exam:     Physical Exam   Constitutional: He is oriented to person, place, and time  Thin, cachectic   HENT:   Head: Normocephalic and atraumatic  Eyes: EOM are normal  Pupils are equal, round, and reactive to light  Neck: No JVD present  Cardiovascular: Normal rate and regular rhythm  Pulmonary/Chest: Effort normal  He has no wheezes  He has no rales  Left chest tube to suction   Abdominal: There is no tenderness  There is no rebound  Musculoskeletal: He exhibits edema  Neurological: He is alert and oriented to person, place, and time  Skin: Skin is warm and dry         Additional Data:     Labs:      Results from last 7 days  Lab Units 17  0441 17  0448   WBC Thousand/uL 15 63* 17 09*   HEMOGLOBIN g/dL 9 9* 10 0*   HEMATOCRIT % 29 1* 29 1*   PLATELETS Thousands/uL 825* 875*   NEUTROS PCT %  --  82*   LYMPHS PCT %  --  11*   MONOS PCT %  --  7   EOS PCT %  --  0       Results from last 7 days  Lab Units 17  0441   SODIUM mmol/L 134*   POTASSIUM mmol/L 3 5   CHLORIDE mmol/L 98*   CO2 mmol/L 28   BUN mg/dL 21   CREATININE mg/dL 0 54*   CALCIUM mg/dL 7 7*   GLUCOSE RANDOM mg/dL 81             Recent Cultures (last 7 days):       Results from last 7 days  Lab Units 11/15/17  1833   GRAM STAIN RESULT  No Polys or Bacteria seen       Last 24 Hours Medication List:     alteplase (ACTIVASE) 8 mg in 0 9% NaCl 50 mL 8 mg Chest Tube Once   amiodarone 200 mg Oral TID With Meals   calcium carbonate-vitamin D 1 tablet Oral BID With Meals   clobetasol 1 application Topical S[address]S [address]   diazepam 5 mg Oral HS   diltiazem 240 mg Oral Daily   docusate sodium 200 mg Oral BID   fentaNYL 1 patch Transdermal Q48H   [START ON 11/21/2017] furosemide 20 mg Intravenous BID (diuretic)   gabapentin 100 mg Oral TID   guaiFENesin 600 mg Oral Q12H [address]   metoprolol tartrate 50 mg Oral Q12H [address]   potassium chloride 40 mEq Oral Daily   tamsulosin 0 4 mg Oral Daily With Dinner        Today, Patient Was Seen By: Isabela Moe MD    ** Please Note: Dragon 360 Dictation voice to text software may have been used in the creation of this document   **

## 2017-11-20 NOTE — ASSESSMENT & PLAN NOTE
· Hypervolemic, hypoosmolar  · Improved  · IV lasix with compression stockings to legs  · Will increase lasix to BID

## 2017-11-20 NOTE — PROGRESS NOTES
Progress Note - Infectious Disease   Johnny Vicente 57 y o  male MRN: [id number]  Unit/Bed#: [facility] 420-01 Encounter: [id number]      Impression/Plan:  1   Severe sepsis-POA   Fever, leukocytosis, lactic acidosis   Likely all secondary to 2   Clinically improved with resolution of the fever  The cultures have remained negative  Lasha Hoover has remained hemodynamically stable and nontoxic despite the systemic illness   He was not bacteremic  Fever has resolved  The patient completed approximately 2 weeks of intravenous antibiotics  -no additional antibiotics for now  -monitor CBC with diff and creatinine as needed  -supportive care     2   Left-sided pneumonia-appears to be an acute acquired pneumoniae with some risk of aspiration   Patient does have poor dentition which also would increase the risk of anaerobes  -no additional antibiotics for now  -monitor respiratory status  -Pulmonary follow-up     3   Loculated left pleural effusion-status post chest tube placement   Does not appear to be an empyema based upon the preliminary results   The patient is now status post VATS with decortication for trapped lung  The pleural cultures remain negative    -chest tube drainage  -thoracic follow-up  -Pulmonary follow-up  -no additional antibiotics for now     4   Acute urinary retention-Camp has been removed   Doubt UTI   Patient is now on Flomax  -medical treatment  -Flomax  -no additional ID workup for now     5   Severe osteoporosis with kyphosis-may impact the patient's pulmonary function and mechanics  -workup and management as per the primary service     6   Atrial fibrillation with rapid ventricular response-rate control as per Cardiology     7   Leukocytosis-persistent  Almond Linker multifactorial   No new source appreciated  Possibly a leukemoid reaction to the surgery   Consideration for the possibility of a primary hematologic process playing a role    The white cell count has come down   -monitor GFR  -no additional antibiotics for now  -no additional ID workup for now   -recommend hematology oncology evaluation if the leukocytosis persists despite appropriate treatment of any infection    8  Disposition-possible discharge home soon  Antibiotics:  None    Subjective:  Patient has no fever, chills, sweats; no nausea, vomiting, diarrhea; no increased cough, or shortness of breath; no pain  No new symptoms  One of his chest tubes has been removed    Objective:  Vitals:  HR:  [62-73] 71  Resp:  [18-20] 18  BP: (116-127)/(61-69) 123/69  SpO2:  [96 %-98 %] 97 %  Temp (24hrs), Av 1 °F (36 7 °C), Min:97 9 °F (36 6 °C), Max:98 3 °F (36 8 °C)  Current: Temperature: 97 9 °F (36 6 °C)    Physical Exam:   General Appearance:  Alert, nontoxic, no acute distress  Throat: Oropharynx moist without lesions  Lungs:   Decreased breath sounds bilaterally; respirations unlabored  Left posterior chest tube in place   Heart:  RRR; no murmur, rub or gallop   Abdomen:   Soft, non-tender, non-distended, positive bowel sounds  Extremities: No clubbing, cyanosis  Skin: No new rashes or lesions  No draining wounds noted         Labs, Imaging, & Other studies:   All pertinent labs and imaging studies were personally reviewed    Results from last 7 days  Lab Units 17  0441 17  0448 17  0521   WBC Thousand/uL 15 63* 17 09* 20 68*   HEMOGLOBIN g/dL 9 9* 10 0* 11 5*   PLATELETS Thousands/uL 825* 875* 959*       Results from last 7 days  Lab Units 17  0441 17  0448 17  1637   SODIUM mmol/L 134* 132* 129*   POTASSIUM mmol/L 3 5 4 0 5 1   CHLORIDE mmol/L 98* 98* 99*   CO2 mmol/L 28 28 22   ANION GAP mmol/L 8 6 8   BUN mg/dL 21 19 22   CREATININE mg/dL 0 54* 0 65 0 76   EGFR ml/min/1 73sq m 113 104 98   GLUCOSE RANDOM mg/dL 81 67 119   CALCIUM mg/dL 7 7* 8 0* 8 1*       Results from last 7 days  Lab Units 11/15/17  1833   GRAM STAIN RESULT  No Polys or Bacteria seen

## 2017-11-20 NOTE — ASSESSMENT & PLAN NOTE
· With sepsis, present on admission, and parapneumonic effusion  · Surgical cultures are negative for growth,   · Completed antibiotics, observe off antibiotics at this time    · WBC counts improving  · afebrile

## 2017-11-20 NOTE — PLAN OF CARE
Problem: PHYSICAL THERAPY ADULT  Goal: Performs mobility at highest level of function for planned discharge setting  See evaluation for individualized goals  Treatment/Interventions: Functional transfer training, LE strengthening/ROM, Elevations, Therapeutic exercise, Endurance training, Bed mobility, Gait training, Spoke to nursing, Spoke to case management  Equipment Recommended: Xochitl Marino (w/ (A))       See flowsheet documentation for full assessment, interventions and recommendations  Outcome: Progressing  Prognosis: Guarded  Problem List: Decreased strength, Decreased range of motion, Decreased endurance, Impaired balance, Decreased mobility, Pain  Assessment: Pt standing in room with support of rw as he exited from br without assist  Pt ambulated increased distances with rw and close supervision/min assist with occasional unsteadiness however no loss of balance noted  Pt required standing rest periods between each gt trial as well as instruction during gt for posture/pacing  Pt performed seated/standing exercises with rest periods between each set  Pt seated on bedside chair with pillows under ble upon completion of session  May trial stairs next session  Pt will continue to benefit from rehab at d/c  Barriers to Discharge: Decreased caregiver support, Inaccessible home environment     Recommendation:  (rehab )     PT - OK to Discharge: Yes (to rehab when medically stable for d/c )    See flowsheet documentation for full assessment

## 2017-11-21 LAB
ANION GAP SERPL CALCULATED.3IONS-SCNC: 12 MMOL/L (ref 4–13)
BASOPHILS # BLD AUTO: 0.04 THOUSANDS/ΜL (ref 0–0.1)
BASOPHILS NFR BLD AUTO: 0 % (ref 0–1)
BUN SERPL-MCNC: 19 MG/DL (ref 5–25)
CALCIUM SERPL-MCNC: 8.5 MG/DL (ref 8.3–10.1)
CHLORIDE SERPL-SCNC: 99 MMOL/L (ref 100–108)
CO2 SERPL-SCNC: 18 MMOL/L (ref 21–32)
CREAT SERPL-MCNC: 0.72 MG/DL (ref 0.6–1.3)
EOSINOPHIL # BLD AUTO: 0.07 THOUSAND/ΜL (ref 0–0.61)
EOSINOPHIL NFR BLD AUTO: 0 % (ref 0–6)
ERYTHROCYTE [DISTWIDTH] IN BLOOD BY AUTOMATED COUNT: 15.5 % (ref 11.6–15.1)
GFR SERPL CREATININE-BSD FRML MDRD: 100 ML/MIN/1.73SQ M
GLUCOSE SERPL-MCNC: 123 MG/DL (ref 65–140)
HCT VFR BLD AUTO: 30.7 % (ref 36.5–49.3)
HGB BLD-MCNC: 10.6 G/DL (ref 12–17)
LYMPHOCYTES # BLD AUTO: 1.79 THOUSANDS/ΜL (ref 0.6–4.47)
LYMPHOCYTES NFR BLD AUTO: 11 % (ref 14–44)
MCH RBC QN AUTO: 29.6 PG (ref 26.8–34.3)
MCHC RBC AUTO-ENTMCNC: 34.5 G/DL (ref 31.4–37.4)
MCV RBC AUTO: 86 FL (ref 82–98)
MONOCYTES # BLD AUTO: 0.97 THOUSAND/ΜL (ref 0.17–1.22)
MONOCYTES NFR BLD AUTO: 6 % (ref 4–12)
NEUTROPHILS # BLD AUTO: 13.77 THOUSANDS/ΜL (ref 1.85–7.62)
NEUTS SEG NFR BLD AUTO: 83 % (ref 43–75)
NRBC BLD AUTO-RTO: 0 /100 WBCS
PLATELET # BLD AUTO: 860 THOUSANDS/UL (ref 149–390)
PMV BLD AUTO: 7.4 FL (ref 8.9–12.7)
POTASSIUM SERPL-SCNC: 4.1 MMOL/L (ref 3.5–5.3)
RBC # BLD AUTO: 3.58 MILLION/UL (ref 3.88–5.62)
SODIUM SERPL-SCNC: 129 MMOL/L (ref 136–145)
WBC # BLD AUTO: 16.84 THOUSAND/UL (ref 4.31–10.16)

## 2017-11-21 PROCEDURE — 97535 SELF CARE MNGMENT TRAINING: CPT

## 2017-11-21 PROCEDURE — 97110 THERAPEUTIC EXERCISES: CPT

## 2017-11-21 PROCEDURE — 0HBRXZZ EXCISION OF TOE NAIL, EXTERNAL APPROACH: ICD-10-PCS | Performed by: PODIATRIST

## 2017-11-21 PROCEDURE — 80048 BASIC METABOLIC PNL TOTAL CA: CPT | Performed by: INTERNAL MEDICINE

## 2017-11-21 PROCEDURE — 97116 GAIT TRAINING THERAPY: CPT

## 2017-11-21 PROCEDURE — 97530 THERAPEUTIC ACTIVITIES: CPT

## 2017-11-21 PROCEDURE — 85025 COMPLETE CBC W/AUTO DIFF WBC: CPT | Performed by: INTERNAL MEDICINE

## 2017-11-21 RX ORDER — POTASSIUM CHLORIDE 20 MEQ/1
40 TABLET, EXTENDED RELEASE ORAL ONCE
Status: COMPLETED | OUTPATIENT
Start: 2017-11-21 | End: 2017-11-21

## 2017-11-21 RX ORDER — FUROSEMIDE 20 MG/1
20 TABLET ORAL DAILY
Status: DISCONTINUED | OUTPATIENT
Start: 2017-11-21 | End: 2017-11-22 | Stop reason: HOSPADM

## 2017-11-21 RX ADMIN — FUROSEMIDE 20 MG: 10 INJECTION, SOLUTION INTRAMUSCULAR; INTRAVENOUS at 08:34

## 2017-11-21 RX ADMIN — POTASSIUM CHLORIDE 40 MEQ: 1500 TABLET, EXTENDED RELEASE ORAL at 10:01

## 2017-11-21 RX ADMIN — GABAPENTIN 100 MG: 100 CAPSULE ORAL at 08:33

## 2017-11-21 RX ADMIN — AMIODARONE HYDROCHLORIDE 200 MG: 200 TABLET ORAL at 08:33

## 2017-11-21 RX ADMIN — GABAPENTIN 100 MG: 100 CAPSULE ORAL at 17:24

## 2017-11-21 RX ADMIN — CLOBETASOL PROPIONATE 1 APPLICATION: 0.5 CREAM TOPICAL at 08:35

## 2017-11-21 RX ADMIN — ENOXAPARIN SODIUM 40 MG: 40 INJECTION SUBCUTANEOUS at 10:01

## 2017-11-21 RX ADMIN — DOCUSATE SODIUM 200 MG: 100 CAPSULE, LIQUID FILLED ORAL at 08:33

## 2017-11-21 RX ADMIN — DILTIAZEM HYDROCHLORIDE 240 MG: 240 CAPSULE, COATED, EXTENDED RELEASE ORAL at 08:34

## 2017-11-21 RX ADMIN — METOPROLOL TARTRATE 50 MG: 50 TABLET ORAL at 22:02

## 2017-11-21 RX ADMIN — CALCIUM CARBONATE 500 MG (1,250 MG)-VITAMIN D3 200 UNIT TABLET 1 TABLET: at 08:33

## 2017-11-21 RX ADMIN — METOPROLOL TARTRATE 50 MG: 50 TABLET ORAL at 08:33

## 2017-11-21 RX ADMIN — FENTANYL 1 PATCH: 50 PATCH, EXTENDED RELEASE TRANSDERMAL at 08:34

## 2017-11-21 RX ADMIN — GABAPENTIN 100 MG: 100 CAPSULE ORAL at 22:02

## 2017-11-21 RX ADMIN — AMIODARONE HYDROCHLORIDE 200 MG: 200 TABLET ORAL at 17:23

## 2017-11-21 RX ADMIN — GUAIFENESIN 600 MG: 600 TABLET, EXTENDED RELEASE ORAL at 22:02

## 2017-11-21 RX ADMIN — HYDROMORPHONE HYDROCHLORIDE 1 MG: 1 INJECTION, SOLUTION INTRAMUSCULAR; INTRAVENOUS; SUBCUTANEOUS at 02:04

## 2017-11-21 RX ADMIN — GUAIFENESIN 600 MG: 600 TABLET, EXTENDED RELEASE ORAL at 08:34

## 2017-11-21 RX ADMIN — CALCIUM CARBONATE 500 MG (1,250 MG)-VITAMIN D3 200 UNIT TABLET 1 TABLET: at 17:24

## 2017-11-21 RX ADMIN — HYDROCODONE BITARTRATE AND ACETAMINOPHEN 1 TABLET: 5; 325 TABLET ORAL at 05:16

## 2017-11-21 RX ADMIN — TAMSULOSIN HYDROCHLORIDE 0.4 MG: 0.4 CAPSULE ORAL at 17:23

## 2017-11-21 RX ADMIN — DOCUSATE SODIUM 200 MG: 100 CAPSULE, LIQUID FILLED ORAL at 17:24

## 2017-11-21 RX ADMIN — AMIODARONE HYDROCHLORIDE 200 MG: 200 TABLET ORAL at 12:50

## 2017-11-21 RX ADMIN — HYDROCODONE BITARTRATE AND ACETAMINOPHEN 1 TABLET: 5; 325 TABLET ORAL at 19:49

## 2017-11-21 RX ADMIN — DIAZEPAM 5 MG: 5 TABLET ORAL at 22:02

## 2017-11-21 RX ADMIN — HYDROCODONE BITARTRATE AND ACETAMINOPHEN 1 TABLET: 5; 325 TABLET ORAL at 12:50

## 2017-11-21 RX ADMIN — POTASSIUM CHLORIDE 40 MEQ: 1500 TABLET, EXTENDED RELEASE ORAL at 08:33

## 2017-11-21 NOTE — PLAN OF CARE
Problem: PHYSICAL THERAPY ADULT  Goal: Performs mobility at highest level of function for planned discharge setting  See evaluation for individualized goals  Treatment/Interventions: Functional transfer training, LE strengthening/ROM, Elevations, Therapeutic exercise, Endurance training, Bed mobility, Gait training, Spoke to nursing, Spoke to case management  Equipment Recommended: Meet Nogueira (w/ (A))       See flowsheet documentation for full assessment, interventions and recommendations  Outcome: Progressing  Prognosis: Guarded  Problem List: Decreased strength, Decreased range of motion, Decreased endurance, Impaired balance, Decreased mobility, Pain  Assessment: Pt performed arom ble in sitting with instruction and rest periods between each set  Pt continues to require supervision for transfers for safety  Pt ambulated 200'x1 with rw and close supervision with occasional instruction for safety  Pt in br standing with support of rw to attempt to urinate  Pt ambulated 6'x1 from br to bed with rw and supervision  Pt returned to supine with min assist for le and head of bed elevated  pt required mod assist to reposition self in bed  pillows placed under ble and bue upon completion of session  Pt will continue to benefit from rehab at d/c  Barriers to Discharge: Decreased caregiver support, Inaccessible home environment     Recommendation:  (rehab )     PT - OK to Discharge: Yes (to rehab when medically stable for d/c)    See flowsheet documentation for full assessment

## 2017-11-21 NOTE — PROGRESS NOTES
Progress Note - Infectious Disease   Byron Peck 57 y o  male MRN: [id number]  Unit/Bed#: [facility] 420-01 Encounter: [id number]      Impression/Plan:  1   Severe sepsis-POA   Fever, leukocytosis, lactic acidosis   Likely all secondary to 2   Clinically improved with resolution of the fever  The cultures have remained negative  Rick Quiroz has remained hemodynamically stable and nontoxic despite the systemic illness   He was not bacteremic  The sepsis has resolved  The patient completed approximately 2 weeks of intravenous antibiotics  -no additional antibiotics for now  -monitor CBC with diff and creatinine as needed  -supportive care     2   Left-sided pneumonia-appears to be an acute acquired pneumoniae with some risk of aspiration   Patient does have poor dentition which also would increase the risk of anaerobes  -no additional antibiotics for now  -monitor respiratory status  -Pulmonary follow-up     3   Loculated left pleural effusion-status post chest tube placement   Does not appear to be an empyema based upon the preliminary results   The patient is now status post VATS with decortication for trapped lung  The pleural cultures remain negative    -chest tube drainage  -thoracic follow-up  -Pulmonary follow-up  -no additional antibiotics for now     4   Atrial fibrillation with rapid ventricular response-rate control as per Cardiology     5   Leukocytosis-persistent  [place] multifactorial   No new source appreciated  Possibly a leukemoid reaction to the surgery   Consideration for the possibility of a primary hematologic process playing a role   The white cell count has come down and now stabilized   -monitor GFR  -no additional antibiotics for now  -no additional ID workup for now   -recommend hematology oncology evaluation if the leukocytosis persists despite appropriate treatment of any infection     6  Disposition-possible discharge home soon      Antibiotics:  None    Subjective:  Patient has no fever, chills, sweats; no nausea, vomiting, diarrhea; no increase cough, or shortness of breath; no pain  No new symptoms  He is feeling better overall  Objective:  Vitals:  HR:  [63-76] 76  Resp:  [18] 18  BP: ()/(56-72) 125/72  SpO2:  [95 %-98 %] 96 %  Temp (24hrs), Av 2 °F (36 8 °C), Min:97 9 °F (36 6 °C), Max:98 4 °F (36 9 °C)  Current: Temperature: 98 3 °F (36 8 °C)    Physical Exam:   General Appearance:  Alert, nontoxic, no acute distress  Throat: Oropharynx moist without lesions  Lungs:   Decreased breath sounds bilaterally; respirations unlabored; left-sided chest tube in place   Heart:  RRR; no murmur, rub or gallop   Abdomen:   Soft, non-tender, non-distended, positive bowel sounds  Extremities: No clubbing, cyanosis    Skin: No new rashes or lesions  No draining wounds noted         Labs, Imaging, & Other studies:   All pertinent labs and imaging studies were personally reviewed    Results from last 7 days  Lab Units 17  0855 17  0441 17  0448   WBC Thousand/uL 16 84* 15 63* 17 09*   HEMOGLOBIN g/dL 10 6* 9 9* 10 0*   PLATELETS Thousands/uL 860* 825* 875*       Results from last 7 days  Lab Units 17  0854 17  0441 17  0448   SODIUM mmol/L 129* 134* 132*   POTASSIUM mmol/L 4 1 3 5 4 0   CHLORIDE mmol/L 99* 98* 98*   CO2 mmol/L 18* 28 28   ANION GAP mmol/L 12 8 6   BUN mg/dL 19 21 19   CREATININE mg/dL 0 72 0 54* 0 65   EGFR ml/min/1 73sq m 100 113 104   GLUCOSE RANDOM mg/dL 123 81 67   CALCIUM mg/dL 8 5 7 7* 8 0*       Results from last 7 days  Lab Units 11/15/17  1833   GRAM STAIN RESULT  No Polys or Bacteria seen     Chest x-ray-decreased left-sided pneumothorax

## 2017-11-21 NOTE — PROGRESS NOTES
[facility] Internal Medicine Progress Note  Patient: Denia Purcell 57 y o  male   MRN: [id number]  PCP: Marcello Arteaga MD  Unit/Bed#: [facility] 420-01 Encounter: [id number]  Date Of Visit: 11/21/17    Assessment:    Principal Problem:    Loculated pleural effusion  Active Problems:    Pneumonia    Severe protein-calorie malnutrition ([address] )    Continuous opioid dependence ([address] )    Atrial fibrillation with RVR (HCC)    Hyponatremia    Edema      Plan:    1  Left-sided pneumonia with loculated parapneumonic effusion , s/p VATS decortication, chest tube still in place with persistent air leak,  thoracic surgery is following, he completed approximately 2 weeks of IV antibiotic   2  Leukocytosis  secondary to above- improving   3  Bilateral lower extremity and pedal edema with left big toe discoloration, negative for DVT, continue Merrill stocking, consult Podiatry for further management  4  Osteoporosis/severe kyphosis with compression deformities and  chronic pain, on continuous opiate dependence- outpatient Endocrine follow-up- continue calcium supplement  5  Severe protein calorie malnutrition with low albumin-continue with nutrition supplement  6  New onset AFib -heart rate is controlled,  on amiodarone, diltiazem and Lopressor, [facility]       Rehab when stable    VTE Pharmacologic Prophylaxis:   Pharmacologic: Enoxaparin (Lovenox)  Mechanical VTE Prophylaxis in Place: Yes    Patient Centered Rounds: I have performed bedside rounds with nursing staff today  Discussions with Specialists or Other Care Team Provider:     Education and Discussions with Family / Patient:  Patient    Time Spent for Care: 30 minutes  More than 50% of total time spent on counseling and coordination of care as described above      Current Length of Stay: 14 day(s)    Current Patient Status: Inpatient   Certification Statement: The patient will continue to require additional inpatient hospital stay due to Management of pleural effusion/chest tube    Discharge Plan / Estimated Discharge Date: not ready yet    Code Status: Level 1 - Full Code      Subjective:   Patient seen and examined  Comfortable in chair  No nausea vomiting       Objective:     Vitals:   Temp (24hrs), Av 2 °F (36 8 °C), Min:97 9 °F (36 6 °C), Max:98 4 °F (36 9 °C)    HR:  [63-76] 76  Resp:  [18] 18  BP: ()/(56-72) 125/72  SpO2:  [95 %-98 %] 96 %  Body mass index is 19 21 kg/m²  Input and Output Summary (last 24 hours): Intake/Output Summary (Last 24 hours) at 17 0833  Last data filed at 17 0600   Gross per 24 hour   Intake              850 ml   Output             1150 ml   Net             -300 ml       Physical Exam:     Physical Exam  Patient is awake alert in no acute distress  Cachectic  Lung with decreased breath sounds bilateral- left-sided chest tube in place  Heart positive S1-S2 no murmur  Back with severe kyphosis  Abdomen soft nontender positive bowel sounds  Lower extremity 3+ pitting edema, with pedal edema long toenails and left big toe discoloration    Additional Data:     Labs:      Results from last 7 days  Lab Units 17  0441 17  0448   WBC Thousand/uL 15 63* 17 09*   HEMOGLOBIN g/dL 9 9* 10 0*   HEMATOCRIT % 29 1* 29 1*   PLATELETS Thousands/uL 825* 875*   NEUTROS PCT %  --  82*   LYMPHS PCT %  --  11*   MONOS PCT %  --  7   EOS PCT %  --  0       Results from last 7 days  Lab Units 17  0441   SODIUM mmol/L 134*   POTASSIUM mmol/L 3 5   CHLORIDE mmol/L 98*   CO2 mmol/L 28   BUN mg/dL 21   CREATININE mg/dL 0 54*   CALCIUM mg/dL 7 7*   GLUCOSE RANDOM mg/dL 81           * I Have Reviewed All Lab Data Listed Above  * Additional Pertinent Lab Tests Reviewed:  [facility] Admission Reviewed    Imaging:    Imaging Reports Reviewed Today Include:   Imaging Personally Reviewed by Myself Includes:      Recent Cultures (last 7 days):       Results from last 7 days  Lab Units 11/15/17  1833   GRAM STAIN RESULT  No Polys or Bacteria seen       Last 24 Hours Medication List:     alteplase (ACTIVASE) 8 mg in 0 9% NaCl 50 mL 8 mg Chest Tube Once   amiodarone 200 mg Oral TID With Meals   calcium carbonate-vitamin D 1 tablet Oral BID With Meals   clobetasol 1 application Topical Z50I [facility]   diazepam 5 mg Oral HS   diltiazem 240 mg Oral Daily   docusate sodium 200 mg Oral BID   fentaNYL 1 patch Transdermal Q48H   furosemide 20 mg Intravenous BID (diuretic)   gabapentin 100 mg Oral TID   guaiFENesin 600 mg Oral Q12H MICHELET   metoprolol tartrate 50 mg Oral Q12H [facility]   potassium chloride 40 mEq Oral Daily   potassium chloride 40 mEq Oral Once   tamsulosin 0 4 mg Oral Daily With Dinner        Today, Patient Was Seen By: Zulema Varela DO    ** Please Note: This note has been constructed using a voice recognition system   **

## 2017-11-21 NOTE — CONSULTS
Consult Routine Foot Care- Podiatry   Malika Smith 57 y o  male MRN: [id number]  Unit/Bed#: [facility] 420-01 Encounter: [id number]    Assessment/Plan     Assessment:  1  B/l LE edema with weeping likely 2/2 hypoalbuminemia and IVF  2  Onychomycosis    Plan:  - bilateral lower extremities were dressed with Maxorb, DSD, ABDs, and Ace wrap  Will see patient Q weekly if still in-house  Nursing change ordered to change dressings as needed if strike through  - patient was instructed to keep feet elevated as much as possible  - Nails debrided x10 without incidence utilizing a sharp nail nipper  - All questions and concerns addressed  - patient stable from podiatry point of view  Patient will follow up with Dr Deysi Mijares at [facility]     History of Present Illness     HPI:  Malika Smith is a 57 y o  male who presents with elongated toenails and bilateral lower extremity edema  He states that their nails are painful, elongated  They have difficulty applying their socks and shoes  The pressure within their shoe gear is painful and they have been unable to cut their nails adequately  He states that his lower extremities recently have become filled with fluid  He states that it has it has not happened before  He denies any recent nausea, vomiting, fever, chills, shortness of breath, or chest pains  Consults  Review of Systems   Constitutional: Negative  HENT: Negative  Eyes: Negative  Respiratory: Negative  Cardiovascular: Negative  Gastrointestinal: Negative  Musculoskeletal: Negative   Skin: elongated thickened toenails, bilateral lower extremity edema   Neurological: Negative          Historical Information   Past Medical History:   Diagnosis Date    History of spinal fracture     Kyphosis     Loss of appetite     Loss of weight     Osteoarthritis     Osteoporosis      Past Surgical History:   Procedure Laterality Date    FINGER SURGERY      LUNG DECORTICATION Left 11/15/2017    Procedure: VATS DECORTICATION LUNG;  Surgeon: Joanna Castorena MD;  [facility];  Service: Thoracic    SHOULDER DEBRIDEMENT       Social History   History   Alcohol Use No     History   Drug Use No     History   Smoking Status    Never Smoker   Smokeless Tobacco    Never Used     Family History:   Family History   Problem Relation Age of Onset    Heart disease Mother     Early death Mother     Cancer Father     Cancer Brother        Meds/Allergies   Prescriptions Prior to Admission   Medication    Calcium Carbonate-Vitamin D (CALCIUM 500 + D PO)    ciprofloxacin-dexamethasone (CIPRODEX) otic suspension    docusate sodium (COLACE) 100 mg capsule    fentaNYL (DURAGESIC) 50 mcg/hr    HYDROcodone-acetaminophen (ZAMCET)  MG/15ML solution    ofloxacin (OCUFLOX) 0 3 % ophthalmic solution    Omega-3 Fatty Acids (OMEGA-3 FISH OIL) 500 MG CAPS    diazepam (VALIUM) 5 mg tablet     Allergies   Allergen Reactions    Asa [Aspirin]     Carbapenems     Cephalosporins     Penicillins        Objective   First Vitals:   Blood Pressure: 144/77 (11/07/17 1720)  Pulse: 105 (11/07/17 1720)  Temperature: 99 2 °F (37 3 °C) (11/07/17 1720)  Temp Source: Oral (11/07/17 1720)  Respirations: 18 (11/07/17 1720)  Height: 5' 8" (172 7 cm) (11/07/17 1700)  Weight - Scale: 57 2 kg (126 lb 1 7 oz) (11/07/17 1700)  SpO2: 95 % (11/07/17 1700)    Current Vitals:   Blood Pressure: 125/72 (Map 92) (11/21/17 0734)  Pulse: 76 (11/21/17 0734)  Temperature: 98 3 °F (36 8 °C) (11/21/17 0734)  Temp Source: Oral (11/21/17 0734)  Respirations: 18 (11/21/17 0734)  Height: 5' 8" (172 7 cm) (11/07/17 1700)  Weight - Scale: 57 3 kg (126 lb 5 2 oz) (11/21/17 0600)  SpO2: 96 % (11/21/17 0734)        /72 Comment: Map 92  Pulse 76   Temp 98 3 °F (36 8 °C) (Oral)   Resp 18   Ht 5' 8" (1 727 m)   Wt 57 3 kg (126 lb 5 2 oz)   SpO2 96%   BMI 19 21 kg/m²     General Appearance:    Alert, cooperative, no distress Head:    Normocephalic, without obvious abnormality, atraumatic   Eyes:    PERRL, conjunctiva/corneas clear, EOM's intact            Nose:   Moist mucous membranes, no drainage or sinus tenderness   Throat:   No tenderness, no exudates   Neck:   Supple, symmetrical, trachea midline, no JVD   Back:     Symmetric, no CVA tenderness   Lungs:     Clear to auscultation bilaterally, respirations unlabored   Chest wall:    No tenderness or deformity   Heart:    Regular rate and rhythm, S1 and S2 normal, no murmur, rub   or gallop   Abdomen:     Soft, non-tender, bowel sounds active all four quadrants,     no masses, no organomegaly           Extremities:   MMT is 4/5 to all compartments of the LE, +4/4 edema B/L with weeping, Digital ROM is intact,    Pulses:   R DP is +1/4, R PT is +1/4, L DP is +1/4, L PT is +1/4, CFT< 3sec to all digits   Skin:   Nails are thickened, elongated, TTP with notable subungual debris  No open Lesions  Skin of the LE is normal texture, turgor  Neurologic:   CNII-XII intact  Normal strength, sensation and reflexes       Throughout  Gross sensation is intact  Protective sensation is intact           Lab Results:   Admission on 11/07/2017   No results displayed because visit has over 200 results  Imaging: I have personally reviewed pertinent films in PACS  EKG, Pathology, and Other Studies: I have personally reviewed pertinent reports        Code Status: Level 1 - Full Code  Advance Directive and Living Will:      Power of :    POLST:

## 2017-11-21 NOTE — RESTORATIVE TECHNICIAN NOTE
Restorative Specialist Mobility Note       Activity: Ambulate in nu[place], [address] [facility], Chair     Assistive Device: Front wheel walker     Ambulation Response: Tolerated well (some weepage from feet   some pain in feet "not as much as yesterday")  Repositioned: Sitting, Up in chair        Heels/Feet: Heels elevated off bed (feet elevated on pillows in chair)  Range of Motion: Active, All extremities

## 2017-11-21 NOTE — PROGRESS NOTES
Progress Note - Thoracic Surgery   Jc Castro 57 y o  male MRN: [id number]  Unit/Bed#: [facility] 420-01 Encounter: [id number]    Assessment:  60y o  M with a loculated left pleural effusion s/p L pigtail CT 11/6 s/p TPA 11/7 and 11/8, 11/9  CT upsized 11/10 s/p 11/19 L VATS decortication    Persistent airleak of   Plan:    Continue chest tube to H2O seal    Subjective/Objective     Subjective: JIM  C/o back pain      Objective:     Vitals: Blood pressure 97/56, pulse 63, temperature 97 9 °F (36 6 °C), temperature source Oral, resp  rate 18, height 5' 8" (1 727 m), weight 58 1 kg (128 lb 1 4 oz), SpO2 98 %  ,Body mass index is 19 48 kg/m²  I/O       11/19 0701 - 11/20 0700 11/20 0701 - 11/21 0700    P  O  1140 600    Total Intake(mL/kg) 1140 (19 6) 600 (10 3)    Urine (mL/kg/hr) 1900 (1 4) 1680 (1 2)    Chest Tube 0 (0) 0 (0)    Total Output 1900 1680    Net -760 -1080               Physical Exam: NAD  AAOX3  RRR  Normal respiratory effort  Soft, NT, ND  Dressings c/d/i  No c/c/e    Lab, Imaging and other studies: reviewed  Pharm ppx: lovenox  VTE Mechanical Prophylaxis: sequential compression device

## 2017-11-21 NOTE — PHYSICAL THERAPY NOTE
Physical Therapy Progress Note     11/21/17 3292   Pain Assessment   Pain Assessment 0-10   Pain Score 7   Pain Type Chronic pain   Pain Location Back; Chest   Pain Orientation Left   Hospital Pain Intervention(s) Repositioned; Ambulation/increased activity; Elevated   Response to Interventions satisfied   Restrictions/Precautions   Weight Bearing Precautions Per Order No   Other Precautions Fall Risk;Pain  (chest tube to water seal)   General   Chart Reviewed Yes   Response to Previous Treatment Patient with no complaints from previous session  Family/Caregiver Present No   Cognition   Overall Cognitive Status WFL   Subjective   Subjective pt reports he walked earlier this pm   Bed Mobility   Sit to Supine 4  Minimal assistance   Additional items Assist x 1;HOB elevated; Increased time required;Verbal cues;LE management   Transfers   Sit to Stand 5  Supervision   Additional items Armrests; Increased time required   Stand to Sit 5  Supervision   Additional items Increased time required;Assist x 1   Ambulation/Elevation   Gait pattern Forward Flexion;Decreased foot clearance;Narrow DANIELLE   Gait Assistance 5  Supervision   Additional items Assist x 1;Verbal cues   Assistive Device Rolling walker   Distance 200'x1 and 6'x1 from br to bed   Stair Management Assistance Not tested   Balance   Static Sitting Fair +   Static Standing Farhad Marin 4096 -  (with support of rw)   Activity Tolerance   Activity Tolerance Patient limited by fatigue;Patient tolerated treatment well   Nurse Made Aware spoke with covering nurse upon completion of session  Exercises   Hip Abduction Sitting;20 reps;AROM; Bilateral   Hip Adduction Sitting;20 reps;AROM; Bilateral  (and adductor sets with pillow x 20 reps)   Knee AROM Long Arc Quad Sitting;10 reps;AROM; Bilateral  (x3 sets)   Ankle Pumps Sitting;25 reps;AROM; Bilateral   Marching Sitting;10 reps;AROM  (x2 sets)   Assessment   Prognosis Guarded   Problem List Decreased strength;Decreased range of motion;Decreased endurance; Impaired balance;Decreased mobility;Pain   Assessment Pt performed arom ble in sitting with instruction and rest periods between each set  Pt continues to require supervision for transfers for safety  Pt ambulated 200'x1 with rw and close supervision with occasional instruction for safety  Pt in br standing with support of rw to attempt to urinate  Pt ambulated 6'x1 from br to bed with rw and supervision  Pt returned to supine with min assist for le and head of bed elevated  pt required mod assist to reposition self in bed  pillows placed under ble and bue upon completion of session  Pt will continue to benefit from rehab at d/c   Barriers to Discharge Decreased caregiver support; Inaccessible home environment   Goals   Patient Goals to get into bed and get some rest   LTG Expiration Date 11/24/17   Treatment Day 6   Plan   Treatment/Interventions Functional transfer training;LE strengthening/ROM; Therapeutic exercise; Endurance training;Patient/family training;Bed mobility;Gait training;Spoke to nursing   Progress Progressing toward goals   PT Frequency 5x/wk  (bid prn)   Recommendation   Recommendation (rehab )   Equipment Recommended Walker  (roller walker)   PT - OK to Discharge Yes  (to rehab when medically stable for d/c)     Polo Medrano, PTA

## 2017-11-22 ENCOUNTER — APPOINTMENT (INPATIENT)
Dept: RADIOLOGY | Facility: HOSPITAL | Age: 62
DRG: 853 | End: 2017-11-22
Attending: THORACIC SURGERY (CARDIOTHORACIC VASCULAR SURGERY)
Payer: MEDICARE

## 2017-11-22 VITALS
RESPIRATION RATE: 18 BRPM | BODY MASS INDEX: 18.88 KG/M2 | SYSTOLIC BLOOD PRESSURE: 104 MMHG | HEIGHT: 68 IN | HEART RATE: 69 BPM | OXYGEN SATURATION: 97 % | WEIGHT: 124.56 LBS | DIASTOLIC BLOOD PRESSURE: 58 MMHG | TEMPERATURE: 98.4 F

## 2017-11-22 PROBLEM — J18.9 PNEUMONIA: Status: RESOLVED | Noted: 2017-11-06 | Resolved: 2017-11-22

## 2017-11-22 LAB
ANION GAP SERPL CALCULATED.3IONS-SCNC: 6 MMOL/L (ref 4–13)
BUN SERPL-MCNC: 17 MG/DL (ref 5–25)
CALCIUM SERPL-MCNC: 7.9 MG/DL (ref 8.3–10.1)
CHLORIDE SERPL-SCNC: 100 MMOL/L (ref 100–108)
CO2 SERPL-SCNC: 27 MMOL/L (ref 21–32)
CREAT SERPL-MCNC: 0.6 MG/DL (ref 0.6–1.3)
ERYTHROCYTE [DISTWIDTH] IN BLOOD BY AUTOMATED COUNT: 15.7 % (ref 11.6–15.1)
GFR SERPL CREATININE-BSD FRML MDRD: 108 ML/MIN/1.73SQ M
GLUCOSE SERPL-MCNC: 85 MG/DL (ref 65–140)
HCT VFR BLD AUTO: 29.4 % (ref 36.5–49.3)
HGB BLD-MCNC: 10.1 G/DL (ref 12–17)
MCH RBC QN AUTO: 29.7 PG (ref 26.8–34.3)
MCHC RBC AUTO-ENTMCNC: 34.4 G/DL (ref 31.4–37.4)
MCV RBC AUTO: 87 FL (ref 82–98)
PLATELET # BLD AUTO: 806 THOUSANDS/UL (ref 149–390)
PMV BLD AUTO: 7.8 FL (ref 8.9–12.7)
POTASSIUM SERPL-SCNC: 4.4 MMOL/L (ref 3.5–5.3)
RBC # BLD AUTO: 3.4 MILLION/UL (ref 3.88–5.62)
SODIUM SERPL-SCNC: 133 MMOL/L (ref 136–145)
WBC # BLD AUTO: 13.15 THOUSAND/UL (ref 4.31–10.16)

## 2017-11-22 PROCEDURE — 80048 BASIC METABOLIC PNL TOTAL CA: CPT | Performed by: PHYSICIAN ASSISTANT

## 2017-11-22 PROCEDURE — 72072 X-RAY EXAM THORAC SPINE 3VWS: CPT

## 2017-11-22 PROCEDURE — 73560 X-RAY EXAM OF KNEE 1 OR 2: CPT

## 2017-11-22 PROCEDURE — 72100 X-RAY EXAM L-S SPINE 2/3 VWS: CPT

## 2017-11-22 PROCEDURE — 71020 HB CHEST X-RAY 2VW FRONTAL&LATL: CPT

## 2017-11-22 PROCEDURE — 85027 COMPLETE CBC AUTOMATED: CPT | Performed by: PHYSICIAN ASSISTANT

## 2017-11-22 RX ORDER — HYDROCODONE BITARTRATE AND ACETAMINOPHEN 5; 325 MG/1; MG/1
1 TABLET ORAL EVERY 6 HOURS PRN
Qty: 10 TABLET | Refills: 0 | Status: SHIPPED | OUTPATIENT
Start: 2017-11-22 | End: 2017-12-02

## 2017-11-22 RX ORDER — ACETAMINOPHEN 325 MG/1
650 TABLET ORAL EVERY 6 HOURS PRN
Qty: 30 TABLET | Refills: 0
Start: 2017-11-22

## 2017-11-22 RX ORDER — B-COMPLEX WITH VITAMIN C
1 TABLET ORAL 2 TIMES DAILY WITH MEALS
Refills: 0
Start: 2017-11-22 | End: 2018-02-14

## 2017-11-22 RX ORDER — FENTANYL 50 UG/H
1 PATCH TRANSDERMAL
Qty: 3 PATCH | Refills: 0 | Status: SHIPPED | OUTPATIENT
Start: 2017-11-23 | End: 2017-12-03

## 2017-11-22 RX ORDER — POLYETHYLENE GLYCOL 3350 17 G/17G
17 POWDER, FOR SOLUTION ORAL DAILY PRN
Qty: 14 EACH | Refills: 0
Start: 2017-11-22 | End: 2018-02-14

## 2017-11-22 RX ORDER — DOCUSATE SODIUM 100 MG/1
200 CAPSULE, LIQUID FILLED ORAL 2 TIMES DAILY
Qty: 10 CAPSULE | Refills: 0
Start: 2017-11-22 | End: 2018-02-14

## 2017-11-22 RX ORDER — CLOBETASOL PROPIONATE 0.5 MG/G
1 CREAM TOPICAL EVERY 12 HOURS SCHEDULED
Qty: 30 G | Refills: 0
Start: 2017-11-22 | End: 2018-02-14

## 2017-11-22 RX ORDER — GUAIFENESIN 600 MG
600 TABLET, EXTENDED RELEASE 12 HR ORAL EVERY 12 HOURS SCHEDULED
Refills: 0
Start: 2017-11-22 | End: 2017-12-27 | Stop reason: HOSPADM

## 2017-11-22 RX ORDER — GABAPENTIN 100 MG/1
100 CAPSULE ORAL 3 TIMES DAILY
Refills: 0 | Status: ON HOLD
Start: 2017-11-22 | End: 2017-12-27

## 2017-11-22 RX ORDER — TAMSULOSIN HYDROCHLORIDE 0.4 MG/1
0.4 CAPSULE ORAL
Refills: 0
Start: 2017-11-22 | End: 2018-03-15 | Stop reason: SDUPTHER

## 2017-11-22 RX ORDER — AMIODARONE HYDROCHLORIDE 200 MG/1
200 TABLET ORAL
Refills: 0 | Status: ON HOLD
Start: 2017-11-22 | End: 2017-12-27

## 2017-11-22 RX ORDER — DILTIAZEM HYDROCHLORIDE 240 MG/1
240 CAPSULE, COATED, EXTENDED RELEASE ORAL DAILY
Refills: 0
Start: 2017-11-23 | End: 2017-12-27 | Stop reason: HOSPADM

## 2017-11-22 RX ORDER — FUROSEMIDE 20 MG/1
20 TABLET ORAL DAILY
Refills: 0 | Status: ON HOLD
Start: 2017-11-23 | End: 2017-12-27

## 2017-11-22 RX ORDER — METOPROLOL TARTRATE 50 MG/1
50 TABLET, FILM COATED ORAL EVERY 12 HOURS SCHEDULED
Refills: 0
Start: 2017-11-22 | End: 2018-02-17 | Stop reason: HOSPADM

## 2017-11-22 RX ORDER — POTASSIUM CHLORIDE 20 MEQ/1
40 TABLET, EXTENDED RELEASE ORAL DAILY
Refills: 0 | Status: ON HOLD
Start: 2017-11-23 | End: 2017-12-27

## 2017-11-22 RX ORDER — DIAZEPAM 5 MG/1
5 TABLET ORAL
Qty: 5 TABLET | Refills: 0 | Status: SHIPPED | OUTPATIENT
Start: 2017-11-22 | End: 2017-12-27 | Stop reason: HOSPADM

## 2017-11-22 RX ADMIN — GABAPENTIN 100 MG: 100 CAPSULE ORAL at 08:38

## 2017-11-22 RX ADMIN — AMIODARONE HYDROCHLORIDE 200 MG: 200 TABLET ORAL at 16:11

## 2017-11-22 RX ADMIN — HYDROMORPHONE HYDROCHLORIDE 1 MG: 1 INJECTION, SOLUTION INTRAMUSCULAR; INTRAVENOUS; SUBCUTANEOUS at 01:58

## 2017-11-22 RX ADMIN — DILTIAZEM HYDROCHLORIDE 240 MG: 240 CAPSULE, COATED, EXTENDED RELEASE ORAL at 08:37

## 2017-11-22 RX ADMIN — HYDROCODONE BITARTRATE AND ACETAMINOPHEN 1 TABLET: 5; 325 TABLET ORAL at 04:34

## 2017-11-22 RX ADMIN — AMIODARONE HYDROCHLORIDE 200 MG: 200 TABLET ORAL at 11:54

## 2017-11-22 RX ADMIN — GUAIFENESIN 600 MG: 600 TABLET, EXTENDED RELEASE ORAL at 08:36

## 2017-11-22 RX ADMIN — CALCIUM CARBONATE 500 MG (1,250 MG)-VITAMIN D3 200 UNIT TABLET 1 TABLET: at 16:11

## 2017-11-22 RX ADMIN — POTASSIUM CHLORIDE 40 MEQ: 1500 TABLET, EXTENDED RELEASE ORAL at 08:37

## 2017-11-22 RX ADMIN — HYDROCODONE BITARTRATE AND ACETAMINOPHEN 1 TABLET: 5; 325 TABLET ORAL at 18:45

## 2017-11-22 RX ADMIN — GABAPENTIN 100 MG: 100 CAPSULE ORAL at 15:17

## 2017-11-22 RX ADMIN — TAMSULOSIN HYDROCHLORIDE 0.4 MG: 0.4 CAPSULE ORAL at 16:11

## 2017-11-22 RX ADMIN — HYDROCODONE BITARTRATE AND ACETAMINOPHEN 1 TABLET: 5; 325 TABLET ORAL at 10:36

## 2017-11-22 RX ADMIN — HYDROMORPHONE HYDROCHLORIDE 1 MG: 1 INJECTION, SOLUTION INTRAMUSCULAR; INTRAVENOUS; SUBCUTANEOUS at 08:39

## 2017-11-22 RX ADMIN — DOCUSATE SODIUM 200 MG: 100 CAPSULE, LIQUID FILLED ORAL at 17:00

## 2017-11-22 RX ADMIN — FUROSEMIDE 20 MG: 20 TABLET ORAL at 08:38

## 2017-11-22 RX ADMIN — CALCIUM CARBONATE 500 MG (1,250 MG)-VITAMIN D3 200 UNIT TABLET 1 TABLET: at 08:37

## 2017-11-22 RX ADMIN — METOPROLOL TARTRATE 50 MG: 50 TABLET ORAL at 08:45

## 2017-11-22 RX ADMIN — ENOXAPARIN SODIUM 40 MG: 40 INJECTION SUBCUTANEOUS at 08:39

## 2017-11-22 RX ADMIN — DOCUSATE SODIUM 200 MG: 100 CAPSULE, LIQUID FILLED ORAL at 08:38

## 2017-11-22 RX ADMIN — AMIODARONE HYDROCHLORIDE 200 MG: 200 TABLET ORAL at 08:38

## 2017-11-22 NOTE — PROGRESS NOTES
Progress Note - Infectious Disease   Tyra Constable 57 y o  male MRN: [id number]  Unit/Bed#: [facility] 420-01 Encounter: [id number]      Impression/Plan:  1   Severe sepsis-POA   Fever, leukocytosis, lactic acidosis   Likely all secondary to 2   Clinically improved with resolution of the fever  The cultures have remained negative  Clary Army has remained hemodynamically stable and nontoxic despite the systemic illness   He was not bacteremic  The sepsis has resolved   The patient completed approximately 2 weeks of intravenous antibiotics  -no additional antibiotics for now  -monitor CBC with diff and creatinine as needed  -supportive care     2   Left-sided pneumonia-appears to be an acute acquired pneumoniae with some risk of aspiration   Patient does have poor dentition which also would increase the risk of anaerobes  -no additional antibiotics for now  -monitor respiratory status  -Pulmonary follow-up     3   Loculated left pleural effusion-status post chest tube placement   Does not appear to be an empyema based upon the preliminary results   The patient is now status post VATS with decortication for trapped lung  The pleural cultures remain negative    -chest tube drainage  -thoracic follow-up  -Pulmonary follow-up  -no additional antibiotics for now     4   Atrial fibrillation with rapid ventricular response-rate control as per Cardiology     5   Leukocytosis-persistent  Rama Sleek multifactorial   He also has thrombocytosis which may suggest this is all reactive process versus a primary hematologic process  The white cell count has come down  -CBC with diff as an outpatient  -no additional antibiotics for now  -no additional ID workup for now   -recommend hematology oncology evaluation if the leukocytosis and thrombocytosis persists      6   Disposition-okay to discharge from infectious disease standpoint  Patient possibly to rehab today  No infectious disease follow-up needed  We will sign off    Please call if questions  Antibiotics:  None    Subjective:  Patient has no fever, chills, sweats; no nausea, vomiting, diarrhea; no cough, or shortness of breath; decreased pain  No new symptoms  He is feeling much better and anxious to get out of the hospital     Objective:  Vitals:  HR:  [65-72] 72  Resp:  [18] 18  BP: (112-135)/(65-69) 115/65  SpO2:  [96 %-97 %] 97 %  Temp (24hrs), Av 5 °F (36 9 °C), Min:98 4 °F (36 9 °C), Max:98 7 °F (37 1 °C)  Current: Temperature: 98 4 °F (36 9 °C)    Physical Exam:   General Appearance:  Alert, nontoxic, no acute distress  Throat: Oropharynx moist without lesions  Lungs:   Decreased breath sounds bilaterally; respirations unlabored  Left-sided chest tube in place  Heart:  RRR; no murmur, rub or gallop   Abdomen:   Soft, non-tender, non-distended, positive bowel sounds  Extremities: No clubbing, cyanosis or edema   Skin: No new rashes or lesions  No draining wounds noted         Labs, Imaging, & Other studies:   All pertinent labs and imaging studies were personally reviewed    Results from last 7 days  Lab Units 17  0531 17  0855 17  0441   WBC Thousand/uL 13 15* 16 84* 15 63*   HEMOGLOBIN g/dL 10 1* 10 6* 9 9*   PLATELETS Thousands/uL 806* 860* 825*       Results from last 7 days  Lab Units 17  0531 17  0854 17  0441   SODIUM mmol/L 133* 129* 134*   POTASSIUM mmol/L 4 4 4 1 3 5   CHLORIDE mmol/L 100 99* 98*   CO2 mmol/L 27 18* 28   ANION GAP mmol/L 6 12 8   BUN mg/dL 17 19 21   CREATININE mg/dL 0 60 0 72 0 54*   EGFR ml/min/1 73sq m 108 100 113   GLUCOSE RANDOM mg/dL 85 123 81   CALCIUM mg/dL 7 9* 8 5 7 7*       Results from last 7 days  Lab Units 11/15/17  1833   GRAM STAIN RESULT  No Polys or Bacteria seen

## 2017-11-22 NOTE — SOCIAL WORK
Pt is cleared for d/c  Pt is accepted for short-term skilled rehab placement at [facility] located in [facility]  CM arranged BLS ambulance via [facility] for 8:15PM pickup this day to transport pt to SNF  The pt and his brother Juma Emmanuel were both informed of d/c  IMM signed by pt on 11/20/17  Medicare Bundle Letter given  Chart copy requested for SNF  CM to follow

## 2017-11-22 NOTE — WOUND OSTOMY CARE
Progress Note - Wound   Simeon Auguste 57 y o  male MRN: [id number]  Unit/Bed#: [facility] 420-01 Encounter: [id number]      Assessment: Patient is seen for weekly skin / wound assessment   Patient is seen at the bed sitting in the chair and is able to stand independently for the assessment   The patient's sacral is dry and intact with no pressure injury areas   The patient has a severe kyphosis of the spine and a silicone foam is in place for protection   Silicone foam pulled back and assessed the area all areas are blanchable   Will continue with the same plan of care for skin care   Plan:   1  Hydraguard to sacrum , buttocks and heels bid and prn   2  Moisturize skin daily and prn with skin nourishing cream   3  Soft care cushion when OOB in the chair   4  Turn and reposition every 2 hours to off load and prevent pressure   5  Midline back - allevyn foam dressing change every 5 days   6  Elevate heels off of the bed with pillows           Objective:    Vitals: Blood pressure 115/65, pulse 72, temperature 98 4 °F (36 9 °C), temperature source Oral, resp  rate 18, height 5' 8" (1 727 m), weight 56 5 kg (124 lb 9 oz), SpO2 97 %  ,Body mass index is 18 94 kg/m²  Incision 11/15/17 Chest Left (Active)   Incision Description LEXI 11/22/2017  8:00 AM   Carol-wound Assessment LEXI 11/22/2017  8:00 AM   Closure Sutures 11/21/2017 12:00 PM   Drainage Amount Small 11/22/2017  8:00 AM   Drainage Description Sanguineous 11/22/2017  8:00 AM   Treatments Site care 11/21/2017 12:00 PM   Dressing ABD;Dry dressing 11/22/2017  8:00 AM   Dressing Changed Changed 11/21/2017 12:00 PM   Patient Tolerance Tolerated well 11/21/2017 12:00 PM   Dressing Status Clean;Dry; Intact 11/22/2017  8:00 AM       Will sign off for wound care     Shira Zhu RN BSN CWOCN

## 2017-11-22 NOTE — PROGRESS NOTES
Progress Note - Thoracic Surgery   Vanessa Ferro 57 y o  male MRN: [id number]  Unit/Bed#: [facility] 420-01 Encounter: [id number]    Assessment:  60y o  M with a loculated left pleural effusion s/p L pigtail CT 11/6 s/p TPA 11/7 and 11/8, 11/9  CT upsized 11/10 s/p 11/19 L VATS decortication    Persistent airleak    Plan:    Continue chest tube to H2O seal  May need 1 way valve to facilitate d/c    Subjective/Objective     Subjective: JIM  C/o back pain, on room air      Objective:     Vitals: Blood pressure 112/67, pulse 65, temperature 98 4 °F (36 9 °C), temperature source Oral, resp  rate 18, height 5' 8" (1 727 m), weight 57 3 kg (126 lb 5 2 oz), SpO2 97 %  ,Body mass index is 19 21 kg/m²  I/O       11/20 0701 - 11/21 0700 11/21 0701 - 11/22 0700    P  O  850 390    Total Intake(mL/kg) 850 (14 8) 390 (6 8)    Urine (mL/kg/hr) 1780 (1 3) 1170 (0 9)    Stool  0 (0)    Chest Tube 0 (0) 0 (0)    Total Output 1780 1170    Net -930 -780          Unmeasured Stool Occurrence  3 x         Physical Exam: NAD  AAOX3  RRR  Normal respiratory effort  Soft, NT, ND  Dressings c/d/i  No c/c/e    Lab, Imaging and other studies: reviewed  Pharm ppx: lovenox  VTE Mechanical Prophylaxis: sequential compression device

## 2017-11-22 NOTE — PLAN OF CARE
Problem: Prexisting or High Potential for Compromised Skin Integrity  Goal: Skin integrity is maintained or improved  INTERVENTIONS:  - Identify patients at risk for skin breakdown  - Assess and monitor skin integrity  - Assess and monitor nutrition and hydration status  - Monitor labs (i e  albumin)  - Assess for incontinence   - Turn and reposition patient  - Assist with mobility/ambulation  - Relieve pressure over bony prominences  - Avoid friction and shearing  - Provide appropriate hygiene as needed including keeping skin clean and dry  - Evaluate need for skin moisturizer/barrier cream  - Collaborate with interdisciplinary team (i e  Nutrition, Rehabilitation, etc )   - Patient/family teaching   Outcome: Progressing      Problem: Potential for Falls  Goal: Patient will remain free of falls  INTERVENTIONS:  - Assess patient frequently for physical needs  -  Identify cognitive and physical deficits and behaviors that affect risk of falls  -  [place] fall precautions as indicated by assessment   - Educate patient/family on patient safety including physical limitations  - Instruct patient to call for assistance with activity based on assessment  - Modify environment to reduce risk of injury  - Consider OT/PT consult to assist with strengthening/mobility    Outcome: Progressing      Problem: Nutrition/Hydration-ADULT  Goal: Nutrient/Hydration intake appropriate for improving, restoring or maintaining nutritional needs  Monitor and assess patient's nutrition/hydration status for malnutrition (ex- brittle hair, bruises, dry skin, pale skin and conjunctiva, muscle wasting, smooth red tongue, and disorientation)  Collaborate with interdisciplinary team and initiate plan and interventions as ordered  Monitor patient's weight and dietary intake as ordered or per policy  Utilize nutrition screening tool and intervene per policy   Determine patient's food preferences and provide high-protein, high-caloric foods as appropriate       INTERVENTIONS:  - Monitor oral intake, urinary output, labs, and treatment plans  - Assess nutrition and hydration status and recommend course of action  - Evaluate amount of meals eaten  - Assist patient with eating if necessary   - Allow adequate time for meals  - Recommend/ encourage appropriate diets, oral nutritional supplements, and vitamin/mineral supplements  - Order, calculate, and assess calorie counts as needed  - Recommend, monitor, and adjust tube feedings and TPN/PPN based on assessed needs  - Assess need for intravenous fluids  - Provide specific nutrition/hydration education as appropriate  - Include patient/family/caregiver in decisions related to nutrition    Outcome: Progressing      Problem: PAIN - ADULT  Goal: Verbalizes/displays adequate comfort level or baseline comfort level  Interventions:  - Encourage patient to monitor pain and request assistance  - Assess pain using appropriate pain scale  - Administer analgesics based on type and severity of pain and evaluate response  - Implement non-pharmacological measures as appropriate and evaluate response  - Consider cultural and social influences on pain and pain management  - Notify physician/advanced practitioner if interventions unsuccessful or patient reports new pain   Outcome: Progressing      Problem: INFECTION - ADULT  Goal: Absence or prevention of progression during hospitalization  INTERVENTIONS:  - Assess and monitor for signs and symptoms of infection  - Monitor lab/diagnostic results  - Monitor all insertion sites, i e  indwelling lines, tubes, and drains  - Monitor endotracheal (as able) and nasal secretions for changes in amount and color  - [place] appropriate cooling/warming therapies per order  - Administer medications as ordered  - Instruct and encourage patient and family to use good hand hygiene technique  - Identify and instruct in appropriate isolation precautions for identified infection/condition Outcome: Progressing    Goal: Absence of fever/infection during neutropenic period  INTERVENTIONS:  - Monitor WBC    Outcome: Progressing      Problem: SAFETY ADULT  Goal: Maintain or return to baseline ADL function  INTERVENTIONS:  -  Assess patient's ability to carry out ADLs; assess patient's baseline for ADL function and identify physical deficits which impact ability to perform ADLs (bathing, care of mouth/teeth, toileting, grooming, dressing, etc )  - Assess/evaluate cause of self-care deficits   - Assess range of motion  - Assess patient's mobility; develop plan if impaired  - Assess patient's need for assistive devices and provide as appropriate  - Encourage maximum independence but intervene and supervise when necessary  ¯ Involve family in performance of ADLs  ¯ Assess for home care needs following discharge   ¯ Request OT consult to assist with ADL evaluation and planning for discharge  ¯ Provide patient education as appropriate   Outcome: Progressing    Goal: Maintain or return mobility status to optimal level  INTERVENTIONS:  - Assess patient's baseline mobility status (ambulation, transfers, stairs, etc )    - Identify cognitive and physical deficits and behaviors that affect mobility  - Identify mobility aids required to assist with transfers and/or ambulation (gait belt, sit-to-stand, lift, walker, cane, etc )  - [place] fall precautions as indicated by assessment  - Record patient progress and toleration of activity level on Mobility SBAR; progress patient to next Phase/Stage  - Instruct patient to call for assistance with activity based on assessment  - Request Rehabilitation consult to assist with strengthening/weightbearing, etc    Outcome: Progressing    Goal: Patient will remain free of falls  INTERVENTIONS:  - Assess patient frequently for physical needs  -  Identify cognitive and physical deficits and behaviors that affect risk of falls    -  [place] fall precautions as indicated by assessment   - Educate patient/family on patient safety including physical limitations  - Instruct patient to call for assistance with activity based on assessment  - Modify environment to reduce risk of injury  - Consider OT/PT consult to assist with strengthening/mobility    Outcome: Progressing      Problem: DISCHARGE PLANNING  Goal: Discharge to home or other facility with appropriate resources  INTERVENTIONS:  - Identify barriers to discharge w/patient and caregiver  - Arrange for needed discharge resources and transportation as appropriate  - Identify discharge learning needs (meds, wound care, etc )  - Arrange for interpretive services to assist at discharge as needed  - Refer to Case Management Department for coordinating discharge planning if the patient needs post-hospital services based on physician/advanced practitioner order or complex needs related to functional status, cognitive ability, or social support system   Outcome: Progressing      Problem: Knowledge Deficit  Goal: Patient/family/caregiver demonstrates understanding of disease process, treatment plan, medications, and discharge instructions  Complete learning assessment and assess knowledge base    Interventions:  - Provide teaching at level of understanding  - Provide teaching via preferred learning methods   Outcome: Progressing      Problem: DISCHARGE PLANNING - CARE MANAGEMENT  Goal: Discharge to post-acute care or home with appropriate resources  INTERVENTIONS:  - Conduct assessment to determine patient/family and health care team treatment goals, and need for post-acute services based on payer coverage, community resources, and patient preferences, and barriers to discharge  - Address psychosocial, clinical, and financial barriers to discharge as identified in assessment in conjunction with the patient/family and health care team  - Arrange appropriate level of post-acute services according to patient's   needs and preference and payer coverage in collaboration with the physician and health care team  - Communicate with and update the patient/family, physician, and health care team regarding progress on the discharge plan  - Arrange appropriate transportation to post-acute venues    Plan return home with brother or friend transporting, denies need for [facility]      Outcome: Progressing

## 2017-11-22 NOTE — DISCHARGE SUMMARY
Discharge Summary - [facility]    Patient Information: Charlie Disla 57 y o  male MRN: [id number]  Unit/Bed#: [facility] 420-01 Encounter: [id number]    Discharging Physician / Practitioner: Rip Alvarez DO  PCP: Ifeanyi Stone MD  Admission Date: 11/7/2017  Discharge Date: 11/22/17    Reason for Admission:   Left-sided pneumonia with loculated parapneumonic effusion status post VATS decortication    Discharge Diagnoses:     Principal Problem:    Loculated pleural effusion  Active Problems:    Severe protein-calorie malnutrition (HCC)    Continuous opioid dependence ([facility] )    Atrial fibrillation with RVR ([facility] )    Hyponatremia    Edema  Resolved Problems:    Pneumonia    Sepsis ([facility] )    Pedal edema    Urinary retention      Consultations During Hospital Stay:  · Cardiology  · Infectious disease  · Thoracic surgery  · Podiatry  · Behavior Health  · Pulmonology    Procedures Performed:     · VATS decortication  · Cardiac echo with EF 60%    Significant Findings / Test Results:     · As above    Incidental Findings:   · none    Test Results Pending at Discharge (will require follow up):   · none     Outpatient Tests Requested:  · none    Complications:  none    Hospital Course:     Charlie Disla is a 57 y o  male patient who originally presented to [address] [place] [address] on 11/6/2017 due to worsening shortness of breath and left-sided chest pain  He was found to have left-sided pneumonia with parapneumonic effusion, he was transferred to [facility] for further management  Thoracic surgery were consulted patient end up with VATS decortication, ID also consulted,  he completed approximately 2 weeks of IV antibiotic  PT recommending rehab, he was cleared by thoracic surgery to discharge to rehab in 1 way valve to follow up with thoracic surgery on 12/5/2017 at 4:15 p m    While in the hospital patient developed rapid AFib felt secondary to pleural effusion and chest tube cardiology consulted, currently heart rate is better controlled, no need for anticoagulation, he was instructed to follow with Cardiology as an outpatient  Patient with severe protein calorie malnutrition and low albumin in addition to lower extremity edema, venous Dopplers negative for DVT he was started on p o  Lasix    Patient currently in stable condition he will be discharged to [facility] SNF         Condition at Discharge: stable     Discharge Day Visit / Exam:     Subjective:    Patient seen and examined  Comfortable in chair  No nausea vomiting   Vitals: Blood Pressure: 115/65 (Map 84) (11/22/17 0736)  Pulse: 72 (11/22/17 0736)  Temperature: 98 4 °F (36 9 °C) (11/22/17 0736)  Temp Source: Oral (11/22/17 0736)  Respirations: 18 (11/22/17 0736)  Height: 5' 8" (172 7 cm) (11/07/17 1700)  Weight - Scale: 56 5 kg (124 lb 9 oz) (11/22/17 0600)  SpO2: 97 % (11/22/17 0736)  Exam:   Physical Exam  Patient is awake alert in no acute distress  Cachectic  Lung with decreased breath sounds bilateral- left-sided chest tube in place  Heart positive S1-S2 no murmur  Back with severe kyphosis  Abdomen soft nontender positive bowel sounds  Lower extremity pitting edema,    Discharge instructions/Information to patient and family:   See after visit summary for information provided to patient and family  Provisions for Follow-Up Care:  See after visit summary for information related to follow-up care and any pertinent home health orders  Disposition:     [facility] to [facility] SNF:   · Not Applicable to this Patient - Not Applicable to this Patient    Planned Readmission: no     Discharge Statement:  I spent 35 minutes discharging the patient  This time was spent on the day of discharge  I had direct contact with the patient on the day of discharge   Greater than 50% of the total time was spent examining patient, answering all patient questions, arranging and discussing plan of care with patient as well as directly providing post-discharge instructions  Additional time then spent on discharge activities  Discharge Medications:  See after visit summary for reconciled discharge medications provided to patient and family        ** Please Note: This note has been constructed using a voice recognition system **

## 2017-11-22 NOTE — DISCHARGE INSTRUCTIONS
Wash incisions daily with soap and water  Change chest tube dressing daily with a dry, sterile dressing  Please drain mini express chest tube container as needed with a leur loc syringe and record output

## 2017-12-01 ENCOUNTER — ALLSCRIPTS OFFICE VISIT (OUTPATIENT)
Dept: OTHER | Facility: OTHER | Age: 62
End: 2017-12-01

## 2017-12-11 LAB — FUNGUS SPEC CULT: NORMAL

## 2017-12-18 LAB — FUNGUS SPEC CULT: NORMAL

## 2017-12-20 ENCOUNTER — HOSPITAL ENCOUNTER (INPATIENT)
Facility: HOSPITAL | Age: 62
LOS: 7 days | Discharge: NURSING, REHAB OR LONG-TERM CARE | DRG: 300 | End: 2017-12-27
Attending: EMERGENCY MEDICINE | Admitting: INTERNAL MEDICINE
Payer: MEDICARE

## 2017-12-20 ENCOUNTER — APPOINTMENT (EMERGENCY)
Dept: RADIOLOGY | Facility: HOSPITAL | Age: 62
DRG: 300 | End: 2017-12-20
Payer: MEDICARE

## 2017-12-20 DIAGNOSIS — Z78.9 FAILURE OF OUTPATIENT TREATMENT: ICD-10-CM

## 2017-12-20 DIAGNOSIS — M25.559 HIP PAIN: ICD-10-CM

## 2017-12-20 DIAGNOSIS — L03.115 BILATERAL LOWER LEG CELLULITIS: Primary | ICD-10-CM

## 2017-12-20 DIAGNOSIS — L03.116 BILATERAL LOWER LEG CELLULITIS: Primary | ICD-10-CM

## 2017-12-20 DIAGNOSIS — R60.0 LEG EDEMA, RIGHT: ICD-10-CM

## 2017-12-20 DIAGNOSIS — R60.0 BILATERAL LOWER EXTREMITY EDEMA: ICD-10-CM

## 2017-12-20 PROBLEM — L03.90 CELLULITIS: Status: ACTIVE | Noted: 2017-12-20

## 2017-12-20 LAB
ALBUMIN SERPL BCP-MCNC: 2.3 G/DL (ref 3.5–5)
ALP SERPL-CCNC: 260 U/L (ref 46–116)
ALT SERPL W P-5'-P-CCNC: 17 U/L (ref 12–78)
ANION GAP SERPL CALCULATED.3IONS-SCNC: 4 MMOL/L (ref 4–13)
APTT PPP: 34 SECONDS (ref 23–35)
AST SERPL W P-5'-P-CCNC: 18 U/L (ref 5–45)
BACTERIA UR QL AUTO: ABNORMAL /HPF
BASOPHILS # BLD AUTO: 0.02 THOUSANDS/ΜL (ref 0–0.1)
BASOPHILS NFR BLD AUTO: 0 % (ref 0–1)
BILIRUB SERPL-MCNC: 0.25 MG/DL (ref 0.2–1)
BILIRUB UR QL STRIP: NEGATIVE
BUN SERPL-MCNC: 16 MG/DL (ref 5–25)
CALCIUM SERPL-MCNC: 8.2 MG/DL (ref 8.3–10.1)
CHLORIDE SERPL-SCNC: 102 MMOL/L (ref 100–108)
CLARITY UR: CLEAR
CO2 SERPL-SCNC: 30 MMOL/L (ref 21–32)
COLOR UR: YELLOW
CREAT SERPL-MCNC: 0.8 MG/DL (ref 0.6–1.3)
EOSINOPHIL # BLD AUTO: 0.17 THOUSAND/ΜL (ref 0–0.61)
EOSINOPHIL NFR BLD AUTO: 2 % (ref 0–6)
ERYTHROCYTE [DISTWIDTH] IN BLOOD BY AUTOMATED COUNT: 17.1 % (ref 11.6–15.1)
GFR SERPL CREATININE-BSD FRML MDRD: 95 ML/MIN/1.73SQ M
GLUCOSE SERPL-MCNC: 104 MG/DL (ref 65–140)
GLUCOSE UR STRIP-MCNC: NEGATIVE MG/DL
HCT VFR BLD AUTO: 30.5 % (ref 36.5–49.3)
HGB BLD-MCNC: 10.2 G/DL (ref 12–17)
HGB UR QL STRIP.AUTO: ABNORMAL
HYALINE CASTS #/AREA URNS LPF: ABNORMAL /LPF
INR PPP: 1.09 (ref 0.86–1.16)
KETONES UR STRIP-MCNC: NEGATIVE MG/DL
LACTATE SERPL-SCNC: 1 MMOL/L (ref 0.5–2)
LEUKOCYTE ESTERASE UR QL STRIP: NEGATIVE
LYMPHOCYTES # BLD AUTO: 1.74 THOUSANDS/ΜL (ref 0.6–4.47)
LYMPHOCYTES NFR BLD AUTO: 21 % (ref 14–44)
MCH RBC QN AUTO: 29.8 PG (ref 26.8–34.3)
MCHC RBC AUTO-ENTMCNC: 33.4 G/DL (ref 31.4–37.4)
MCV RBC AUTO: 89 FL (ref 82–98)
MONOCYTES # BLD AUTO: 0.77 THOUSAND/ΜL (ref 0.17–1.22)
MONOCYTES NFR BLD AUTO: 9 % (ref 4–12)
NEUTROPHILS # BLD AUTO: 5.74 THOUSANDS/ΜL (ref 1.85–7.62)
NEUTS SEG NFR BLD AUTO: 68 % (ref 43–75)
NITRITE UR QL STRIP: NEGATIVE
NON-SQ EPI CELLS URNS QL MICRO: ABNORMAL /HPF
NRBC BLD AUTO-RTO: 0 /100 WBCS
PH UR STRIP.AUTO: 6 [PH] (ref 4.5–8)
PLATELET # BLD AUTO: 412 THOUSANDS/UL (ref 149–390)
PMV BLD AUTO: 7.5 FL (ref 8.9–12.7)
POTASSIUM SERPL-SCNC: 4 MMOL/L (ref 3.5–5.3)
PROT SERPL-MCNC: 6.2 G/DL (ref 6.4–8.2)
PROT UR STRIP-MCNC: NEGATIVE MG/DL
PROTHROMBIN TIME: 14.1 SECONDS (ref 12.1–14.4)
RBC # BLD AUTO: 3.42 MILLION/UL (ref 3.88–5.62)
RBC #/AREA URNS AUTO: ABNORMAL /HPF
SODIUM SERPL-SCNC: 136 MMOL/L (ref 136–145)
SP GR UR STRIP.AUTO: 1.02 (ref 1–1.03)
UROBILINOGEN UR QL STRIP.AUTO: 0.2 E.U./DL
WBC # BLD AUTO: 8.46 THOUSAND/UL (ref 4.31–10.16)
WBC #/AREA URNS AUTO: ABNORMAL /HPF

## 2017-12-20 PROCEDURE — 71020 HB CHEST X-RAY 2VW FRONTAL&LATL: CPT

## 2017-12-20 PROCEDURE — 99285 EMERGENCY DEPT VISIT HI MDM: CPT

## 2017-12-20 PROCEDURE — 83605 ASSAY OF LACTIC ACID: CPT | Performed by: EMERGENCY MEDICINE

## 2017-12-20 PROCEDURE — 93005 ELECTROCARDIOGRAM TRACING: CPT | Performed by: EMERGENCY MEDICINE

## 2017-12-20 PROCEDURE — 96365 THER/PROPH/DIAG IV INF INIT: CPT

## 2017-12-20 PROCEDURE — 85730 THROMBOPLASTIN TIME PARTIAL: CPT | Performed by: EMERGENCY MEDICINE

## 2017-12-20 PROCEDURE — 93005 ELECTROCARDIOGRAM TRACING: CPT

## 2017-12-20 PROCEDURE — 87040 BLOOD CULTURE FOR BACTERIA: CPT | Performed by: EMERGENCY MEDICINE

## 2017-12-20 PROCEDURE — 36415 COLL VENOUS BLD VENIPUNCTURE: CPT | Performed by: EMERGENCY MEDICINE

## 2017-12-20 PROCEDURE — 85025 COMPLETE CBC W/AUTO DIFF WBC: CPT | Performed by: EMERGENCY MEDICINE

## 2017-12-20 PROCEDURE — 85610 PROTHROMBIN TIME: CPT | Performed by: EMERGENCY MEDICINE

## 2017-12-20 PROCEDURE — 80053 COMPREHEN METABOLIC PANEL: CPT | Performed by: EMERGENCY MEDICINE

## 2017-12-20 PROCEDURE — 81001 URINALYSIS AUTO W/SCOPE: CPT | Performed by: EMERGENCY MEDICINE

## 2017-12-20 RX ORDER — VANCOMYCIN HYDROCHLORIDE 1 G/200ML
15 INJECTION, SOLUTION INTRAVENOUS EVERY 12 HOURS
Status: DISCONTINUED | OUTPATIENT
Start: 2017-12-21 | End: 2017-12-22

## 2017-12-20 RX ORDER — AMIODARONE HYDROCHLORIDE 200 MG/1
200 TABLET ORAL DAILY
Status: DISCONTINUED | OUTPATIENT
Start: 2017-12-21 | End: 2017-12-27 | Stop reason: HOSPADM

## 2017-12-20 RX ORDER — B-COMPLEX WITH VITAMIN C
1 TABLET ORAL 2 TIMES DAILY WITH MEALS
Status: DISCONTINUED | OUTPATIENT
Start: 2017-12-21 | End: 2017-12-27 | Stop reason: HOSPADM

## 2017-12-20 RX ORDER — HYDROCODONE BITARTRATE AND ACETAMINOPHEN 5; 325 MG/1; MG/1
1 TABLET ORAL ONCE
Status: COMPLETED | OUTPATIENT
Start: 2017-12-20 | End: 2017-12-20

## 2017-12-20 RX ORDER — VANCOMYCIN HYDROCHLORIDE 1 G/200ML
15 INJECTION, SOLUTION INTRAVENOUS ONCE
Status: DISCONTINUED | OUTPATIENT
Start: 2017-12-20 | End: 2017-12-21

## 2017-12-20 RX ORDER — DILTIAZEM HYDROCHLORIDE 120 MG/1
240 CAPSULE, COATED, EXTENDED RELEASE ORAL DAILY
Status: DISCONTINUED | OUTPATIENT
Start: 2017-12-21 | End: 2017-12-22

## 2017-12-20 RX ORDER — DOCUSATE SODIUM 100 MG/1
200 CAPSULE, LIQUID FILLED ORAL 2 TIMES DAILY
Status: DISCONTINUED | OUTPATIENT
Start: 2017-12-21 | End: 2017-12-27 | Stop reason: HOSPADM

## 2017-12-20 RX ORDER — FENTANYL 50 UG/H
1 PATCH TRANSDERMAL
Status: DISCONTINUED | OUTPATIENT
Start: 2017-12-22 | End: 2017-12-21

## 2017-12-20 RX ORDER — FENTANYL 50 UG/H
1 PATCH TRANSDERMAL
COMMUNITY
End: 2017-12-27 | Stop reason: HOSPADM

## 2017-12-20 RX ORDER — ACETAMINOPHEN 325 MG/1
650 TABLET ORAL EVERY 6 HOURS PRN
Status: DISCONTINUED | OUTPATIENT
Start: 2017-12-20 | End: 2017-12-21

## 2017-12-20 RX ORDER — METOPROLOL TARTRATE 50 MG/1
50 TABLET, FILM COATED ORAL EVERY 12 HOURS SCHEDULED
Status: DISCONTINUED | OUTPATIENT
Start: 2017-12-21 | End: 2017-12-27 | Stop reason: HOSPADM

## 2017-12-20 RX ORDER — TAMSULOSIN HYDROCHLORIDE 0.4 MG/1
0.4 CAPSULE ORAL
Status: DISCONTINUED | OUTPATIENT
Start: 2017-12-21 | End: 2017-12-27 | Stop reason: HOSPADM

## 2017-12-20 RX ORDER — GABAPENTIN 100 MG/1
100 CAPSULE ORAL 3 TIMES DAILY
Status: DISCONTINUED | OUTPATIENT
Start: 2017-12-21 | End: 2017-12-22

## 2017-12-20 RX ORDER — DIAZEPAM 5 MG/1
5 TABLET ORAL
Status: DISCONTINUED | OUTPATIENT
Start: 2017-12-21 | End: 2017-12-27 | Stop reason: HOSPADM

## 2017-12-20 RX ORDER — DOXYCYCLINE HYCLATE 100 MG/1
100 TABLET, DELAYED RELEASE ORAL 2 TIMES DAILY
COMMUNITY
End: 2017-12-27 | Stop reason: HOSPADM

## 2017-12-20 RX ORDER — LEVOFLOXACIN 5 MG/ML
750 INJECTION, SOLUTION INTRAVENOUS ONCE
Status: COMPLETED | OUTPATIENT
Start: 2017-12-20 | End: 2017-12-20

## 2017-12-20 RX ORDER — OXYCODONE HYDROCHLORIDE 5 MG/1
5 TABLET ORAL EVERY 4 HOURS PRN
Status: DISCONTINUED | OUTPATIENT
Start: 2017-12-20 | End: 2017-12-21

## 2017-12-20 RX ORDER — POLYETHYLENE GLYCOL 3350 17 G/17G
17 POWDER, FOR SOLUTION ORAL DAILY PRN
Status: DISCONTINUED | OUTPATIENT
Start: 2017-12-20 | End: 2017-12-27 | Stop reason: HOSPADM

## 2017-12-20 RX ORDER — FUROSEMIDE 10 MG/ML
40 INJECTION INTRAMUSCULAR; INTRAVENOUS ONCE
Status: COMPLETED | OUTPATIENT
Start: 2017-12-21 | End: 2017-12-21

## 2017-12-20 RX ORDER — LEVOFLOXACIN 750 MG/1
750 TABLET ORAL ONCE
Status: DISCONTINUED | OUTPATIENT
Start: 2017-12-20 | End: 2017-12-20

## 2017-12-20 RX ADMIN — VANCOMYCIN HYDROCHLORIDE 1000 MG: 1 INJECTION, SOLUTION INTRAVENOUS at 23:24

## 2017-12-20 RX ADMIN — LEVOFLOXACIN 750 MG: 5 INJECTION, SOLUTION INTRAVENOUS at 21:35

## 2017-12-20 RX ADMIN — HYDROCODONE BITARTRATE AND ACETAMINOPHEN 1 TABLET: 5; 325 TABLET ORAL at 22:14

## 2017-12-21 PROBLEM — R07.89 MUSCULOSKELETAL CHEST PAIN: Status: ACTIVE | Noted: 2017-12-21

## 2017-12-21 PROBLEM — I48.0 PAROXYSMAL A-FIB (HCC): Status: ACTIVE | Noted: 2017-11-07

## 2017-12-21 PROBLEM — R21 RASH: Status: ACTIVE | Noted: 2017-12-21

## 2017-12-21 PROBLEM — N40.0 BPH (BENIGN PROSTATIC HYPERPLASIA): Chronic | Status: ACTIVE | Noted: 2017-12-21

## 2017-12-21 PROBLEM — R31.9 HEMATURIA: Status: RESOLVED | Noted: 2017-11-07 | Resolved: 2017-12-21

## 2017-12-21 PROBLEM — J90 LOCULATED PLEURAL EFFUSION: Status: RESOLVED | Noted: 2017-11-07 | Resolved: 2017-12-21

## 2017-12-21 LAB
ANION GAP SERPL CALCULATED.3IONS-SCNC: 6 MMOL/L (ref 4–13)
ATRIAL RATE: 65 BPM
ATRIAL RATE: 68 BPM
BUN SERPL-MCNC: 11 MG/DL (ref 5–25)
CALCIUM SERPL-MCNC: 8 MG/DL (ref 8.3–10.1)
CHLORIDE SERPL-SCNC: 102 MMOL/L (ref 100–108)
CO2 SERPL-SCNC: 28 MMOL/L (ref 21–32)
CREAT SERPL-MCNC: 0.63 MG/DL (ref 0.6–1.3)
ERYTHROCYTE [DISTWIDTH] IN BLOOD BY AUTOMATED COUNT: 17.2 % (ref 11.6–15.1)
GFR SERPL CREATININE-BSD FRML MDRD: 105 ML/MIN/1.73SQ M
GLUCOSE SERPL-MCNC: 82 MG/DL (ref 65–140)
HCT VFR BLD AUTO: 31.3 % (ref 36.5–49.3)
HGB BLD-MCNC: 10.5 G/DL (ref 12–17)
MCH RBC QN AUTO: 29.7 PG (ref 26.8–34.3)
MCHC RBC AUTO-ENTMCNC: 33.5 G/DL (ref 31.4–37.4)
MCV RBC AUTO: 89 FL (ref 82–98)
P AXIS: 53 DEGREES
P AXIS: 63 DEGREES
PLATELET # BLD AUTO: 420 THOUSANDS/UL (ref 149–390)
PMV BLD AUTO: 7.7 FL (ref 8.9–12.7)
POTASSIUM SERPL-SCNC: 3.6 MMOL/L (ref 3.5–5.3)
PR INTERVAL: 144 MS
PR INTERVAL: 144 MS
QRS AXIS: 50 DEGREES
QRS AXIS: 61 DEGREES
QRSD INTERVAL: 100 MS
QRSD INTERVAL: 110 MS
QT INTERVAL: 414 MS
QT INTERVAL: 442 MS
QTC INTERVAL: 430 MS
QTC INTERVAL: 469 MS
RBC # BLD AUTO: 3.53 MILLION/UL (ref 3.88–5.62)
SODIUM SERPL-SCNC: 136 MMOL/L (ref 136–145)
T WAVE AXIS: 42 DEGREES
T WAVE AXIS: 73 DEGREES
TROPONIN I SERPL-MCNC: <0.02 NG/ML
VENTRICULAR RATE: 65 BPM
VENTRICULAR RATE: 68 BPM
WBC # BLD AUTO: 8.77 THOUSAND/UL (ref 4.31–10.16)

## 2017-12-21 PROCEDURE — 87081 CULTURE SCREEN ONLY: CPT | Performed by: INTERNAL MEDICINE

## 2017-12-21 PROCEDURE — 84484 ASSAY OF TROPONIN QUANT: CPT | Performed by: INTERNAL MEDICINE

## 2017-12-21 PROCEDURE — 85027 COMPLETE CBC AUTOMATED: CPT | Performed by: INTERNAL MEDICINE

## 2017-12-21 PROCEDURE — 80048 BASIC METABOLIC PNL TOTAL CA: CPT | Performed by: INTERNAL MEDICINE

## 2017-12-21 RX ORDER — DIAPER,BRIEF,INFANT-TODD,DISP
EACH MISCELLANEOUS 2 TIMES DAILY
Status: DISPENSED | OUTPATIENT
Start: 2017-12-21 | End: 2017-12-26

## 2017-12-21 RX ORDER — HYDROCODONE BITARTRATE AND ACETAMINOPHEN 5; 325 MG/1; MG/1
1 TABLET ORAL EVERY 4 HOURS PRN
COMMUNITY
End: 2017-12-27 | Stop reason: HOSPADM

## 2017-12-21 RX ORDER — FENTANYL 50 UG/H
1 PATCH TRANSDERMAL
Status: DISCONTINUED | OUTPATIENT
Start: 2017-12-21 | End: 2017-12-27 | Stop reason: HOSPADM

## 2017-12-21 RX ORDER — POTASSIUM CHLORIDE 20 MEQ/1
20 TABLET, EXTENDED RELEASE ORAL DAILY
Status: DISCONTINUED | OUTPATIENT
Start: 2017-12-22 | End: 2017-12-27 | Stop reason: HOSPADM

## 2017-12-21 RX ORDER — OXYCODONE HYDROCHLORIDE 5 MG/1
TABLET ORAL
Status: COMPLETED
Start: 2017-12-21 | End: 2017-12-21

## 2017-12-21 RX ORDER — HYDROCODONE BITARTRATE AND ACETAMINOPHEN 5; 325 MG/1; MG/1
1 TABLET ORAL EVERY 4 HOURS PRN
Status: DISCONTINUED | OUTPATIENT
Start: 2017-12-21 | End: 2017-12-27 | Stop reason: HOSPADM

## 2017-12-21 RX ORDER — FUROSEMIDE 10 MG/ML
20 INJECTION INTRAMUSCULAR; INTRAVENOUS DAILY
Status: DISCONTINUED | OUTPATIENT
Start: 2017-12-22 | End: 2017-12-22

## 2017-12-21 RX ORDER — ACETAMINOPHEN 325 MG/1
325 TABLET ORAL EVERY 6 HOURS PRN
Status: DISCONTINUED | OUTPATIENT
Start: 2017-12-21 | End: 2017-12-27 | Stop reason: HOSPADM

## 2017-12-21 RX ADMIN — DILTIAZEM HYDROCHLORIDE 240 MG: 120 CAPSULE, COATED, EXTENDED RELEASE ORAL at 10:20

## 2017-12-21 RX ADMIN — ENOXAPARIN SODIUM 40 MG: 40 INJECTION SUBCUTANEOUS at 10:20

## 2017-12-21 RX ADMIN — OXYCODONE HYDROCHLORIDE 5 MG: 5 TABLET ORAL at 07:45

## 2017-12-21 RX ADMIN — METOPROLOL TARTRATE 50 MG: 50 TABLET ORAL at 00:14

## 2017-12-21 RX ADMIN — HYDROCORTISONE: 5 CREAM TOPICAL at 18:30

## 2017-12-21 RX ADMIN — METOPROLOL TARTRATE 50 MG: 50 TABLET ORAL at 10:19

## 2017-12-21 RX ADMIN — FENTANYL 1 PATCH: 50 PATCH, EXTENDED RELEASE TRANSDERMAL at 10:40

## 2017-12-21 RX ADMIN — DOCUSATE SODIUM 200 MG: 100 CAPSULE, LIQUID FILLED ORAL at 10:19

## 2017-12-21 RX ADMIN — HYDROCODONE BITARTRATE AND ACETAMINOPHEN 1 TABLET: 5; 325 TABLET ORAL at 15:36

## 2017-12-21 RX ADMIN — OXYCODONE HYDROCHLORIDE 5 MG: 5 TABLET ORAL at 02:45

## 2017-12-21 RX ADMIN — DIAZEPAM 5 MG: 5 TABLET ORAL at 21:33

## 2017-12-21 RX ADMIN — VANCOMYCIN HYDROCHLORIDE 1000 MG: 1 INJECTION, SOLUTION INTRAVENOUS at 11:26

## 2017-12-21 RX ADMIN — FUROSEMIDE 40 MG: 10 INJECTION, SOLUTION INTRAMUSCULAR; INTRAVENOUS at 07:51

## 2017-12-21 RX ADMIN — AMIODARONE HYDROCHLORIDE 200 MG: 200 TABLET ORAL at 10:20

## 2017-12-21 RX ADMIN — CALCIUM CARBONATE 500 MG (1,250 MG)-VITAMIN D3 200 UNIT TABLET 1 TABLET: at 10:20

## 2017-12-21 RX ADMIN — TAMSULOSIN HYDROCHLORIDE 0.4 MG: 0.4 CAPSULE ORAL at 15:36

## 2017-12-21 RX ADMIN — DIAZEPAM 5 MG: 5 TABLET ORAL at 00:15

## 2017-12-21 RX ADMIN — METOPROLOL TARTRATE 50 MG: 50 TABLET ORAL at 21:33

## 2017-12-21 RX ADMIN — HYDROCODONE BITARTRATE AND ACETAMINOPHEN 1 TABLET: 5; 325 TABLET ORAL at 10:42

## 2017-12-21 RX ADMIN — CALCIUM CARBONATE 500 MG (1,250 MG)-VITAMIN D3 200 UNIT TABLET 1 TABLET: at 17:01

## 2017-12-21 RX ADMIN — HYDROCODONE BITARTRATE AND ACETAMINOPHEN 1 TABLET: 5; 325 TABLET ORAL at 19:40

## 2017-12-21 RX ADMIN — DOCUSATE SODIUM 200 MG: 100 CAPSULE, LIQUID FILLED ORAL at 17:01

## 2017-12-21 NOTE — PLAN OF CARE
DISCHARGE PLANNING     Discharge to home or other facility with appropriate resources Progressing        INFECTION - ADULT     Absence or prevention of progression during hospitalization Progressing     Absence of fever/infection during neutropenic period Progressing        Knowledge Deficit     Patient/family/caregiver demonstrates understanding of disease process, treatment plan, medications, and discharge instructions Progressing        METABOLIC, FLUID AND ELECTROLYTES - ADULT     Electrolytes maintained within normal limits Progressing     Fluid balance maintained Progressing     Glucose maintained within target range Progressing        Nutrition/Hydration-ADULT     Nutrient/Hydration intake appropriate for improving, restoring or maintaining nutritional needs Progressing        PAIN - ADULT     Verbalizes/displays adequate comfort level or baseline comfort level Progressing        Potential for Falls     Patient will remain free of falls Progressing        SAFETY ADULT     Maintain or return to baseline ADL function Progressing     Maintain or return mobility status to optimal level Progressing

## 2017-12-21 NOTE — CASE MANAGEMENT
Initial Clinical Review    Admission: Date/Time/Statement: 12/20/17 @ 2219     Orders Placed This Encounter   Procedures    Inpatient Admission (expected length of stay for this patient is greater than two midnights)     Standing Status:   Standing     Number of Occurrences:   1     Order Specific Question:   Admitting Physician     Answer:   Selin Rollins [[id number]]     Order Specific Question:   Level of Care     Answer:   Med Surg [[id number]]     Order Specific Question:   Estimated length of stay     Answer:   More than 2 Midnights     Order Specific Question:   Certification     Answer:   I certify that inpatient services are medically necessary for this patient for a duration of greater than two midnights  See H&P and MD Progress Notes for additional information about the patient's course of treatment  ED: Date/Time/Mode of Arrival:   ED Arrival Information     Expected Arrival Acuity Means of Arrival Escorted By Service Admission Type    12/20/2017 18:46 12/20/2017 19:07 Urgent Ambulance [address]  Urgent    Arrival Complaint    [facility] - [address]          Chief Complaint:   Chief Complaint   Patient presents with    Cellulitis     pt with bilateral lower extremity swelling, redness, edema  reports being on oral abx without improvement  brought in by EMS from [facility]       History of Illness:      Cornelio Adam is a 60 y o  male with a medical history significant for severe kyphosis, osteoporosis, thoracic compression fractures with chronic opioid/benzo dependence, new afib in Nov 2017 thought to be 2/2 underlying lung process who presents from his rehab facility with worsening LE edema and erythema  He was recently admitted in Nov 2017 with left sided PNA c/b loculated parapneumonic effusions/p VATS decortication and chest tube (removed 12/1)  Patient reports that he has had LE swelling since his last admission at which time he was given a lot of IVFs   At his SNF following discharge, he was diagnosed with cellulitis and started on doxycycline  He was discharged from SNF 14d later but patient did not feel like he was able to ambulate well and was readmitted to a different rehab facility at [facility]  He has been continuing PT/OT and has started ambulating in the past 3-4 days  Over the past 24-48hrs, he has noticed worsening left LE swelling, continued RLE swelling, and worsening redness  Erythema was initially in bl lower legs but has moved up to above the knee on the right leg  He was seen by his PCP at the rehab and advised to come to the ER  He reports having pain across his chest when he works with PT and is worse with taking deep breaths  He thinks its muscle spasms and soreness in his intercostal muscles  In the ER, he was given hydrocodone-acetaminophen 5-325mg, levaquin IV and vancomycin  Per records, he has been on doxycycline for 14days  He was given an extra dose of lasix 40mg today        ED Vital Signs:   ED Triage Vitals   Temperature Pulse Respirations Blood Pressure SpO2   12/20/17 1912 12/20/17 1912 12/20/17 1912 12/20/17 1912 12/20/17 1912   98 4 °F (36 9 °C) 74 18 125/58 93 %      Temp Source Heart Rate Source Patient Position - Orthostatic VS BP Location FiO2 (%)   12/20/17 1912 12/20/17 2020 12/20/17 2020 12/20/17 2020 --   Oral Monitor Sitting Right arm       Pain Score       12/20/17 1912       7        Wt Readings from Last 1 Encounters:   12/21/17 64 2 kg (141 lb 8 6 oz)         Abnormal Labs/Diagnostic Test Results:     Recent Labs      12/20/17 2128   CALCIUM  8 2*          Recent Labs      12/20/17 2128   HGB  10 2*   HCT  30 5*   PLT  412*           ED Treatment:   Medication Administration from 12/20/2017 1846 to 12/20/2017 2338       Date/Time Order Dose Route     12/20/2017 2324 vancomycin (VANCOCIN) IVPB (premix) 1,000 mg 1,000 mg Intravenous     12/20/2017 2135 levofloxacin (LEVAQUIN) IVPB (premix) 750 mg 750 mg Intravenous 12/20/2017 2214 HYDROcodone-acetaminophen (NORCO) 5-325 mg per tablet 1 tablet 1 tablet Oral       Past Medical/Surgical History: Active Ambulatory Problems     Diagnosis Date Noted    Pleural disorder 11/06/2017    Osteoporosis 11/06/2017    Kyphosis 11/06/2017    Psoriasis 11/07/2017    Hematuria 11/07/2017    Loculated pleural effusion 11/07/2017    Severe protein-calorie malnutrition ([facility] [address] ) 11/07/2017    Continuous opioid dependence ([facility] [address] ) 11/07/2017    Other chronic pain 11/07/2017    Atrial fibrillation with RVR (HCC) 11/07/2017    Hyponatremia 11/15/2017    Edema 11/17/2017     Resolved Ambulatory Problems     Diagnosis Date Noted    Pneumonia 11/06/2017    Sepsis ([facility] [address] ) 11/06/2017    Pedal edema 11/06/2017    Urinary retention 11/07/2017     Past Medical History:    History of spinal fracture    Kyphosis    Loss of appetite    Loss of weight    Osteoarthritis    Osteoporosis       Admitting Diagnosis: Edema [R60 9]  Bilateral lower leg cellulitis [L03 116, L03 115]  Failure of outpatient treatment [Z78 9]    Age/Sex: 60 y o  male    Assessment/Plan:     60 y o  male with a medical history significant for severe kyphosis, osteoporosis, thoracic compression fractures with chronic opioid/benzo dependence, new afib in Nov 2017 thought to be 2/2 underlying lung process who presents from his rehab facility with worsening LE edema and erythema       # Bilateral leg swelling and erythema  - appears he may have chronic swelling at this point with stasis changes in lower legs  Would note however that the right leg does feel warmer than left, especially over rash above the knees  - will continue tx for nonpurulent, nonsevere cellulitis with vanc (cephalosporin allergy)  - in regards to his LE swelling, from history sounds iatrogenic but has not been improving   UA without proteinuria, LFTs are wnl, last ECHO was on Nov 7 (on admission from last time) with normal LVEF, no wma  - s/p extra dose of 40mg lasix PO at rehab  - will give IV lasix 40mg in am  - can consider repeat ECHO  - monitor I/Os, daily weights     # Chest pain  - likely MSK from PT but nonreproducible on exam  - will check troponin and EKG     # Afib  - regular, HRs 60s  - continue amiodarone, metoprolol, diltiazem     # Chronic pain - cont valium, fentanyl patch, tylenol, prn oxy, gabapentin  # constipation - cont docusate, miralax  # BPH - cont flomax        Admission Orders:          Scheduled Meds:   amiodarone 200 mg Oral Daily   calcium carbonate-vitamin D 1 tablet Oral BID With Meals   diazepam 5 mg Oral HS   diltiazem 240 mg Oral Daily   docusate sodium 200 mg Oral BID   enoxaparin 40 mg Subcutaneous Daily   fentaNYL 1 patch Transdermal Q48H   gabapentin 100 mg Oral TID   metoprolol tartrate 50 mg Oral Q12H MICHELET   tamsulosin 0 4 mg Oral Daily With Dinner   vancomycin 15 mg/kg Intravenous Q12H     Continuous Infusions:    PRN Meds:   acetaminophen    HYDROcodone-acetaminophen    polyethylene glycol

## 2017-12-21 NOTE — ASSESSMENT & PLAN NOTE
· Seems to be paroxysmal; EKG shows NSR; S1S2 reg on exam  · Rate controlled  · Continue cardizem, amiodarone, metoprolol  · CHADS VASC 0, thus no a/c  · Afib occurred after VATS in Nov 2017; cardiology evaluated during that admission; it was decided to not anticoagulate

## 2017-12-21 NOTE — ASSESSMENT & PLAN NOTE
· Afebrile  · Cellulitis RLE, continue  Vanco 1g bid  · Monitor vanco trough before 4th dose; tmw a m   · Using vanco 2/2 multiple allergies

## 2017-12-21 NOTE — ASSESSMENT & PLAN NOTE
· Diuresis; lasix added, monitor labs  · Venous Doppler RLE to r/o DVT  · Podiatry consult; to consider compression stockings  · Low Na diet  · Monitor I&O

## 2017-12-21 NOTE — RESTORATIVE TECHNICIAN NOTE
Restorative Specialist Mobility Note       Activity:  (Attempted to see pt for assistance oob to the wheel chair, per nursing pt transfers to oob to wheel chair Independently and refuses assistance )       Adali HERNANDEZ, Restorative Technician, [employer]

## 2017-12-21 NOTE — H&P
History and Physical - [facility] Internal Medicine    Patient Information: Valentin Valles 60 y o  male MRN: [id number]  Unit/Bed#: [facility] 733-01 Encounter: [id number]  Admitting Physician: Christopher Fried MD  PCP: Chalo Harrington MD  Date of Admission:  12/20/17      Chief Complaint: leg swelling, redness    History of Present Illness:   Valentin Valles is a 60 y o  male with a medical history significant for severe kyphosis, osteoporosis, thoracic compression fractures with chronic opioid/benzo dependence, new afib in Nov 2017 thought to be 2/2 underlying lung process who presents from his rehab facility with worsening LE edema and erythema  He was recently admitted in Nov 2017 with left sided PNA c/b loculated parapneumonic effusions/p VATS decortication and chest tube (removed 12/1)  Patient reports that he has had LE swelling since his last admission at which time he was given a lot of IVFs  At his SNF following discharge, he was diagnosed with cellulitis and started on doxycycline  He was discharged from SNF 14d later but patient did not feel like he was able to ambulate well and was readmitted to a different rehab facility at [facility]  He has been continuing PT/OT and has started ambulating in the past 3-4 days  Over the past 24-48hrs, he has noticed worsening left LE swelling, continued RLE swelling, and worsening redness  Erythema was initially in bl lower legs but has moved up to above the knee on the right leg  He was seen by his PCP at the rehab and advised to come to the ER  He reports having pain across his chest when he works with PT and is worse with taking deep breaths  He thinks its muscle spasms and soreness in his intercostal muscles  He also reports occasional abdominal muscle spasms  No shortness of breath, vomiting, diarrhea, bloody/black stools, dysuria, weakness/numbness, fevers, cough  In the ER, he was given hydrocodone-acetaminophen 5-325mg, levaquin IV and vancomycin   Per records, he has been on doxycycline for 14days  He was given an extra dose of lasix 40mg today  Past Medical  Past Medical History:   Diagnosis Date    History of spinal fracture     Kyphosis     Loss of appetite     Loss of weight     Osteoarthritis     Osteoporosis        Past Surgical History:   Past Surgical History:   Procedure Laterality Date    FINGER SURGERY      LUNG DECORTICATION Left 11/15/2017    Procedure: VATS DECORTICATION LUNG;  Surgeon: Alfonso Pascual MD;  [facility];  Service: Thoracic    SHOULDER DEBRIDEMENT         Home Medications:   Prior to Admission medications    Medication Sig Start Date End Date Taking?  Authorizing Provider   amiodarone 200 mg tablet Take 1 tablet by mouth 3 (three) times a day with meals  Patient taking differently: Take 200 mg by mouth daily   11/22/17  Yes Robbie Lester DO   diazepam (VALIUM) 5 mg tablet Take 1 tablet by mouth daily at bedtime for 10 days 11/22/17 12/20/17 Yes Robbie Lester DO   doxycycline (DORYX) 100 MG EC tablet Take 100 mg by mouth 2 (two) times a day   Yes Historical Provider, MD   fentaNYL (DURAGESIC) 50 mcg/hr Place 1 patch on the skin every third day   Yes Historical Provider, MD   furosemide (LASIX) 20 mg tablet Take 1 tablet by mouth daily 11/23/17  Yes Robbie Lester DO   acetaminophen (TYLENOL) 325 mg tablet Take 2 tablets by mouth every 6 (six) hours as needed for fever 11/22/17   Robbie Lester DO   calcium carbonate-vitamin D (OSCAL-D) 500 mg-200 units per tablet Take 1 tablet by mouth 2 (two) times a day with meals 11/22/17   Robbie Lester DO   clobetasol (TEMOVATE) 0 05 % cream Apply 5 g topically every 12 (twelve) hours 11/22/17   Robbie Lester DO   diltiazem (CARDIZEM CD) 240 mg 24 hr capsule Take 1 capsule by mouth daily 11/23/17   Robbie Lester DO   docusate sodium (COLACE) 100 mg capsule Take 2 capsules by mouth 2 (two) times a day 11/22/17   Robbie Lester DO   gabapentin (NEURONTIN) 100 mg capsule Take 1 capsule by mouth 3 (three) times a day 11/22/17   Cee Cola, DO   guaiFENesin (MUCINEX) 600 mg 12 hr tablet Take 1 tablet by mouth every 12 (twelve) hours 11/22/17   Cee Cola, DO   metoprolol tartrate (LOPRESSOR) 50 mg tablet Take 1 tablet by mouth every 12 (twelve) hours 11/22/17   Puryear Cola, DO   polyethylene glycol (MIRALAX) 17 g packet Take 17 g by mouth daily as needed (Constipation) 11/22/17   Cee Cola, DO   potassium chloride (K-DUR,KLOR-CON) 20 mEq tablet Take 2 tablets by mouth daily 11/23/17   Cee Cola, DO   tamsulosin [facility]) 0 4 mg Take 1 capsule by mouth daily with dinner 11/22/17   Puryear Cola, DO     Home meds reviewed and reconciled with rehab records  Allergies: Allergies   Allergen Reactions    Asa [Aspirin]     Carbapenems     Cephalosporins     Penicillins        Social History:  Social History   Substance Use Topics    Smoking status: Never Smoker    Smokeless tobacco: Never Used    Alcohol use No     History   Drug Use No       Family History:   Family History   Problem Relation Age of Onset    Heart disease Mother     Early death Mother     Cancer Father     Cancer Brother        Review of Systems:    All other review of systems reviewed and are negative except for as above in HPI  Physical Exam:    Temp: 98 4 °F (36 9 °C) (Oral)  HR:  [66-74] 66  Resp:  [16-18] 17  BP: (105-134)/(55-68) 134/68  SpO2:  [93 %-94 %] 93 %  Body mass index is 21 74 kg/m²      Weight - Scale: 64 9 kg (143 lb)       Intake/Output Summary (Last 24 hours) at 12/20/17 2342  Last data filed at 12/20/17 2319   Gross per 24 hour   Intake              150 ml   Output              180 ml   Net              -30 ml       General: Awake, alert, no acute distress  HEENT: NC/AT, EOMI, mmm  Neck: supple, no LAD  Lungs: decreased breath sounds throughout but sounds clear without wheezing/crackles  Heart: regular, nl S1/S2, no appreciable murmurs  Chest wall: nontender to palpation but reports some soreness  Abdomen: +BS, soft, NT/ND  Back: no spinal tenderness  Ext: 3-4+ pitting LE edema bilaterally which extends above the knee on the right  Skin: + blanching erythema on lower legs bl with petechial type rash on right thigh, warm to touch, no open wounds or purulent drainage  Neuro: 4/5 strength throughout, sensation intact    Labs:  Recent Labs      12/20/17 2128   NA  136   K  4 0   CL  102   CO2  30   BUN  16   CREATININE  0 80   CALCIUM  8 2*       Recent Labs      12/20/17 2128   WBC  8 46   HGB  10 2*   HCT  30 5*   PLT  412*   BASOPCT  0   EOSPCT  2   LYMPHSABS  1 74   MONOSABS  0 77   EOSABS  0 17   BASOSABS  0 02     Recent Labs      12/20/17 2128   ALT  17   AST  18   ALKPHOS  260*   BILITOT  0 25   ALBUMIN  2 3*      No results for input(s): TROPONINI in the last 72 hours  Lab Results   Component Value Date    INR 1 09 12/20/2017    INR 1 22 (H) 11/06/2017    PROTIME 14 1 12/20/2017    PROTIME 15 2 (H) 11/06/2017     Recent Labs      12/20/17 2102   KETONESU  Negative   SPECGRAV  1 016   PROTEINUA  Negative   UROBILINOGEN  0 2   LEUKOCYTESUR  Negative       I have personally reviewed pertinent reports  and I have personally reviewed pertinent films in PACS    Imaging:    CXR:  Bilateral pleural effusions, poor inspiratory effort, similar to prior    I have personally reviewed pertinent films in PACS    Assessment/Plan:   60 y o  male with a medical history significant for severe kyphosis, osteoporosis, thoracic compression fractures with chronic opioid/benzo dependence, new afib in Nov 2017 thought to be 2/2 underlying lung process who presents from his rehab facility with worsening LE edema and erythema  # Bilateral leg swelling and erythema  - appears he may have chronic swelling at this point with stasis changes in lower legs  Would note however that the right leg does feel warmer than left, especially over rash above the knees     - will continue tx for nonpurulent, nonsevere cellulitis with vanc (cephalosporin allergy)  - in regards to his LE swelling, from history sounds iatrogenic but has not been improving  UA without proteinuria, LFTs are wnl, last ECHO was on Nov 7 (on admission from last time) with normal LVEF, no wma  - s/p extra dose of 40mg lasix PO at rehab  - will give IV lasix 40mg in am  - can consider repeat ECHO  - monitor I/Os, daily weights    # Chest pain  - likely MSK from PT but nonreproducible on exam  - will check troponin and EKG    # Afib  - regular, HRs 60s  - continue amiodarone, metoprolol, diltiazem    # Chronic pain - cont valium, fentanyl patch, tylenol, prn oxy, gabapentin  # constipation - cont docusate, miralax  # BPH - cont flomax    FEN: Cardiac  PPx:  Enoxaparin (Lovenox)  / sequential compression device   Code: Full, as discussed at bedside on admission  Dispo: HLOC    I have discussed the plan of care with: patient    Anticipated Length of Stay:  Patient will be admitted on an Inpatient basis with an anticipated length of stay of greater than 2 midnights  Justification for Hospital Stay: LE edema, cellulitis    Total Time for Visit, including Counseling / Coordination of Care: 1 hour  Greater than 50% of this total time spent on direct patient counseling and coordination of care      Allscripts / Zing Records Reviewed: Yes       Signature:  Nika Brizuela MD     Electronic Signature

## 2017-12-21 NOTE — PHYSICIAN ADVISOR
Current patient class: Inpatient  The patient is currently on Hospital Day: 2      The patient was admitted to the hospital  on 12/20/17 at 2219 for the following diagnosis:  Edema [R60 9]  Bilateral lower leg cellulitis [L03 116, L03 115]  Failure of outpatient treatment [Z78 9]       There is documentation in the medical record of an expected length of stay of at least 2 midnights  The patient is therefore expected to satisfy the 2 midnight benchmark and given the 2 midnight presumption is appropriate for INPATIENT ADMISSION  Given this expectation of a satisfying stay, CMS instructs us that the patient is most often appropriate for inpatient admission under part A provided medical necessity is documented in the chart  After review of the relevant documentation, labs, vital signs and test results, the patient is appropriate for INPATIENT ADMISSION  Admission to the hospital as an inpatient is a complex decision making process which requires the practitioner to consider the patients presenting complaint, history and physical examination and all relevant testing  With this in mind, in this case, the patient was deemed appropriate for INPATIENT ADMISSION  After review of the documentation and testing available at the time of the admission I concur with this clinical determination of medical necessity  The patient does have an inpatient admission within the previous 30 days  The patient was admitted on 11/7/17 and discharged on 11/22/17 as an inpatient  The patient therefore required readmission review  In this case the patient should be considered a SEPARATE and UNRELATED INPATIENT ADMISSION  The patient had been discharged in stable condition with a completed care plan  There were no unresolved acute medical issues at the time of discharged which would have reasonably been expected to prompt this readmission      The patient's previous admission from 11/7/17 from 11/22/17 was for severe sepsis secondary to a left-sided pneumonia  During that admission, the patient underwent a VATS procedure with decortication and chest tube placement for a loculated left pleural effusion  The current admission is for bilateral lower extremity cellulitis and edema  These admissions are considered separate and unrelated inpatient admissions  Rationale is as follows: The patient is a 60 yrs   Male who presented to the ED at 12/20/2017  7:07 PM with a chief complaint of Cellulitis (pt with bilateral lower extremity swelling, redness, edema  reports being on oral abx without improvement  brought in by EMS from [facility])     The patient is a 58year-old male who presented to the ED with the complaints of bilateral lower extremity erythema and edema  He was diagnosed with bilateral lower extremity cellulitis and failed an outpatient course of PO antibiotics  He is being treated with IV antibiotics for the bilateral lower extremity cellulitis and received a dose of IV Lasix for the bilateral lower extremity edema  The patient is appropriate for an inpatient admission      The patients vitals on arrival were ED Triage Vitals   Temperature Pulse Respirations Blood Pressure SpO2   12/20/17 1912 12/20/17 1912 12/20/17 1912 12/20/17 1912 12/20/17 1912   98 4 °F (36 9 °C) 74 18 125/58 93 %      Temp Source Heart Rate Source Patient Position - Orthostatic VS BP Location FiO2 (%)   12/20/17 1912 12/20/17 2020 12/20/17 2020 12/20/17 2020 --   Oral Monitor Sitting Right arm       Pain Score       12/20/17 1912       7           Past Medical History:   Diagnosis Date    History of spinal fracture     Kyphosis     Loss of appetite     Loss of weight     Osteoarthritis     Osteoporosis      Past Surgical History:   Procedure Laterality Date    FINGER SURGERY      LUNG DECORTICATION Left 11/15/2017    Procedure: VATS DECORTICATION LUNG;  Surgeon: Samanta Calhoun MD;  [facility];  Service: Thoracic    SHOULDER DEBRIDEMENT             Consults have been placed to:   IP CONSULT TO PODIATRY    Vitals:    12/20/17 2210 12/20/17 2359 12/21/17 0600 12/21/17 0740   BP: 134/68 112/63  142/73   Pulse: 66 67  96   Resp: 17 18 18   Temp:  98 8 °F (37 1 °C)  98 9 °F (37 2 °C)   TempSrc:  Oral  Axillary   SpO2: 93% 93%  95%   Weight:  64 5 kg (142 lb 4 4 oz) 64 2 kg (141 lb 8 6 oz)    Height:  5' 8" (1 727 m)         Most recent labs:    Recent Labs      12/20/17   2128  12/21/17   0018  12/21/17   0612   WBC  8 46   --   8 77   HGB  10 2*   --   10 5*   HCT  30 5*   --   31 3*   PLT  412*   --   420*   K  4 0   --   3 6   NA  136   --   136   CALCIUM  8 2*   --   8 0*   BUN  16   --   11   CREATININE  0 80   --   0 63   INR  1 09   --    --    TROPONINI   --   <0 02   --    AST  18   --    --    ALT  17   --    --    ALKPHOS  260*   --    --    BILITOT  0 25   --    --        Scheduled Meds:  amiodarone 200 mg Oral Daily   calcium carbonate-vitamin D 1 tablet Oral BID With Meals   diazepam 5 mg Oral HS   diltiazem 240 mg Oral Daily   docusate sodium 200 mg Oral BID   enoxaparin 40 mg Subcutaneous Daily   fentaNYL 1 patch Transdermal Q48H   gabapentin 100 mg Oral TID   metoprolol tartrate 50 mg Oral Q12H MICHELET   tamsulosin 0 4 mg Oral Daily With Dinner   vancomycin 15 mg/kg Intravenous Q12H     Continuous Infusions:   PRN Meds:   acetaminophen    HYDROcodone-acetaminophen    polyethylene glycol    Surgical procedures (if appropriate):

## 2017-12-21 NOTE — CONSULTS
Consult - Podiatry   Nhung Khan 60 y o  male MRN: [id number]  Unit/Bed#: [facility] 733-01 Encounter: [id number]    Assessment/Plan     3  59 y/o male with b/l lower extremity cellulitis vs chronic venous stasis vs  Lymphedema   -Pt currently on Levaquin for cellulitis as redness was extending to thigh however has receeded  -pt reports chronic swelling to b/l LE  -pt in NAD, VSS, no leukocytosis  -encouraged him to elevate his legs as much as possible although it is hard for him to do as it hurts his back  -pt should be bed rest with leg elevation  -Will discuss with attending and update plan as necessary  2  Afib  3  Hx psoriasis    History of Present Illness     HPI:  Nhung Khan is a 60 y o  male who presents with b/l lower extremity edema  He states he has had this for a long time however came to the hospital as he felt it was worse and extended to his thigh  He states it is hard to elevate his legs as it hurts his back but he knows that he should  He denies NVFC or SOB today  Consults  Review of Systems   Constitutional: Negative  HENT: Negative  Eyes: Negative  Respiratory: Negative  Cardiovascular: Negative  Gastrointestinal: Negative  Musculoskeletal: swelling b/l LE   Skin:swelling and redness b/l LE  Neurological: Negative  Psych: negative         Historical Information   Past Medical History:   Diagnosis Date    History of spinal fracture     Kyphosis     Loss of appetite     Loss of weight     Osteoarthritis     Osteoporosis      Past Surgical History:   Procedure Laterality Date    FINGER SURGERY      LUNG DECORTICATION Left 11/15/2017    Procedure: VATS DECORTICATION LUNG;  Surgeon: Alfonso Pascual MD;  Location: [facility];  Service: Thoracic    SHOULDER DEBRIDEMENT       Social History   History   Alcohol Use No     History   Drug Use No     History   Smoking Status    Never Smoker   Smokeless Tobacco    Never Used     Family History:   Family History   Problem Relation Age of Onset    Heart disease Mother     Early death Mother     Cancer Father     Cancer Brother        Meds/Allergies   Prescriptions Prior to Admission   Medication    amiodarone 200 mg tablet    diazepam (VALIUM) 5 mg tablet    doxycycline (DORYX) 100 MG EC tablet    fentaNYL (DURAGESIC) 50 mcg/hr    furosemide (LASIX) 20 mg tablet    HYDROcodone-acetaminophen (NORCO) 5-325 mg per tablet    acetaminophen (TYLENOL) 325 mg tablet    calcium carbonate-vitamin D (OSCAL-D) 500 mg-200 units per tablet    clobetasol (TEMOVATE) 0 05 % cream    diltiazem (CARDIZEM CD) 240 mg 24 hr capsule    docusate sodium (COLACE) 100 mg capsule    gabapentin (NEURONTIN) 100 mg capsule    guaiFENesin (MUCINEX) 600 mg 12 hr tablet    metoprolol tartrate (LOPRESSOR) 50 mg tablet    polyethylene glycol (MIRALAX) 17 g packet    potassium chloride (K-DUR,KLOR-CON) 20 mEq tablet    tamsulosin (FLOMAX) 0 4 mg     Allergies   Allergen Reactions    Asa [Aspirin]     Carbapenems     Cephalosporins     Penicillins        Objective   First Vitals:   Blood Pressure: 125/58 (12/20/17 1912)  Pulse: 74 (12/20/17 1912)  Temperature: 98 4 °F (36 9 °C) (12/20/17 1912)  Temp Source: Oral (12/20/17 1912)  Respirations: 18 (12/20/17 1912)  Height: 5' 8" (172 7 cm) (12/20/17 2359)  Weight - Scale: 64 9 kg (143 lb) (12/20/17 1912)  SpO2: 93 % (12/20/17 1912)    Current Vitals:   Blood Pressure: 142/73 (12/21/17 0740)  Pulse: 96 (12/21/17 0740)  Temperature: 98 9 °F (37 2 °C) (12/21/17 0740)  Temp Source: Axillary (12/21/17 0740)  Respirations: 18 (12/21/17 0740)  Height: 5' 8" (172 7 cm) (12/20/17 2359)  Weight - Scale: 64 2 kg (141 lb 8 6 oz) (12/21/17 0600)  SpO2: 95 % (12/21/17 0740)        /73   Pulse 96   Temp 98 9 °F (37 2 °C) (Axillary)   Resp 18   Ht 5' 8" (1 727 m)   Wt 64 2 kg (141 lb 8 6 oz)   SpO2 95%   BMI 21 52 kg/m²      General Appearance:    Alert, cooperative, no distress   Head: Normocephalic, without obvious abnormality, atraumatic   Eyes:    PERRL, conjunctiva/corneas clear, EOM's intact        Nose:   Moist mucous membranes   Neck:   Supple, symmetrical, trachea midline   Back:     Symmetric   Lungs:     Respirations unlabored   Heart:    Regular rate and rhythm, S1 and S2 normal, no murmur, rub   or gallop   Abdomen:     Soft, non-tender   Extremities:  B/L LE +2 pitting edema with redness from ankle to below knee  Calf non-tender to palpation b/l    Pulses:   DP b/l 2/4, PT b/l difficult to palpate 2/2 edema  CRT<3 sec, pedal hair absent   Skin:  No open lesions present, skin is red, thin and shiny with +2 pitting edema   Neurologic:   Gross sensation is intact  Protective sensation is intact                   Lab Results:   Admission on 12/20/2017   Component Date Value    WBC 12/20/2017 8 46     RBC 12/20/2017 3 42*    Hemoglobin 12/20/2017 10 2*    Hematocrit 12/20/2017 30 5*    MCV 12/20/2017 89     MCH 12/20/2017 29 8     MCHC 12/20/2017 33 4     RDW 12/20/2017 17 1*    MPV 12/20/2017 7 5*    Platelets 60/48/2636 412*    nRBC 12/20/2017 0     Neutrophils Relative 12/20/2017 68     Lymphocytes Relative 12/20/2017 21     Monocytes Relative 12/20/2017 9     Eosinophils Relative 12/20/2017 2     Basophils Relative 12/20/2017 0     Neutrophils Absolute 12/20/2017 5 74     Lymphocytes Absolute 12/20/2017 1 74     Monocytes Absolute 12/20/2017 0 77     Eosinophils Absolute 12/20/2017 0 17     Basophils Absolute 12/20/2017 0 02     Sodium 12/20/2017 136     Potassium 12/20/2017 4 0     Chloride 12/20/2017 102     CO2 12/20/2017 30     Anion Gap 12/20/2017 4     BUN 12/20/2017 16     Creatinine 12/20/2017 0 80     Glucose 12/20/2017 104     Calcium 12/20/2017 8 2*    AST 12/20/2017 18     ALT 12/20/2017 17     Alkaline Phosphatase 12/20/2017 260*    Total Protein 12/20/2017 6 2*    Albumin 12/20/2017 2 3*    Total Bilirubin 12/20/2017 0 25     eGFR 12/20/2017 95     LACTIC ACID 12/20/2017 1 0     Protime 12/20/2017 14 1     INR 12/20/2017 1 09     PTT 12/20/2017 34     Color, UA 12/20/2017 Yellow     Clarity, UA 12/20/2017 Clear     Specific Gravity, UA 12/20/2017 1 016     pH, UA 12/20/2017 6 0     Leukocytes, UA 12/20/2017 Negative     Nitrite, UA 12/20/2017 Negative     Protein, UA 12/20/2017 Negative     Glucose, UA 12/20/2017 Negative     Ketones, UA 12/20/2017 Negative     Urobilinogen, UA 12/20/2017 0 2     Bilirubin, UA 12/20/2017 Negative     Blood, UA 12/20/2017 Small*    Ventricular Rate 12/21/2017 68     Atrial Rate 12/21/2017 68     MA Interval 12/21/2017 144     QRSD Interval 12/21/2017 100     QT Interval 12/21/2017 442     QTC Interval 12/21/2017 469     P Axis 12/21/2017 53     QRS Axis 12/21/2017 50     T Wave Axis 12/21/2017 73     Ventricular Rate 12/20/2017 65     Atrial Rate 12/20/2017 65     MA Interval 12/20/2017 144     QRSD Interval 12/20/2017 110     QT Interval 12/20/2017 414     QTC Interval 12/20/2017 430     P Axis 12/20/2017 63     QRS Axis 12/20/2017 61     T Wave Axis 12/20/2017 42     RBC, UA 12/20/2017 4-10*    WBC, UA 12/20/2017 None Seen     Epithelial Cells 12/20/2017 None Seen     Bacteria, UA 12/20/2017 None Seen     Hyaline Casts, UA 12/20/2017 None Seen     Troponin I 12/21/2017 <0 02     Sodium 12/21/2017 136     Potassium 12/21/2017 3 6     Chloride 12/21/2017 102     CO2 12/21/2017 28     Anion Gap 12/21/2017 6     BUN 12/21/2017 11     Creatinine 12/21/2017 0 63     Glucose 12/21/2017 82     Calcium 12/21/2017 8 0*    eGFR 12/21/2017 105     WBC 12/21/2017 8 77     RBC 12/21/2017 3 53*    Hemoglobin 12/21/2017 10 5*    Hematocrit 12/21/2017 31 3*    MCV 12/21/2017 89     MCH 12/21/2017 29 7     MCHC 12/21/2017 33 5     RDW 12/21/2017 17 2*    Platelets 79/45/5627 420*    MPV 12/21/2017 7 7*                   Invalid input(s): LABAEARO            Imaging: I have personally reviewed pertinent films in PACS  EKG, Pathology, and Other Studies: I have personally reviewed pertinent reports        Code Status: Level 1 - Full Code  Advance Directive and Living Will:      Power of :    POLST:

## 2017-12-21 NOTE — PROGRESS NOTES
Progress Note - Geofm Severe 1955, 60 y o  male MRN: [id number]    Unit/Bed#: [facility] 733-01 Encounter: [id number]    Primary Care Provider: Aster Ochoa MD   Date and time admitted to hospital: 12/20/2017  7:07 PM        * Cellulitis   Assessment & Plan    · Afebrile  · Cellulitis RLE, continue  Vanco 1g bid  · Monitor vanco trough before 4th dose; tmw a m   · Using vanco 2/2 multiple allergies            Bilateral lower extremity edema   Assessment & Plan    · Diuresis; lasix added, monitor labs  · Venous Doppler RLE to r/o DVT  · Podiatry consult; to consider compression stockings  · Low Na diet  · Monitor I&O          Paroxysmal a-fib (HCC)   Assessment & Plan    · Seems to be paroxysmal; EKG shows NSR; S1S2 reg on exam  · Rate controlled  · Continue cardizem, amiodarone, metoprolol  · CHADS VASC 0, thus no a/c  · Afib occurred after VATS in Nov 2017; cardiology evaluated during that admission; it was decided to not anticoagulate        Musculoskeletal chest pain    Assessment & Plan    · Improving; continue analgesics        Continuous opioid dependence ([facility] [address] )   Assessment & Plan    · Pt from SNF; medications reviewed  · Continue fentanyl and vicodin        Kyphosis   Assessment & Plan    · Chronic, continue PT/OT  · Encourage activity OOB to chair with assistance          Rash R knee and thigh anterior   Assessment & Plan    · Add hydrocortisone         Other chronic pain   Assessment & Plan    · Continue fentanyl and Vicodin; Reviewed SNF documentation; medication reconciliation done  VTE Pharmacologic Prophylaxis:   Pharmacologic: Enoxaparin (Lovenox)  Mechanical VTE Prophylaxis in Place: No    Patient Centered Rounds: I have performed bedside rounds with nursing staff today  Discussions with Specialists or Other Care Team Provider: nursing    Education and Discussions with Family / Patient: Patient  Time Spent for Care: 45 minutes    More than 50% of total time spent on counseling and coordination of care as described above  Current Length of Stay: 1 day(s)    Current Patient Status: Inpatient   Certification Statement: The patient will continue to require additional inpatient hospital stay due to cellulitis, edema, r/o DVT    Discharge Plan: Continue IV abx, diuresis, monitor clinical response  Not stable for d/c at this time  Code Status: Level 1 - Full Code      Subjective:   States he feels much better than yesterday  Denies fever, is concerned about increasing edema and rash on R thigh; c/o limitation in activity 2/2 increased edema and pain  Denies SOB or chest tightness; continues to have MSK chest pain he contributes to overexerting himself lifting 4lb weights during therapy at rehab center; normally he lifts 2lb weights  C/o sometimes feels a spasm shoot across his abdomen which quickly resolves, occurs seldom; has not occurred since hospitalization; denies nausea, loss of appetite, abdominal pain, constipation or change in bowel pattern; last BM this a m  Denies dysuria  Objective:   B/L LE edema 4+, R>L, chronic venous stasis, non wheeping, no open lesions, scattered yellow crusting b/l, +pain on palpation; diminished pulses b/l  RLE edema ascending to R thigh with redness posterior knee/thigh and rash anteriorly to R knee and R thigh  Lungs CTA; heart S1S2 regular  Abd soft NTND +BS, no palpable mass, good appetite  Voiding; clear yellow UO  EKG 17 noted: as per cardiology, "When compared with ECG of 2017 19:39, Sinus rhythm has replaced Atrial fibrillation "    Vitals:   Temp (24hrs), Av 7 °F (37 1 °C), Min:98 4 °F (36 9 °C), Max:98 9 °F (37 2 °C)    HR:  [60-96] 60  Resp:  [16-18] 18  BP: (105-142)/(55-73) 107/60  SpO2:  [93 %-97 %] 97 %  Body mass index is 21 52 kg/m²  Input and Output Summary (last 24 hours):        Intake/Output Summary (Last 24 hours) at 17 1632  Last data filed at 17 1519   Gross per 24 hour Intake              670 ml   Output             2380 ml   Net            -1710 ml       Physical Exam:     Physical Exam   Constitutional: He appears well-developed  HENT:   Head: Normocephalic  Eyes: EOM are normal    Cardiovascular: Normal rate, regular rhythm and normal heart sounds  Pulmonary/Chest: Effort normal and breath sounds normal    Abdominal: Soft  Bowel sounds are normal          Additional Data:     Labs:  Troponin: neg <0 02    Results from last 7 days  Lab Units 12/21/17  0612 12/20/17 2128   WBC Thousand/uL 8 77 8 46   HEMOGLOBIN g/dL 10 5* 10 2*   HEMATOCRIT % 31 3* 30 5*   PLATELETS Thousands/uL 420* 412*   NEUTROS PCT %  --  68   LYMPHS PCT %  --  21   MONOS PCT %  --  9   EOS PCT %  --  2       Results from last 7 days  Lab Units 12/21/17 0612 12/20/17 2128   SODIUM mmol/L 136 136   POTASSIUM mmol/L 3 6 4 0   CHLORIDE mmol/L 102 102   CO2 mmol/L 28 30   BUN mg/dL 11 16   CREATININE mg/dL 0 63 0 80   CALCIUM mg/dL 8 0* 8 2*   TOTAL PROTEIN g/dL  --  6 2*   BILIRUBIN TOTAL mg/dL  --  0 25   ALK PHOS U/L  --  260*   ALT U/L  --  17   AST U/L  --  18   GLUCOSE RANDOM mg/dL 82 104       Results from last 7 days  Lab Units 12/20/17 2128   INR  1 09       * I Have Reviewed All Lab Data Listed Above  * Additional Pertinent Lab Tests Reviewed:  All Labs Within Last 24 Hours Reviewed    Imaging:    Imaging Reports Reviewed Today Include:   · CXR  · Venous doppler 11/06/2017  ·   Imaging Personally Reviewed by Myself Includes:  none    Recent Cultures (last 7 days):           Last 24 Hours Medication List:     amiodarone 200 mg Oral Daily   calcium carbonate-vitamin D 1 tablet Oral BID With Meals   diazepam 5 mg Oral HS   diltiazem 240 mg Oral Daily   docusate sodium 200 mg Oral BID   enoxaparin 40 mg Subcutaneous Daily   fentaNYL 1 patch Transdermal Q48H   [START ON 12/22/2017] furosemide 20 mg Intravenous Daily   gabapentin 100 mg Oral TID   hydrocortisone  Topical BID   metoprolol tartrate 50 mg Oral Q12H [facility]   [START ON 12/22/2017] potassium chloride 20 mEq Oral Daily   tamsulosin 0 4 mg Oral Daily With Dinner   vancomycin 15 mg/kg Intravenous Q12H        Today, Patient Was Seen By: MARY Cooper    ** Please Note: Dictation voice to text software may have been used in the creation of this document   **

## 2017-12-21 NOTE — PROGRESS NOTES
Pt refusing to stick to bedrest with legs elevated  Kristi Medina at pt bedside to discuss tx plan  Pt is competent to make decisions and refuses treatment plan  Per pt he will not be able to elevate legs on foot stool while in chair d/t back pain  Pt does agree to sit OOB in [facility] for ONE HOUR then back to bed with legs elevated

## 2017-12-21 NOTE — ED PROVIDER NOTES
History  Chief Complaint   Patient presents with    Cellulitis     pt with bilateral lower extremity swelling, redness, edema  reports being on oral abx without improvement  brought in by EMS from [facility]     58yo male referred from [facility] AND [facility] for evaluation of b/l lower extremity cellulitis  Patient failed outpatient treatment with doxycyline, began 12/01/17  Patient notes in last day b/l leg edema has worsened from below the knees to above  He also notes erythema of right lower extremity has worsened and now extends into right thigh  Able to stand and bear osmani b/l but feels weak  Admits to having muscle spasms in his abdomen  Denies fever, chills, nausea, vomiting, chest pain, shortness of breath, diarrhea, or dysuria  Of note, patient recently had a left sided pneumonia with a parapneumonic effusion s/p left thoracoscopic complete decortication performed on 11/15/17  Pmhx: opioid dependence, Afib with RVR, kyphosis, scoliosis            Prior to Admission Medications   Prescriptions Last Dose Informant Patient Reported?  Taking?   acetaminophen (TYLENOL) 325 mg tablet Unknown at Unknown time  No No   Sig: Take 2 tablets by mouth every 6 (six) hours as needed for fever   amiodarone 200 mg tablet 12/20/2017 at Unknown time  No Yes   Sig: Take 1 tablet by mouth 3 (three) times a day with meals   Patient taking differently: Take 200 mg by mouth daily     calcium carbonate-vitamin D (OSCAL-D) 500 mg-200 units per tablet   No No   Sig: Take 1 tablet by mouth 2 (two) times a day with meals   clobetasol (TEMOVATE) 0 05 % cream   No No   Sig: Apply 5 g topically every 12 (twelve) hours   diazepam (VALIUM) 5 mg tablet 12/20/2017 at Unknown time  No Yes   Sig: Take 1 tablet by mouth daily at bedtime for 10 days   diltiazem (CARDIZEM CD) 240 mg 24 hr capsule   No No   Sig: Take 1 capsule by mouth daily   docusate sodium (COLACE) 100 mg capsule   No No   Sig: Take 2 capsules by mouth 2 (two) times a day doxycycline (DORYX) 100 MG EC tablet   Yes Yes   Sig: Take 100 mg by mouth 2 (two) times a day   fentaNYL (DURAGESIC) 50 mcg/hr 12/19/2017 at Unknown time Self Yes Yes   Sig: Place 1 patch on the skin every third day   furosemide (LASIX) 20 mg tablet 12/20/2017 at Unknown time  No Yes   Sig: Take 1 tablet by mouth daily   gabapentin (NEURONTIN) 100 mg capsule   No No   Sig: Take 1 capsule by mouth 3 (three) times a day   guaiFENesin (MUCINEX) 600 mg 12 hr tablet   No No   Sig: Take 1 tablet by mouth every 12 (twelve) hours   metoprolol tartrate (LOPRESSOR) 50 mg tablet   No No   Sig: Take 1 tablet by mouth every 12 (twelve) hours   polyethylene glycol (MIRALAX) 17 g packet Unknown at Unknown time  No No   Sig: Take 17 g by mouth daily as needed (Constipation)   potassium chloride (K-DUR,KLOR-CON) 20 mEq tablet   No No   Sig: Take 2 tablets by mouth daily   tamsulosin (FLOMAX) 0 4 mg   No No   Sig: Take 1 capsule by mouth daily with dinner      Facility-Administered Medications: None       Past Medical History:   Diagnosis Date    History of spinal fracture     Kyphosis     Loss of appetite     Loss of weight     Osteoarthritis     Osteoporosis        Past Surgical History:   Procedure Laterality Date    FINGER SURGERY      LUNG DECORTICATION Left 11/15/2017    Procedure: VATS DECORTICATION LUNG;  Surgeon: Shelia Andres MD;  Location: [facility];  Service: Thoracic    SHOULDER DEBRIDEMENT         Family History   Problem Relation Age of Onset    Heart disease Mother     Early death Mother     Cancer Father     Cancer Brother      I have reviewed and agree with the history as documented  Social History   Substance Use Topics    Smoking status: Never Smoker    Smokeless tobacco: Never Used    Alcohol use No        Review of Systems   Constitutional: Negative for chills, diaphoresis, fatigue and fever  HENT: Negative for congestion, rhinorrhea and sore throat      Eyes: Negative for photophobia and visual disturbance  Respiratory: Negative for cough, chest tightness and shortness of breath  Cardiovascular: Positive for leg swelling  Negative for chest pain and palpitations  Gastrointestinal: Negative for abdominal pain, blood in stool, constipation, diarrhea, nausea and vomiting  Genitourinary: Negative for dysuria, frequency and hematuria  Musculoskeletal: Negative for back pain, gait problem, myalgias, neck pain and neck stiffness  Skin: Positive for color change  Negative for pallor and rash  Neurological: Positive for weakness  Negative for light-headedness, numbness and headaches  Hematological: Negative for adenopathy  Does not bruise/bleed easily  All other systems reviewed and are negative  Physical Exam  ED Triage Vitals   Temperature Pulse Respirations Blood Pressure SpO2   12/20/17 1912 12/20/17 1912 12/20/17 1912 12/20/17 1912 12/20/17 1912   98 4 °F (36 9 °C) 74 18 125/58 93 %      Temp Source Heart Rate Source Patient Position - Orthostatic VS BP Location FiO2 (%)   12/20/17 1912 12/20/17 2020 12/20/17 2020 12/20/17 2020 --   Oral Monitor Sitting Right arm       Pain Score       12/20/17 1912       7           Orthostatic Vital Signs  Vitals:    12/20/17 1912 12/20/17 2020 12/20/17 2210 12/20/17 2359   BP: 125/58 105/55 134/68 112/63   Pulse: 74 67 66 67   Patient Position - Orthostatic VS:  Sitting Lying Lying       Physical Exam   Constitutional: He is oriented to person, place, and time  He appears well-developed and well-nourished  No distress  Patient alert and oriented, appears chronically ill but non-toxic, in no acute distress    HENT:   Head: Normocephalic and atraumatic  Eyes: Conjunctivae and EOM are normal  Pupils are equal, round, and reactive to light  Neck: Normal range of motion  Neck supple  Cardiovascular: Normal rate, regular rhythm and intact distal pulses  Murmur heard    Systolic murmur   Pulmonary/Chest: Breath sounds normal  No respiratory distress  He has no wheezes  He has no rales  Abdominal: Soft  Bowel sounds are normal  He exhibits no distension  There is no tenderness  There is no rebound and no guarding  Musculoskeletal: Normal range of motion  He exhibits edema  He exhibits no tenderness  B/l lower extremity 3+ edema and erythema extending above the knee  Non-tender on palpation   Lymphadenopathy:     He has no cervical adenopathy  Neurological: He is alert and oriented to person, place, and time  No facial asymmetry noted, CN 2-12 intact, full ROM of upper and lower extremities, muscle strength 5/5 throughout, DTRs normal, sensation intact throughout, negative finger to nose/Marilin, negative Romberg's, negative Babinski, no gait disturbance noted   Skin: Skin is warm and dry  Capillary refill takes less than 2 seconds  No rash noted  He is not diaphoretic  There is erythema  No pallor  Psychiatric: He has a normal mood and affect  His behavior is normal  Judgment and thought content normal    Nursing note and vitals reviewed        ED Medications  Medications   metoprolol tartrate (LOPRESSOR) tablet 50 mg (50 mg Oral Given 12/21/17 0014)   tamsulosin (FLOMAX) capsule 0 4 mg (not administered)   fentaNYL (DURAGESIC) 50 mcg/hr TD 72 hr patch 1 patch (not administered)   amiodarone tablet 200 mg (not administered)   diazepam (VALIUM) tablet 5 mg (5 mg Oral Given 12/21/17 0015)   diltiazem (CARDIZEM CD) 24 hr capsule 240 mg (not administered)   docusate sodium (COLACE) capsule 200 mg (not administered)   acetaminophen (TYLENOL) tablet 650 mg (not administered)   calcium carbonate-vitamin D (OSCAL-D) 500 mg-200 units per tablet 1 tablet (not administered)   gabapentin (NEURONTIN) capsule 100 mg (100 mg Oral Not Given 12/20/17 5718)   polyethylene glycol (MIRALAX) packet 17 g (not administered)   enoxaparin (LOVENOX) subcutaneous injection 40 mg (not administered)   vancomycin (VANCOCIN) IVPB (premix) 1,000 mg (not administered)   furosemide (LASIX) injection 40 mg (not administered)   oxyCODONE (ROXICODONE) IR tablet 5 mg (not administered)   levofloxacin (LEVAQUIN) IVPB (premix) 750 mg (0 mg Intravenous Stopped 12/20/17 2319)   HYDROcodone-acetaminophen (NORCO) 5-325 mg per tablet 1 tablet (1 tablet Oral Given 12/20/17 2214)       Diagnostic Studies  Results Reviewed     Procedure Component Value Units Date/Time    Lactic acid x2 [[id number]]  (Normal) Collected:  12/20/17 2128    Lab Status:  Final result Specimen:  Blood from Arm, Left Updated:  12/20/17 2205     LACTIC ACID 1 0 mmol/L     Narrative:         Result may be elevated if tourniquet was used during collection  Comprehensive metabolic panel [[id number]]  (Abnormal) Collected:  12/20/17 2128    Lab Status:  Final result Specimen:  Blood from Arm, Left Updated:  12/20/17 2159     Sodium 136 mmol/L      Potassium 4 0 mmol/L      Chloride 102 mmol/L      CO2 30 mmol/L      Anion Gap 4 mmol/L      BUN 16 mg/dL      Creatinine 0 80 mg/dL      Glucose 104 mg/dL      Calcium 8 2 (L) mg/dL      AST 18 U/L      ALT 17 U/L      Alkaline Phosphatase 260 (H) U/L      Total Protein 6 2 (L) g/dL      Albumin 2 3 (L) g/dL      Total Bilirubin 0 25 mg/dL      eGFR 95 ml/min/1 73sq m     Narrative:         National Kidney Disease Education Program recommendations are as follows:  GFR calculation is accurate only with a steady state creatinine  Chronic Kidney disease less than 60 ml/min/1 73 sq  meters  Kidney failure less than 15 ml/min/1 73 sq  meters  Protime-INR [[id number]]  (Normal) Collected:  12/20/17 2128    Lab Status:  Final result Specimen:  Blood from Arm, Left Updated:  12/20/17 2157     Protime 14 1 seconds      INR 1 09    APTT [[id number]]  (Normal) Collected:  12/20/17 2128    Lab Status:  Final result Specimen:  Blood from Arm, Left Updated:  12/20/17 2157     PTT 34 seconds     Narrative:          Therapeutic Heparin Range = 60-90 seconds    CBC and differential [[id number]]  (Abnormal) Collected:  12/20/17 2128    Lab Status:  Final result Specimen:  Blood from Arm, Left Updated:  12/20/17 2140     WBC 8 46 Thousand/uL      RBC 3 42 (L) Million/uL      Hemoglobin 10 2 (L) g/dL      Hematocrit 30 5 (L) %      MCV 89 fL      MCH 29 8 pg      MCHC 33 4 g/dL      RDW 17 1 (H) %      MPV 7 5 (L) fL      Platelets 010 (H) Thousands/uL      nRBC 0 /100 WBCs      Neutrophils Relative 68 %      Lymphocytes Relative 21 %      Monocytes Relative 9 %      Eosinophils Relative 2 %      Basophils Relative 0 %      Neutrophils Absolute 5 74 Thousands/µL      Lymphocytes Absolute 1 74 Thousands/µL      Monocytes Absolute 0 77 Thousand/µL      Eosinophils Absolute 0 17 Thousand/µL      Basophils Absolute 0 02 Thousands/µL     Urine Microscopic [[id number]]  (Abnormal) Collected:  12/20/17 2102    Lab Status:  Final result Specimen:  Urine from Urine, Clean Catch Updated:  12/20/17 2138     RBC, UA 4-10 (A) /hpf      WBC, UA None Seen /hpf      Epithelial Cells None Seen /hpf      Bacteria, UA None Seen /hpf      Hyaline Casts, UA None Seen /lpf     Blood culture #2 [[id number]] Collected:  12/20/17 2128    Lab Status: In process Specimen:  Blood from Arm, Left Updated:  12/20/17 2134    Blood culture #1 [[id number]] Collected:  12/20/17 2130    Lab Status:   In process Specimen:  Blood from Arm, Right Updated:  12/20/17 2134    UA w Reflex to Microscopic w Reflex to Culture [[id number]]  (Abnormal) Collected:  12/20/17 2102    Lab Status:  Final result Specimen:  Urine from Urine, Clean Catch Updated:  12/20/17 2124     Color, UA Yellow     Clarity, UA Clear     Specific Gravity, UA 1 016     pH, UA 6 0     Leukocytes, UA Negative     Nitrite, UA Negative     Protein, UA Negative mg/dl      Glucose, UA Negative mg/dl      Ketones, UA Negative mg/dl      Urobilinogen, UA 0 2 E U /dl      Bilirubin, UA Negative     Blood, UA Small (A)                 XR chest 2 views    (Results Pending) Procedures  Procedures      Phone Consults  ED Phone Contact    ED Course  ED Course as of Dec 21 0059   Wed Dec 20, 2017   2011 Patient currently not septic but will begin sepsis protocol                                MDM  Number of Diagnoses or Management Options  Bilateral lower leg cellulitis:   Failure of outpatient treatment:   Diagnosis management comments: Impression: 58yo male presents for outpatient treatment failure of b/l lower extremity cellulitis  Ddx: cellulitis  Plan: septic workup, abx, admit    CritCare Time    Disposition  Final diagnoses:   Bilateral lower leg cellulitis   Failure of outpatient treatment     Time reflects when diagnosis was documented in both MDM as applicable and the Disposition within this note     Time User Action Codes Description Comment    12/20/2017 10:18 PM Asa Damico Add [S14 [id number],  [id number]] Bilateral lower leg cellulitis     12/20/2017 10:18 PM Garrick Danger [Z78 [id number]] Failure of outpatient treatment       ED Disposition     ED Disposition Condition Comment    Admit  Case was discussed with Dr Gabriel Babb and the patient's admission status was agreed to be Admission Status: inpatient status to the service of Dr Gabriel Babb           Follow-up Information    None       Current Discharge Medication List      CONTINUE these medications which have NOT CHANGED    Details   amiodarone 200 mg tablet Take 1 tablet by mouth 3 (three) times a day with meals  Refills: 0      diazepam (VALIUM) 5 mg tablet Take 1 tablet by mouth daily at bedtime for 10 days  Qty: 5 tablet, Refills: 0      doxycycline (DORYX) 100 MG EC tablet Take 100 mg by mouth 2 (two) times a day      fentaNYL (DURAGESIC) 50 mcg/hr Place 1 patch on the skin every third day      furosemide (LASIX) 20 mg tablet Take 1 tablet by mouth daily  Refills: 0      acetaminophen (TYLENOL) 325 mg tablet Take 2 tablets by mouth every 6 (six) hours as needed for fever  Qty: 30 tablet, Refills: 0      calcium carbonate-vitamin D (OSCAL-D) 500 mg-200 units per tablet Take 1 tablet by mouth 2 (two) times a day with meals  Refills: 0      clobetasol (TEMOVATE) 0 05 % cream Apply 5 g topically every 12 (twelve) hours  Qty: 30 g, Refills: 0      diltiazem (CARDIZEM CD) 240 mg 24 hr capsule Take 1 capsule by mouth daily  Refills: 0      docusate sodium (COLACE) 100 mg capsule Take 2 capsules by mouth 2 (two) times a day  Qty: 10 capsule, Refills: 0      gabapentin (NEURONTIN) 100 mg capsule Take 1 capsule by mouth 3 (three) times a day  Refills: 0      guaiFENesin (MUCINEX) 600 mg 12 hr tablet Take 1 tablet by mouth every 12 (twelve) hours  Refills: 0      metoprolol tartrate (LOPRESSOR) 50 mg tablet Take 1 tablet by mouth every 12 (twelve) hours  Refills: 0      polyethylene glycol (MIRALAX) 17 g packet Take 17 g by mouth daily as needed (Constipation)  Qty: 14 each, Refills: 0      potassium chloride (K-DUR,KLOR-CON) 20 mEq tablet Take 2 tablets by mouth daily  Refills: 0      tamsulosin (FLOMAX) 0 4 mg Take 1 capsule by mouth daily with dinner  Refills: 0           No discharge procedures on file  ED Provider  Attending physically available and evaluated Cookie Puentes I managed the patient along with the ED Attending      Electronically Signed by         Grant Means MD  Resident  12/21/17 1652

## 2017-12-21 NOTE — SOCIAL WORK
Met with pt at bedside  Pt is currently at [facility] for [facility]  Pt states his plan is to go back there  Pt states he thinks [facility] is horrible but he feels the physical therapist there does a great job so that is why he agreed to stay  Prior to this pt resides alone in a [address] [place] [address] with 13 ELIF  Pt states his brother Renetta Portillo is his primary contact and his # is [phone]  Pt states he is in a wheelchair at  but the past several days they have been able to walk him a little bit  CM reviewed d/c planning process including the following: identifying help at home, patient preference for d/c planning needs, Discharge Lounge, [facility] Meds to Bed program, availability of treatment team to discuss questions or concerns patient and/or family may have regarding understanding medications and recognizing signs and symptoms once discharged  CM also encouraged patient to follow up with all recommended appointments after discharge  Patient advised of importance for patient and family to participate in managing patients medical well being

## 2017-12-22 ENCOUNTER — APPOINTMENT (INPATIENT)
Dept: NON INVASIVE DIAGNOSTICS | Facility: HOSPITAL | Age: 62
DRG: 300 | End: 2017-12-22
Payer: MEDICARE

## 2017-12-22 PROBLEM — L89.92 PRESSURE ULCER, STAGE 2 (HCC): Chronic | Status: ACTIVE | Noted: 2017-12-22

## 2017-12-22 LAB
ANION GAP SERPL CALCULATED.3IONS-SCNC: 6 MMOL/L (ref 4–13)
BUN SERPL-MCNC: 12 MG/DL (ref 5–25)
CALCIUM SERPL-MCNC: 8.5 MG/DL (ref 8.3–10.1)
CHLORIDE SERPL-SCNC: 101 MMOL/L (ref 100–108)
CO2 SERPL-SCNC: 30 MMOL/L (ref 21–32)
CREAT SERPL-MCNC: 0.69 MG/DL (ref 0.6–1.3)
GFR SERPL CREATININE-BSD FRML MDRD: 101 ML/MIN/1.73SQ M
GLUCOSE SERPL-MCNC: 88 MG/DL (ref 65–140)
MRSA NOSE QL CULT: NORMAL
POTASSIUM SERPL-SCNC: 3.6 MMOL/L (ref 3.5–5.3)
SODIUM SERPL-SCNC: 137 MMOL/L (ref 136–145)
VANCOMYCIN TROUGH SERPL-MCNC: 14.7 UG/ML (ref 10–20)

## 2017-12-22 PROCEDURE — 80048 BASIC METABOLIC PNL TOTAL CA: CPT | Performed by: NURSE PRACTITIONER

## 2017-12-22 PROCEDURE — 80202 ASSAY OF VANCOMYCIN: CPT | Performed by: INTERNAL MEDICINE

## 2017-12-22 PROCEDURE — 93971 EXTREMITY STUDY: CPT

## 2017-12-22 RX ORDER — FUROSEMIDE 10 MG/ML
20 INJECTION INTRAMUSCULAR; INTRAVENOUS
Status: DISCONTINUED | OUTPATIENT
Start: 2017-12-22 | End: 2017-12-24

## 2017-12-22 RX ORDER — DILTIAZEM HYDROCHLORIDE 120 MG/1
120 CAPSULE, COATED, EXTENDED RELEASE ORAL DAILY
Status: DISCONTINUED | OUTPATIENT
Start: 2017-12-23 | End: 2017-12-27 | Stop reason: HOSPADM

## 2017-12-22 RX ADMIN — DOCUSATE SODIUM 200 MG: 100 CAPSULE, LIQUID FILLED ORAL at 08:28

## 2017-12-22 RX ADMIN — DOCUSATE SODIUM 200 MG: 100 CAPSULE, LIQUID FILLED ORAL at 17:01

## 2017-12-22 RX ADMIN — ENOXAPARIN SODIUM 40 MG: 40 INJECTION SUBCUTANEOUS at 08:30

## 2017-12-22 RX ADMIN — AMIODARONE HYDROCHLORIDE 200 MG: 200 TABLET ORAL at 08:29

## 2017-12-22 RX ADMIN — HYDROCODONE BITARTRATE AND ACETAMINOPHEN 1 TABLET: 5; 325 TABLET ORAL at 04:02

## 2017-12-22 RX ADMIN — VANCOMYCIN HYDROCHLORIDE 1000 MG: 1 INJECTION, SOLUTION INTRAVENOUS at 12:42

## 2017-12-22 RX ADMIN — POTASSIUM CHLORIDE 20 MEQ: 1500 TABLET, EXTENDED RELEASE ORAL at 08:29

## 2017-12-22 RX ADMIN — HYDROCODONE BITARTRATE AND ACETAMINOPHEN 1 TABLET: 5; 325 TABLET ORAL at 17:53

## 2017-12-22 RX ADMIN — HYDROCODONE BITARTRATE AND ACETAMINOPHEN 1 TABLET: 5; 325 TABLET ORAL at 00:00

## 2017-12-22 RX ADMIN — HYDROCODONE BITARTRATE AND ACETAMINOPHEN 1 TABLET: 5; 325 TABLET ORAL at 08:29

## 2017-12-22 RX ADMIN — FUROSEMIDE 20 MG: 10 INJECTION, SOLUTION INTRAMUSCULAR; INTRAVENOUS at 08:30

## 2017-12-22 RX ADMIN — HYDROCORTISONE 1 APPLICATION: 5 CREAM TOPICAL at 08:37

## 2017-12-22 RX ADMIN — METOPROLOL TARTRATE 50 MG: 50 TABLET ORAL at 21:32

## 2017-12-22 RX ADMIN — CALCIUM CARBONATE 500 MG (1,250 MG)-VITAMIN D3 200 UNIT TABLET 1 TABLET: at 16:57

## 2017-12-22 RX ADMIN — DIAZEPAM 5 MG: 5 TABLET ORAL at 21:32

## 2017-12-22 RX ADMIN — HYDROCODONE BITARTRATE AND ACETAMINOPHEN 1 TABLET: 5; 325 TABLET ORAL at 12:44

## 2017-12-22 RX ADMIN — VANCOMYCIN HYDROCHLORIDE 1000 MG: 1 INJECTION, SOLUTION INTRAVENOUS at 00:46

## 2017-12-22 RX ADMIN — FUROSEMIDE 20 MG: 10 INJECTION, SOLUTION INTRAMUSCULAR; INTRAVENOUS at 17:54

## 2017-12-22 RX ADMIN — HYDROCODONE BITARTRATE AND ACETAMINOPHEN 1 TABLET: 5; 325 TABLET ORAL at 22:10

## 2017-12-22 RX ADMIN — HYDROCORTISONE: 5 CREAM TOPICAL at 17:54

## 2017-12-22 RX ADMIN — CALCIUM CARBONATE 500 MG (1,250 MG)-VITAMIN D3 200 UNIT TABLET 1 TABLET: at 08:28

## 2017-12-22 RX ADMIN — TAMSULOSIN HYDROCHLORIDE 0.4 MG: 0.4 CAPSULE ORAL at 16:57

## 2017-12-22 NOTE — ASSESSMENT & PLAN NOTE
· Seems to be paroxysmal; EKG shows NSR  · Rate controlled  · Continue cardizem, amiodarone, metoprolol  Decreased dose of cardizem; BP on low side or normal; HR 60's    · CHADS VASC 0, thus no a/c  · Afib occurred after VATS in Nov 2017; cardiology evaluated during that admission; it was decided to not anticoagulate

## 2017-12-22 NOTE — PROGRESS NOTES
Discussed with Infectious Disease and Podiatry  The patient does have right thigh rash of unclear etiology  This could be petechial rash related to drug reaction versus alternative etiology  CBC and BMP without significant abnormalities, patient has platelet count of 109, hemoglobin 10 5  No new medications since admission aside from vancomycin  Rash of the right thigh has worsened since yesterday  No evidence of cellulitis given bilateral lower extremity chronic venous stasis, chronic edema and symmetric changes aside from right thigh rash which does not appear to be cellulitic in itself  Discontinue vancomycin, Ace wrap bilateral lower extremities and elevated as much as possible  Discussed with Podiatry potential rhonda boot however given lack of acute open wounds, would hold on this  Will need to monitor right thigh rash closely and remainder of skin to ensure no further spread of rash  Currently, small area on left thigh but no rash/petechiael changes on trunk/abdomen/back/arms

## 2017-12-22 NOTE — ASSESSMENT & PLAN NOTE
· Continues to be afebrile  · Cellulitis RLE with erythema extending to R thigh, unchanged; continue Vanco 1g bid; on vanco 2/2 multiple drug allergies  · Monitor vanco trough before 4th dose    · ID consult ordered (failed outpatient treatment with doxycyline, started in [facility] on 12/[facility]/17)

## 2017-12-22 NOTE — PROGRESS NOTES
Progress Note - Geofm Severe 1955, 60 y o  male MRN: [id number]    Unit/Bed#: [facility] 733-01 Encounter: [id number]    Primary Care Provider: Aster Ochoa MD   Date and time admitted to hospital: 12/20/2017  7:07 PM        * Cellulitis   Assessment & Plan    · Continues to be afebrile  · Cellulitis RLE with erythema extending to R thigh, unchanged; continue Vanco 1g bid; on vanco 2/2 multiple drug allergies  · Monitor vanco trough before 4th dose  · ID consult ordered (failed outpatient treatment with doxycyline, started in SNF on 12/01/17)            Bilateral lower extremity edema   Assessment & Plan    · D/c neurontin  · Decreased dose of cardizem; CCB known to contribute peripheral edema (BP on low side or normal; HR 60's)  · Not improving; lasix increased from daily to bid  · Venous Doppler RLE to r/o DVT- pending  · Podiatry consult appreciated; rec to keep legs elevated  · Monitor labs  · Low Na diet  · Monitor I&O        Paroxysmal a-fib (HCC)   Assessment & Plan    · Seems to be paroxysmal; EKG shows NSR  · Rate controlled  · Continue cardizem, amiodarone, metoprolol  Decreased dose of cardizem; BP on low side or normal; HR 60's  · CHADS VASC 0, thus no a/c  · Afib occurred after VATS in Nov 2017; cardiology evaluated during that admission; it was decided to not anticoagulate        Musculoskeletal chest pain    Assessment & Plan    · Improving; continue analgesics        Kyphosis   Assessment & Plan    · Chronic, continue PT/OT  · Encourage activity OOB to chair with assistance          Rash R knee and thigh anterior   Assessment & Plan    · Cont hydrocortisone         Other chronic pain   Assessment & Plan    · Continue fentanyl and Vicodin; Reviewed SNF documentation; medication reconciliation done  · Comfort measures          Pressure ulcer of L upper back, St 2,POA 2/2 severe kyphosis being treated with hydrocolloid dressing; T&P q2h      VTE Pharmacologic Prophylaxis: Pharmacologic: Enoxaparin (Lovenox)  Mechanical VTE Prophylaxis in Place: No    Patient Centered Rounds: I have performed bedside rounds with nursing staff today  Discussions with Specialists or Other Care Team Provider: Nursing    Education and Discussions with Family / Patient: d/w his brother via phone  Time Spent for Care: 45 minutes  More than 50% of total time spent on counseling and coordination of care as described above  Current Length of Stay: 2 day(s)    Current Patient Status: Inpatient   Certification Statement: The patient will continue to require additional inpatient hospital stay due to cellulitis, no improvement in edema or erythema    Discharge Plan: Plan to discharge when improvement in cellulitis and edema and able to transition to oral abx  Code Status: Level 1 - Full Code      Subjective:   Pt reports feeling less pain and less swelling in b/l le; states improvement in ROM in L leg; continues to c/o limited ROM R leg  Denies fever  Pt researched s/e of neurontin and is concerned that neurontin is contributing to edema, he does not know why he is on it; prefers to no longer take this medication  He does not have PMH neuropathy or fibromyalgia; does not believe this med helps his pain  Objective:     Vitals:   Temp (24hrs), Av 2 °F (36 8 °C), Min:98 °F (36 7 °C), Max:98 6 °F (37 °C)    HR:  [60-62] 62  Resp:  [16-18] 16  BP: ()/(52-72) 95/52  SpO2:  [97 %] 97 %  Body mass index is 21 52 kg/m²  Input and Output Summary (last 24 hours): Intake/Output Summary (Last 24 hours) at 17 1248  Last data filed at 17 1205   Gross per 24 hour   Intake             2080 ml   Output             1725 ml   Net              355 ml       Physical Exam:     Physical Exam   Constitutional: He is oriented to person, place, and time  He appears well-developed and well-nourished  HENT:   Head: Normocephalic     Slumped posture 2/2 kyphosis    Eyes: EOM are normal  Neck:   Limited ROM   Cardiovascular: Normal rate, regular rhythm and normal heart sounds  No murmur heard  Pulmonary/Chest: Effort normal and breath sounds normal  No respiratory distress  Abdominal: Soft  Bowel sounds are normal  He exhibits no distension and no mass  There is no tenderness  Musculoskeletal: He exhibits edema and tenderness  Limited ROM R leg 2/2 edema and pain w movement   Neurological: He is alert and oriented to person, place, and time  Psychiatric: He has a normal mood and affect  His behavior is normal  Judgment and thought content normal        Additional Data:     Labs:  Vanco [facility] noted 14 7     Results from last 7 days  Lab Units 12/21/17  0612 12/20/17 2128   WBC Thousand/uL 8 77 8 46   HEMOGLOBIN g/dL 10 5* 10 2*   HEMATOCRIT % 31 3* 30 5*   PLATELETS Thousands/uL 420* 412*   NEUTROS PCT %  --  68   LYMPHS PCT %  --  21   MONOS PCT %  --  9   EOS PCT %  --  2       Results from last 7 days  Lab Units 12/22/17 0644  12/20/17 2128   SODIUM mmol/L 137  < > 136   POTASSIUM mmol/L 3 6  < > 4 0   CHLORIDE mmol/L 101  < > 102   CO2 mmol/L 30  < > 30   BUN mg/dL 12  < > 16   CREATININE mg/dL 0 69  < > 0 80   CALCIUM mg/dL 8 5  < > 8 2*   TOTAL PROTEIN g/dL  --   --  6 2*   BILIRUBIN TOTAL mg/dL  --   --  0 25   ALK PHOS U/L  --   --  260*   ALT U/L  --   --  17   AST U/L  --   --  18   GLUCOSE RANDOM mg/dL 88  < > 104   < > = values in this interval not displayed  Results from last 7 days  Lab Units 12/20/17 2128   INR  1 09       * I Have Reviewed All Lab Data Listed Above  * Additional Pertinent Lab Tests Reviewed: Elizabeth 66 Admission Reviewed    Imaging:    Imaging Reports Reviewed Today Include: None  Imaging Personally Reviewed by Myself Includes:  None    Recent Cultures (last 7 days):       Results from last 7 days  Lab Units 12/20/17 2130 12/20/17 2128   BLOOD CULTURE  No Growth at 24 hrs  No Growth at 24 hrs         Last 24 Hours Medication List:     amiodarone 200 mg Oral Daily   calcium carbonate-vitamin D 1 tablet Oral BID With Meals   diazepam 5 mg Oral HS   [START ON 12/23/2017] diltiazem 120 mg Oral Daily   docusate sodium 200 mg Oral BID   enoxaparin 40 mg Subcutaneous Daily   fentaNYL 1 patch Transdermal Q48H   furosemide 20 mg Intravenous BID (diuretic)   hydrocortisone  Topical BID   metoprolol tartrate 50 mg Oral Q12H [address]   potassium chloride 20 mEq Oral Daily   tamsulosin 0 4 mg Oral Daily With Dinner   vancomycin 15 mg/kg Intravenous Q12H        Today, Patient Was Seen By: MARY Stoll    ** Please Note: Dictation voice to text software may have been used in the creation of this document   **

## 2017-12-22 NOTE — RESTORATIVE TECHNICIAN NOTE
Restorative Specialist Mobility Note       Activity: Other (Comment) (Attempted to see pt, pt is currently off the unit  )       Dave HERNANDEZ, Restorative Technician, [employer]

## 2017-12-22 NOTE — CONSULTS
Consultation - Infectious Disease   Hardy Posada 60 y o  male MRN: [id number]  Unit/Bed#: [facility] 733-01 Encounter: [id number]      IMPRESSION & RECOMMENDATIONS:   1   Bilateral lower extremity venous stasis changes-no clear clinical evidence of cellulitis  I suspect this is primarily and edema problem  The findings are symmetric, and the patient does not have any fever leukocytosis  Patient has difficulty with elevating his legs due to the severe kyphosis   -discontinue vancomycin  -monitor off all antibiotics  -aggressive edema control measures  -may need bilateral Unna boots as the patient cannot keep his leg elevated due to the severe kyphosis  -local care  -podiatry follow-up    2  Anemia-unclear etiology  -monitor CBC with diff  -additional workup and treatment as per the primary service    3  Recent pneumonia with complicated effusion-status post VATS with decortication for trapped lung  4   Severe kyphosis-is impairing the patient's ability to keep his legs elevated and control the edema  We will see the patient again 12/26/2017 if not discharged  Please call if questions  HISTORY OF PRESENT ILLNESS:  Reason for Consult:  Leg cellulitis  HPI: Hardy Posada is a 60y o  year old male admitted to [facility] in [place] with increasing leg swelling and redness who I am asked to assist with management  Patient had recently been admitted to [facility] in [place] after developing a pneumonia with a complicated pleural effusion  He underwent VATS with decortication and was treated with a 2 week course of intravenous antibiotics  The pleural fluid and cultures were not consistent with an infected pleural space  He was eventually discharged to rehab  While at rehab he was diagnosed with possible lower extremity cellulitis and started on doxycycline    He apparently was discharged from skilled nursing but then did not feel is able to ambulate well and was readmitted to a different subacute rehab facility  He apparently developed increasing lower extremity swelling of his extremities and worsening redness  Because of the symptoms the patient was sent to the ER for further evaluation  He was diagnosed with bilateral leg cellulitis, had blood cultures obtained, was given a dose of levofloxacin and started on vancomycin  He denies having any fever chills or sweats, denies any nausea vomiting or diarrhea, denies any cough or difficulty breathing, denies any dysuria or hematuria, denies any other joint or muscle pains  He actually denies having any significant leg pain at this time  REVIEW OF SYSTEMS:  A complete 12 point system-based review of systems is otherwise negative  PAST MEDICAL HISTORY:  Past Medical History:   Diagnosis Date    History of spinal fracture     Kyphosis     Loss of appetite     Loss of weight     Osteoarthritis     Osteoporosis      Past Surgical History:   Procedure Laterality Date    FINGER SURGERY      LUNG DECORTICATION Left 11/15/2017    Procedure: VATS DECORTICATION LUNG;  Surgeon: Tiffanie Bolden MD;  Location: [facility];  Service: Thoracic    SHOULDER DEBRIDEMENT         FAMILY HISTORY:  Non-contributory    SOCIAL HISTORY:  Social History   History   Alcohol Use No     History   Drug Use No     History   Smoking Status    Never Smoker   Smokeless Tobacco    Never Used       ALLERGIES:  Allergies   Allergen Reactions    Asa [Aspirin]     Carbapenems     Cephalosporins     Penicillins        MEDICATIONS:  All current active medications have been reviewed  Antibiotics:  Vancomycin 3      PHYSICAL EXAM:  HR:  [60-62] 62  Resp:  [16] 16  BP: ()/(52-72) 95/52  SpO2:  [97 %] 97 %  Temp (24hrs), Av 1 °F (36 7 °C), Min:98 °F (36 7 °C), Max:98 1 °F (36 7 °C)  Current: Temperature: 98 °F (36 7 °C)    Intake/Output Summary (Last 24 hours) at 17 9048  Last data filed at 17 1301   Gross per 24 hour   Intake 1680 ml   Output             1725 ml   Net              -45 ml       General Appearance:  Appearing well, nontoxic, and in no distress   Head:  Normocephalic, without obvious abnormality, atraumatic   Eyes:  Conjunctiva pink and sclera anicteric, both eyes   Nose: Nares normal, mucosa normal, no drainage   Throat: Oropharynx moist without lesions   Neck: Supple, symmetrical, no adenopathy, no tenderness/mass/nodules   Back:   Severe kyphosis   Lungs:   Clear to auscultation bilaterally, no audible wheezes, rhonchi and rales, respirations unlabored   Chest Wall:  No tenderness or deformity   Heart:  RRR; no murmur, rub or gallop   Abdomen:   Soft, non-tender, non-distended, positive bowel sounds,    Extremities: No cyanosis, clubbing or edema  Bilateral lower extremity erythema and venous stasis changes  Petechial rash in the right thigh  The legs are nontender  Skin: No other rashes or lesions  No draining wounds noted  Lymph nodes: Cervical, supraclavicular nodes normal   Neurologic: Alert and oriented times 3, extremity strength 5/5 and symmetric       LABS, IMAGING, & OTHER STUDIES:  Lab Results:  I have personally reviewed pertinent labs      Results from last 7 days  Lab Units 12/21/17  0612 12/20/17  2128   WBC Thousand/uL 8 77 8 46   HEMOGLOBIN g/dL 10 5* 10 2*   PLATELETS Thousands/uL 420* 412*       Results from last 7 days  Lab Units 12/22/17  0644 12/21/17  0612 12/20/17 2128   SODIUM mmol/L 137 136 136   POTASSIUM mmol/L 3 6 3 6 4 0   CHLORIDE mmol/L 101 102 102   CO2 mmol/L 30 28 30   ANION GAP mmol/L 6 6 4   BUN mg/dL 12 11 16   CREATININE mg/dL 0 69 0 63 0 80   EGFR ml/min/1 73sq m 101 105 95   GLUCOSE RANDOM mg/dL 88 82 104   CALCIUM mg/dL 8 5 8 0* 8 2*   AST U/L  --   --  18   ALT U/L  --   --  17   ALK PHOS U/L  --   --  260*   TOTAL PROTEIN g/dL  --   --  6 2*   ALBUMIN g/dL  --   --  2 3*   BILIRUBIN TOTAL mg/dL  --   --  0 25       Results from last 7 days  Lab Units 12/21/17  0037 12/20/17 2130 12/20/17 2128   BLOOD CULTURE   --  No Growth at 24 hrs  No Growth at 24 hrs  MRSA CULTURE ONLY  No Methicillin Resistant Staphlyococcus aureus (MRSA) isolated  --   --          Imaging Studies:   I have personally reviewed pertinent imaging study reports and images in PACS      Chest x-ray-no acute pulmonary disease

## 2017-12-22 NOTE — ASSESSMENT & PLAN NOTE
· D/c neurontin  · Decreased dose of cardizem; CCB known to contribute peripheral edema (BP on low side or normal; HR 60's)  · Not improving; lasix increased from daily to bid  · Venous Doppler RLE to r/o DVT- pending  · Podiatry consult appreciated; rec to keep legs elevated  · Monitor labs  · Low Na diet  · Monitor I&O

## 2017-12-22 NOTE — ASSESSMENT & PLAN NOTE
· Continue fentanyl and Vicodin; Reviewed SNF documentation; medication reconciliation done    · Comfort measures

## 2017-12-23 LAB
ALBUMIN SERPL BCP-MCNC: 2.2 G/DL (ref 3.5–5)
ALP SERPL-CCNC: 238 U/L (ref 46–116)
ALT SERPL W P-5'-P-CCNC: 15 U/L (ref 12–78)
ANION GAP SERPL CALCULATED.3IONS-SCNC: 8 MMOL/L (ref 4–13)
AST SERPL W P-5'-P-CCNC: 19 U/L (ref 5–45)
BASOPHILS # BLD AUTO: 0.03 THOUSANDS/ΜL (ref 0–0.1)
BASOPHILS NFR BLD AUTO: 0 % (ref 0–1)
BILIRUB SERPL-MCNC: 0.38 MG/DL (ref 0.2–1)
BUN SERPL-MCNC: 13 MG/DL (ref 5–25)
CALCIUM SERPL-MCNC: 8 MG/DL (ref 8.3–10.1)
CHLORIDE SERPL-SCNC: 102 MMOL/L (ref 100–108)
CO2 SERPL-SCNC: 28 MMOL/L (ref 21–32)
CREAT SERPL-MCNC: 0.59 MG/DL (ref 0.6–1.3)
EOSINOPHIL # BLD AUTO: 0.26 THOUSAND/ΜL (ref 0–0.61)
EOSINOPHIL NFR BLD AUTO: 4 % (ref 0–6)
ERYTHROCYTE [DISTWIDTH] IN BLOOD BY AUTOMATED COUNT: 17.2 % (ref 11.6–15.1)
GFR SERPL CREATININE-BSD FRML MDRD: 108 ML/MIN/1.73SQ M
GLUCOSE SERPL-MCNC: 86 MG/DL (ref 65–140)
HCT VFR BLD AUTO: 30.1 % (ref 36.5–49.3)
HGB BLD-MCNC: 10.1 G/DL (ref 12–17)
LYMPHOCYTES # BLD AUTO: 1.28 THOUSANDS/ΜL (ref 0.6–4.47)
LYMPHOCYTES NFR BLD AUTO: 17 % (ref 14–44)
MAGNESIUM SERPL-MCNC: 2.2 MG/DL (ref 1.6–2.6)
MCH RBC QN AUTO: 29.8 PG (ref 26.8–34.3)
MCHC RBC AUTO-ENTMCNC: 33.6 G/DL (ref 31.4–37.4)
MCV RBC AUTO: 89 FL (ref 82–98)
MONOCYTES # BLD AUTO: 0.8 THOUSAND/ΜL (ref 0.17–1.22)
MONOCYTES NFR BLD AUTO: 11 % (ref 4–12)
NEUTROPHILS # BLD AUTO: 5.11 THOUSANDS/ΜL (ref 1.85–7.62)
NEUTS SEG NFR BLD AUTO: 68 % (ref 43–75)
NRBC BLD AUTO-RTO: 0 /100 WBCS
PLATELET # BLD AUTO: 397 THOUSANDS/UL (ref 149–390)
PMV BLD AUTO: 8.1 FL (ref 8.9–12.7)
POTASSIUM SERPL-SCNC: 3.5 MMOL/L (ref 3.5–5.3)
PROT SERPL-MCNC: 5.9 G/DL (ref 6.4–8.2)
RBC # BLD AUTO: 3.39 MILLION/UL (ref 3.88–5.62)
SODIUM SERPL-SCNC: 138 MMOL/L (ref 136–145)
WBC # BLD AUTO: 7.5 THOUSAND/UL (ref 4.31–10.16)

## 2017-12-23 PROCEDURE — G8978 MOBILITY CURRENT STATUS: HCPCS

## 2017-12-23 PROCEDURE — 85025 COMPLETE CBC W/AUTO DIFF WBC: CPT | Performed by: NURSE PRACTITIONER

## 2017-12-23 PROCEDURE — 83735 ASSAY OF MAGNESIUM: CPT | Performed by: NURSE PRACTITIONER

## 2017-12-23 PROCEDURE — 97163 PT EVAL HIGH COMPLEX 45 MIN: CPT

## 2017-12-23 PROCEDURE — 80053 COMPREHEN METABOLIC PANEL: CPT | Performed by: NURSE PRACTITIONER

## 2017-12-23 PROCEDURE — G8979 MOBILITY GOAL STATUS: HCPCS

## 2017-12-23 RX ADMIN — TAMSULOSIN HYDROCHLORIDE 0.4 MG: 0.4 CAPSULE ORAL at 16:19

## 2017-12-23 RX ADMIN — ENOXAPARIN SODIUM 40 MG: 40 INJECTION SUBCUTANEOUS at 08:13

## 2017-12-23 RX ADMIN — FENTANYL 1 PATCH: 50 PATCH, EXTENDED RELEASE TRANSDERMAL at 10:43

## 2017-12-23 RX ADMIN — HYDROCODONE BITARTRATE AND ACETAMINOPHEN 1 TABLET: 5; 325 TABLET ORAL at 20:34

## 2017-12-23 RX ADMIN — HYDROCORTISONE: 5 CREAM TOPICAL at 08:14

## 2017-12-23 RX ADMIN — DILTIAZEM HYDROCHLORIDE 120 MG: 120 CAPSULE, COATED, EXTENDED RELEASE ORAL at 08:11

## 2017-12-23 RX ADMIN — CALCIUM CARBONATE 500 MG (1,250 MG)-VITAMIN D3 200 UNIT TABLET 1 TABLET: at 08:11

## 2017-12-23 RX ADMIN — POTASSIUM CHLORIDE 20 MEQ: 1500 TABLET, EXTENDED RELEASE ORAL at 08:11

## 2017-12-23 RX ADMIN — DOCUSATE SODIUM 200 MG: 100 CAPSULE, LIQUID FILLED ORAL at 17:00

## 2017-12-23 RX ADMIN — AMIODARONE HYDROCHLORIDE 200 MG: 200 TABLET ORAL at 08:11

## 2017-12-23 RX ADMIN — HYDROCODONE BITARTRATE AND ACETAMINOPHEN 1 TABLET: 5; 325 TABLET ORAL at 02:15

## 2017-12-23 RX ADMIN — HYDROCODONE BITARTRATE AND ACETAMINOPHEN 1 TABLET: 5; 325 TABLET ORAL at 06:19

## 2017-12-23 RX ADMIN — FUROSEMIDE 20 MG: 10 INJECTION, SOLUTION INTRAMUSCULAR; INTRAVENOUS at 16:19

## 2017-12-23 RX ADMIN — METOPROLOL TARTRATE 50 MG: 50 TABLET ORAL at 21:17

## 2017-12-23 RX ADMIN — HYDROCODONE BITARTRATE AND ACETAMINOPHEN 1 TABLET: 5; 325 TABLET ORAL at 16:19

## 2017-12-23 RX ADMIN — CALCIUM CARBONATE 500 MG (1,250 MG)-VITAMIN D3 200 UNIT TABLET 1 TABLET: at 16:20

## 2017-12-23 RX ADMIN — METOPROLOL TARTRATE 50 MG: 50 TABLET ORAL at 08:11

## 2017-12-23 RX ADMIN — HYDROCORTISONE: 5 CREAM TOPICAL at 17:02

## 2017-12-23 RX ADMIN — FUROSEMIDE 20 MG: 10 INJECTION, SOLUTION INTRAMUSCULAR; INTRAVENOUS at 08:11

## 2017-12-23 RX ADMIN — DIAZEPAM 5 MG: 5 TABLET ORAL at 21:17

## 2017-12-23 RX ADMIN — DOCUSATE SODIUM 200 MG: 100 CAPSULE, LIQUID FILLED ORAL at 08:11

## 2017-12-23 RX ADMIN — HYDROCODONE BITARTRATE AND ACETAMINOPHEN 1 TABLET: 5; 325 TABLET ORAL at 10:49

## 2017-12-23 NOTE — WOUND OSTOMY CARE
Progress Note - Wound   Domenico Pitts 60 y o  male MRN: [id number]  [facility] Encounter: [id number]      Assessment: Patient is sen for skin and wound care assessment   The patient is a 60year old male that has been admitted with worsening of lower extremity edema and erythema   The bilateral lower legs are being managed by podiatry   Note a petechial rash on the thigh that is being followed by ID   Bilateral and sacral intact as reported by the RN  Note he has kyphosis and a small stage 1 -POA area on the mid spine area   Discussed the plan of care with the patient and the RN   Please refer to the plan listed below   Plan:   1  Apply hydraguard bid and prn to bilateral heels and sacral/ buttocks   2  Apply skin nourishing cream to the skin daily   3  Soft care cushion when OOB in the chair  4  Turn and reposition every 2 hours to off load and prevent pressure   5  Midline back - apply alleyvn foam ( assess the area by pulling back daily on days then reapply the foam to protect ) ONLY change every 5 days   6  Elevate heels off of the bed with pillows           Objective:    Vitals: Blood pressure 141/79, pulse 71, temperature 97 9 °F (36 6 °C), temperature source Oral, resp  rate 18, height 5' 8" (1 727 m), weight 64 2 kg (141 lb 8 6 oz), SpO2 95 %  ,Body mass index is 21 52 kg/m²  Pressure Ulcer 12/21/17 Back Mid partial thickness skin loss with surrounding blanchable erythema (Active)   Staging Stage I 12/22/2017  7:00 PM   Wound Description Clean;Dry; Intact; Non-blanchable erythema 12/22/2017  7:00 PM   Carol-wound Assessment Clean;Dry; Intact 12/22/2017  7:00 PM   Shape round  12/21/2017 10:40 AM   Wound Length (cm) 1 5 cm 12/22/2017  7:00 PM   Wound Width (cm) 1 cm 12/22/2017  7:00 PM   Wound Depth (cm) 0 12/22/2017  7:00 PM   Calculated Wound Area (cm^2) 1 5 cm^2 12/22/2017  7:00 PM   Calculated Wound Volume (cm^3) 0 cm^3 12/22/2017  7:00 PM   Drainage Amount None 12/22/2017  7:00 PM Treatment Offload; Turn & reposition 12/22/2017  7:00 PM   Dressing Foam 12/22/2017  7:00 PM   Dressing Changed New dressing applied 12/22/2017  7:00 PM   Wound packed? No 12/21/2017 10:40 AM   Patient Tolerance Tolerated well 12/22/2017  7:00 PM   Dressing Status Clean; Intact;Dry 12/21/2017  8:00 PM         Wound care will follow weekly     Karissa Pearson RN BSN Jess Godinez

## 2017-12-23 NOTE — PROGRESS NOTES
Progress Note - Podiatry  Umatillamary Landa 60 y o  male MRN: [id number]  Unit/Bed#: [facility] 733-01 Encounter: [id number]    Assessment/Plan:  3 78-year-old male with bilateral lower extremity edema  -patient not currently on antibiotics this is cellulitis is not likely  -patient has been wrapped with Ace wraps to bilateral lower extremity   -patient needs to elevate his legs as much as possible although it is difficult due to his painful kyphosis  -will see pt on 12/26 next, please continue ACE wraps b/l   2  Afib  3  History of psoriasis    Subjective/Objective   Chief Complaint:   Chief Complaint   Patient presents with    Cellulitis     pt with bilateral lower extremity swelling, redness, edema  reports being on oral abx without improvement  brought in by EMS from [address] [place] [address] Drive: 60 y o  y/o male was seen and evaluated at bedside  Blood pressure 101/55, pulse 66, temperature 98 1 °F (36 7 °C), resp  rate 18, height 5' 8" (1 727 m), weight 64 2 kg (141 lb 8 6 oz), SpO2 93 %  ,Body mass index is 21 52 kg/m²  Invasive Devices     Peripheral Intravenous Line            Peripheral IV 12/21/17 Left Forearm 1 day          Drain            Chest Tube 2 Left Pleural 28 Fr  37 days                Physical Exam:   General: Alert, cooperative and no distress  Lungs: Non labored breathing  Heart: Positive S1, S2  Abdomen: Soft, non-tender  Extremity:     Neurovascular status and gross motor function at baseline, no open wounds present   Edema b/l LE      Lab, Imaging and other studies:   CBC:   Lab Results   Component Value Date    WBC 7 50 12/23/2017    HGB 10 1 (L) 12/23/2017    HCT 30 1 (L) 12/23/2017    MCV 89 12/23/2017     (H) 12/23/2017    MCH 29 8 12/23/2017    MCHC 33 6 12/23/2017    RDW 17 2 (H) 12/23/2017    MPV 8 1 (L) 12/23/2017    NRBC 0 12/23/2017       Imaging: I have personally reviewed pertinent films in PACS  EKG, Pathology, and Other Studies: I have personally reviewed pertinent reports    VTE Pharmacologic Prophylaxis: Enoxaparin (Lovenox)

## 2017-12-23 NOTE — PLAN OF CARE
INFECTION - ADULT     Absence of fever/infection during neutropenic period Completed          DISCHARGE PLANNING     Discharge to home or other facility with appropriate resources Progressing        DISCHARGE PLANNING - CARE MANAGEMENT     Discharge to post-acute care or home with appropriate resources Progressing        INFECTION - ADULT     Absence or prevention of progression during hospitalization Progressing        Knowledge Deficit     Patient/family/caregiver demonstrates understanding of disease process, treatment plan, medications, and discharge instructions Progressing        METABOLIC, FLUID AND ELECTROLYTES - ADULT     Electrolytes maintained within normal limits Progressing     Fluid balance maintained Progressing     Glucose maintained within target range Progressing        Nutrition/Hydration-ADULT     Nutrient/Hydration intake appropriate for improving, restoring or maintaining nutritional needs Progressing        PAIN - ADULT     Verbalizes/displays adequate comfort level or baseline comfort level Progressing        Potential for Falls     Patient will remain free of falls Progressing        Prexisting or High Potential for Compromised Skin Integrity     Skin integrity is maintained or improved Progressing        SAFETY ADULT     Maintain or return to baseline ADL function Progressing     Maintain or return mobility status to optimal level Progressing

## 2017-12-23 NOTE — PLAN OF CARE
Problem: PHYSICAL THERAPY ADULT  Goal: Performs mobility at highest level of function for planned discharge setting  See evaluation for individualized goals  Treatment/Interventions: Functional transfer training, LE strengthening/ROM, Elevations, Therapeutic exercise, Patient/family training, Equipment eval/education, Bed mobility, Gait training, Spoke to nursing  Equipment Recommended: Yecenia Toro (RW)       See flowsheet documentation for full assessment, interventions and recommendations  Mariela Matos, PT      Prognosis: Good  Problem List: Decreased strength, Decreased endurance, Impaired balance, Decreased mobility, Pain, Decreased skin integrity  Assessment: PT COMPLETED EVALUATION OF 60YEAR OLD MALE ADMITTED TO [facility] ON 12/20/17 WITH B/L LE SWELLING  CURRENT DIAGNOSES INCLUDE B/L LE VENOUS STASIS AND ANEMIA  CURRENT MEDICAL AND PHYSICAL INSTABILITIES INCLUDE PAIN, CONTINUED B/L LE EDEMA (WRAPPED IN ACE BANDAGE), FALLS RISK, CHAIR ALARM, AND A REGRESSION IN FUNCTIONAL STATUS FROM BASELINE  PMH IS SIGNIFICANT FOR SEVERE KYPHOSIS, OSTEOPOROSIS, THORACIC COMPRESSION FRACTURES, CHRONIC OPIOID DEPENDENT, AFIB, L SIDED PNA, AND VATS  PRIOR TO THIS ADMISSION PATIENT WAS AT SNF FOR STR HOWEVER NORMALLY HE RESIDES ALONE IN ONE LEVEL APARTMENT (13 [address]) WHERE HE IS I WITH MOBILITY (NO AD), ADLS, AND IADLS  CURRENT IMPAIRMENTS INCLUDE DECREASED SKIN INTEGRITY, ACTIVITY TOLERANCE, BALANCE, AND PARTICIPATION IN FUNCTIONAL MOBILITY IN ADDITION TO PAIN  DURING PT EVALUATION PATIENT REQUIRED S FOR SIT<-->STAND TRANSFER AND MIN-AX1 FOR SHORT DISTANCE AMBULATION  HE AMBULATED 10 FEET W/ RW PRESENTING WITH REDUCED B/L LE CLEARANCE AND GAIT SPEED  AT THIS POINT IN TIME THIS PATIENT WILL BENEFIT FROM STR UPON D/C  HE WILL BENEFIT FROM CONTINUED SKILLED INPT PT TO IMPROVE MOBILITY IN ORDER TO ACHIEVE MAXIMAL FUNCTION AND SAFETY     Barriers to Discharge: Inaccessible home environment, Decreased caregiver support Recommendation: (S) Short-term skilled PT     PT - OK to Discharge: (S) Yes (TO STR WHEN MED EDMOND )    See flowsheet documentation for full assessment     Ivory Cohn, PT

## 2017-12-23 NOTE — PROGRESS NOTES
[facility] Progress Note  Patient: Judy Sport 60 y o  male   MRN: [id number]  PCP: Zach Beasley MD  Unit/Bed#: [facility] 733-01 Encounter: [id number]  Date Of Visit: 17    Assessment:    Principal Problem:    Cellulitis  Active Problems:    Kyphosis    Other chronic pain    Paroxysmal a-fib (HCC)    Bilateral lower extremity edema    Rash R knee and thigh anterior    Musculoskeletal chest pain     Pressure ulcer, stage 2      Plan:    · Bilateral lower extremity venous stasis changes  No clear evidence of cellulitis  Note Infectious Disease input  Will observe off of antibiotics for the time being  Will implement plan to aggressively treat fluid overlaid state  · Podiatry following  Patient has been wrapped with Ace  · Continue to elevate legs  · Ace wraps bilaterally  · PT recommending skilled nursing facility rehab  Will discuss with case management  · Atrial fibrillation-continue monitor heart rate  Patient had atrial fibrillation occur with VATS back  · History of psoriasis       VTE Pharmacologic Prophylaxis:   Pharmacologic: Enoxaparin (Lovenox)  Mechanical VTE Prophylaxis in Place: Yes    Patient Centered Rounds: I have performed bedside rounds with nursing staff today  Time Spent for Care: 15 minutes  More than 50% of total time spent on counseling and coordination of care as described above      Current Length of Stay: 3 day(s)    Current Patient Status: Inpatient   Certification Statement: The patient will continue to require additional inpatient hospital stay due to Need to monitor symptoms    Discharge Plan / Estimated Discharge Date:  Not ready for discharge    Code Status: Level 1 - Full Code      Subjective:   Patient comfortable    Objective:     Vitals:   Temp (24hrs), Av °F (36 7 °C), Min:97 9 °F (36 6 °C), Max:98 1 °F (36 7 °C)    HR:  [64-71] 64  Resp:  [18] 18  BP: (101-141)/(55-79) 137/78  SpO2:  [93 %-97 %] 97 %  Body mass index is 21 52 kg/m²  Input and Output Summary (last 24 hours): Intake/Output Summary (Last 24 hours) at 12/23/17 1237  Last data filed at 12/23/17 1030   Gross per 24 hour   Intake             1320 ml   Output             1625 ml   Net             -305 ml       Physical Exam:     Physical Exam   Constitutional: He is oriented to person, place, and time  He appears well-developed and well-nourished  HENT:   Head: Normocephalic and atraumatic  Eyes: Conjunctivae are normal  Pupils are equal, round, and reactive to light  Neck: Normal range of motion  Neck supple  No JVD present  No tracheal deviation present  No thyromegaly present  Cardiovascular: Normal rate and regular rhythm  No murmur heard  Pulmonary/Chest: Effort normal and breath sounds normal  No stridor  No respiratory distress  He has no wheezes  He has no rales  Abdominal: Soft  Bowel sounds are normal  He exhibits no distension  There is no tenderness  There is no rebound  Musculoskeletal: Normal range of motion  He exhibits no edema  Neurological: He is alert and oriented to person, place, and time  No cranial nerve deficit  Skin:   Bilateral lower extremity erythema  Ace wrap  Psychiatric: He has a normal mood and affect  Additional Data:     Labs:      Results from last 7 days  Lab Units 12/23/17  0527   WBC Thousand/uL 7 50   HEMOGLOBIN g/dL 10 1*   HEMATOCRIT % 30 1*   PLATELETS Thousands/uL 397*   NEUTROS PCT % 68   LYMPHS PCT % 17   MONOS PCT % 11   EOS PCT % 4       Results from last 7 days  Lab Units 12/23/17  0527   SODIUM mmol/L 138   POTASSIUM mmol/L 3 5   CHLORIDE mmol/L 102   CO2 mmol/L 28   BUN mg/dL 13   CREATININE mg/dL 0 59*   CALCIUM mg/dL 8 0*   TOTAL PROTEIN g/dL 5 9*   BILIRUBIN TOTAL mg/dL 0 38   ALK PHOS U/L 238*   ALT U/L 15   AST U/L 19   GLUCOSE RANDOM mg/dL 86       Results from last 7 days  Lab Units 12/20/17  2128   INR  1 09       * I Have Reviewed All Lab Data Listed Above    * Additional Pertinent Lab Tests Reviewed: All Labs Within Last 24 Hours Reviewed      Recent Cultures (last 7 days):       Results from last 7 days  Lab Units 12/20/17 2130 12/20/17 2128   BLOOD CULTURE  No Growth at 48 hrs  No Growth at 48 hrs  Last 24 Hours Medication List:     amiodarone 200 mg Oral Daily   calcium carbonate-vitamin D 1 tablet Oral BID With Meals   diazepam 5 mg Oral HS   diltiazem 120 mg Oral Daily   docusate sodium 200 mg Oral BID   enoxaparin 40 mg Subcutaneous Daily   fentaNYL 1 patch Transdermal Q48H   furosemide 20 mg Intravenous BID (diuretic)   hydrocortisone  Topical BID   metoprolol tartrate 50 mg Oral Q12H [address]   potassium chloride 20 mEq Oral Daily   tamsulosin 0 4 mg Oral Daily With Dinner        Today, Patient Was Seen By: Lisseth Sierra DO    ** Please Note: This note has been constructed using a voice recognition system   **

## 2017-12-23 NOTE — PHYSICAL THERAPY NOTE
Physical Therapy Evaluation    Patient's Name: Marcel Estrada    Admitting Diagnosis  Edema [R60 9]  Bilateral lower leg cellulitis [Q70 367, D22 098]  Failure of outpatient treatment [Z78 9]    Problem List  Patient Active Problem List   Diagnosis    Pleural disorder    Osteoporosis    Kyphosis    Psoriasis    Pleural effusion    Severe protein-calorie malnutrition (HCC)    Continuous opioid dependence ([address] )    Other chronic pain    Paroxysmal a-fib (HCC)    Hyponatremia    Edema    Bilateral lower extremity edema    Cellulitis    Rash R knee and thigh anterior    Musculoskeletal chest pain     BPH (benign prostatic hyperplasia)    Pressure ulcer, stage 2       Past Medical History  Past Medical History:   Diagnosis Date    History of spinal fracture     Kyphosis     Loss of appetite     Loss of weight     Osteoarthritis     Osteoporosis        Past Surgical History  Past Surgical History:   Procedure Laterality Date    FINGER SURGERY      LUNG DECORTICATION Left 11/15/2017    Procedure: VATS DECORTICATION LUNG;  Surgeon: Myrick Kawasaki, MD;  [facility];  Service: Thoracic    SHOULDER DEBRIDEMENT          12/23/17 1007   Note Type   Note type Eval only   Pain Assessment   Pain Assessment 0-10   Pain Score 4   Pain Type Acute pain   Pain Location Leg   Pain Orientation Right   Home Living   Type of [address] [facility] One level;Stairs to enter with rails   Additional Comments PRIOR TO THIS ADMISSION PATIENT AT SNF FOR STR  NORMALLY RESIDES ALONE IN APT  Prior Function   Level of [place] Independent with ADLs and functional mobility   Lives With Alone   ADL Assistance Independent   IADLs Independent   Falls in the last 6 months 1 to 4  (1)   Vocational Retired   Restrictions/Precautions   [facility] Bearing Precautions Per Order No   Braces or Orthoses (NONE)   Other Precautions Pain; Fall Risk; Chair Alarm  (SOFT CARE CUSHION)   General   Family/Caregiver Present No   Cognition   Overall Cognitive Status WFL   RUE Assessment   RUE Assessment WFL   LUE Assessment   LUE Assessment WFL   RLE Assessment   RLE Assessment X   Strength RLE   RLE Overall Strength 3+/5   LLE Assessment   LLE Assessment X   Strength LLE   LLE Overall Strength 3+/5   Bed Mobility   Supine to Sit Unable to assess   Sit to Supine Unable to assess   Transfers   Sit to Stand 5  Supervision   Stand to Sit 5  Supervision   Ambulation/Elevation   Gait pattern Excessively slow; Shuffling; Improper Weight shift; Antalgic;Decreased foot clearance   Gait Assistance 4  Minimal assist   Additional items Assist x 1   Assistive Device Rolling walker   Distance 10 FEET   Balance   Static Sitting Good   Static Standing Fair   Ambulatory Fair -   Endurance Deficit   Endurance Deficit Yes   Endurance Deficit Description PAIN; EDEMA   Activity Tolerance   Activity Tolerance Patient tolerated treatment well   Nurse Made Aware EDMOND TO SEE PER RN MANDY   Assessment   Prognosis Good   Problem List Decreased strength;Decreased endurance; Impaired balance;Decreased mobility;Pain;Decreased skin integrity   Assessment PT COMPLETED EVALUATION OF 60YEAR OLD MALE ADMITTED TO [facility] ON 12/20/17 WITH B/L LE SWELLING  CURRENT DIAGNOSES INCLUDE B/L LE VENOUS STASIS AND ANEMIA  CURRENT MEDICAL AND PHYSICAL INSTABILITIES INCLUDE PAIN, CONTINUED B/L LE EDEMA (WRAPPED IN ACE BANDAGE), FALLS RISK, CHAIR ALARM, AND A REGRESSION IN FUNCTIONAL STATUS FROM BASELINE  PMH IS SIGNIFICANT FOR SEVERE KYPHOSIS, OSTEOPOROSIS, THORACIC COMPRESSION FRACTURES, CHRONIC OPIOID DEPENDENT, AFIB, L SIDED PNA, AND VATS  PRIOR TO THIS ADMISSION PATIENT WAS AT SNF FOR STR HOWEVER NORMALLY HE RESIDES ALONE IN ONE LEVEL APARTMENT ([address]) WHERE HE IS I WITH MOBILITY (NO AD), ADLS, AND IADLS  CURRENT IMPAIRMENTS INCLUDE DECREASED SKIN INTEGRITY, ACTIVITY TOLERANCE, BALANCE, AND PARTICIPATION IN FUNCTIONAL MOBILITY IN ADDITION TO PAIN   DURING PT EVALUATION PATIENT REQUIRED S FOR SIT<-->STAND TRANSFER AND MIN-AX1 FOR SHORT DISTANCE AMBULATION  HE AMBULATED 10 FEET W/ RW PRESENTING WITH REDUCED B/L LE CLEARANCE AND GAIT SPEED  AT THIS POINT IN TIME THIS PATIENT WILL BENEFIT FROM STR UPON D/C  HE WILL BENEFIT FROM CONTINUED SKILLED INPT PT TO IMPROVE MOBILITY IN ORDER TO ACHIEVE MAXIMAL FUNCTION AND SAFETY  Barriers to Discharge Inaccessible home environment;Decreased caregiver support   Goals   Patient Goals TO WALK W/O AD   LTG Expiration Date 01/06/18   Long Term Goal #1 10-14 DAYS: 1) COMPLETE BED MOBILITY MOD-I; 2) PERFORM SIT<-->STAND TRANSFER MOD-I; 3) AMBULATE 300 FEET I/MOD-I W/ LEAST RESTRICTIVE DEVICE; 4) NAVIGATE 12 STEPS S LEVEL; 5) IMPROVE B/L LE STRENGTH BY 1/2 GRADE; 6) IMPROVE BALANCE BY 1 GRADE   Treatment Day 0   Plan   Treatment/Interventions Functional transfer training;LE strengthening/ROM; Elevations; Therapeutic exercise;Patient/family training;Equipment eval/education; Bed mobility;Gait training;Spoke to nursing   PT Frequency 5x/wk   Recommendation   Recommendation Short-term skilled PT   Equipment Recommended Walker  (RW)   PT - OK to Discharge Yes  (TO STR WHEN MED EDMOND )   Barthel Index   Feeding 10   Bathing 0   Grooming Score 5   Dressing Score 5   Bladder Score 10   Bowels Score 10   Toilet Use Score 5   Transfers (Bed/Chair) Score 10   Mobility (Level Surface) Score 0   Stairs Score 0   Barthel Index Score 55           Sol Gallego, PT

## 2017-12-24 RX ORDER — FUROSEMIDE 20 MG/1
20 TABLET ORAL
Status: DISCONTINUED | OUTPATIENT
Start: 2017-12-24 | End: 2017-12-27 | Stop reason: HOSPADM

## 2017-12-24 RX ADMIN — HYDROCODONE BITARTRATE AND ACETAMINOPHEN 1 TABLET: 5; 325 TABLET ORAL at 21:18

## 2017-12-24 RX ADMIN — POTASSIUM CHLORIDE 20 MEQ: 1500 TABLET, EXTENDED RELEASE ORAL at 08:04

## 2017-12-24 RX ADMIN — ENOXAPARIN SODIUM 40 MG: 40 INJECTION SUBCUTANEOUS at 08:04

## 2017-12-24 RX ADMIN — FUROSEMIDE 20 MG: 20 TABLET ORAL at 16:53

## 2017-12-24 RX ADMIN — METOPROLOL TARTRATE 50 MG: 50 TABLET ORAL at 08:04

## 2017-12-24 RX ADMIN — TAMSULOSIN HYDROCHLORIDE 0.4 MG: 0.4 CAPSULE ORAL at 16:53

## 2017-12-24 RX ADMIN — METOPROLOL TARTRATE 50 MG: 50 TABLET ORAL at 21:18

## 2017-12-24 RX ADMIN — DOCUSATE SODIUM 200 MG: 100 CAPSULE, LIQUID FILLED ORAL at 08:05

## 2017-12-24 RX ADMIN — HYDROCODONE BITARTRATE AND ACETAMINOPHEN 1 TABLET: 5; 325 TABLET ORAL at 12:23

## 2017-12-24 RX ADMIN — HYDROCORTISONE: 5 CREAM TOPICAL at 08:15

## 2017-12-24 RX ADMIN — FUROSEMIDE 20 MG: 10 INJECTION, SOLUTION INTRAMUSCULAR; INTRAVENOUS at 08:08

## 2017-12-24 RX ADMIN — AMIODARONE HYDROCHLORIDE 200 MG: 200 TABLET ORAL at 08:05

## 2017-12-24 RX ADMIN — CALCIUM CARBONATE 500 MG (1,250 MG)-VITAMIN D3 200 UNIT TABLET 1 TABLET: at 08:05

## 2017-12-24 RX ADMIN — CALCIUM CARBONATE 500 MG (1,250 MG)-VITAMIN D3 200 UNIT TABLET 1 TABLET: at 16:53

## 2017-12-24 RX ADMIN — HYDROCODONE BITARTRATE AND ACETAMINOPHEN 1 TABLET: 5; 325 TABLET ORAL at 06:15

## 2017-12-24 RX ADMIN — HYDROCODONE BITARTRATE AND ACETAMINOPHEN 1 TABLET: 5; 325 TABLET ORAL at 16:53

## 2017-12-24 RX ADMIN — HYDROCORTISONE: 5 CREAM TOPICAL at 18:05

## 2017-12-24 RX ADMIN — DIAZEPAM 5 MG: 5 TABLET ORAL at 21:18

## 2017-12-24 RX ADMIN — DOCUSATE SODIUM 200 MG: 100 CAPSULE, LIQUID FILLED ORAL at 18:05

## 2017-12-24 RX ADMIN — HYDROCODONE BITARTRATE AND ACETAMINOPHEN 1 TABLET: 5; 325 TABLET ORAL at 01:58

## 2017-12-24 RX ADMIN — DILTIAZEM HYDROCHLORIDE 120 MG: 120 CAPSULE, COATED, EXTENDED RELEASE ORAL at 08:05

## 2017-12-24 NOTE — DISCHARGE SUMMARY
Discharge Summary - [place] [address] Internal Medicine    Patient Information: Tashi Batista 60 y o  male MRN: [id number]  [facility] Encounter: [id number]    Discharging Physician / Practitioner: Tasia Godfrey DO  PCP: Isela Salazar MD  Admission Date: 12/20/2017  Discharge Date: 12/24/17    Reason for Admission:  Lower extremity edema    Discharge Diagnoses:     Principal Problem:    Cellulitis  Active Problems:    Kyphosis    Other chronic pain    Paroxysmal a-fib (HCC)    Bilateral lower extremity edema    Rash R knee and thigh anterior    Musculoskeletal chest pain     Pressure ulcer, stage 2  Resolved Problems:    * No resolved hospital problems  *      Consultations During Hospital Stay:  · Infectious Disease    Procedures Performed:     · Chest x-ray no acute pulmonary disease        Hospital Course:     Tashi Batista is a 60 y o  male patient who originally presented to the hospital on 12/20/2017 due to symptoms of lower extremity edema and concern for possible infection  The patient has a past medical history of severe kyphoscoliosis, osteoporosis, thoracic compression fractures and chronic opioid and benzo dependence and new onset atrial fibrillation diagnosed in November of 2017  He presents to the hospital with lower extremity edema  The patient was noted to have increasing erythema and subsequent was admitted for further workup evaluation  He was recently admitted on November 17th for left-sided pneumonia with a loculated parapneumonic effusion requiring a VATS procedure  He subsequently had decortication/chest tube removal on December 1st, 2017  The patient reports that the lower extremity swelling has since continued since his prior admission  At the skilled nursing facility he was diagnosed with presumed cellulitis and started on doxycycline    He was discharged from the skilled nursing facility 14 days later but the patient did not feel like he was able to ambulate well and was readmitted to a different rehab facility at [facility]  He reports over the past 24 to 48 hours prior to admission that he noted worsening lower extremity edema and continue right lower extremity swelling worsening redness  He was admitted for further workup evaluation  · Bilateral lower extremity venous stasis changes  No clear evidence of cellulitis  Note Infectious Disease input  No further intervention at this time  Podiatry to follow up as outpatient  Continue to elevate legs  Ace wraps bilaterally  § Back to skilled nursing facility soon  Will discuss with case management  § Continue to monitor petechial rash on thigh on right side  · Atrial fibrillation  Monitor heart rate  Patient with reported postoperative atrial fibrillation after prior VATS procedure this past month ago            Condition at Discharge: fair     Discharge Day Visit / Exam:     * Please refer to separate progress note for these details *    Discussion with Family:  Discussed with family      Discharge instructions/Information to patient and family:   See after visit summary for information provided to patient and family  Provisions for Follow-Up Care:  See after visit summary for information related to follow-up care and any pertinent home health orders  Disposition:     Home    For Discharges to [facility] SNF:   · Not Applicable to this Patient - Not Applicable to this Patient    Planned Readmission:  No    Discharge Statement:  I spent 35 minutes discharging the patient  This time was spent on the day of discharge  I had direct contact with the patient on the day of discharge  Greater than 50% of the total time was spent examining patient, answering all patient questions, arranging and discussing plan of care with patient as well as directly providing post-discharge instructions  Additional time then spent on discharge activities      Discharge Medications:  See after visit summary for reconciled discharge medications provided to patient and family  ** Please Note: This note has been constructed using a voice recognition system   **

## 2017-12-24 NOTE — SOCIAL WORK
Met with pt to inform that there was no call back from Mundo Iniguez at   He stated after he thought about it, he is now agreeable to the [facility] cost   Pt's bedside Rn, Ligia Quiroga made aware of same and transport time  Facility agency transport form completed and chart copy requested

## 2017-12-24 NOTE — PROGRESS NOTES
[facility] Progress Note  Patient: aSndeep Mancini 60 y o  male   MRN: [id number]  PCP: Gilda Chung MD  Unit/Bed#: [facility] 733-01 Encounter: [id number]  Date Of Visit: 17    Assessment:    Principal Problem:    Cellulitis  Active Problems:    Kyphosis    Other chronic pain    Paroxysmal a-fib (HCC)    Bilateral lower extremity edema    Rash R knee and thigh anterior    Musculoskeletal chest pain     Pressure ulcer, stage 2      Plan:    · Bilateral lower extremity venous stasis changes  No clear evidence of cellulitis  Note Infectious Disease input  No further intervention at this time  Podiatry to follow up as outpatient  Continue to elevate legs  Ace wraps bilaterally  · Back to skilled nursing facility soon  Will discuss with case management  · Continue to monitor petechial rash on thigh on right side  · Atrial fibrillation  Monitor heart rate  Patient with reported postoperative atrial fibrillation after prior VATS procedure in the past        VTE Pharmacologic Prophylaxis:   Pharmacologic: Enoxaparin (Lovenox)  Mechanical VTE Prophylaxis in Place: Yes    Patient Centered Rounds: I have performed bedside rounds with nursing staff today  Time Spent for Care: 15 minutes  More than 50% of total time spent on counseling and coordination of care as described above  Current Length of Stay: 4 day(s)    Current Patient Status: Inpatient   Certification Statement: The patient will continue to require additional inpatient hospital stay due to Need to monitor wound      Code Status: Level 1 - Full Code      Subjective:   Patient comfortable  rash appears to be improving  Swelling improved  Patient's gait improving as well  Objective:     Vitals:   Temp (24hrs), Av 2 °F (36 8 °C), Min:98 °F (36 7 °C), Max:98 4 °F (36 9 °C)    HR:  [62-68] 68  Resp:  [16-18] 18  BP: (108-123)/(57-68) 118/63  SpO2:  [94 %-95 %] 95 %  Body mass index is 21 05 kg/m²       Input and Output Summary (last 24 hours): Intake/Output Summary (Last 24 hours) at 12/24/17 1049  Last data filed at 12/24/17 0801   Gross per 24 hour   Intake              400 ml   Output             2675 ml   Net            -2275 ml       Physical Exam:     Physical Exam   Constitutional: He is oriented to person, place, and time  He appears well-developed and well-nourished  HENT:   Head: Normocephalic and atraumatic  Eyes: Conjunctivae are normal  Pupils are equal, round, and reactive to light  Neck: Normal range of motion  Neck supple  Cardiovascular: Normal rate and regular rhythm  No murmur heard  Pulmonary/Chest: Effort normal and breath sounds normal  No respiratory distress  Abdominal: Soft  Bowel sounds are normal  He exhibits no distension  There is no tenderness  There is no rebound  Musculoskeletal: Normal range of motion  He exhibits no edema  Neurological: He is alert and oriented to person, place, and time  Skin: Skin is warm and dry  No erythema  Psychiatric: He has a normal mood and affect  Additional Data:     Labs:      Results from last 7 days  Lab Units 12/23/17  0527   WBC Thousand/uL 7 50   HEMOGLOBIN g/dL 10 1*   HEMATOCRIT % 30 1*   PLATELETS Thousands/uL 397*   NEUTROS PCT % 68   LYMPHS PCT % 17   MONOS PCT % 11   EOS PCT % 4       Results from last 7 days  Lab Units 12/23/17  0527   SODIUM mmol/L 138   POTASSIUM mmol/L 3 5   CHLORIDE mmol/L 102   CO2 mmol/L 28   BUN mg/dL 13   CREATININE mg/dL 0 59*   CALCIUM mg/dL 8 0*   TOTAL PROTEIN g/dL 5 9*   BILIRUBIN TOTAL mg/dL 0 38   ALK PHOS U/L 238*   ALT U/L 15   AST U/L 19   GLUCOSE RANDOM mg/dL 86       Results from last 7 days  Lab Units 12/20/17  2128   INR  1 09       * I Have Reviewed All Lab Data Listed Above  * Additional Pertinent Lab Tests Reviewed: The patient will continue to require additional inpatient hospital stay due to Need to monitor symptoms  Monitor rash          Recent Cultures (last 7 days): Results from last 7 days  Lab Units 12/20/17 2130 12/20/17 2128   BLOOD CULTURE  No Growth at 72 hrs  No Growth at 72 hrs  Last 24 Hours Medication List:     amiodarone 200 mg Oral Daily   calcium carbonate-vitamin D 1 tablet Oral BID With Meals   diazepam 5 mg Oral HS   diltiazem 120 mg Oral Daily   docusate sodium 200 mg Oral BID   enoxaparin 40 mg Subcutaneous Daily   fentaNYL 1 patch Transdermal Q48H   furosemide 20 mg Intravenous BID (diuretic)   hydrocortisone  Topical BID   metoprolol tartrate 50 mg Oral Q12H [address]   potassium chloride 20 mEq Oral Daily   tamsulosin 0 4 mg Oral Daily With Dinner        Today, Patient Was Seen By: Khushi Sepulveda DO    ** Please Note: This note has been constructed using a voice recognition system   **

## 2017-12-24 NOTE — SOCIAL WORK
Received phone call from Amaury Clarke [place] liaison, who stated that pt is able to return to facility on 12/25  CM arranged [facility] transport with [facility] for 12 pm  no 12/25  Informed pt of transport time and out of pocket cost   He stated that he was not agreeable to the cost and wanted to speak with Amaury Clarke from [employer]  Message left for Amaury Clarke in regards to same

## 2017-12-24 NOTE — SOCIAL WORK
Informed by Dr Nadya House that pt is a potential discharge for tomorrow 12/25  CM contacted  and spoke with Carly Scott to inquire if pt can return tomorrow  She stated that she will contact her  to see if pt can return tomorrow

## 2017-12-25 ENCOUNTER — APPOINTMENT (INPATIENT)
Dept: RADIOLOGY | Facility: HOSPITAL | Age: 62
DRG: 300 | End: 2017-12-25
Payer: MEDICARE

## 2017-12-25 LAB
BACTERIA BLD CULT: NORMAL
BACTERIA BLD CULT: NORMAL

## 2017-12-25 PROCEDURE — 73503 X-RAY EXAM HIP UNI 4/> VIEWS: CPT

## 2017-12-25 PROCEDURE — 97116 GAIT TRAINING THERAPY: CPT

## 2017-12-25 PROCEDURE — 97530 THERAPEUTIC ACTIVITIES: CPT

## 2017-12-25 RX ADMIN — TAMSULOSIN HYDROCHLORIDE 0.4 MG: 0.4 CAPSULE ORAL at 16:43

## 2017-12-25 RX ADMIN — HYDROCORTISONE: 5 CREAM TOPICAL at 20:40

## 2017-12-25 RX ADMIN — CALCIUM CARBONATE 500 MG (1,250 MG)-VITAMIN D3 200 UNIT TABLET 1 TABLET: at 08:16

## 2017-12-25 RX ADMIN — HYDROCODONE BITARTRATE AND ACETAMINOPHEN 1 TABLET: 5; 325 TABLET ORAL at 09:38

## 2017-12-25 RX ADMIN — DIAZEPAM 5 MG: 5 TABLET ORAL at 21:22

## 2017-12-25 RX ADMIN — HYDROCODONE BITARTRATE AND ACETAMINOPHEN 1 TABLET: 5; 325 TABLET ORAL at 14:07

## 2017-12-25 RX ADMIN — ENOXAPARIN SODIUM 40 MG: 40 INJECTION SUBCUTANEOUS at 08:17

## 2017-12-25 RX ADMIN — HYDROCODONE BITARTRATE AND ACETAMINOPHEN 1 TABLET: 5; 325 TABLET ORAL at 17:36

## 2017-12-25 RX ADMIN — HYDROCODONE BITARTRATE AND ACETAMINOPHEN 1 TABLET: 5; 325 TABLET ORAL at 21:40

## 2017-12-25 RX ADMIN — POTASSIUM CHLORIDE 20 MEQ: 1500 TABLET, EXTENDED RELEASE ORAL at 08:16

## 2017-12-25 RX ADMIN — DOCUSATE SODIUM 200 MG: 100 CAPSULE, LIQUID FILLED ORAL at 08:16

## 2017-12-25 RX ADMIN — DOCUSATE SODIUM 200 MG: 100 CAPSULE, LIQUID FILLED ORAL at 16:43

## 2017-12-25 RX ADMIN — METOPROLOL TARTRATE 50 MG: 50 TABLET ORAL at 21:23

## 2017-12-25 RX ADMIN — AMIODARONE HYDROCHLORIDE 200 MG: 200 TABLET ORAL at 08:16

## 2017-12-25 RX ADMIN — HYDROCORTISONE: 5 CREAM TOPICAL at 08:19

## 2017-12-25 RX ADMIN — HYDROCODONE BITARTRATE AND ACETAMINOPHEN 1 TABLET: 5; 325 TABLET ORAL at 05:26

## 2017-12-25 RX ADMIN — HYDROCODONE BITARTRATE AND ACETAMINOPHEN 1 TABLET: 5; 325 TABLET ORAL at 01:25

## 2017-12-25 RX ADMIN — CALCIUM CARBONATE 500 MG (1,250 MG)-VITAMIN D3 200 UNIT TABLET 1 TABLET: at 16:43

## 2017-12-25 RX ADMIN — FUROSEMIDE 20 MG: 20 TABLET ORAL at 16:43

## 2017-12-25 RX ADMIN — FENTANYL 1 PATCH: 50 PATCH, EXTENDED RELEASE TRANSDERMAL at 10:46

## 2017-12-25 NOTE — PROGRESS NOTES
Pt back from xray and OOB in the chair resting at this time  VSS  Pt states he has 7 out of 10 pain  Pt aware that he is not due for pain medication at this time  Will continue to monitor pt closely

## 2017-12-25 NOTE — PROGRESS NOTES
Patient stating he does not think he is ready for discharge due to the fact that "my leg still hurts and nobody has done anything about it"  Patient concerns relayed to Dr Giordano

## 2017-12-25 NOTE — PROGRESS NOTES
[address] Internal Medicine Progress Note  Patient: Tashi Batista 60 y o  male   MRN: [id number]  PCP: Isela Salazar MD  Unit/Bed#: [facility] 733-01 Encounter: [id number]  Date Of Visit: 12/25/17    Assessment:    Principal Problem:    Cellulitis  Active Problems:    Kyphosis    Other chronic pain    Paroxysmal a-fib (HCC)    Bilateral lower extremity edema    Rash R knee and thigh anterior    Musculoskeletal chest pain     Pressure ulcer, stage 2      Plan:  · Bilateral lower extremity venous stasis changes  No clear signs of cellulitis  · Podiatry input noted  · Continue with Ace wraps  · Back to skilled nursing facility soon  · Monitor rash  · Atrial fibrillation-monitor heart rate  Patient with atrial fibrillation status post prior VATS  · Right-sided hip pain  Possible intertrochanteric bursitis versus intrinsic joint disease  Patient having difficulty locating specific spot of pain  He reports increasing pain on the lateral aspect of the joint but also notes pain closer to the groin area as well  · Follow up on x-ray of hip  · Consult Orthopedic surgery  · Hold on discharge  Patient reports that he wishes to have this addressed before he would move on to rehab due to concern for possible aggravating injury  Will workup further  Hold on discharge  VTE Pharmacologic Prophylaxis:   Pharmacologic: Enoxaparin (Lovenox)  Mechanical VTE Prophylaxis in Place: Yes    Patient Centered Rounds: I have performed bedside rounds with nursing staff today  Time Spent for Care: 15 minutes  More than 50% of total time spent on counseling and coordination of care as described above      Current Length of Stay: 5 day(s)    Current Patient Status: Inpatient   Certification Statement: The patient will continue to require additional inpatient hospital stay due to Need to monitor symptoms        Code Status: Level 1 - Full Code      Subjective:   Patient comfortable however reports increasing hip pain     Objective:     Vitals:   Temp (24hrs), Av 1 °F (36 7 °C), Min:97 9 °F (36 6 °C), Max:98 2 °F (36 8 °C)    HR:  [56-69] 69  Resp:  [16-18] 16  BP: ()/(56-69) 109/65  SpO2:  [96 %-99 %] 96 %  Body mass index is 20 78 kg/m²  Input and Output Summary (last 24 hours): Intake/Output Summary (Last 24 hours) at 17 1017  Last data filed at 17 2126   Gross per 24 hour   Intake              360 ml   Output             1875 ml   Net            -1515 ml       Physical Exam:     Physical Exam   Constitutional: He is oriented to person, place, and time  HENT:   Head: Normocephalic and atraumatic  Eyes: Conjunctivae are normal  Pupils are equal, round, and reactive to light  Neck: Normal range of motion  Neck supple  No tracheal deviation present  No thyromegaly present  Cardiovascular: Normal rate  Pulmonary/Chest: Effort normal and breath sounds normal  No respiratory distress  Abdominal: Soft  Bowel sounds are normal  He exhibits no distension  There is no tenderness  There is no rebound  Musculoskeletal:   Patient presenting with worsening right-sided hip pain  Pain is worsening with compression on the the hip however he also notes pain agent in the inner aspect of his thigh as well  Neurological: He is alert and oriented to person, place, and time  Skin: Skin is warm and dry           Additional Data:     Labs:      Results from last 7 days  Lab Units 17  0527   WBC Thousand/uL 7 50   HEMOGLOBIN g/dL 10 1*   HEMATOCRIT % 30 1*   PLATELETS Thousands/uL 397*   NEUTROS PCT % 68   LYMPHS PCT % 17   MONOS PCT % 11   EOS PCT % 4       Results from last 7 days  Lab Units 17  0527   SODIUM mmol/L 138   POTASSIUM mmol/L 3 5   CHLORIDE mmol/L 102   CO2 mmol/L 28   BUN mg/dL 13   CREATININE mg/dL 0 59*   CALCIUM mg/dL 8 0*   TOTAL PROTEIN g/dL 5 9*   BILIRUBIN TOTAL mg/dL 0 38   ALK PHOS U/L 238*   ALT U/L 15   AST U/L 19   GLUCOSE RANDOM mg/dL 86       Results from last 7 days  Lab Units 12/20/17 2128   INR  1 09       * I Have Reviewed All Lab Data Listed Above  * Additional Pertinent Lab Tests Reviewed: All Labs Within Last 24 Hours Reviewed      Recent Cultures (last 7 days):       Results from last 7 days  Lab Units 12/20/17 2130 12/20/17 2128   BLOOD CULTURE  No Growth After 4 Days  No Growth After 4 Days  Last 24 Hours Medication List:     amiodarone 200 mg Oral Daily   calcium carbonate-vitamin D 1 tablet Oral BID With Meals   diazepam 5 mg Oral HS   diltiazem 120 mg Oral Daily   docusate sodium 200 mg Oral BID   enoxaparin 40 mg Subcutaneous Daily   fentaNYL 1 patch Transdermal Q48H   furosemide 20 mg Oral BID (diuretic)   hydrocortisone  Topical BID   metoprolol tartrate 50 mg Oral Q12H [address]   potassium chloride 20 mEq Oral Daily   tamsulosin 0 4 mg Oral Daily With Dinner        Today, Patient Was Seen By: Al Walker DO    ** Please Note: This note has been constructed using a voice recognition system   **

## 2017-12-25 NOTE — RESTORATIVE TECHNICIAN NOTE
Restorative Specialist Mobility Note       Activity: Other (Comment) (Pt currently oob to the chair sitting upright   Pt callbell, phone/tray within reach )       Fei HERNANDEZ, Restorative Technician, [employer]

## 2017-12-25 NOTE — SOCIAL WORK
On-call CM responded to page and was informed by CHAPIN Quinn that pt's scheduled d/c for this day has been cancelled due need for an Ortho Consult to be performed  CM contacted [facility] and cancelled transport  CM contacted [facility] and informed them of cancellation  CM contacted pt's brother Yonas Fregoso ([phone]) and informed him of cancellation  CM to follow

## 2017-12-25 NOTE — PLAN OF CARE
Problem: PHYSICAL THERAPY ADULT  Goal: Performs mobility at highest level of function for planned discharge setting  See evaluation for individualized goals  Treatment/Interventions: Functional transfer training, LE strengthening/ROM, Elevations, Therapeutic exercise, Patient/family training, Equipment eval/education, Bed mobility, Gait training, Spoke to nursing  Equipment Recommended: Lisette Childs (RW)       See flowsheet documentation for full assessment, interventions and recommendations  Chalo Varghese, PT       Outcome: Progressing  Prognosis: Good  Problem List: Decreased strength, Decreased endurance, Impaired balance, Decreased mobility, Pain  Assessment: pt  continues to  c/o  rt hip and  left  sided back pain   he is willing to participate in tx session     pt  did  complete  trnasfer and  short distance  amb  c min A  gt endurnace is limted to  40 ft uisng rw     gait is  very slow  c severely flexed posture   he  did perform therex arom BLE seated  in  chr   rom limted 2* pain and  edema    pt  will benefit  from cont PT  intervention   Barriers to Discharge: Inaccessible home environment, Decreased caregiver support     Recommendation: Short-term skilled PT     PT - OK to Discharge: Yes (when med ready )    See flowsheet documentation for full assessment

## 2017-12-25 NOTE — PHYSICAL THERAPY NOTE
Physical Therapy Progress Note     12/25/17 1130   Pain Assessment   Pain Assessment 0-10   Pain Score 4   Pain Type Acute pain   Pain Location Back; Hip   Pain Orientation Left;Right   Hospital Pain Intervention(s) Ambulation/increased activity;Cold applied   Response to Interventions tolerated    Restrictions/Precautions   Weight Bearing Precautions Per Order No   Other Precautions Chair Alarm; Fall Risk;Pain   General   Family/Caregiver Present No   Cognition   Overall Cognitive Status WFL   Orientation Level Oriented X4   Following Commands Follows one step commands without difficulty   Subjective   Subjective states  that  his back and  rt hip hurt  wants to get leg  checked prior to dc   Bed Mobility   Supine to Sit (recived pt sitting in recliner )   Transfers   Sit to Stand 5  Supervision   Stand to Sit 5  Supervision   Ambulation/Elevation   Gait pattern Forward Flexion;Narrow DANIELLE; Decreased foot clearance; Short stride   Gait Assistance 4  Minimal assist   Additional items Assist x 1;Verbal cues   Assistive Device Rolling walker   Distance 40ft   Balance   Static Sitting Good   Static Standing Fair   Ambulatory Fair -   Endurance Deficit   Endurance Deficit Yes   Endurance Deficit Description pain   Activity Tolerance   Activity Tolerance Patient limited by pain   Nurse Made Aware yes   Exercises   THR Sitting;20 reps;AROM; Bilateral   Assessment   Prognosis Good   Problem List Decreased strength;Decreased endurance; Impaired balance;Decreased mobility;Pain   Assessment pt  continues to  c/o  rt hip and  left  sided back pain   he is willing to participate in tx session     pt  did  complete  trnasfer and  short distance  amb  c min A  gt endurnace is limted to  40 ft uisng rw     gait is  very slow  c severely flexed posture   he  did perform therex arom BLE seated  in  chr   rom limted 2* pain and  edema    pt  will benefit  from cont PT  intervention    Barriers to Discharge Inaccessible home environment;Decreased caregiver support   Goals   Patient Goals none stated   LTG Expiration Date 01/06/17   Treatment Day 1   Plan   Treatment/Interventions Functional transfer training;LE strengthening/ROM; Patient/family training;Gait training;Spoke to nursing   Progress Progressing toward goals   PT Frequency 5x/wk   Recommendation   Recommendation Short-term skilled PT   Equipment Recommended Walker   PT - OK to Discharge Yes  (when med ready )     Janyth Halsted, PTA

## 2017-12-26 ENCOUNTER — APPOINTMENT (INPATIENT)
Dept: RADIOLOGY | Facility: HOSPITAL | Age: 62
DRG: 300 | End: 2017-12-26
Payer: MEDICARE

## 2017-12-26 LAB
MYCOBACTERIUM SPEC CULT: NORMAL
RHODAMINE-AURAMINE STN SPEC: NORMAL

## 2017-12-26 PROCEDURE — 73552 X-RAY EXAM OF FEMUR 2/>: CPT

## 2017-12-26 RX ADMIN — POTASSIUM CHLORIDE 20 MEQ: 1500 TABLET, EXTENDED RELEASE ORAL at 09:24

## 2017-12-26 RX ADMIN — HYDROCODONE BITARTRATE AND ACETAMINOPHEN 1 TABLET: 5; 325 TABLET ORAL at 12:09

## 2017-12-26 RX ADMIN — FUROSEMIDE 20 MG: 20 TABLET ORAL at 09:23

## 2017-12-26 RX ADMIN — AMIODARONE HYDROCHLORIDE 200 MG: 200 TABLET ORAL at 09:24

## 2017-12-26 RX ADMIN — HYDROCODONE BITARTRATE AND ACETAMINOPHEN 1 TABLET: 5; 325 TABLET ORAL at 01:47

## 2017-12-26 RX ADMIN — TAMSULOSIN HYDROCHLORIDE 0.4 MG: 0.4 CAPSULE ORAL at 16:30

## 2017-12-26 RX ADMIN — CALCIUM CARBONATE 500 MG (1,250 MG)-VITAMIN D3 200 UNIT TABLET 1 TABLET: at 09:23

## 2017-12-26 RX ADMIN — DIAZEPAM 5 MG: 5 TABLET ORAL at 22:02

## 2017-12-26 RX ADMIN — FUROSEMIDE 20 MG: 20 TABLET ORAL at 16:32

## 2017-12-26 RX ADMIN — ENOXAPARIN SODIUM 40 MG: 40 INJECTION SUBCUTANEOUS at 09:24

## 2017-12-26 RX ADMIN — CALCIUM CARBONATE 500 MG (1,250 MG)-VITAMIN D3 200 UNIT TABLET 1 TABLET: at 16:30

## 2017-12-26 RX ADMIN — HYDROCODONE BITARTRATE AND ACETAMINOPHEN 1 TABLET: 5; 325 TABLET ORAL at 21:03

## 2017-12-26 RX ADMIN — DILTIAZEM HYDROCHLORIDE 120 MG: 120 CAPSULE, COATED, EXTENDED RELEASE ORAL at 09:23

## 2017-12-26 RX ADMIN — DOCUSATE SODIUM 200 MG: 100 CAPSULE, LIQUID FILLED ORAL at 16:31

## 2017-12-26 RX ADMIN — HYDROCODONE BITARTRATE AND ACETAMINOPHEN 1 TABLET: 5; 325 TABLET ORAL at 05:58

## 2017-12-26 RX ADMIN — METOPROLOL TARTRATE 50 MG: 50 TABLET ORAL at 09:24

## 2017-12-26 RX ADMIN — METOPROLOL TARTRATE 50 MG: 50 TABLET ORAL at 21:03

## 2017-12-26 RX ADMIN — HYDROCODONE BITARTRATE AND ACETAMINOPHEN 1 TABLET: 5; 325 TABLET ORAL at 16:31

## 2017-12-26 RX ADMIN — DOCUSATE SODIUM 200 MG: 100 CAPSULE, LIQUID FILLED ORAL at 09:24

## 2017-12-26 NOTE — DISCHARGE INSTRUCTIONS
A-fib (Atrial Fibrillation)   WHAT YOU NEED TO KNOW:   A-fib may come and go, or it may be a long-term condition  A-fib can cause blood clots, stroke, or heart failure  These conditions may become life-threatening  It is important to treat and manage a-fib to help prevent a blood clot, stroke, or heart failure  DISCHARGE INSTRUCTIONS:   Call 911 for any of the following:   · You have any of the following signs of a heart attack:      ¨ Squeezing, pressure, or pain in your chest that lasts longer than 5 minutes or returns    ¨ Discomfort or pain in your back, neck, jaw, stomach, or arm     ¨ Trouble breathing    ¨ Nausea or vomiting    ¨ Lightheadedness or a sudden cold sweat, especially with chest pain or trouble breathing    · You have any of the following signs of a stroke:      ¨ Numbness or drooping on one side of your face     ¨ Weakness in an arm or leg    ¨ Confusion or difficulty speaking    ¨ Dizziness, a severe headache, or vision loss  Seek care immediately if:  You have any of the following signs of a blood clot:  · You feel lightheaded, are short of breath, and have chest pain  · You cough up blood  · You have swelling, redness, pain, or warmth in your arm or leg  Contact your cardiologist or healthcare provider if:   · Your heart rate is higher than your healthcare provider said it should be  · You have new or worsening swelling in your legs, feet, ankles, or abdomen  · You are short of breath, even at rest      · You have questions or concerns about your condition or care  Medicines: You may need any of the following:  · Heart medicines  help control your heart rate and rhythm  You may need more than one medicine to treat your symptoms  · Blood thinners    help prevent blood clots  Examples of blood thinners include heparin and warfarin  Clots can cause strokes, heart attacks, and death   The following are general safety guidelines to follow while you are taking a blood thinner:    ¨ Watch for bleeding and bruising while you take blood thinners  Watch for bleeding from your gums or nose  Watch for blood in your urine and bowel movements  Use a soft washcloth on your skin, and a soft toothbrush to brush your teeth  This can keep your skin and gums from bleeding  If you shave, use an electric shaver  Do not play contact sports  ¨ Tell your dentist and other healthcare providers that you take anticoagulants  Wear a bracelet or necklace that says you take this medicine  ¨ Do not start or stop any medicines unless your healthcare provider tells you to  Many medicines cannot be used with blood thinners  ¨ Tell your healthcare provider right away if you forget to take the medicine, or if you take too much  ¨ Warfarin  is a blood thinner that you may need to take  The following are things you should be aware of if you take warfarin  § Foods and medicines can affect the amount of warfarin in your blood  Do not make major changes to your diet while you take warfarin  Warfarin works best when you eat about the same amount of vitamin K every day  Vitamin K is found in green leafy vegetables and certain other foods  Ask for more information about what to eat when you are taking warfarin  § You will need to see your healthcare provider for follow-up visits when you are on warfarin  You will need regular blood tests  These tests are used to decide how much medicine you need  · Antiplatelets , such as aspirin, help prevent blood clots  Take your antiplatelet medicine exactly as directed  These medicines make it more likely for you to bleed or bruise  If you are told to take aspirin, do not take acetaminophen or ibuprofen instead  · Take your medicine as directed  Contact your healthcare provider if you think your medicine is not helping or if you have side effects  Tell him or her if you are allergic to any medicine   Keep a list of the medicines, vitamins, and herbs you take  Include the amounts, and when and why you take them  Bring the list or the pill bottles to follow-up visits  Carry your medicine list with you in case of an emergency  Follow up with your cardiologist as directed: You will need regular blood tests and monitoring  Write down your questions so you remember to ask them during your visits  Manage A-fib:   · Know your target heart rate  Learn how to take your pulse and monitor your heart rate  · Manage other health conditions  This includes high blood pressure, sleep apnea, thyroid disease, diabetes, and other heart conditions  Take medicine as directed and follow your treatment plan  · Limit or do not drink alcohol  Alcohol can make a-fib hard to manage  Ask your healthcare provider if it is safe for you to drink alcohol  A drink of alcohol is 12 ounces of beer, 5 ounces of wine, or 1½ ounces of liquor  · Do not smoke  Nicotine and other chemicals in cigarettes and cigars can cause heart and lung damage  Ask your healthcare provider for information if you currently smoke and need help to quit  E-cigarettes or smokeless tobacco still contain nicotine  Talk to your healthcare provider before you use these products  · Eat heart-healthy foods  Heart healthy foods will help keep your cholesterol low  These include fruits, vegetables, whole-grain breads, low-fat dairy products, beans, lean meats, and fish  Replace butter and margarine with heart-healthy oils such as olive oil and canola oil  · Maintain a healthy weight  Ask your healthcare provider how much you should weigh  Ask him to help you create a weight loss plan if you are overweight  · Exercise for 30 minutes  most days of the week  Ask your healthcare provider about the best exercise plan for you  © 2017 [address] [employer] [employer] Information is for End User's use only and may not be sold, redistributed or otherwise used for commercial purposes   All illustrations and images included in CareNotes® are the copyrighted property of A [employer] M [employer]  or [employer]  The above information is an  only  It is not intended as medical advice for individual conditions or treatments  Talk to your doctor, nurse or pharmacist before following any medical regimen to see if it is safe and effective for you  Podiatry Instructions:  1  Please wrap legs with ACE wrap for edema  2  Please keep legs elevated as much as possible  3 Please call and make appt to follow-up with Dr Edgar Marks  Discharge Instructions - Orthopedics  Ino Orta 60 y o  male MRN: [id number]  Unit/Bed#: [facility] 733-01    Weight Bearing Status:                                           Weight bearing as tolerated right lower extermity    Pain:  Continue analgesics as directed    Appt Instructions:   No future orthopaedic follow-up is necessary   If you do with to have future orthopaedic consultation, please do not hesitate to call the office at [phone]

## 2017-12-26 NOTE — PROGRESS NOTES
Orthopedics   [place] Lynne 60 y o  male MRN: [id number]  Unit/Bed#: [facility] 733-01      Subjective: no events overnight      Labs:    0  Lab Value Date/Time   HCT 30 1 (L) 12/23/2017 0527   HCT 31 3 (L) 12/21/2017 0612   HCT 30 5 (L) 12/20/2017 2128   HGB 10 1 (L) 12/23/2017 0527   HGB 10 5 (L) 12/21/2017 0612   HGB 10 2 (L) 12/20/2017 2128   INR 1 09 12/20/2017 2128   WBC 7 50 12/23/2017 0527   WBC 8 77 12/21/2017 0612   WBC 8 46 12/20/2017 2128       Meds:    Current Facility-Administered Medications:     acetaminophen (TYLENOL) tablet 325 mg, 325 mg, Oral, Q6H PRN, Tab Hemp, CRNP    amiodarone tablet 200 mg, 200 mg, Oral, Daily, Tati Cohn MD, 200 mg at 12/25/17 0816    calcium carbonate-vitamin D (OSCAL-D) 500 mg-200 units per tablet 1 tablet, 1 tablet, Oral, BID With Meals, Tati Cohn MD, 1 tablet at 12/25/17 1643    diazepam (VALIUM) tablet 5 mg, 5 mg, Oral, HS, Tati Cohn MD, 5 mg at 12/25/17 2122    diltiazem (CARDIZEM CD) 24 hr capsule 120 mg, 120 mg, Oral, Daily, Tab Hemp, CRNP, 120 mg at 12/24/17 0805    docusate sodium (COLACE) capsule 200 mg, 200 mg, Oral, BID, Tati Cohn MD, 200 mg at 12/25/17 1643    enoxaparin (LOVENOX) subcutaneous injection 40 mg, 40 mg, Subcutaneous, Daily, Tati Cohn MD, 40 mg at 12/25/17 0817    fentaNYL (DURAGESIC) 50 mcg/hr TD 72 hr patch 1 patch, 1 patch, Transdermal, Q48H, Tab Hemp, CRNP, 1 patch at 12/25/17 1046    furosemide (LASIX) tablet 20 mg, 20 mg, Oral, BID (diuretic), Hetul Giordano, DO, 20 mg at 12/25/17 1643    HYDROcodone-acetaminophen (NORCO) 5-325 mg per tablet 1 tablet, 1 tablet, Oral, Q4H PRN, MARY Terrell, 1 tablet at 12/26/17 0558    hydrocortisone 0 5 % cream, , Topical, BID, JIM TerrellNP    metoprolol tartrate (LOPRESSOR) tablet 50 mg, 50 mg, Oral, Q12H [address], Tati Cohn MD, 50 mg at 12/25/17 2123    polyethylene glycol (MIRALAX) packet 17 g, 17 g, Oral, Daily PRN, Tati Cohn MD  [place] potassium chloride (K-DUR,KLOR-CON) CR tablet 20 mEq, 20 mEq, Oral, Daily, Chase Ravinder, CRNP, 20 mEq at 12/25/17 0816    tamsulosin (FLOMAX) capsule 0 4 mg, 0 4 mg, Oral, Daily With Khanh Nielson MD, 0 4 mg at 12/25/17 1643    Blood Culture:   Lab Results   Component Value Date    BLOODCX No Growth After 5 Days  12/20/2017       Wound Culture:   No results found for: WOUNDCULT    Ins and Outs:  I/O last 24 hours: In: 9891 [P O :5491]  Out: 2340 [Urine:2340]    Physical Exam:   /62   Pulse 63   Temp 97 8 °F (36 6 °C) (Oral)   Resp 18   Ht 5' 8" (1 727 m)   Wt 62 kg (136 lb 11 oz)   SpO2 93%   BMI 20 78 kg/m²   Gen: Alert and oriented to person, place, time  Musculoskeletal: right lower extremity  · Skin pink dry and intact, no erythema  Lower legs in dressing with ACE bandage  · Negative log roll  · Sensation intact L1-S1  · 3/5 motor strength to hip flexion/extension  · 4+/5 knee flexion/extension, ankle dorsi/plantar flexion  · 2+ DP pulse    Radiology:   I personally reviewed the films  X-rays of right hip shows no acute fracture    _*_*_*_*_*_*_*_*_*_*_*_*_*_*_*_*_*_*_*_*_*_*_*_*_*_*_*_*_*_*_*_*_*_*_*_*_*_*_*_*_*    Assessment:  62 y o male with right thigh pain       Plan:   · WBAT right lower extremity  · Follow up right femur Xray which is pending  · PT/OT  · Pain control  · Dispo: Ortho will follow      Kortney Lopez

## 2017-12-26 NOTE — PROGRESS NOTES
Progress Note - Infectious Disease   [place]ice Grove 60 y o  male MRN: [id number]  Unit/Bed#: [facility] 733-01 Encounter: [id number]      Impression/Plan:  1  Bilateral lower extremity venous stasis changes-no clear clinical evidence of cellulitis  I suspect this is primarily and edema problem  The findings are symmetric, and the patient does not have any fever leukocytosis  Patient has difficulty with elevating his legs due to the severe kyphosis  -monitor off all antibiotics  -aggressive edema control measures with elevation as the patient is able to tolerate  -pressure dressing  -local care  -podiatry follow-up     2  Anemia-unclear etiology  The H&H has been relatively stable   -monitor CBC with diff  -additional workup and treatment as per the primary service     3  Recent pneumonia with complicated effusion-status post VATS with decortication for trapped lung      4   Severe kyphosis-is impairing the patient's ability to keep his legs elevated and control the edema  5   Proximal right leg pain-possibly most to related edema  Consideration for the possibility of intrinsic hip disease   -edema control  -orthopedics workup and follow-up    Antibiotics:  None    Subjective:  Patient has no fever, chills, sweats; no nausea, vomiting, diarrhea; no cough, shortness of breath; no increased pain  No new symptoms  He continues to have some right leg pain of unclear etiology  He states that is edema gets much better when he keeps his legs elevated  Objective:  Vitals:  HR:  [63-82] 82  Resp:  [18] 18  BP: (110-147)/(62-78) 122/67  SpO2:  [93 %-97 %] 97 %  Temp (24hrs), Av °F (36 7 °C), Min:97 8 °F (36 6 °C), Max:98 2 °F (36 8 °C)  Current: Temperature: 98 °F (36 7 °C)    Physical Exam:   General Appearance:  Alert, nontoxic, no acute distress  Throat: Oropharynx moist without lesions      Lungs:   Clear to auscultation bilaterally; respirations unlabored   Heart:  RRR; no murmur, rub or gallop Abdomen:   Soft, non-tender, non-distended, positive bowel sounds  Extremities: No clubbing, cyanosis  Bilateral lower extremity edema with venous stasis changes  Pressure dressings in place  Skin: No new rashes or lesions  No draining wounds noted  Labs, Imaging, & Other studies:   All pertinent labs and imaging studies were personally reviewed    Results from last 7 days  Lab Units 12/23/17  0527 12/21/17 0612 12/20/17 2128   WBC Thousand/uL 7 50 8 77 8 46   HEMOGLOBIN g/dL 10 1* 10 5* 10 2*   PLATELETS Thousands/uL 397* 420* 412*       Results from last 7 days  Lab Units 12/23/17  0527 12/22/17  0644 12/21/17 0612 12/20/17 2128   SODIUM mmol/L 138 137 136 136   POTASSIUM mmol/L 3 5 3 6 3 6 4 0   CHLORIDE mmol/L 102 101 102 102   CO2 mmol/L 28 30 28 30   ANION GAP mmol/L 8 6 6 4   BUN mg/dL 13 12 11 16   CREATININE mg/dL 0 59* 0 69 0 63 0 80   EGFR ml/min/1 73sq m 108 101 105 95   GLUCOSE RANDOM mg/dL 86 88 82 104   CALCIUM mg/dL 8 0* 8 5 8 0* 8 2*   AST U/L 19  --   --  18   ALT U/L 15  --   --  17   ALK PHOS U/L 238*  --   --  260*   TOTAL PROTEIN g/dL 5 9*  --   --  6 2*   ALBUMIN g/dL 2 2*  --   --  2 3*   BILIRUBIN TOTAL mg/dL 0 38  --   --  0 25       Results from last 7 days  Lab Units 12/21/17  0037 12/20/17 2130 12/20/17 2128   BLOOD CULTURE   --  No Growth After 5 Days  No Growth After 5 Days     MRSA CULTURE ONLY  No Methicillin Resistant Staphlyococcus aureus (MRSA) isolated  --   --

## 2017-12-26 NOTE — PROGRESS NOTES
[facility] Hospitalist Service - Internal Medicine Progress Note  Patient: Christian Conteh 60 y o  male   MRN: [id number]  PCP: Bev De La Torre MD  [facility] Encounter: [id number]  Date Of Visit: 17         Subjective:   Seen examined this afternoon  No acute overnight events  States that his lateral hip pain has improved although he does have intermittent shooting pains down his medial thighs  We had a conversation today about his hip imaging and he feels more peaceful of mind with the news that there are no new fractures  He was surprised that old healing fractures were present however  He was sitting upright in a chair during my encounter  Objective:     Vitals:   Temp (24hrs), Av 2 °F (36 8 °C), Min:97 8 °F (36 6 °C), Max:98 9 °F (37 2 °C)    HR:  [58-82] 58  Resp:  [18] 18  BP: (110-123)/(62-73) 123/73  SpO2:  [93 %-98 %] 98 %  Body mass index is 20 78 kg/m²  Input and Output Summary (last 24 hours):        Intake/Output Summary (Last 24 hours) at 17 1653  Last data filed at 17 1635   Gross per 24 hour   Intake             5041 ml   Output             2015 ml   Net             3026 ml       Physical Exam:     GENERAL:  Well-developed/nourished - no current distress  HEAD:  Normocephalic - atraumatic  EYES: PERRL - EOMI   MOUTH:  Mucosa moist  NECK:  Supple - full range of motion  CARDIAC:  Regular rate/rhythm - S1/S2 positive  PULMONARY:  Clear breath sounds bilaterally - nonlabored respirations - diminished respiratory effort due to kyphosis  ABDOMEN:  Soft - nontender/nondistended - active bowel sounds  MUSCULOSKELETAL:  Motor strength/range of motion somewhat deconditioned - kyphosis noted  NEUROLOGIC:  Alert/oriented x 3  SKIN:  Chronic venous stasis changes noted with dressings applied - age-appropriate wrinkles/blemishes otherwise  PSYCHIATRIC:  Mood/affect stable      Additional Data:    Labs & Recent Cultures:       Results from last 7 days  Lab Units 12/23/17  0527   WBC Thousand/uL 7 50   HEMOGLOBIN g/dL 10 1*   HEMATOCRIT % 30 1*   PLATELETS Thousands/uL 397*   NEUTROS PCT % 68   LYMPHS PCT % 17   MONOS PCT % 11   EOS PCT % 4       Results from last 7 days  Lab Units 12/23/17  0527   SODIUM mmol/L 138   POTASSIUM mmol/L 3 5   CHLORIDE mmol/L 102   CO2 mmol/L 28   BUN mg/dL 13   CREATININE mg/dL 0 59*   CALCIUM mg/dL 8 0*   TOTAL PROTEIN g/dL 5 9*   BILIRUBIN TOTAL mg/dL 0 38   ALK PHOS U/L 238*   ALT U/L 15   AST U/L 19   GLUCOSE RANDOM mg/dL 86       Results from last 7 days  Lab Units 12/20/17 2128   INR  1 09           Results from last 7 days  Lab Units 12/20/17 2130 12/20/17 2128   BLOOD CULTURE  No Growth After 5 Days  No Growth After 5 Days  Last 24 Hours Medication List:     amiodarone 200 mg Oral Daily   calcium carbonate-vitamin D 1 tablet Oral BID With Meals   diazepam 5 mg Oral HS   diltiazem 120 mg Oral Daily   docusate sodium 200 mg Oral BID   enoxaparin 40 mg Subcutaneous Daily   fentaNYL 1 patch Transdermal Q48H   furosemide 20 mg Oral BID (diuretic)   hydrocortisone  Topical BID   metoprolol tartrate 50 mg Oral Q12H [facility]   potassium chloride 20 mEq Oral Daily   tamsulosin 0 4 mg Oral Daily With Dinner         Assessments:    1  Right Hip Pain - Chronic/Healing Pubic Rami Fractures   2  Bilateral Chronic Venous Stasis   3  Paroxysmal Atrial Fibrillation  4  Recent VATS procedure   5    Osteoporosis - Kyphosis      Plan:    · Appreciate Orthopedic surgery evaluation - XR of right hip/femur noted - healing/subacute right pubic rami fractures noted along with a previously healed left ramus fracture - will continue to encourage weight-bearing as tolerated and planning discharge to SNF tomorrow   · Continue leg elevation as tolerated (limited due to kyphosis) and local wound care for chronic venous stasis - appreciate ID evaluation as there is no evidence for acute cellulitis - recent bilateral LE doppler study negative for DVT · Rate controlled on Lopressor/Cardizem/Amiodarone  · Underwent recent VATS procedure for complicated pneumonia with trapped lung and bilateral effusions - currently oxygenating on room air - outpatient follow-up  · Continue vitamin-D/calcium supplementation - continue PT/OT as tolerated - plan for discharge to SNF tomorrow      VTE Prophylaxis:  Lovenox       Time Spent for Care: 36 minutes  More than 50% of total time spent on counseling and coordination of care as described above  Current Length of Stay: 6 day(s)      Code Status: Level 1 - Full Code       Today, Patient Was Seen By: Amanda Michaud MD    ** Please Note: This note has been constructed using a voice recognition system   **

## 2017-12-27 VITALS
SYSTOLIC BLOOD PRESSURE: 156 MMHG | TEMPERATURE: 98.1 F | HEIGHT: 68 IN | RESPIRATION RATE: 16 BRPM | OXYGEN SATURATION: 96 % | HEART RATE: 63 BPM | DIASTOLIC BLOOD PRESSURE: 75 MMHG | WEIGHT: 136.69 LBS | BODY MASS INDEX: 20.72 KG/M2

## 2017-12-27 PROBLEM — M25.551 RIGHT HIP PAIN: Status: ACTIVE | Noted: 2017-12-27

## 2017-12-27 PROBLEM — R07.89 MUSCULOSKELETAL CHEST PAIN: Status: RESOLVED | Noted: 2017-12-21 | Resolved: 2017-12-27

## 2017-12-27 PROBLEM — M25.551 RIGHT HIP PAIN: Status: RESOLVED | Noted: 2017-12-27 | Resolved: 2017-12-27

## 2017-12-27 PROBLEM — L03.90 CELLULITIS: Status: RESOLVED | Noted: 2017-12-20 | Resolved: 2017-12-27

## 2017-12-27 PROBLEM — R21 RASH: Status: RESOLVED | Noted: 2017-12-21 | Resolved: 2017-12-27

## 2017-12-27 LAB
ALBUMIN SERPL BCP-MCNC: 2.6 G/DL (ref 3.5–5)
ALP SERPL-CCNC: 240 U/L (ref 46–116)
ALT SERPL W P-5'-P-CCNC: 18 U/L (ref 12–78)
ANION GAP SERPL CALCULATED.3IONS-SCNC: 6 MMOL/L (ref 4–13)
AST SERPL W P-5'-P-CCNC: 14 U/L (ref 5–45)
BILIRUB SERPL-MCNC: 0.4 MG/DL (ref 0.2–1)
BUN SERPL-MCNC: 14 MG/DL (ref 5–25)
CALCIUM SERPL-MCNC: 8.4 MG/DL (ref 8.3–10.1)
CHLORIDE SERPL-SCNC: 99 MMOL/L (ref 100–108)
CO2 SERPL-SCNC: 29 MMOL/L (ref 21–32)
CREAT SERPL-MCNC: 0.75 MG/DL (ref 0.6–1.3)
GFR SERPL CREATININE-BSD FRML MDRD: 98 ML/MIN/1.73SQ M
GLUCOSE SERPL-MCNC: 79 MG/DL (ref 65–140)
POTASSIUM SERPL-SCNC: 3.9 MMOL/L (ref 3.5–5.3)
PROT SERPL-MCNC: 6.3 G/DL (ref 6.4–8.2)
SODIUM SERPL-SCNC: 134 MMOL/L (ref 136–145)

## 2017-12-27 PROCEDURE — 80053 COMPREHEN METABOLIC PANEL: CPT | Performed by: INTERNAL MEDICINE

## 2017-12-27 RX ORDER — FENTANYL 50 UG/H
1 PATCH TRANSDERMAL
Qty: 3 PATCH | Refills: 0 | Status: ON HOLD | OUTPATIENT
Start: 2017-12-29 | End: 2018-02-17

## 2017-12-27 RX ORDER — HYDROCODONE BITARTRATE AND ACETAMINOPHEN 5; 325 MG/1; MG/1
1 TABLET ORAL EVERY 4 HOURS PRN
Qty: 15 TABLET | Refills: 0 | Status: SHIPPED | OUTPATIENT
Start: 2017-12-27 | End: 2018-01-06

## 2017-12-27 RX ORDER — FUROSEMIDE 20 MG/1
20 TABLET ORAL 2 TIMES DAILY
Refills: 0
Start: 2017-12-27 | End: 2018-03-15 | Stop reason: SDUPTHER

## 2017-12-27 RX ORDER — GABAPENTIN 100 MG/1
100 CAPSULE ORAL 3 TIMES DAILY
Qty: 12 CAPSULE | Refills: 0 | Status: SHIPPED | OUTPATIENT
Start: 2017-12-27

## 2017-12-27 RX ORDER — AMIODARONE HYDROCHLORIDE 200 MG/1
200 TABLET ORAL DAILY
Start: 2017-12-27 | End: 2018-03-15 | Stop reason: SDUPTHER

## 2017-12-27 RX ORDER — POTASSIUM CHLORIDE 20 MEQ/1
20 TABLET, EXTENDED RELEASE ORAL DAILY
Refills: 0
Start: 2017-12-27 | End: 2018-03-15 | Stop reason: SDUPTHER

## 2017-12-27 RX ORDER — DIAZEPAM 5 MG/1
5 TABLET ORAL
Qty: 5 TABLET | Refills: 0 | Status: ON HOLD | OUTPATIENT
Start: 2017-12-27 | End: 2018-02-17

## 2017-12-27 RX ORDER — DILTIAZEM HYDROCHLORIDE 120 MG/1
120 CAPSULE, COATED, EXTENDED RELEASE ORAL DAILY
Qty: 30 CAPSULE | Refills: 0
Start: 2017-12-28 | End: 2018-03-15 | Stop reason: SDUPTHER

## 2017-12-27 RX ADMIN — DILTIAZEM HYDROCHLORIDE 120 MG: 120 CAPSULE, COATED, EXTENDED RELEASE ORAL at 09:33

## 2017-12-27 RX ADMIN — HYDROCODONE BITARTRATE AND ACETAMINOPHEN 1 TABLET: 5; 325 TABLET ORAL at 05:49

## 2017-12-27 RX ADMIN — HYDROCODONE BITARTRATE AND ACETAMINOPHEN 1 TABLET: 5; 325 TABLET ORAL at 13:54

## 2017-12-27 RX ADMIN — POTASSIUM CHLORIDE 20 MEQ: 1500 TABLET, EXTENDED RELEASE ORAL at 09:32

## 2017-12-27 RX ADMIN — DOCUSATE SODIUM 200 MG: 100 CAPSULE, LIQUID FILLED ORAL at 09:32

## 2017-12-27 RX ADMIN — HYDROCODONE BITARTRATE AND ACETAMINOPHEN 1 TABLET: 5; 325 TABLET ORAL at 01:46

## 2017-12-27 RX ADMIN — METOPROLOL TARTRATE 50 MG: 50 TABLET ORAL at 09:32

## 2017-12-27 RX ADMIN — FENTANYL 1 PATCH: 50 PATCH, EXTENDED RELEASE TRANSDERMAL at 10:23

## 2017-12-27 RX ADMIN — CALCIUM CARBONATE 500 MG (1,250 MG)-VITAMIN D3 200 UNIT TABLET 1 TABLET: at 09:32

## 2017-12-27 RX ADMIN — HYDROCODONE BITARTRATE AND ACETAMINOPHEN 1 TABLET: 5; 325 TABLET ORAL at 09:48

## 2017-12-27 RX ADMIN — ENOXAPARIN SODIUM 40 MG: 40 INJECTION SUBCUTANEOUS at 09:32

## 2017-12-27 RX ADMIN — FUROSEMIDE 20 MG: 20 TABLET ORAL at 09:32

## 2017-12-27 RX ADMIN — AMIODARONE HYDROCHLORIDE 200 MG: 200 TABLET ORAL at 09:32

## 2017-12-27 NOTE — CASE MANAGEMENT
Continued Stay Review    Date: 17       Vital Signs:Temp (24hrs), Av 1 °F (36 7 °C), Min:97 9 °F (36 6 °C), Max:98 2 °F (36 8 °C)     HR:  [56-69] 69  Resp:  [16-18] 16  BP: ()/(56-69) 109/65  SpO2:  [96 %-99 %] 96 %  Body mass index is 20 78 kg/m²       Medications:     amiodarone 200 mg Oral Daily   calcium carbonate-vitamin D 1 tablet Oral BID With Meals   diazepam 5 mg Oral HS   diltiazem 120 mg Oral Daily   docusate sodium 200 mg Oral BID   enoxaparin 40 mg Subcutaneous Daily   fentaNYL 1 patch Transdermal Q48H   furosemide 20 mg Oral BID (diuretic)   hydrocortisone   Topical BID   metoprolol tartrate 50 mg Oral Q12H [address]   potassium chloride 20 mEq Oral Daily   tamsulosin 0 4 mg Oral Daily With Dinner        12-24 TO 12-25  1038 AM   Pain Score  7 7 7 7 7  6 7 3 7 4 7 3     FentaNYL TD (patch)               1       HYDROcodone-acetaminophen 5-325 mg (tablet)   1  1 1  1  1  1         Age/Sex: 60 y o  male     Assessment/Plan:   Patient stating he does not think he is ready for discharge due to the fact that "my leg still hurts and nobody has done anything about it"  · Bilateral lower extremity venous stasis changes  No clear signs of cellulitis  § Podiatry input noted  § Continue with Ace wraps  § Back to skilled nursing facility soon  § Monitor rash  · Atrial fibrillation-monitor heart rate  Patient with atrial fibrillation status post prior VATS  · Right-sided hip pain  Possible intertrochanteric bursitis versus intrinsic joint disease  Patient having difficulty locating specific spot of pain  He reports increasing pain on the lateral aspect of the joint but also notes pain closer to the groin area as well  § Follow up on x-ray of hip  [facility] surgery  § Hold on discharge  Patient reports that he wishes to have this addressed before he would move on to rehab due to concern for possible aggravating injury  Will workup further    Hold on discharge  PT - OK to Discharge Yes  (when med ready )       Discharge Plan:   Received phone call from Evi Caraballo [place] liaison, who stated that pt is able to return to facility on 12/25  CM arranged [facility] transport with [facility] for 12 pm  no 12/25  Informed pt of transport time and out of pocket cost   He stated that he was not agreeable to the cost and wanted to speak with Evi Caraballo from   Message left for Evi Caraballo in regards to same  Met with pt to inform that there was no call back from Evi Caraballo at   He stated after he thought about it, he is now agreeable to the [facility] cost   Pt's bedside Rn, Tabatha Estrada made aware of same and transport time    Facility agency transport form completed and chart copy requested

## 2017-12-27 NOTE — SOCIAL WORK
CM was informed by Dr Sujey Sierra that pt is medically stable for dc today  CM informed pt, pts bedside RN Michele Moore, and Tonya Sarmiento at [facility] of dc time  Facility transfer form completed  Chart copy requested

## 2017-12-27 NOTE — DISCHARGE SUMMARY
[facility]      Patient Information: Silvina Delgado 60 y o  male MRN: [id number]  Unit/Bed#: [facility] 733-01 Encounter: [id number]    Discharging Physician / Practitioner: Teetee Ji MD  PCP: Florina Bermudez MD  Admission Date: 12/20/2017  Discharge Date: 12/27/17      Reason for Admission:  Bilateral lower extremity swelling and redness    Discharge Diagnoses:     Principal Problem(s):    Bilateral Chronic Venous Stasis with hyperpigmentation    Right Hip Pain - Subacute/Chronic Pubic Rami fractures     Chronic Problems:    Kyphosis - Osteoporosis    Paroxysmal atrial fibrillation    Pressure ulcer, stage 2        Consultations During Hospital Stay:  · Infectious Disease  · Orthopedic Surgery  · [address] Course:     Silvina Delgado is a 60 y o  male patient who originally presented to the hospital on 12/20/2017 due to worsening distal lower extremity edema with hyperpigmentation/redness  Notably, he recently admission last month due to a complicated pneumonia effusion which ultimately resulted in a VATS procedure  There was an initial concern that the lower extremity redness was due to cellulitis and he was empirically started on a course of Doxycycline  He had recently undergone a bilateral lower extremity Doppler study which was negative for DVT  He was seen by infectious disease who did not feel he had active clinical cellulitis and discontinued antibiotics  The redness was attributed to chronic/progressive venous stasis changes and he was thoroughly counseled on leg elevation which would be difficult for him however due to his kyphosis  He also had local wound care and was seen by podiatry  His atrial fibrillation which was also recently diagnosed was controlled with a regimen consisting of Lopressor/Cardizem/Amiodarone    He was continued on his vitamin-D/calcium supplementation for his osteoporosis and was seen by PT/OT who had recommended discharge back to skilled rehabilitation  Toward the latter part of his hospitalization, even plan of some right hip pain and x-ray imaging revealed subacute/healing pubic rami fractures which per patient he was unaware of previously  He was seen by Orthopedic surgery who recommended weight-bearing as tolerated after reviewing the films themselves  After going over the imaging reports with him and reassuring him, he felt more peace of mind and allowed himself to ambulate with assistance  Subsequently on 12/27, he was discharged to [facility] [address] [facility]  He will follow up with his PCP and specialists as instructed  Condition at Discharge: good       Discharge Day Visit / Exam:     Vitals: Blood Pressure: 156/75 (12/27/17 0731)  Pulse: 63 (12/27/17 0731)  Temperature: 98 1 °F (36 7 °C) (12/27/17 0731)  Temp Source: Oral (12/27/17 0731)  Respirations: 16 (12/27/17 0731)  Height: 5' 8" (172 7 cm) (12/20/17 9191)  Weight - Scale: 62 kg (136 lb 11 oz) (12/26/17 0600)  SpO2: 96 % (12/27/17 0731)      Physical exam - I had a face-to-face encounter with the patient on day of discharge  Discussion with Patient and/or Family:  The patient has been advised to return to the ER immediately if any symptoms recur or worsen  Discharge instructions/Information to Patient and/or Family:   See after visit summary for information provided to patient and/or family  Provisions for Follow-Up Care:  See after visit summary for information related to follow-up care and any pertinent home health orders  Disposition:   [place] [facility] SNF        Discharge Statement:  I spent 36 minutes discharging the patient  This time was spent on the day of discharge  I had direct contact with the patient on the day of discharge   Greater than 50% of the total time was spent examining patient, answering all patient questions, arranging and discussing plan of care with patient as well as directly providing post-discharge instructions  Additional time then spent on discharge activities  Discharge Medications:  See after visit summary for reconciled discharge medications provided to patient and family        ** Please Note: This note has been constructed using a voice recognition system **

## 2017-12-27 NOTE — PLAN OF CARE
DISCHARGE PLANNING     Discharge to home or other facility with appropriate resources Progressing        DISCHARGE PLANNING - CARE MANAGEMENT     Discharge to post-acute care or home with appropriate resources Progressing        INFECTION - ADULT     Absence or prevention of progression during hospitalization Progressing        Knowledge Deficit     Patient/family/caregiver demonstrates understanding of disease process, treatment plan, medications, and discharge instructions Progressing        METABOLIC, FLUID AND ELECTROLYTES - ADULT     Electrolytes maintained within normal limits Progressing     Fluid balance maintained Progressing     Glucose maintained within target range Progressing        Nutrition/Hydration-ADULT     Nutrient/Hydration intake appropriate for improving, restoring or maintaining nutritional needs Progressing        PAIN - ADULT     Verbalizes/displays adequate comfort level or baseline comfort level Progressing        Potential for Falls     Patient will remain free of falls Progressing        Prexisting or High Potential for Compromised Skin Integrity     Skin integrity is maintained or improved Progressing        SAFETY ADULT     Maintain or return to baseline ADL function Progressing     Maintain or return mobility status to optimal level Progressing

## 2018-01-01 NOTE — ED ATTENDING ATTESTATION
Linda Nelson MD, saw and evaluated the patient  I have discussed the patient with the resident/non-physician practitioner and agree with the resident's/non-physician practitioner's findings, Plan of Care, and MDM as documented in the resident's/non-physician practitioner's note, except where noted  All available labs and Radiology studies were reviewed  At this point I agree with the current assessment done in the Emergency Department  I have conducted an independent evaluation of this patient a history and physical is as follows:    Patient presents with bilateral lower extremity swelling that has worsened despite being on doxycycline for cellulitis  Currently, the redness has started to spread upward  The patient denies any fevers, chills, nausea, vomiting, or additional complaints  No additional complaints  Exam: AAOx3, NAD, RRR, CTA, S/NT/ND, bilateral lower extremity erythema  A/P:  Lower extremity cellulitis  Will check labs and start antibiotic  Will admit for failure of outpatient treatment      Critical Care Time  CritCare Time    Procedures

## 2018-01-03 LAB
MYCOBACTERIUM SPEC CULT: NORMAL
RHODAMINE-AURAMINE STN SPEC: NORMAL

## 2018-01-23 VITALS
HEIGHT: 68 IN | TEMPERATURE: 98.5 F | SYSTOLIC BLOOD PRESSURE: 131 MMHG | DIASTOLIC BLOOD PRESSURE: 62 MMHG | HEART RATE: 82 BPM | OXYGEN SATURATION: 97 %

## 2018-01-23 NOTE — CONSULTS
I had the pleasure of evaluating your patient, Sugar Pro  My full evaluation follows:      History of Present Illness    Diagnosis: Left loculated pleural effusion  Procedures: Left thoracoscopic complete decortication performed on 11/15/17  Pathology: All cultures negative to date  Mr Anupam Poon is a 59 yo gentleman who was recently transferred from [facility] to [facility] for left sided pneumonia with a parapneumonic effusion  Thoracic surgery was consulted and he underwent a left thoracoscopic complete decortication on 11/15/17  He was discharged to [facility] with one chest tube to a mini express, secondary to a prolonged air leak  He had completed two weeks of IV antibiotics and therefore did not have to continue antibiotics at discharge  He returns today for his post op visit  His chest tube output has been minimal and there is no air leak seen on exam  He is having difficulty with leg cellulitis and a persistent dry cough, without any sputum production  He denies green sputum, hemoptysis, chest pain, or shortness of breath  His chest xray from 11/29/17 performed at his facility revealed a slight improvement in his left hydropneumothorax without any increased effusion  Active Problems    1  Pleural effusion (511 9) (J90)    Current Meds   1  Amiodarone HCl - 200 MG Oral Tablet; Therapy: (Recorded:[id number]) to Recorded   2  Clobetasol Propionate 0 05 % External Cream;   Therapy: (Recorded:01Dec2017) to Recorded   3  DiazePAM 5 MG Oral Tablet; Therapy: (Recorded:01Dec2017) to Recorded   4  DilTIAZem HCl ER Coated Beads 240 MG Oral Capsule Extended Release 24 Hour; Therapy: (Recorded:01Dec2017) to Recorded   5  Docusate Sodium 100 MG Oral Capsule; Therapy: (Recorded:[id number]) to Recorded   6  Doxycycline Hyclate 100 MG Oral Capsule; Therapy: (Recorded:[id number]) to Recorded   7  Dulcolax SUPP; Therapy: (Recorded:01Dec2017) to Recorded   8  FentaNYL 50 MCG/HR Transdermal Patch 72 Hour;    Therapy: (Recorded:01Dec2017) to Recorded   9  Furosemide 20 MG Oral Tablet; Therapy: (Recorded:01Dec2017) to Recorded   10  Gabapentin 100 MG Oral Capsule; Therapy: (Recorded:01Dec2017) to Recorded   11  Hydrocodone-Acetaminophen 5-325 MG Oral Tablet; Therapy: (Recorded:01Dec2017) to Recorded   12  Metoprolol Tartrate 50 MG Oral Tablet; Therapy: (Recorded:01Dec2017) to Recorded   13  Mucinex 600 MG Oral Tablet Extended Release 12 Hour; Therapy: (Recorded:01Dec2017) to Recorded   14  Oyster Shell Calcium/D 500-200 MG-UNIT Oral Tablet; Therapy: (Recorded:01Dec2017) to Recorded   15  Potassium Chloride Mónica ER 20 MEQ Oral Tablet Extended Release; Therapy: (Recorded:01Dec2017) to Recorded   16  Tamsulosin HCl - 0 4 MG Oral Capsule; Therapy: (Recorded:01Dec2017) to Recorded   17  Triamcinolone Acetonide 0 1 % External Cream;    Therapy: (Recorded:01Dec2017) to Recorded   18  Tylenol 325 MG Oral Tablet; Therapy: (Recorded:01Dec2017) to Recorded    Allergies    1  Aspirin TABS   2  Carbapenems   3  Cephalosporins   4  Penicillins    Vitals   Recorded: [id number] 01:39PM   Temperature 98 5 F   Heart Rate 82   Systolic 025   Diastolic 62   Height 5 ft 8 in   Weight Unobtainable Yes   O2 Saturation 97, RA     Physical Exam    Constitutional   General appearance: Abnormal   chronically ill  in wheelchair  Head and Face   Head and face: Normal     Pulmonary   Respiratory effort: No increased work of breathing or signs of respiratory distress  Cardiovascular   Examination of extremities for edema and/or varicosities: Abnormal   +3 b/l LE edema  Musculoskeletal   Gait and station: Abnormal   wheelchair  severely kyphotic lordotic stature  Skin   Incision Site Exam: left thoracoscopic and ct incisions healing well  no drainage, very minimal erythema  no evidence of infection  Neurologic   Cortical function: Normal mental status      Psychiatric   Judgment and insight: Normal     Orientation to person, place and time: Normal        Procedure  Left chest tube removed without incident  The patient tolerated the procedure well  U stitch was tied and area dressed with a dry, sterile dressing  first previous ct suture was removed  Assessment    1  Pleural effusion (511 9) (J90)    Plan  Pleural effusion    · Continue with our present treatment plan ; Status:Complete;   Done: [id number]   Ordered; For:Pleural effusion; Ordered By:Magdalena Banks;   · Follow-up PRN Evaluation and Treatment  Follow-up  Status: Complete  Done:  [id number]   Ordered; For: Pleural effusion; Ordered By: Luc Hesetr Performed:  Due: [id number]    Discussion/Summary    Mr Abigail Albarran is stable from a thoracic surgery standpoint  His chest xray has slightly improved and he had no air leak on exam  Therefore, his chest tube was removed  His cultures are negative to date  I would like him to obtain a chest xray at [facility] by 12/4/17  I have also instructed them to remove his last blue chest tube suture stitch on 12/8/17  He will only return to our office on as needed basis  The patient is in complete agreement with the above plan  Patient is unable to Self-Care: [place] [facility]  Thank you very much for allowing me to participate in the care of this patient  If you have any questions, please do not hesitate to contact me  End of Encounter Meds   Amiodarone HCl - 200 MG Oral Tablet; Therapy: (Recorded:01Dec2017) to Recorded  Clobetasol Propionate 0 05 % External Cream;  Therapy: (Recorded:01Dec2017) to Recorded  DiazePAM 5 MG Oral Tablet; Therapy: (Recorded:01Dec2017) to Recorded  DilTIAZem HCl ER Coated Beads 240 MG Oral Capsule Extended Release 24 Hour; Therapy: (Recorded:01Dec2017) to Recorded  Docusate Sodium 100 MG Oral Capsule; Therapy: (Recorded:01Dec2017) to Recorded  Doxycycline Hyclate 100 MG Oral Capsule; Therapy: (Recorded:01Dec2017) to Recorded  Dulcolax SUPP;   Therapy: (Recorded:01Dec2017) to Recorded  FentaNYL 50 MCG/HR Transdermal Patch 72 Hour; Therapy: (Recorded:12Ure7951) to Recorded  Furosemide 20 MG Oral Tablet; Therapy: (Recorded:77Lnd6061) to Recorded  Gabapentin 100 MG Oral Capsule; Therapy: (Recorded:94Fkj8110) to Recorded  Hydrocodone-Acetaminophen 5-325 MG Oral Tablet; Therapy: (Recorded:51Jxr3215) to Recorded  Metoprolol Tartrate 50 MG Oral Tablet; Therapy: (Recorded:50Dzb0392) to Recorded  Mucinex 600 MG Oral Tablet Extended Release 12 Hour; Therapy: (Recorded:75Cqk8363) to Recorded  Josey Mas Calcium/D 500-200 MG-UNIT Oral Tablet; Therapy: (Recorded:46Pkb3434) to Recorded  Potassium Chloride Mónica ER 20 MEQ Oral Tablet Extended Release; Therapy: (Recorded:41Yps1646) to Recorded  Tamsulosin HCl - 0 4 MG Oral Capsule; Therapy: (Recorded:76Aol4121) to Recorded  Triamcinolone Acetonide 0 1 % External Cream;  Therapy: (Recorded:01Dec2017) to Recorded  Tylenol 325 MG Oral Tablet; Therapy: (Recorded:42Lay7639) to Recorded    Future Appointments    Signatures   Electronically signed by : Jaison De La Fuente [facility]; Dec  1 2017  2:51PM EST                       (Author)    Electronically signed by :  GUTIERREZ Nance ; Dec  5 2017  5:48PM EST

## 2018-02-14 ENCOUNTER — ANESTHESIA EVENT (INPATIENT)
Dept: PERIOP | Facility: HOSPITAL | Age: 63
DRG: 469 | End: 2018-02-14
Payer: MEDICARE

## 2018-02-14 ENCOUNTER — HOSPITAL ENCOUNTER (INPATIENT)
Facility: HOSPITAL | Age: 63
LOS: 3 days | Discharge: NURSING, REHAB OR LONG-TERM CARE | DRG: 469 | End: 2018-02-17
Attending: EMERGENCY MEDICINE | Admitting: INTERNAL MEDICINE
Payer: MEDICARE

## 2018-02-14 ENCOUNTER — APPOINTMENT (EMERGENCY)
Dept: RADIOLOGY | Facility: HOSPITAL | Age: 63
DRG: 469 | End: 2018-02-14
Payer: MEDICARE

## 2018-02-14 ENCOUNTER — TELEPHONE (OUTPATIENT)
Dept: OBGYN CLINIC | Facility: HOSPITAL | Age: 63
End: 2018-02-14

## 2018-02-14 ENCOUNTER — APPOINTMENT (EMERGENCY)
Dept: CT IMAGING | Facility: HOSPITAL | Age: 63
DRG: 469 | End: 2018-02-14
Payer: MEDICARE

## 2018-02-14 ENCOUNTER — ANESTHESIA (INPATIENT)
Dept: PERIOP | Facility: HOSPITAL | Age: 63
DRG: 469 | End: 2018-02-14
Payer: MEDICARE

## 2018-02-14 ENCOUNTER — APPOINTMENT (INPATIENT)
Dept: RADIOLOGY | Facility: HOSPITAL | Age: 63
DRG: 469 | End: 2018-02-14
Payer: MEDICARE

## 2018-02-14 DIAGNOSIS — R07.9 CHEST PAIN: ICD-10-CM

## 2018-02-14 DIAGNOSIS — S72.002A CLOSED FRACTURE OF NECK OF LEFT FEMUR, INITIAL ENCOUNTER (HCC): ICD-10-CM

## 2018-02-14 DIAGNOSIS — E44.0 PROTEIN-CALORIE MALNUTRITION, MODERATE (HCC): Chronic | ICD-10-CM

## 2018-02-14 DIAGNOSIS — E46 MALNUTRITION COMPROMISING BODILY FUNCTION (HCC): ICD-10-CM

## 2018-02-14 DIAGNOSIS — S72.002A CLOSED LEFT HIP FRACTURE (HCC): Primary | ICD-10-CM
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
## 2018-02-14 PROBLEM — E87.1 HYPONATREMIA: Status: RESOLVED | Noted: 2017-11-15 | Resolved: 2018-02-14

## 2018-02-14 PROBLEM — J94.9: Status: RESOLVED | Noted: 2017-11-06 | Resolved: 2018-02-14

## 2018-02-14 PROBLEM — E87.6 HYPOKALEMIA: Status: ACTIVE | Noted: 2018-02-14

## 2018-02-14 PROBLEM — J90 PLEURAL EFFUSION: Status: RESOLVED | Noted: 2017-11-07 | Resolved: 2018-02-14

## 2018-02-14 PROBLEM — R60.9 EDEMA: Status: RESOLVED | Noted: 2017-11-17 | Resolved: 2018-02-14

## 2018-02-14 PROBLEM — G89.29 OTHER CHRONIC PAIN: Chronic | Status: RESOLVED | Noted: 2017-11-07 | Resolved: 2018-02-14

## 2018-02-14 PROBLEM — I10 ESSENTIAL HYPERTENSION: Status: ACTIVE | Noted: 2018-02-14

## 2018-02-14 PROBLEM — L89.92 PRESSURE ULCER, STAGE 2 (HCC): Chronic | Status: RESOLVED | Noted: 2017-12-22 | Resolved: 2018-02-14

## 2018-02-14 PROBLEM — L40.9 PSORIASIS: Chronic | Status: RESOLVED | Noted: 2017-11-07 | Resolved: 2018-02-14

## 2018-02-14 LAB
ABO GROUP BLD: NORMAL
ANION GAP SERPL CALCULATED.3IONS-SCNC: 9 MMOL/L (ref 4–13)
APTT PPP: 29 SECONDS (ref 23–35)
ATRIAL RATE: 61 BPM
BACTERIA UR QL AUTO: ABNORMAL /HPF
BASOPHILS # BLD AUTO: 0.02 THOUSANDS/ΜL (ref 0–0.1)
BASOPHILS NFR BLD AUTO: 0 % (ref 0–1)
BILIRUB UR QL STRIP: NEGATIVE
BLD GP AB SCN SERPL QL: NEGATIVE
BUN SERPL-MCNC: 17 MG/DL (ref 5–25)
CALCIUM SERPL-MCNC: 9.3 MG/DL (ref 8.3–10.1)
CHLORIDE SERPL-SCNC: 101 MMOL/L (ref 100–108)
CLARITY UR: CLEAR
CO2 SERPL-SCNC: 28 MMOL/L (ref 21–32)
COLOR UR: YELLOW
CREAT SERPL-MCNC: 0.83 MG/DL (ref 0.6–1.3)
EOSINOPHIL # BLD AUTO: 0.04 THOUSAND/ΜL (ref 0–0.61)
EOSINOPHIL NFR BLD AUTO: 0 % (ref 0–6)
ERYTHROCYTE [DISTWIDTH] IN BLOOD BY AUTOMATED COUNT: 15 % (ref 11.6–15.1)
GFR SERPL CREATININE-BSD FRML MDRD: 94 ML/MIN/1.73SQ M
GLUCOSE SERPL-MCNC: 129 MG/DL (ref 65–140)
GLUCOSE UR STRIP-MCNC: NEGATIVE MG/DL
HCT VFR BLD AUTO: 39.5 % (ref 36.5–49.3)
HGB BLD-MCNC: 13.2 G/DL (ref 12–17)
HGB UR QL STRIP.AUTO: ABNORMAL
INR PPP: 1.1 (ref 0.86–1.16)
KETONES UR STRIP-MCNC: NEGATIVE MG/DL
LEUKOCYTE ESTERASE UR QL STRIP: NEGATIVE
LYMPHOCYTES # BLD AUTO: 1.56 THOUSANDS/ΜL (ref 0.6–4.47)
LYMPHOCYTES NFR BLD AUTO: 14 % (ref 14–44)
MCH RBC QN AUTO: 29.2 PG (ref 26.8–34.3)
MCHC RBC AUTO-ENTMCNC: 33.4 G/DL (ref 31.4–37.4)
MCV RBC AUTO: 87 FL (ref 82–98)
MONOCYTES # BLD AUTO: 0.68 THOUSAND/ΜL (ref 0.17–1.22)
MONOCYTES NFR BLD AUTO: 6 % (ref 4–12)
NEUTROPHILS # BLD AUTO: 8.68 THOUSANDS/ΜL (ref 1.85–7.62)
NEUTS SEG NFR BLD AUTO: 80 % (ref 43–75)
NITRITE UR QL STRIP: NEGATIVE
NON-SQ EPI CELLS URNS QL MICRO: ABNORMAL /HPF
P AXIS: 47 DEGREES
PH UR STRIP.AUTO: 7 [PH] (ref 4.5–8)
PLATELET # BLD AUTO: 298 THOUSANDS/UL (ref 149–390)
PMV BLD AUTO: 9.1 FL (ref 8.9–12.7)
POTASSIUM SERPL-SCNC: 3.2 MMOL/L (ref 3.5–5.3)
PR INTERVAL: 126 MS
PROT UR STRIP-MCNC: NEGATIVE MG/DL
PROTHROMBIN TIME: 14 SECONDS (ref 12.1–14.4)
QRS AXIS: 57 DEGREES
QRSD INTERVAL: 96 MS
QT INTERVAL: 466 MS
QTC INTERVAL: 469 MS
RBC # BLD AUTO: 4.52 MILLION/UL (ref 3.88–5.62)
RBC #/AREA URNS AUTO: ABNORMAL /HPF
RH BLD: POSITIVE
SODIUM SERPL-SCNC: 138 MMOL/L (ref 136–145)
SP GR UR STRIP.AUTO: 1.01 (ref 1–1.03)
SPECIMEN EXPIRATION DATE: NORMAL
T WAVE AXIS: 67 DEGREES
TROPONIN I SERPL-MCNC: <0.02 NG/ML
UROBILINOGEN UR QL STRIP.AUTO: 0.2 E.U./DL
VENTRICULAR RATE: 61 BPM
WBC # BLD AUTO: 10.98 THOUSAND/UL (ref 4.31–10.16)
WBC #/AREA URNS AUTO: ABNORMAL /HPF

## 2018-02-14 PROCEDURE — 72192 CT PELVIS W/O DYE: CPT

## 2018-02-14 PROCEDURE — 93005 ELECTROCARDIOGRAM TRACING: CPT

## 2018-02-14 PROCEDURE — 86901 BLOOD TYPING SEROLOGIC RH(D): CPT | Performed by: PHYSICIAN ASSISTANT

## 2018-02-14 PROCEDURE — 0SRS01Z REPLACEMENT OF LEFT HIP JOINT, FEMORAL SURFACE WITH METAL SYNTHETIC SUBSTITUTE, OPEN APPROACH: ICD-10-PCS | Performed by: ORTHOPAEDIC SURGERY

## 2018-02-14 PROCEDURE — 86850 RBC ANTIBODY SCREEN: CPT | Performed by: PHYSICIAN ASSISTANT

## 2018-02-14 PROCEDURE — C1776 JOINT DEVICE (IMPLANTABLE): HCPCS | Performed by: ORTHOPAEDIC SURGERY

## 2018-02-14 PROCEDURE — 96375 TX/PRO/DX INJ NEW DRUG ADDON: CPT

## 2018-02-14 PROCEDURE — 36415 COLL VENOUS BLD VENIPUNCTURE: CPT | Performed by: EMERGENCY MEDICINE

## 2018-02-14 PROCEDURE — 73502 X-RAY EXAM HIP UNI 2-3 VIEWS: CPT

## 2018-02-14 PROCEDURE — 85025 COMPLETE CBC W/AUTO DIFF WBC: CPT | Performed by: EMERGENCY MEDICINE

## 2018-02-14 PROCEDURE — 84484 ASSAY OF TROPONIN QUANT: CPT | Performed by: EMERGENCY MEDICINE

## 2018-02-14 PROCEDURE — 72170 X-RAY EXAM OF PELVIS: CPT

## 2018-02-14 PROCEDURE — 27236 TREAT THIGH FRACTURE: CPT | Performed by: ORTHOPAEDIC SURGERY

## 2018-02-14 PROCEDURE — 85610 PROTHROMBIN TIME: CPT | Performed by: EMERGENCY MEDICINE

## 2018-02-14 PROCEDURE — 81001 URINALYSIS AUTO W/SCOPE: CPT | Performed by: EMERGENCY MEDICINE

## 2018-02-14 PROCEDURE — 85730 THROMBOPLASTIN TIME PARTIAL: CPT | Performed by: EMERGENCY MEDICINE

## 2018-02-14 PROCEDURE — 96374 THER/PROPH/DIAG INJ IV PUSH: CPT

## 2018-02-14 PROCEDURE — 99222 1ST HOSP IP/OBS MODERATE 55: CPT | Performed by: ORTHOPAEDIC SURGERY

## 2018-02-14 PROCEDURE — 99285 EMERGENCY DEPT VISIT HI MDM: CPT

## 2018-02-14 PROCEDURE — 99223 1ST HOSP IP/OBS HIGH 75: CPT | Performed by: INTERNAL MEDICINE

## 2018-02-14 PROCEDURE — 80048 BASIC METABOLIC PNL TOTAL CA: CPT | Performed by: EMERGENCY MEDICINE

## 2018-02-14 PROCEDURE — 86900 BLOOD TYPING SEROLOGIC ABO: CPT | Performed by: PHYSICIAN ASSISTANT

## 2018-02-14 PROCEDURE — 27236 TREAT THIGH FRACTURE: CPT | Performed by: PHYSICIAN ASSISTANT

## 2018-02-14 PROCEDURE — 71045 X-RAY EXAM CHEST 1 VIEW: CPT

## 2018-02-14 DEVICE — ARTICUL/EZE FEMORAL HEAD DIAMETER 28MM +1.5 12/14 TAPER
Type: IMPLANTABLE DEVICE | Site: HIP | Status: FUNCTIONAL
Brand: ARTICUL/EZE

## 2018-02-14 DEVICE — TRI-LOCK BPS FEMORAL STEM 12/14 TAPER TRI-LOCK BPS W/GRIPTION SIZE 10 STD 115MM
Type: IMPLANTABLE DEVICE | Site: HIP | Status: FUNCTIONAL
Brand: TRI-LOCK GRIPTION

## 2018-02-14 DEVICE — SELF CENTERING BI-POLAR HEAD 28MM ID 52MM OD
Type: IMPLANTABLE DEVICE | Site: HIP | Status: FUNCTIONAL
Brand: SELF CENTERING

## 2018-02-14 RX ORDER — ACETAMINOPHEN 325 MG/1
650 TABLET ORAL EVERY 6 HOURS PRN
Status: DISCONTINUED | OUTPATIENT
Start: 2018-02-14 | End: 2018-02-17 | Stop reason: HOSPADM

## 2018-02-14 RX ORDER — EPHEDRINE SULFATE 50 MG/ML
INJECTION, SOLUTION INTRAVENOUS AS NEEDED
Status: DISCONTINUED | OUTPATIENT
Start: 2018-02-14 | End: 2018-02-14 | Stop reason: SURG

## 2018-02-14 RX ORDER — BISACODYL 10 MG
10 SUPPOSITORY, RECTAL RECTAL DAILY PRN
Status: DISCONTINUED | OUTPATIENT
Start: 2018-02-14 | End: 2018-02-17 | Stop reason: HOSPADM

## 2018-02-14 RX ORDER — FENTANYL 50 UG/H
50 PATCH TRANSDERMAL ONCE
Status: DISCONTINUED | OUTPATIENT
Start: 2018-02-14 | End: 2018-02-14

## 2018-02-14 RX ORDER — ONDANSETRON 2 MG/ML
4 INJECTION INTRAMUSCULAR; INTRAVENOUS EVERY 6 HOURS PRN
Status: DISCONTINUED | OUTPATIENT
Start: 2018-02-14 | End: 2018-02-17 | Stop reason: HOSPADM

## 2018-02-14 RX ORDER — PANTOPRAZOLE SODIUM 40 MG/1
40 TABLET, DELAYED RELEASE ORAL DAILY
Status: DISCONTINUED | OUTPATIENT
Start: 2018-02-15 | End: 2018-02-17 | Stop reason: HOSPADM

## 2018-02-14 RX ORDER — BISACODYL 10 MG
10 SUPPOSITORY, RECTAL RECTAL DAILY PRN
Status: DISCONTINUED | OUTPATIENT
Start: 2018-02-14 | End: 2018-02-14 | Stop reason: SDUPTHER

## 2018-02-14 RX ORDER — PROPOFOL 10 MG/ML
INJECTION, EMULSION INTRAVENOUS AS NEEDED
Status: DISCONTINUED | OUTPATIENT
Start: 2018-02-14 | End: 2018-02-14 | Stop reason: SURG

## 2018-02-14 RX ORDER — CLINDAMYCIN PHOSPHATE 900 MG/50ML
900 INJECTION INTRAVENOUS ONCE
Status: COMPLETED | OUTPATIENT
Start: 2018-02-14 | End: 2018-02-14

## 2018-02-14 RX ORDER — FENTANYL 50 UG/H
1 PATCH TRANSDERMAL
Status: DISCONTINUED | OUTPATIENT
Start: 2018-02-14 | End: 2018-02-14

## 2018-02-14 RX ORDER — MAGNESIUM HYDROXIDE/ALUMINUM HYDROXICE/SIMETHICONE 120; 1200; 1200 MG/30ML; MG/30ML; MG/30ML
15 SUSPENSION ORAL EVERY 6 HOURS PRN
Status: DISCONTINUED | OUTPATIENT
Start: 2018-02-14 | End: 2018-02-17 | Stop reason: HOSPADM

## 2018-02-14 RX ORDER — HYDROCODONE BITARTRATE AND ACETAMINOPHEN 10; 300 MG/1; MG/1
1 TABLET ORAL 2 TIMES DAILY
COMMUNITY
End: 2018-02-17 | Stop reason: HOSPADM

## 2018-02-14 RX ORDER — SODIUM CHLORIDE, SODIUM LACTATE, POTASSIUM CHLORIDE, CALCIUM CHLORIDE 600; 310; 30; 20 MG/100ML; MG/100ML; MG/100ML; MG/100ML
75 INJECTION, SOLUTION INTRAVENOUS CONTINUOUS
Status: DISCONTINUED | OUTPATIENT
Start: 2018-02-14 | End: 2018-02-16

## 2018-02-14 RX ORDER — FENTANYL 50 UG/H
1 PATCH TRANSDERMAL
Status: DISCONTINUED | OUTPATIENT
Start: 2018-02-16 | End: 2018-02-17 | Stop reason: HOSPADM

## 2018-02-14 RX ORDER — ONDANSETRON 2 MG/ML
4 INJECTION INTRAMUSCULAR; INTRAVENOUS ONCE AS NEEDED
Status: DISCONTINUED | OUTPATIENT
Start: 2018-02-14 | End: 2018-02-14 | Stop reason: HOSPADM

## 2018-02-14 RX ORDER — ONDANSETRON 2 MG/ML
4 INJECTION INTRAMUSCULAR; INTRAVENOUS EVERY 6 HOURS PRN
Status: DISCONTINUED | OUTPATIENT
Start: 2018-02-14 | End: 2018-02-14 | Stop reason: SDUPTHER

## 2018-02-14 RX ORDER — METOCLOPRAMIDE HYDROCHLORIDE 5 MG/ML
10 INJECTION INTRAMUSCULAR; INTRAVENOUS ONCE AS NEEDED
Status: DISCONTINUED | OUTPATIENT
Start: 2018-02-14 | End: 2018-02-14 | Stop reason: HOSPADM

## 2018-02-14 RX ORDER — FENTANYL CITRATE/PF 50 MCG/ML
25 SYRINGE (ML) INJECTION
Status: DISCONTINUED | OUTPATIENT
Start: 2018-02-14 | End: 2018-02-14 | Stop reason: HOSPADM

## 2018-02-14 RX ORDER — TRAMADOL HYDROCHLORIDE 50 MG/1
50 TABLET ORAL EVERY 6 HOURS SCHEDULED
Status: DISCONTINUED | OUTPATIENT
Start: 2018-02-14 | End: 2018-02-17 | Stop reason: HOSPADM

## 2018-02-14 RX ORDER — FUROSEMIDE 20 MG/1
20 TABLET ORAL 2 TIMES DAILY
Status: DISCONTINUED | OUTPATIENT
Start: 2018-02-14 | End: 2018-02-17 | Stop reason: HOSPADM

## 2018-02-14 RX ORDER — SENNOSIDES 8.6 MG
1 TABLET ORAL DAILY
Status: DISCONTINUED | OUTPATIENT
Start: 2018-02-15 | End: 2018-02-14 | Stop reason: SDUPTHER

## 2018-02-14 RX ORDER — OXYCODONE HYDROCHLORIDE 5 MG/1
10 TABLET ORAL EVERY 6 HOURS PRN
Status: DISCONTINUED | OUTPATIENT
Start: 2018-02-14 | End: 2018-02-17 | Stop reason: HOSPADM

## 2018-02-14 RX ORDER — DIAZEPAM 5 MG/1
5 TABLET ORAL
Status: DISCONTINUED | OUTPATIENT
Start: 2018-02-14 | End: 2018-02-17 | Stop reason: HOSPADM

## 2018-02-14 RX ORDER — CLINDAMYCIN PHOSPHATE 900 MG/50ML
900 INJECTION INTRAVENOUS EVERY 8 HOURS
Status: COMPLETED | OUTPATIENT
Start: 2018-02-14 | End: 2018-02-15

## 2018-02-14 RX ORDER — OXYCODONE HYDROCHLORIDE AND ACETAMINOPHEN 5; 325 MG/1; MG/1
2 TABLET ORAL EVERY 6 HOURS PRN
Status: DISCONTINUED | OUTPATIENT
Start: 2018-02-14 | End: 2018-02-17 | Stop reason: HOSPADM

## 2018-02-14 RX ORDER — GABAPENTIN 100 MG/1
100 CAPSULE ORAL 3 TIMES DAILY
Status: DISCONTINUED | OUTPATIENT
Start: 2018-02-14 | End: 2018-02-17 | Stop reason: HOSPADM

## 2018-02-14 RX ORDER — TAMSULOSIN HYDROCHLORIDE 0.4 MG/1
0.4 CAPSULE ORAL
Status: DISCONTINUED | OUTPATIENT
Start: 2018-02-14 | End: 2018-02-17 | Stop reason: HOSPADM

## 2018-02-14 RX ORDER — POLYETHYLENE GLYCOL 3350 17 G/17G
17 POWDER, FOR SOLUTION ORAL DAILY
Status: DISCONTINUED | OUTPATIENT
Start: 2018-02-15 | End: 2018-02-17 | Stop reason: HOSPADM

## 2018-02-14 RX ORDER — SENNOSIDES 8.6 MG
1 TABLET ORAL DAILY
Status: DISCONTINUED | OUTPATIENT
Start: 2018-02-15 | End: 2018-02-17 | Stop reason: HOSPADM

## 2018-02-14 RX ORDER — MORPHINE SULFATE 4 MG/ML
4 INJECTION, SOLUTION INTRAMUSCULAR; INTRAVENOUS ONCE
Status: COMPLETED | OUTPATIENT
Start: 2018-02-14 | End: 2018-02-14

## 2018-02-14 RX ORDER — DOCUSATE SODIUM 100 MG/1
100 CAPSULE, LIQUID FILLED ORAL 2 TIMES DAILY
Status: DISCONTINUED | OUTPATIENT
Start: 2018-02-14 | End: 2018-02-17 | Stop reason: HOSPADM

## 2018-02-14 RX ORDER — POTASSIUM CHLORIDE 20 MEQ/1
20 TABLET, EXTENDED RELEASE ORAL DAILY
Status: DISCONTINUED | OUTPATIENT
Start: 2018-02-15 | End: 2018-02-17 | Stop reason: HOSPADM

## 2018-02-14 RX ORDER — MORPHINE SULFATE 2 MG/ML
2 INJECTION, SOLUTION INTRAMUSCULAR; INTRAVENOUS ONCE
Status: COMPLETED | OUTPATIENT
Start: 2018-02-14 | End: 2018-02-14

## 2018-02-14 RX ORDER — DILTIAZEM HYDROCHLORIDE 120 MG/1
120 CAPSULE, COATED, EXTENDED RELEASE ORAL DAILY
Status: DISCONTINUED | OUTPATIENT
Start: 2018-02-15 | End: 2018-02-17 | Stop reason: HOSPADM

## 2018-02-14 RX ORDER — AMIODARONE HYDROCHLORIDE 200 MG/1
200 TABLET ORAL DAILY
Status: DISCONTINUED | OUTPATIENT
Start: 2018-02-15 | End: 2018-02-17 | Stop reason: HOSPADM

## 2018-02-14 RX ORDER — CALCIUM CARBONATE 200(500)MG
1000 TABLET,CHEWABLE ORAL DAILY PRN
Status: DISCONTINUED | OUTPATIENT
Start: 2018-02-14 | End: 2018-02-17 | Stop reason: HOSPADM

## 2018-02-14 RX ORDER — OXYCODONE HYDROCHLORIDE AND ACETAMINOPHEN 5; 325 MG/1; MG/1
1 TABLET ORAL EVERY 8 HOURS PRN
Status: ON HOLD | COMMUNITY
End: 2018-02-17

## 2018-02-14 RX ORDER — SODIUM CHLORIDE, SODIUM LACTATE, POTASSIUM CHLORIDE, CALCIUM CHLORIDE 600; 310; 30; 20 MG/100ML; MG/100ML; MG/100ML; MG/100ML
INJECTION, SOLUTION INTRAVENOUS CONTINUOUS PRN
Status: DISCONTINUED | OUTPATIENT
Start: 2018-02-14 | End: 2018-02-14 | Stop reason: SURG

## 2018-02-14 RX ORDER — DOCUSATE SODIUM 100 MG/1
100 CAPSULE, LIQUID FILLED ORAL 2 TIMES DAILY
Status: DISCONTINUED | OUTPATIENT
Start: 2018-02-14 | End: 2018-02-14 | Stop reason: SDUPTHER

## 2018-02-14 RX ORDER — OXYCODONE HYDROCHLORIDE AND ACETAMINOPHEN 5; 325 MG/1; MG/1
1 TABLET ORAL EVERY 4 HOURS PRN
Status: DISCONTINUED | OUTPATIENT
Start: 2018-02-14 | End: 2018-02-17 | Stop reason: HOSPADM

## 2018-02-14 RX ORDER — FENTANYL CITRATE 50 UG/ML
INJECTION, SOLUTION INTRAMUSCULAR; INTRAVENOUS AS NEEDED
Status: DISCONTINUED | OUTPATIENT
Start: 2018-02-14 | End: 2018-02-14 | Stop reason: SURG

## 2018-02-14 RX ORDER — SIMETHICONE 80 MG
80 TABLET,CHEWABLE ORAL 4 TIMES DAILY PRN
Status: DISCONTINUED | OUTPATIENT
Start: 2018-02-14 | End: 2018-02-17 | Stop reason: HOSPADM

## 2018-02-14 RX ORDER — LORAZEPAM 2 MG/ML
0.5 INJECTION INTRAMUSCULAR
Status: DISCONTINUED | OUTPATIENT
Start: 2018-02-14 | End: 2018-02-14 | Stop reason: HOSPADM

## 2018-02-14 RX ORDER — ONDANSETRON 2 MG/ML
4 INJECTION INTRAMUSCULAR; INTRAVENOUS ONCE
Status: COMPLETED | OUTPATIENT
Start: 2018-02-14 | End: 2018-02-14

## 2018-02-14 RX ORDER — OXYCODONE AND ACETAMINOPHEN 10; 325 MG/1; MG/1
1 TABLET ORAL 2 TIMES DAILY
COMMUNITY
End: 2018-02-17 | Stop reason: HOSPADM

## 2018-02-14 RX ORDER — CALCIUM CARBONATE 200(500)MG
1000 TABLET,CHEWABLE ORAL DAILY PRN
Status: DISCONTINUED | OUTPATIENT
Start: 2018-02-14 | End: 2018-02-14 | Stop reason: SDUPTHER

## 2018-02-14 RX ADMIN — FUROSEMIDE 20 MG: 20 TABLET ORAL at 18:32

## 2018-02-14 RX ADMIN — FENTANYL CITRATE 50 MCG: 50 INJECTION, SOLUTION INTRAMUSCULAR; INTRAVENOUS at 16:15

## 2018-02-14 RX ADMIN — FENTANYL CITRATE 50 MCG: 50 INJECTION, SOLUTION INTRAMUSCULAR; INTRAVENOUS at 15:45

## 2018-02-14 RX ADMIN — DIAZEPAM 5 MG: 5 TABLET ORAL at 21:53

## 2018-02-14 RX ADMIN — SODIUM CHLORIDE, SODIUM LACTATE, POTASSIUM CHLORIDE, AND CALCIUM CHLORIDE: .6; .31; .03; .02 INJECTION, SOLUTION INTRAVENOUS at 15:12

## 2018-02-14 RX ADMIN — EPHEDRINE SULFATE 10 MG: 50 INJECTION, SOLUTION INTRAMUSCULAR; INTRAVENOUS; SUBCUTANEOUS at 15:37

## 2018-02-14 RX ADMIN — CLINDAMYCIN PHOSPHATE 900 MG: 18 INJECTION, SOLUTION INTRAMUSCULAR; INTRAVENOUS at 16:53

## 2018-02-14 RX ADMIN — FENTANYL CITRATE 25 MCG: 50 INJECTION, SOLUTION INTRAMUSCULAR; INTRAVENOUS at 16:55

## 2018-02-14 RX ADMIN — TRAMADOL HYDROCHLORIDE 50 MG: 50 TABLET, FILM COATED ORAL at 19:42

## 2018-02-14 RX ADMIN — MORPHINE SULFATE 2 MG: 2 INJECTION, SOLUTION INTRAMUSCULAR; INTRAVENOUS at 06:40

## 2018-02-14 RX ADMIN — DOCUSATE SODIUM 100 MG: 100 CAPSULE, LIQUID FILLED ORAL at 18:33

## 2018-02-14 RX ADMIN — HYDROMORPHONE HYDROCHLORIDE 1 MG: 1 INJECTION, SOLUTION INTRAMUSCULAR; INTRAVENOUS; SUBCUTANEOUS at 13:47

## 2018-02-14 RX ADMIN — ONDANSETRON 4 MG: 2 INJECTION INTRAMUSCULAR; INTRAVENOUS at 06:38

## 2018-02-14 RX ADMIN — HYDROMORPHONE HYDROCHLORIDE 1 MG: 1 INJECTION, SOLUTION INTRAMUSCULAR; INTRAVENOUS; SUBCUTANEOUS at 21:51

## 2018-02-14 RX ADMIN — SODIUM CHLORIDE, SODIUM LACTATE, POTASSIUM CHLORIDE, AND CALCIUM CHLORIDE 75 ML/HR: .6; .31; .03; .02 INJECTION, SOLUTION INTRAVENOUS at 18:28

## 2018-02-14 RX ADMIN — TRAMADOL HYDROCHLORIDE 50 MG: 50 TABLET, FILM COATED ORAL at 23:16

## 2018-02-14 RX ADMIN — SODIUM CHLORIDE, SODIUM LACTATE, POTASSIUM CHLORIDE, AND CALCIUM CHLORIDE: .6; .31; .03; .02 INJECTION, SOLUTION INTRAVENOUS at 16:03

## 2018-02-14 RX ADMIN — FENTANYL CITRATE 50 MCG: 50 INJECTION, SOLUTION INTRAMUSCULAR; INTRAVENOUS at 15:32

## 2018-02-14 RX ADMIN — HYDROMORPHONE HYDROCHLORIDE 0.2 MG: 1 INJECTION, SOLUTION INTRAMUSCULAR; INTRAVENOUS; SUBCUTANEOUS at 18:33

## 2018-02-14 RX ADMIN — MORPHINE SULFATE 4 MG: 4 INJECTION, SOLUTION INTRAMUSCULAR; INTRAVENOUS at 08:09

## 2018-02-14 RX ADMIN — OXYCODONE HYDROCHLORIDE 10 MG: 5 TABLET ORAL at 23:13

## 2018-02-14 RX ADMIN — CLINDAMYCIN PHOSPHATE 900 MG: 18 INJECTION, SOLUTION INTRAMUSCULAR; INTRAVENOUS at 15:20

## 2018-02-14 RX ADMIN — PROPOFOL 150 MG: 10 INJECTION, EMULSION INTRAVENOUS at 15:17

## 2018-02-14 RX ADMIN — FENTANYL CITRATE 25 MCG: 50 INJECTION, SOLUTION INTRAMUSCULAR; INTRAVENOUS at 17:14

## 2018-02-14 RX ADMIN — FENTANYL CITRATE 50 MCG: 50 INJECTION, SOLUTION INTRAMUSCULAR; INTRAVENOUS at 16:00

## 2018-02-14 RX ADMIN — TAMSULOSIN HYDROCHLORIDE 0.4 MG: 0.4 CAPSULE ORAL at 18:33

## 2018-02-14 RX ADMIN — GABAPENTIN 100 MG: 100 CAPSULE ORAL at 21:53

## 2018-02-14 NOTE — ED NOTES
Patient transported to OR via stretcher with belongings  Patient to be transported to med surg post surgery   Hardy Nolan on med surg given report and made aware that patient will be going to surgery first      Francisco Maria, RN  02/14/18 [id number]

## 2018-02-14 NOTE — ASSESSMENT & PLAN NOTE
Patient may undergo the planned surgery this afternoon  He is probably low to moderate cardiovascular risk  Due to his chest deformity he is at increased risk for  Pneumonia  He has a history of PAF but currently he is in normal sinus rhythm  It is not certain that he takes amiodarone and diltiazem number last his blood pressure is high due to the pain that he is in from the hip fracture as well as chest wall contusion  Will continue fentanyl patch and I placed him on IV Dilaudid for better pain control before the surgery

## 2018-02-14 NOTE — OP NOTE
Orthopedics HEMIARTHROPLASTY HIP (BIPOLAR)  Op Note    Charleen Green  2/14/2018      Pre-op Diagnosis:   Closed fracture of neck of left femur    Post-op Diagnosis:  Same    Procedure:  LEFT HEMIARTHROPLASTY HIP (BIPOLAR)    Surgeon:  Severiano Overland, MD    Assistant:  Sirena Cerda PA-C    I was present for the entire procedure and A qualified resident physician was not available  NOTE:  The presence of a physician assistant was necessary to help with patient positioning, surgical exposure, wound retraction, wound closure, and other key portions of the procedure  No qualified resident was available for this case  Anesthesia:  LMA    Estimated Blood Loss:  200 mL    Material sent to lab:  None      Complications:  None      Implants:  DePuy hip system:  Femur: 10  Head: 52 +1 5    Indications: This is a 60y o  -year-old Male Who presents with left femoral nec fracture  Treatment options were discussed including a hip pinning and total hip arthroplasty  Recommendation was made for a hip hemiarthroplasty  Risks and benefits were discussed which included but were not limited to infection, neurovascular damage, incomplete resolution of symptoms, hip dislocations, leg length discrepancies, failure of the implants, early failure, need for revision, DVT, PE, pneumonia, MI, and death  Informed consent was obtained  Procedure: The patient was met preoperatively and the left Hip was identified and signed  There were taken back to the operating room where a general anesthesia was performed  Patient was then positioned in the lateral position with bony prominences well-padded  The bean bag used  The patient's back was straight and the patient was not leaning forwards or backwards  Once I was pleased with the position of the patient and the patient was stable on the bed, the leg was sterilely prepped and draped in the usual fashion  A timeout was held identifying the patient, side, site, antibiotics, and allergies   They received Ancef preoperatively  The leg was marked and a curvilinear incision was made over the posterior lateral aspect of the hip  I dissected down through the soft tissue to the IT band, getting hemostasis along the way  IT band was then split down the middle, in line with its fibers  A Charnley retractor was then placed  The short external rotators were identified using a Delacruz elevator  A Delacruz was placed superior to the piriformis and then the tissue was elevated directly off the bone with Bovie cautery  Again hemostasis was obtained throughout the dissection  The short external rotators and capsule were tagged with Ethibond  There were then retracted posteriorly for visualization  Once I had good exposure of the femoral neck a guide was used to dictate the angle of the femoral neck cut, and I made a femoral neck cut approximately 1 cm proximal to the lesser trochanter  The head was removed from the acetabulum using a delacruz  I then placed retractors anterior to the acetabulum as well as inferior to the acetabulum  The head was sized and a trial head, size 52 was felt to the be most appropriate  I then turned my attention to the femur  Femoral retractor was placed underneath the proximal femur and another retractor around the neck  I used the cookie cutter, then the canal finder, then the lateral rasp to open up the proximal canal  I then began sequentially broaching with the entry broach up to a size 10  At this point I felt that I had very good fit with no instability  I then trialed the head and neck, starting off with neutral and +1 5  I found the 52 mm head with the 1 5 neck and the standard offset gave good stability  With this I had stability in the sleep position to 80 degrees, equal leg lengths, and stability to 70 degrees of internal rotation at 90° of flexion  There was no shuck  At this point the hip was then thoroughly irrigated out and trial implants were removed   Duramorph, 20 mLs, were placed in the anterior capsule  All loose bone pieces were removed  I then placed the final femoral stem, retrialed the head, and placed the final head once I was pleased with its stability  I checked one more time I stability  I thoroughly irrigated out the hip and surrounding tissue using pulse lavage as well as a betadine solution  The short external rotators were repaired through bone tunnels in the greater trochanter  The IT band was closed with interrupted Ethibond and 0 Vicryl  Deep subcutaneous was closed with 0 Vicryl  The skin was closed with 2-0 Vicryl and stratafix  Steri-Strips and a sterile dressing was placed  Patient was placed in a hip abduction pillow  Disposition:  Patient tolerated the procedure well and was taken to recovery postoperatively      Plan:  - Patient will be weightbearing as tolerated  - Patient will follow posterior hip precautions  - Patient will receive 24 hours of IV antibiotics postoperatively  - Patient will have sequential compressive devices placed bilaterally  - Lovenox will be started on postoperative day 1 for a total of 2 weeks, to be followed by aspirin for a month  - Patient will work with physical therapy and occupational therapy  - X-rays will be obtained at 6 weeks postoperatively      @[facility]@     Date: 2/14/2018  Time: 4:38 PM

## 2018-02-14 NOTE — CONSULTS
Consultation - Orthopedics   Kwaku Lowe 60 y o  male MRN: [id number]  Unit/Bed#: ED 05 Encounter: [id number]      Assessment/Plan     Assessment:  Left displaced femoral neck fracture    Plan:  1  Left hip hemiarthroplasty to be done by Dr David Nash this afternoon  Risks, benefits and complications of surgery were discussed in detail by Dr David Nash and informed consent was obtained  2   NPO  3  Continue Dilaudid IV for pain at this time  4  DVT prophylaxis- mechanical only  5  Bed rest- NWB to LLE    History of Present Illness   Physician Requesting Consult: Mj Ratliff DO  Reason for Consult / Principal Problem:  Left hip injury  HPI: Kwaku Lowe is a 60y o  year old male who presents with left hip pain after suffering a mechanical fall from standing at his home this morning  He denies any syncope or dizziness  He felt immediate left hip pain and was unable to ambulate  He is brought to the ED via ambulance and x-rays revealed a left femoral neck fracture  He has recently returned home from a rehab facility after being treated for pneumonia and pelvic fractures  He currently states he is in severe pain and IV Dilaudid is not helping much  He has a history of chronic pain from spine issues and normally takes narcotic medication for his pain  He denies any previous injury to his left hip  Inpatient consult to Orthopedic Surgery  Consult performed by: Lilian Gonzalez  Consult ordered by: Da Dawson          Review of Systems   HENT: Negative  Eyes: Negative  Respiratory: Negative  Cardiovascular: Positive for chest pain  Gastrointestinal: Negative  Endocrine: Negative  Genitourinary: Negative  Musculoskeletal: Positive for arthralgias  Skin: Negative  Neurological: Positive for weakness  Hematological: Negative  Psychiatric/Behavioral: Negative          Historical Information   Past Medical History:   Diagnosis Date    History of spinal fracture     Kyphosis     Loss of appetite     Loss of weight     Osteoarthritis     Osteoporosis      Past Surgical History:   Procedure Laterality Date    FINGER SURGERY      LUNG DECORTICATION Left 11/15/2017    Procedure: VATS DECORTICATION LUNG;  Surgeon: Rafat Cardenas MD;  Location: [facility];  Service: Thoracic    SHOULDER DEBRIDEMENT       Social History   History   Alcohol Use No     History   Drug Use No     History   Smoking Status    Never Smoker   Smokeless Tobacco    Never Used     Family History: non-contributory    Meds/Allergies   all current active meds have been reviewed  Allergies   Allergen Reactions    Carbapenems     Asa [Aspirin] Rash    Cephalosporins Rash    Penicillins Rash       Objective   Vitals: Blood pressure 160/78, pulse 78, temperature 97 9 °F (36 6 °C), temperature source Temporal, resp  rate 18, weight 56 7 kg (125 lb), SpO2 92 %  ,Body mass index is 19 01 kg/m²  No intake or output data in the 24 hours ending 02/14/18 1316  No intake/output data recorded  Invasive Devices     Peripheral Intravenous Line            Peripheral IV Right Antecubital -- days                Physical Exam   Constitutional: He is oriented to person, place, and time  He appears well-developed  He appears distressed  HENT:   Head: Normocephalic and atraumatic  Eyes: EOM are normal  Pupils are equal, round, and reactive to light  Neck: Neck supple  Cardiovascular: Intact distal pulses  Pulmonary/Chest: Effort normal  No respiratory distress  Abdominal: Soft  There is no tenderness  Musculoskeletal: He exhibits tenderness and deformity  Neurological: He is alert and oriented to person, place, and time  Skin: Skin is warm and dry  Psychiatric: He has a normal mood and affect  Nursing note and vitals reviewed      Right Hip Exam   Right hip exam is normal        Left Hip Exam     Tenderness   The patient is experiencing tenderness in the anterior and lateral     Other   Erythema: absent  Sensation: normal  Pulse: present    Comments:  Left lower extremity is shortened and externally rotated  Positive logroll  Calf and thigh are soft and compressible  Moving ankle and toes  Lab Results: I have personally reviewed pertinent lab results  Imaging Studies: I have personally reviewed pertinent films in PACS  X-rays of the left hip reveal a displaced femoral neck fracture  Code Status: Prior  Advance Directive and Living Will:      Power of :    POLST:      Counseling / Coordination of Care  Total floor / unit time spent today 25 minutes  Greater than 50% of total time was spent with the patient and / or family counseling and / or coordination of care

## 2018-02-14 NOTE — TELEPHONE ENCOUNTER
I received a call from Dr Olivia Prabhakar this morning need to speak with Dr Kanu Mcneill in regards to a emergency consults  I left message with Radiology tech gave her patient information and callback number for Dr Olivia Prabhakar to give to Dr Kanu Mcneill  I just wanted to make sure you did receive my message  Thanks          Callback# [phone]

## 2018-02-14 NOTE — ED PROVIDER NOTES
H&P Exam - Trauma   Mauro Phillips 60 y o  male MRN: [id number]  Unit/Bed#: 2 [place] 214/2 [address] [place] [address]-* Encounter: [id number]    Assessment/Plan   Trauma Alert: Trauma Acuity: Trauma Evaluation  Model of Arrival:   via    Trauma Team: Current Providers  Attending Provider: Palma Nash MD  Attending Provider: Sade Regalado DO  Attending Provider: James Lagunas MD  Attending Provider: Cecilia Vasques MD  Registered Nurse: Tomas Meléndez RN  Registered Nurse: Sarah Meehan RN  Consulting Physician: James Lagunas MD  Surgeon: James Lagunas MD  Registered Nurse: [employer], RN  Consulting Physician: Cecilia Vasques MD  Unit Ali Ramirez: Yoshi Other  Patient Care Assistant: Mo Villaseñor  Registered Nurse: Tera Roberts RN  Consultants:     Trauma Active Problems:Hip pain    Trauma Plan:    chest x-ray,  Pelvis CT scan, cardiac workup    Chief Complaint:   Chief Complaint   Patient presents with    Fall     Pt presents to ED via EMS from home after falling  Pt tripped injuring left hip  History of Present Illness   HPI:  Mauro Phillips is a 60 y o  male who presents with  Left hip pain  Was walking back from bathroom when hip gave way and he fell  Was recently sent home from nursing facility after rehab  Patient was the hospital for pneumonia and pelvic fractures  Also hit his chest during the fall  Has some chest pain  No shortness of breath  No headache  No head trauma  No neck pain  No back pain  No weakness numbness or tingling  Not on blood thinners  Mechanism:           HPI  Review of Systems   Constitutional: Negative for chills, diaphoresis and fever  HENT: Negative for congestion and sore throat  Eyes: Negative for photophobia and visual disturbance  Respiratory: Negative for cough, shortness of breath, wheezing and stridor  Cardiovascular: Negative for chest pain, palpitations and leg swelling     Gastrointestinal: Negative for abdominal pain, blood in stool, diarrhea, nausea and vomiting  Genitourinary: Negative for dysuria, frequency and urgency  Musculoskeletal: Negative for neck pain and neck stiffness  Skin: Negative for pallor and rash  Neurological: Negative for dizziness, syncope, weakness, light-headedness and headaches  All other systems reviewed and are negative  Historical Information     Immunizations: There is no immunization history on file for this patient  Past Medical History:   Diagnosis Date    History of spinal fracture     Kyphosis     Loss of appetite     Loss of weight     Osteoarthritis     Osteoporosis        Family History   Problem Relation Age of Onset    Heart disease Mother     Early death Mother     Cancer Father     Cancer Brother      Past Surgical History:   Procedure Laterality Date    FINGER SURGERY      LUNG DECORTICATION Left 11/15/2017    Procedure: VATS DECORTICATION LUNG;  Surgeon: Chet Friedman MD;  [facility]: [facility];  Service: Thoracic    SHOULDER DEBRIDEMENT         Social History     Social History    Marital status:      Spouse name: N/A    Number of children: N/A    Years of education: N/A     Social History Main Topics    Smoking status: Never Smoker    Smokeless tobacco: Never Used    Alcohol use No    Drug use: No    Sexual activity: No     Other Topics Concern    None     Social History Narrative    None       Family History: non-contributory    Meds/Allergies   Prior to Admission Medications   Prescriptions Last Dose Informant Patient Reported? Taking?    HYDROcodone-acetaminophen (VICODIN HP)  MG per tablet  Self Yes Yes   Sig: Take 1 tablet by mouth 2 (two) times a day   acetaminophen (TYLENOL) 325 mg tablet  Self No No   Sig: Take 2 tablets by mouth every 6 (six) hours as needed for fever   amiodarone 200 mg tablet  Self No Yes   Sig: Take 1 tablet by mouth daily   diazepam (VALIUM) 5 mg tablet  Self No Yes   Sig: Take 1 tablet by mouth daily at bedtime   diltiazem (CARDIZEM CD) 120 mg 24 hr capsule  Self No Yes   Sig: Take 1 capsule by mouth daily   fentaNYL (DURAGESIC) 50 mcg/hr  Self No Yes   Sig: Place 1 patch on the skin every other day Max Daily Amount: 1 patch   furosemide (LASIX) 20 mg tablet  Self No Yes   Sig: Take 1 tablet by mouth 2 (two) times a day   gabapentin (NEURONTIN) 100 mg capsule  Self No No   Sig: Take 1 capsule by mouth 3 (three) times a day   metoprolol tartrate (LOPRESSOR) 50 mg tablet  Self No Yes   Sig: Take 1 tablet by mouth every 12 (twelve) hours   oxyCODONE-acetaminophen (PERCOCET)  mg per tablet  Self Yes Yes   Sig: Take 1 tablet by mouth 2 (two) times a day   oxyCODONE-acetaminophen (PERCOCET)  MG per tablet  Self Yes Yes   Sig: Take 1 tablet by mouth daily   oxyCODONE-acetaminophen (PERCOCET) 5-325 mg per tablet  Self Yes Yes   Sig: Take 1 tablet by mouth every 8 (eight) hours as needed for moderate pain   potassium chloride (K-DUR,KLOR-CON) 20 mEq tablet  Self No Yes   Sig: Take 1 tablet by mouth daily   tamsulosin (FLOMAX) 0 4 mg  Self No Yes   Sig: Take 1 capsule by mouth daily with dinner   traMADol-acetaminophen (ULTRACET) 37 5-325 mg per tablet  Self Yes Yes   Sig: Take 1 tablet by mouth every 6 (six) hours as needed for moderate pain      Facility-Administered Medications: None       Allergies   Allergen Reactions    Carbapenems     Asa [Aspirin] Rash    Cephalosporins Rash    Penicillins Rash       PHYSICAL EXAM        Objective   Vitals:   First set: Temperature: 97 9 °F (36 6 °C) (02/14/18 0609)  Pulse: 65 (02/14/18 0609)  Respirations: 16 (02/14/18 0609)  Blood Pressure: 165/73 (02/14/18 0645)  SpO2: 92 % (02/14/18 0609)    Primary Survey:   (A) Airway: intact  (B) Breathing: equal b/l  (C) Circulation: Pulses:   normal  (D) Disabliity:  GCS Total:  15  (E) Expose:  Completed    Secondary Survey: (Click on Physical Exam tab above)  Physical Exam   Constitutional: He is oriented to person, place, and time   He appears well-developed  No distress  HENT:   Head: Normocephalic and atraumatic  Eyes: EOM are normal  Pupils are equal, round, and reactive to light  Neck:   No  CT L-spine tenderness   Cardiovascular: Normal rate and regular rhythm  Pulmonary/Chest: Effort normal and breath sounds normal    Abdominal: Soft  He exhibits no distension  There is no tenderness  Musculoskeletal: He exhibits deformity  He exhibits no edema  Left hip  Shortened and rotated   Neurological: He is alert and oriented to person, place, and time  Moving all extremities, left lower extremity weakness due to pain in trauma   Skin: Skin is warm  He is not diaphoretic         Invasive Devices     Peripheral Intravenous Line            Peripheral IV Right Antecubital -- days                Lab Results:   Results Reviewed     Procedure Component Value Units Date/Time    Urine Microscopic [[id number]]  (Abnormal) Collected:  02/14/18 0850    Lab Status:  Final result Specimen:  Urine from Urine, Clean Catch Updated:  02/14/18 0906     RBC, UA 2-4 (A) /hpf      WBC, UA None Seen /hpf      Epithelial Cells None Seen /hpf      Bacteria, UA None Seen /hpf     UA w Reflex to Microscopic w Reflex to Culture [[id number]]  (Abnormal) Collected:  02/14/18 0850    Lab Status:  Final result Specimen:  Urine from Urine, Clean Catch Updated:  02/14/18 0859     Color, UA Yellow     Clarity, UA Clear     Specific Gravity, UA 1 015     pH, UA 7 0     Leukocytes, UA Negative     Nitrite, UA Negative     Protein, UA Negative mg/dl      Glucose, UA Negative mg/dl      Ketones, UA Negative mg/dl      Urobilinogen, UA 0 2 E U /dl      Bilirubin, UA Negative     Blood, UA Small (A)    Protime-INR [[id number]]  (Normal) Collected:  02/14/18 0650    Lab Status:  Final result Specimen:  Blood from Arm, Right Updated:  02/14/18 0740     Protime 14 0 seconds      INR 1 10    APTT [[id number]]  (Normal) Collected:  02/14/18 0650    Lab Status:  Final result Specimen:  Blood from Arm, Right Updated:  02/14/18 0740     PTT 29 seconds     Narrative: Therapeutic Heparin Range = 60-90 seconds    Troponin I [[id number]]  (Normal) Collected:  02/14/18 0650    Lab Status:  Final result Specimen:  Blood from Arm, Right Updated:  02/14/18 0718     Troponin I <0 02 ng/mL     Narrative:         [employer] Chemistry analyzer 99% cutoff is > 0 04 ng/mL in network labs    o cTnI 99% cutoff is useful only when applied to patients in the clinical setting of myocardial ischemia  o cTnI 99% cutoff should be interpreted in the context of clinical history, ECG findings and possibly cardiac imaging to establish correct diagnosis  o cTnI 99% cutoff may be suggestive but clearly not indicative of a coronary event without the clinical setting of myocardial ischemia  Basic metabolic panel [[id number]]  (Abnormal) Collected:  02/14/18 0650    Lab Status:  Final result Specimen:  Blood from Arm, Right Updated:  02/14/18 7444     Sodium 138 mmol/L      Potassium 3 2 (L) mmol/L      Chloride 101 mmol/L      CO2 28 mmol/L      Anion Gap 9 mmol/L      BUN 17 mg/dL      Creatinine 0 83 mg/dL      Glucose 129 mg/dL      Calcium 9 3 mg/dL      eGFR 94 ml/min/1 73sq m     Narrative:         National Kidney Disease Education Program recommendations are as follows:  GFR calculation is accurate only with a steady state creatinine  Chronic Kidney disease less than 60 ml/min/1 73 sq  meters  Kidney failure less than 15 ml/min/1 73 sq  meters      CBC and differential [[id number]]  (Abnormal) Collected:  02/14/18 0650    Lab Status:  Final result Specimen:  Blood from Arm, Right Updated:  02/14/18 0658     WBC 10 98 (H) Thousand/uL      RBC 4 52 Million/uL      Hemoglobin 13 2 g/dL      Hematocrit 39 5 %      MCV 87 fL      MCH 29 2 pg      MCHC 33 4 g/dL      RDW 15 0 %      MPV 9 1 fL      Platelets 295 Thousands/uL      Neutrophils Relative 80 (H) %      Lymphocytes Relative 14 %      Monocytes Relative 6 %      Eosinophils Relative 0 %      Basophils Relative 0 %      Neutrophils Absolute 8 68 (H) Thousands/µL      Lymphocytes Absolute 1 56 Thousands/µL      Monocytes Absolute 0 68 Thousand/µL      Eosinophils Absolute 0 04 Thousand/µL      Basophils Absolute 0 02 Thousands/µL                  Imaging Studies:   XR pelvis ap only 1 or 2 vw   Final Result by Paradise Magana DO (02/14 1736)   Satisfactory arthroplasty placement  Workstation performed: [id number]         XR hip/pelv 2-3 vws left if performed   Final Result by Philip La MD (02/14 1010)      Acute displaced non-comminuted subcapital left femoral neck fracture  Workstation performed: [id number]         XR chest portable   ED Interpretation by Sam Wheeler DO (02/14 0730)   No acute abnl      Final Result by Philip La MD (02/14 0901)      Cardiomegaly, unchanged  Emphysematous changes are noted  No evidence of pneumonia  Workstation performed: [id number]         CT pelvis wo contrast   Final Result by Shabbir Bellamy MD (30/79 8465)    IMPRESSION:           1  There is an acute impacted subcapital left hip fracture  2   Healing pubic bone fractures on the right  Old left inferior pubic bone fracture  3   Large amount of feces in the colon including a fecal impaction  Workstation performed: [id number]                 Code Status: Level 1 - Full Code  Advance Directive and Living Will:      Power of :    POLST:      Procedures  Procedures       Phone Contacts  ED Phone Contact     ED Course  ED Course as of Feb 14 2351 Wed Feb 14, 2018   0625  ECG shows rate of 61, sinus, normal axis, normal QRS, no significant ST or T-wave changes, independently interpreted by me          CRISTI  CritCare Time      Patient signed out to Dr Bernardo Mckeon to follow up scans   and blood work      Disposition  Final diagnoses:   Closed left hip fracture ([address] )   Fall, initial encounter   Chest pain     Time reflects when diagnosis was documented in both MDM as applicable and the Disposition within this note     Time User Action Codes Description Comment    2/14/2018  8:04 AM Shama Dodson Add [S72 002A] Closed left hip fracture ([address] )     2/14/2018  8:04 AM Ileanarikennedy Dodson Add [id number]  [facility] Fall, initial encounter     2/14/2018  8:04 AM Shama Dodson Add [R07 9] Chest pain       ED Disposition     ED Disposition Condition Comment    Admit  Case was discussed with Mata Castellon** and the patient's admission status was agreed to be Admission Status: inpatient status to the service of Dr Estiven Bauer           Follow-up Information    None       Current Discharge Medication List      CONTINUE these medications which have NOT CHANGED    Details   amiodarone 200 mg tablet Take 1 tablet by mouth daily      diazepam (VALIUM) 5 mg tablet Take 1 tablet by mouth daily at bedtime  Qty: 5 tablet, Refills: 0      diltiazem (CARDIZEM CD) 120 mg 24 hr capsule Take 1 capsule by mouth daily  Qty: 30 capsule, Refills: 0      fentaNYL (DURAGESIC) 50 mcg/hr Place 1 patch on the skin every other day Max Daily Amount: 1 patch  Qty: 3 patch, Refills: 0      furosemide (LASIX) 20 mg tablet Take 1 tablet by mouth 2 (two) times a day  Refills: 0      HYDROcodone-acetaminophen (VICODIN HP)  MG per tablet Take 1 tablet by mouth 2 (two) times a day      metoprolol tartrate (LOPRESSOR) 50 mg tablet Take 1 tablet by mouth every 12 (twelve) hours  Refills: 0      oxyCODONE-acetaminophen (PERCOCET)  mg per tablet Take 1 tablet by mouth 2 (two) times a day      oxyCODONE-acetaminophen (PERCOCET)  MG per tablet Take 1 tablet by mouth daily      oxyCODONE-acetaminophen (PERCOCET) 5-325 mg per tablet Take 1 tablet by mouth every 8 (eight) hours as needed for moderate pain      potassium chloride (K-DUR,KLOR-CON) 20 mEq tablet Take 1 tablet by mouth daily  Refills: 0      tamsulosin (FLOMAX) 0 4 mg Take 1 capsule by mouth daily with dinner  Refills: 0      traMADol-acetaminophen (ULTRACET) 37 5-325 mg per tablet Take 1 tablet by mouth every 6 (six) hours as needed for moderate pain      acetaminophen (TYLENOL) 325 mg tablet Take 2 tablets by mouth every 6 (six) hours as needed for fever  Qty: 30 tablet, Refills: 0      gabapentin (NEURONTIN) 100 mg capsule Take 1 capsule by mouth 3 (three) times a day  Qty: 12 capsule, Refills: 0           No discharge procedures on file        ED Provider  Electronically Signed by         Meaghan Perez MD  02/14/18 9832

## 2018-02-14 NOTE — ANESTHESIA POSTPROCEDURE EVALUATION
Post-Op Assessment Note      CV Status:  Stable    Mental Status:  Alert and awake    Hydration Status:  Euvolemic    PONV Controlled:  None    Airway Patency:  Patent    Post Op Vitals Reviewed: Yes          Staff: Anesthesiologist           BP     Temp      Pulse     Resp      SpO2

## 2018-02-14 NOTE — ANESTHESIA PREPROCEDURE EVALUATION
Review of Systems/Medical History  Patient summary reviewed  Chart reviewed      Cardiovascular  Hypertension ,    Pulmonary  Pneumonia, Pleural effusion: history of and left,   Comment: Severe kyphoscoliosis with pulmonary sequelae, had lung decortication Nov 2017 for pleural effusion and pneumonia  GI/Hepatic  Negative GI/hepatic ROS     Comment: NPO since 8pm last night     Negative  ROS        Endo/Other  Negative endo/other ROS      GYN  Negative gynecology ROS          Hematology  Negative hematology ROS      Musculoskeletal  Back pain , cervical pain, thoracic pain and lumbar pain, Scoliosis thoracic scoliosis and thoracic kyphosis,   Arthritis     Neurology  Negative neurology ROS      Psychology     Chronic opioid dependence Chronic pain           Physical Exam    Airway    Mallampati score: II  TM Distance: >3 FB  Neck ROM: limited     Dental   No notable dental hx     Cardiovascular  Rhythm: regular, Rate: normal,     Pulmonary  Pulmonary exam normal     Other Findings        Anesthesia Plan  ASA Score- 3     Anesthesia Type- general with ASA Monitors  Additional Monitors:   Airway Plan: LMA  Plan Factors-    Induction- intravenous  Postoperative Plan- Plan for postoperative opioid use  Informed Consent- Anesthetic plan and risks discussed with patient  I personally reviewed this patient with the CRNA  Discussed and agreed on the Anesthesia Plan with the CRNA  Lukasz Carrera

## 2018-02-14 NOTE — PERIOPERATIVE NURSING NOTE
Pt with mid upper back dressing, duoderm, intact, pt reports he has visiting nurse that comes every three days for "red spot" on his spine

## 2018-02-14 NOTE — ED CARE HANDOFF
Emergency Department Sign Out Note        Sign out and transfer of care from *CAROL Sanchez**  See Separate Emergency Department note  The patient, Johnnie Florence, was evaluated by the previous provider for hip fracture  Workup Completed:  Xray, labs    ED Course / Workup Pending (followup): More dilaudid for pain control, admitted to medical service with ortho consult                          ED Course as of Feb 14 1815   Wed Feb 14, 2018   0849 Spoke with Jerri Alvarado - he will text Dr Nel Moses      Procedures  MDM  Number of Diagnoses or Management Options  Chest pain: new and requires workup  Closed left hip fracture [facility]): new and requires workup  Fall, initial encounter: new and requires workup     Amount and/or Complexity of Data Reviewed  Clinical lab tests: reviewed  Tests in the radiology section of CPT®: reviewed  Obtain history from someone other than the patient: yes  Discuss the patient with other providers: yes    Patient Progress  Patient progress: improved    CritCare Time      Disposition  Final diagnoses:   Closed left hip fracture ([address] )   Fall, initial encounter   Chest pain     Time reflects when diagnosis was documented in both MDM as applicable and the Disposition within this note     Time User Action Codes Description Comment    2/14/2018  8:04 AM Celestia Gretchen Add [S72 002A] Closed left hip fracture ([address] )     2/14/2018  8:04 AM Celestia Gretchen Add [T88  [facility] [place] Fall, initial encounter     2/14/2018  8:04 AM Celestia Gretchen Add [R07 9] Chest pain       ED Disposition     ED Disposition Condition Comment    Admit  Case was discussed with Higinio Galan** and the patient's admission status was agreed to be Admission Status: inpatient status to the service of Dr Jose Dior           Follow-up Information    None       Current Discharge Medication List      CONTINUE these medications which have NOT CHANGED    Details   amiodarone 200 mg tablet Take 1 tablet by mouth daily      diazepam (VALIUM) 5 mg tablet Take 1 tablet by mouth daily at bedtime  Qty: 5 tablet, Refills: 0      diltiazem (CARDIZEM CD) 120 mg 24 hr capsule Take 1 capsule by mouth daily  Qty: 30 capsule, Refills: 0      fentaNYL (DURAGESIC) 50 mcg/hr Place 1 patch on the skin every other day Max Daily Amount: 1 patch  Qty: 3 patch, Refills: 0      furosemide (LASIX) 20 mg tablet Take 1 tablet by mouth 2 (two) times a day  Refills: 0      HYDROcodone-acetaminophen (VICODIN HP)  MG per tablet Take 1 tablet by mouth 2 (two) times a day      metoprolol tartrate (LOPRESSOR) 50 mg tablet Take 1 tablet by mouth every 12 (twelve) hours  Refills: 0      oxyCODONE-acetaminophen (PERCOCET)  mg per tablet Take 1 tablet by mouth 2 (two) times a day      oxyCODONE-acetaminophen (PERCOCET)  MG per tablet Take 1 tablet by mouth daily      oxyCODONE-acetaminophen (PERCOCET) 5-325 mg per tablet Take 1 tablet by mouth every 8 (eight) hours as needed for moderate pain      potassium chloride (K-DUR,KLOR-CON) 20 mEq tablet Take 1 tablet by mouth daily  Refills: 0      tamsulosin (FLOMAX) 0 4 mg Take 1 capsule by mouth daily with dinner  Refills: 0      traMADol-acetaminophen (ULTRACET) 37 5-325 mg per tablet Take 1 tablet by mouth every 6 (six) hours as needed for moderate pain      acetaminophen (TYLENOL) 325 mg tablet Take 2 tablets by mouth every 6 (six) hours as needed for fever  Qty: 30 tablet, Refills: 0      gabapentin (NEURONTIN) 100 mg capsule Take 1 capsule by mouth 3 (three) times a day  Qty: 12 capsule, Refills: 0           No discharge procedures on file         ED Provider  Electronically Signed by     Sade Regalado DO  02/14/18 2915

## 2018-02-14 NOTE — ASSESSMENT & PLAN NOTE
Systolic blood pressures in the 160s  Blood pressure is worse most likely due to severe pain patient is in  Will give him IV Dilaudid before surgery to control his pain   If blood pressure is not better 2 will give IV Lopressor

## 2018-02-15 PROBLEM — E44.0 PROTEIN-CALORIE MALNUTRITION, MODERATE (HCC): Chronic | Status: ACTIVE | Noted: 2018-02-15

## 2018-02-15 PROBLEM — E43 SEVERE PROTEIN-CALORIE MALNUTRITION (HCC): Chronic | Status: RESOLVED | Noted: 2017-11-07 | Resolved: 2018-02-15

## 2018-02-15 PROBLEM — L89.102 DECUBITUS ULCER OF BACK, STAGE 2 (HCC): Chronic | Status: ACTIVE | Noted: 2018-02-15

## 2018-02-15 PROBLEM — E87.6 HYPOKALEMIA: Status: RESOLVED | Noted: 2018-02-14 | Resolved: 2018-02-15

## 2018-02-15 LAB
ANION GAP SERPL CALCULATED.3IONS-SCNC: 6 MMOL/L (ref 4–13)
BUN SERPL-MCNC: 19 MG/DL (ref 5–25)
CALCIUM SERPL-MCNC: 7.8 MG/DL (ref 8.3–10.1)
CHLORIDE SERPL-SCNC: 100 MMOL/L (ref 100–108)
CO2 SERPL-SCNC: 31 MMOL/L (ref 21–32)
CREAT SERPL-MCNC: 0.84 MG/DL (ref 0.6–1.3)
ERYTHROCYTE [DISTWIDTH] IN BLOOD BY AUTOMATED COUNT: 14.9 % (ref 11.6–15.1)
GFR SERPL CREATININE-BSD FRML MDRD: 93 ML/MIN/1.73SQ M
GLUCOSE SERPL-MCNC: 105 MG/DL (ref 65–140)
HCT VFR BLD AUTO: 27.9 % (ref 36.5–49.3)
HGB BLD-MCNC: 9.3 G/DL (ref 12–17)
MCH RBC QN AUTO: 29.5 PG (ref 26.8–34.3)
MCHC RBC AUTO-ENTMCNC: 33.3 G/DL (ref 31.4–37.4)
MCV RBC AUTO: 89 FL (ref 82–98)
PLATELET # BLD AUTO: 236 THOUSANDS/UL (ref 149–390)
PMV BLD AUTO: 9.6 FL (ref 8.9–12.7)
POTASSIUM SERPL-SCNC: 3.8 MMOL/L (ref 3.5–5.3)
RBC # BLD AUTO: 3.15 MILLION/UL (ref 3.88–5.62)
SODIUM SERPL-SCNC: 137 MMOL/L (ref 136–145)
WBC # BLD AUTO: 7.67 THOUSAND/UL (ref 4.31–10.16)

## 2018-02-15 PROCEDURE — G8987 SELF CARE CURRENT STATUS: HCPCS

## 2018-02-15 PROCEDURE — G8988 SELF CARE GOAL STATUS: HCPCS

## 2018-02-15 PROCEDURE — 80048 BASIC METABOLIC PNL TOTAL CA: CPT | Performed by: PHYSICIAN ASSISTANT

## 2018-02-15 PROCEDURE — G8978 MOBILITY CURRENT STATUS: HCPCS

## 2018-02-15 PROCEDURE — 99232 SBSQ HOSP IP/OBS MODERATE 35: CPT | Performed by: INTERNAL MEDICINE

## 2018-02-15 PROCEDURE — 97163 PT EVAL HIGH COMPLEX 45 MIN: CPT

## 2018-02-15 PROCEDURE — 97167 OT EVAL HIGH COMPLEX 60 MIN: CPT

## 2018-02-15 PROCEDURE — G8979 MOBILITY GOAL STATUS: HCPCS

## 2018-02-15 PROCEDURE — 99024 POSTOP FOLLOW-UP VISIT: CPT | Performed by: ORTHOPAEDIC SURGERY

## 2018-02-15 PROCEDURE — 97110 THERAPEUTIC EXERCISES: CPT

## 2018-02-15 PROCEDURE — 85027 COMPLETE CBC AUTOMATED: CPT | Performed by: PHYSICIAN ASSISTANT

## 2018-02-15 PROCEDURE — 97530 THERAPEUTIC ACTIVITIES: CPT

## 2018-02-15 RX ADMIN — PANTOPRAZOLE SODIUM 40 MG: 40 TABLET, DELAYED RELEASE ORAL at 09:41

## 2018-02-15 RX ADMIN — DIAZEPAM 5 MG: 5 TABLET ORAL at 22:16

## 2018-02-15 RX ADMIN — OXYCODONE HYDROCHLORIDE 10 MG: 5 TABLET ORAL at 20:38

## 2018-02-15 RX ADMIN — DOCUSATE SODIUM 100 MG: 100 CAPSULE, LIQUID FILLED ORAL at 09:41

## 2018-02-15 RX ADMIN — ENOXAPARIN SODIUM 40 MG: 40 INJECTION SUBCUTANEOUS at 09:41

## 2018-02-15 RX ADMIN — HYDROMORPHONE HYDROCHLORIDE 1 MG: 1 INJECTION, SOLUTION INTRAMUSCULAR; INTRAVENOUS; SUBCUTANEOUS at 11:08

## 2018-02-15 RX ADMIN — HYDROMORPHONE HYDROCHLORIDE 1 MG: 1 INJECTION, SOLUTION INTRAMUSCULAR; INTRAVENOUS; SUBCUTANEOUS at 07:32

## 2018-02-15 RX ADMIN — OXYCODONE HYDROCHLORIDE AND ACETAMINOPHEN 2 TABLET: 5; 325 TABLET ORAL at 09:48

## 2018-02-15 RX ADMIN — AMIODARONE HYDROCHLORIDE 200 MG: 200 TABLET ORAL at 09:48

## 2018-02-15 RX ADMIN — HYDROMORPHONE HYDROCHLORIDE 1 MG: 1 INJECTION, SOLUTION INTRAMUSCULAR; INTRAVENOUS; SUBCUTANEOUS at 22:18

## 2018-02-15 RX ADMIN — HYDROMORPHONE HYDROCHLORIDE 1 MG: 1 INJECTION, SOLUTION INTRAMUSCULAR; INTRAVENOUS; SUBCUTANEOUS at 14:06

## 2018-02-15 RX ADMIN — OXYCODONE HYDROCHLORIDE 10 MG: 5 TABLET ORAL at 05:51

## 2018-02-15 RX ADMIN — SENNOSIDES 8.6 MG: 8.6 TABLET, FILM COATED ORAL at 09:41

## 2018-02-15 RX ADMIN — DILTIAZEM HYDROCHLORIDE 120 MG: 120 CAPSULE, COATED, EXTENDED RELEASE ORAL at 09:42

## 2018-02-15 RX ADMIN — HYDROMORPHONE HYDROCHLORIDE 1 MG: 1 INJECTION, SOLUTION INTRAMUSCULAR; INTRAVENOUS; SUBCUTANEOUS at 19:16

## 2018-02-15 RX ADMIN — FUROSEMIDE 20 MG: 20 TABLET ORAL at 09:42

## 2018-02-15 RX ADMIN — TRAMADOL HYDROCHLORIDE 50 MG: 50 TABLET, FILM COATED ORAL at 19:17

## 2018-02-15 RX ADMIN — TRAMADOL HYDROCHLORIDE 50 MG: 50 TABLET, FILM COATED ORAL at 05:51

## 2018-02-15 RX ADMIN — FUROSEMIDE 20 MG: 20 TABLET ORAL at 19:17

## 2018-02-15 RX ADMIN — TRAMADOL HYDROCHLORIDE 50 MG: 50 TABLET, FILM COATED ORAL at 14:06

## 2018-02-15 RX ADMIN — DOCUSATE SODIUM 100 MG: 100 CAPSULE, LIQUID FILLED ORAL at 19:17

## 2018-02-15 RX ADMIN — POTASSIUM CHLORIDE 20 MEQ: 20 TABLET, EXTENDED RELEASE ORAL at 09:42

## 2018-02-15 RX ADMIN — HYDROMORPHONE HYDROCHLORIDE 1 MG: 1 INJECTION, SOLUTION INTRAMUSCULAR; INTRAVENOUS; SUBCUTANEOUS at 04:06

## 2018-02-15 RX ADMIN — POLYETHYLENE GLYCOL 3350 17 G: 17 POWDER, FOR SOLUTION ORAL at 09:39

## 2018-02-15 RX ADMIN — TAMSULOSIN HYDROCHLORIDE 0.4 MG: 0.4 CAPSULE ORAL at 19:17

## 2018-02-15 RX ADMIN — CLINDAMYCIN PHOSPHATE 900 MG: 18 INJECTION, SOLUTION INTRAMUSCULAR; INTRAVENOUS at 01:10

## 2018-02-15 RX ADMIN — HYDROMORPHONE HYDROCHLORIDE 1 MG: 1 INJECTION, SOLUTION INTRAMUSCULAR; INTRAVENOUS; SUBCUTANEOUS at 01:10

## 2018-02-15 NOTE — SOCIAL WORK
Met with patient discussed role of Care Management  Patient resides alone in a 2nd floor apt with 1 +13 ELIF the living area  He uses a RW  He had a lengthy SNF stay at [facility] and [facility] using approximately 56 days of his 100 SNF rehab  He does not want to return to [facility] and request referrals be sent to [facility] and [facility], referrals sent via [address] informed me they have no bed for patient  Await bed availability from [facility]

## 2018-02-15 NOTE — PLAN OF CARE
Problem: OCCUPATIONAL THERAPY ADULT  Goal: Performs self-care activities at highest level of function for planned discharge setting  See evaluation for individualized goals  Treatment Interventions: ADL retraining, Functional transfer training, Endurance training, Patient/family training          See flowsheet documentation for full assessment, interventions and recommendations  Outcome: Progressing  Limitation: Decreased ADL status, Decreased UE strength, Decreased Safe judgement during ADL, Decreased endurance, Decreased self-care trans, Decreased high-level ADLs  Prognosis: Good  Assessment: Patient is a 59 y/o male with hx of osteoporosis and kyophosis who underwent a L bipolar hemiarthroplasty s/p fall at home  Patient is alert and oriented, reports living home alone in 2nd floor apartment but has limited social support PTA  Patient also reports recent discharge home from rehab, reports he was having difficulty with managing at home due to weakness  Patient currently presents with decreased activity tolerance, decreased standing balance and  decreased awarness of THPS impacting occupational performance in areas of bathing,  dressing, aDL transfers, toileting and functional mobility  From OT standpoint, anticipate need for in-patient rehab before dischage home  will continue to follow for OT services to progress ADL skills to highest level of independence and safety with least amount of caregiver assitance  Recommendation: PT consult  OT Discharge Recommendation: Short Term Rehab  OT - OK to Discharge:  Yes

## 2018-02-15 NOTE — CASE MANAGEMENT
Initial Clinical Review    Admission: Date/Time/Statement: 2/14/18 AT 0805     Orders Placed This Encounter   Procedures    Inpatient Admission (expected length of stay for this patient is greater than two midnights)     Standing Status:   Standing     Number of Occurrences:   1     Order Specific Question:   Admitting Physician     Answer:   Tyra Cordon [[id number]]     Order Specific Question:   Level of Care     Answer:   Med Surg [16]     Order Specific Question:   Estimated length of stay     Answer:   More than 2 Midnights     Order Specific Question:   Certification     Answer:   I certify that inpatient services are medically necessary for this patient for a duration of greater than two midnights  See H&P and MD Progress Notes for additional information about the patient's course of treatment  ED: Date/Time/Mode of Arrival:   ED Arrival Information     Expected Arrival Acuity Means of Arrival Escorted By Service Admission Type    - 2/14/2018 06:06 Urgent Ambulance [facility]) General Medicine Emergency    Arrival Complaint    FALL          Chief Complaint:   Chief Complaint   Patient presents with    Fall     Pt presents to ED via EMS from home after falling  Pt tripped injuring left hip  Chief Complaint:   Left hip pain after fall     History of Present Illness:   Jennifer Durant is a 60 y o  male who presents with above chief compaint      Patient fell this morning in his home this was purely due to mechanical fall he denies any lightheadedness, dizziness, chest pain, shortness of breath, palpitations, focal extremity weakness or numbness before after the event      He came to the ER for evaluation where he is found to the left femoral fracture he is scheduled for OR this afternoon I discussed the case with Dr Andi Marquez      Patient complains of sharp anterior chest pain since he hit his chest during the fall    Is worse if he takes a deep breath or if he presses on his anterior chest   Chest x-ray described no rib fractures      He denies any history of coronary disease, CHF, CVA, TIA, DVT, urinary tension, diabetes      On review of old records in epic is a history of PAF, lower extremity edema, hypertension, continuous narcotic dependency due to spine pain from kyphosis and scoliosis        Review of Systems:  Constitutional: Negative for fever  Respiratory: Negative for cough, shortness of breath and wheezing  Cardiovascular: Positive for chest pain (The anterior chest hurts since the fall  It is worse when patient presses on his chest review takes a deep breath) and leg swelling (Chronic leg swelling)  Negative for palpitations  Gastrointestinal: Positive for constipation  Negative for abdominal pain, blood in stool, diarrhea and vomiting  Genitourinary: Negative for difficulty urinating and dysuria  Musculoskeletal: Negative for neck stiffness  Left hip pain   Skin: Negative for rash  Neurological: Negative for weakness, numbness and headaches  Physical Exam:  Musculoskeletal: He exhibits tenderness (Patient has reproducible tenderness of the anterior chest that he hit when he fell  He also has tenderness of the left hip area and his left lower extremity is externally rotated and shortened)         ED Vital Signs:   ED Triage Vitals   Temperature Pulse Respirations Blood Pressure SpO2   02/14/18 0609 02/14/18 0609 02/14/18 0609 02/14/18 0645 02/14/18 0609   97 9 °F (36 6 °C) 65 16 165/73 92 %      Temp Source Heart Rate Source Patient Position - Orthostatic VS BP Location FiO2 (%)   02/14/18 0609 02/14/18 0700 02/14/18 0700 02/14/18 1300 --   Temporal Monitor Lying Right arm       Pain Score       02/14/18 0609       Worst Possible Pain        Wt Readings from Last 1 Encounters:   02/15/18 62 kg (136 lb 11 oz)     2  LABS/Diagnostic Test Results:   Results from last 7 days  Lab Units 02/14/18  0650   WBC Thousand/uL 10 98*   HEMOGLOBIN g/dL 13 2   HEMATOCRIT % 39 5 PLATELETS Thousands/uL 298   NEUTROS PCT % 80*   LYMPHS PCT % 14   MONOS PCT % 6   EOS PCT % 0       Results from last 7 days  Lab Units 02/14/18  0650   SODIUM mmol/L 138   POTASSIUM mmol/L 3 2*   CHLORIDE mmol/L 101   CO2 mmol/L 28   BUN mg/dL 17   CREATININE mg/dL 0 83   CALCIUM mg/dL 9 3   GLUCOSE RANDOM mg/dL 129       Results from last 7 days  Lab Units 02/14/18  0650   INR   1 10       LEFT HIP/ PELVIS X Ray -  Acute displaced non-comminuted subcapital left femoral neck fracture  CT PELVIS -  1  There is an acute impacted subcapital left hip fracture  2   Healing pubic bone fractures on the right  Old left inferior pubic bone fracture        3   Large amount of feces in the colon including a fecal impaction  ED Treatment:   Medication Administration from 02/14/2018 0606 to 02/14/2018 1353       Date/Time Order Dose Route Action Action by Comments     02/14/2018 0640 morphine injection 2 mg 2 mg Intravenous Given Larry Webb RN      02/14/2018 0638 ondansetron (ZOFRAN) injection 4 mg 4 mg Intravenous Given Larry Webb RN      02/14/2018 0809 morphine (PF) 4 mg/mL injection 4 mg 4 mg Intravenous Given Indiana Landaverde RN      02/14/2018 1353 HYDROmorphone (DILAUDID) injection 1 mg   Intravenous MAR Hold Automatic Transfer Provider      02/14/2018 1347 HYDROmorphone (DILAUDID) injection 1 mg 1 mg Intravenous Given Indiana Landaverde RN           Past Medical/Surgical History:    Active Ambulatory Problems     Diagnosis Date Noted    Osteoporosis 11/06/2017    Kyphosis 11/06/2017    Continuous opioid dependence ([facility] [address] ) 11/07/2017    Paroxysmal a-fib (HCC) 11/07/2017    Bilateral lower extremity edema 12/20/2017    BPH (benign prostatic hyperplasia) 12/21/2017     Resolved Ambulatory Problems     Diagnosis Date Noted    Pneumonia 11/06/2017    Sepsis ([facility] [address] ) 11/06/2017    Pleural disorder 11/06/2017    Pedal edema 11/06/2017    Psoriasis 11/07/2017    Urinary retention 11/07/2017  Hematuria 11/07/2017    Pleural effusion 11/07/2017    Severe protein-calorie malnutrition ([address] ) 11/07/2017    Other chronic pain 11/07/2017    Hyponatremia 11/15/2017    Edema 11/17/2017    Cellulitis 12/20/2017    Rash R knee and thigh anterior 12/21/2017    Musculoskeletal chest pain  12/21/2017    Pressure ulcer, stage 2 12/22/2017    Right hip pain 12/27/2017     Past Medical History:   Diagnosis Date    History of spinal fracture     Kyphosis     Loss of appetite     Loss of weight     Osteoarthritis     Osteoporosis        Admitting Diagnosis: Chest pain [R07 9]  Closed left hip fracture ([address] ) [S72 002A]  Multiple falls [R29 6]    Age/Sex: 60 y o  male    Assessment/Plan:   * Closed fracture of neck of left femur ([address] )   Assessment & Plan     Patient may undergo the planned surgery this afternoon  He is probably low to moderate cardiovascular risk  Due to his chest deformity he is at increased risk for  Pneumonia      He has a history of PAF but currently he is in normal sinus rhythm  It is not certain that he takes amiodarone and diltiazem number last his blood pressure is high due to the pain that he is in from the hip fracture as well as chest wall contusion      Will continue fentanyl patch and I placed him on IV Dilaudid for better pain control before the surgery           Continuous opioid dependence ([address] )   Assessment & Plan     He has chronic pain due to severe kyphosis, scoliosis osteoporosis  Will continue fentanyl          Paroxysmal a-fib (HCC)   Assessment & Plan     Currently patient is in normal sinus rhythm he may not be taking amiodarone according to him          Essential hypertension   Assessment & Plan     Systolic blood pressures in the 160s  Blood pressure is worse most likely due to severe pain patient is in  Will give him IV Dilaudid before surgery to control his pain   If blood pressure is not better 2 will give IV Lopressor          BPH (benign prostatic hyperplasia)   Assessment & Plan     Continue Flomax perioperatively and will watch for urinary tension              VTE Prophylaxis: ordered  Code Status:  Level 1 DNR      Anticipated Length of Stay:  More than 2 midnights     Justification for Hospital Stay: see assessment and plan        Admission Orders:  2/14/18 AT 0805   ADMIT INPATIENT  VS + NEUROVASCULAR CHECKS Q4HRS    BEDREST  SCD      NPO    Continuous Infusions:   lactated ringers 75 mL/hr Last Rate: 75 mL/hr (02/14/18 1828)       Scheduled Meds:   Current Facility-Administered Medications:  acetaminophen 650 mg Oral Q6H PRN Roz Randhawa MD    acetaminophen 650 mg Oral Q6H PRN Lm Adhikari PA-C    aluminum-magnesium hydroxide-simethicone 15 mL Oral Q6H PRN Lm Adhikari PA-C    amiodarone 200 mg Oral Daily Roz Randhawa MD    bisacodyl 10 mg Rectal Daily PRN Lm Adhikari PA-C    calcium carbonate 1,000 mg Oral Daily PRN Roz Randhawa MD    diazepam 5 mg Oral HS Roz Randhawa MD    diltiazem 120 mg Oral Daily Roz Randhawa MD    docusate sodium 100 mg Oral BID Lm Adhikari PA-C    enoxaparin 40 mg Subcutaneous Daily [place], [place]    [START ON 2/16/2018] fentaNYL 1 patch Transdermal Q48H MARY Lewis    furosemide 20 mg Oral BID Roz Randhawa MD    gabapentin 100 mg Oral TID Roz Randhawa MD    HYDROmorphone 0 2 mg Intravenous Q3H PRN Roz Randhawa MD    HYDROmorphone 1 mg Intravenous Q3H PRN Anant Nye MD    lactated ringers 75 mL/hr Intravenous Continuous Lm Adhikari PA-C Last Rate: 75 mL/hr (02/14/18 1828)   ondansetron 4 mg Intravenous Q6H PRN Roz Randhawa MD    oxyCODONE 10 mg Oral Q6H PRN Lm Adhikari PA-C    oxyCODONE-acetaminophen 1 tablet Oral Q4H PRN Lm Adhikari PA-C    oxyCODONE-acetaminophen 2 tablet Oral Q6H PRN Roz Randhawa MD    pantoprazole 40 mg Oral Daily Lm Adhikari PA-C    polyethylene glycol 17 g Oral Daily Roz Randhawa MD    potassium chloride 20 mEq Oral Daily MD serena Rachel 1 tablet Oral Daily Lm Adhikari PA-C simethicone 80 mg Oral 4x Daily PRN Gloria Chin PA-C    tamsulosin 0 4 mg Oral Daily With Priyanka Salinas MD    traMADol 50 mg Oral Q6H [address] Gloria Chin PA-C        PRN Meds:     acetaminophen    aluminum-magnesium hydroxide-simethicone    bisacodyl    calcium carbonate    IV HYDROmorphone 1 mg q3hrs prn given x 6 / 24 hrs    HYDROmorphone    ondansetron    oxyCODONE-acetaminophen 2 Tablets q6hrs prn given x 1     oxyCODONE 10 mg q6hrs prn given x 2    simethicone    CONSULT ORTHO             Orthopedics  OP Note Date of Service: 2/14/2018  3:38 PM     Procedure: HEMIARTHROPLASTY HIP (BIPOLAR) Location: [facility]          Orthopedics HEMIARTHROPLASTY HIP (BIPOLAR)  Op Note     Brenda Gram  2/14/2018        Pre-op Diagnosis:   Closed fracture of neck of left femur     Post-op Diagnosis:  Same     Procedure:  LEFT HEMIARTHROPLASTY HIP (BIPOLAR)     Surgeon:  Carlie Oliver MD     Assistant:  Gloria Chin PA-C     I was present for the entire procedure and A qualified resident physician was not available  NOTE:  The presence of a physician assistant was necessary to help with patient positioning, surgical exposure, wound retraction, wound closure, and other key portions of the procedure  No qualified resident was available for this case      Anesthesia: LMA       Implants:  DePuy hip system:  Femur: 10  Head: 52 +1 5     Indications: This is a 60y o  -year-old Male Who presents with left femoral neck fracture  Treatment options were discussed including a hip pinning and total hip arthroplasty  Recommendation was made for a hip hemiarthroplasty  Risks and benefits were discussed which included but were not limited to infection, neurovascular damage, incomplete resolution of symptoms, hip dislocations, leg length discrepancies, failure of the implants, early failure, need for revision, DVT, PE, pneumonia, MI, and death   Informed consent was obtained                 Orthopedics  Progress Notes  POD # 1 Date of Service: 2/15/2018  7:48 AM     Marina Jones 60 y o  male MRN: [id number]  Unit/Bed#: [address] [place] [address] Encounter: [id number]        Subjective: 59 y/o M pod#1 s/p L hip hemiarthroplasty  Doing well  No n/v   Pain controlled  Wanting to know what he can do with his hip       Vitals: Blood pressure 137/63, pulse 79, temperature 98 8 °F (37 1 °C), temperature source Oral, resp  rate 18, height 5' 6" (1 676 m), weight 57 kg (125 lb 10 6 oz), SpO2 98 %  ,Body mass index is 20 28 kg/m²         Intake/Output Summary (Last 24 hours) at 02/15/18 0749  Last data filed at 02/15/18 0730    Gross per 24 hour   Intake             1100 ml   Output              350 ml   Net              750 ml            Physical Exam:  left LE:  Dressing c/d/i  Up/down toes  SITLT m/l/d/p foot  BCRx5  Calf soft/nontender  Abduction pillow in place     Lab, Imaging and other studies:   I have personally reviewed pertinent lab results    CBC:         Lab Results   Component Value Date     WBC 7 67 02/15/2018     HGB 9 3 (L) 02/15/2018     HCT 27 9 (L) 02/15/2018     MCV 89 02/15/2018      02/15/2018     MCH 29 5 02/15/2018     MCHC 33 3 02/15/2018     RDW 14 9 02/15/2018     MPV 9 6 02/15/2018     CMP:         Lab Results   Component Value Date      02/15/2018      02/15/2018     CO2 31 02/15/2018     ANIONGAP 6 02/15/2018     BUN 19 02/15/2018     CREATININE 0 84 02/15/2018     GLUCOSE 105 02/15/2018     CALCIUM 7 8 (L) 02/15/2018     EGFR 93 02/15/2018          Assessment:   A 60 y o  y/o male  1 Day Post-Op  s/p left MARIE        Plan:  - posterior hip percautions  - PT/OT  - dispo planning  - dressing change tomorrow  - pain control  - lovenox for DVT prophylaxis  - Weight bearing: WBAT RUE and LLE  - ABLA- follow H/H, drop >2 5 Hgb- no transfusion unless symptomatic  - nutrition consult for malnutrition

## 2018-02-15 NOTE — PROGRESS NOTES
Patient wallet fell out of belongings bag and money was found in wallet  Asked patient if he would like money put in safe and patient was agreeable  Gabriella Calix, RN and this RN at bedside to account for money in wallet  Money sent with security

## 2018-02-15 NOTE — PHYSICAL THERAPY NOTE
PT eval/tx   02/15/18 0910   Note Type   Note type Eval/Treat   Pain Assessment   Pain Assessment Linder-Baker FACES   Linder-Baker FACES Pain Rating 6   Pain Location Hip;Rib cage   Pain Orientation Left; Anterior   Home Living   Type of Home Apartment  (second floor)   Home Layout Able to live on main level with bedroom/bathroom   Home Equipment Walker;Sock aid;Long-handled shoehorn;Reacher   Prior Function   Level of [place] Independent with ADLs and functional mobility; Needs assistance with IADLs   Lives With Alone   Receives Help From Home health   ADL Assistance Independent   IADLs Needs assistance   Falls in the last 6 months 1 to 4   Vocational Retired   Comments just home from rehab 1 1/2 weeks fell fx L hip   Restrictions/Precautions   Weight Bearing Precautions Per Order Yes   RUE Weight Bearing Per Order WBAT   LLE Weight Bearing Per Order WBAT   General   Additional Pertinent History old R shldr inkury, healing R inf and sup pubic ramus fx, severe kyphosis, malnutition   Family/Caregiver Present No   Cognition   Overall Cognitive Status WFL   Arousal/Participation Alert   Orientation Level Oriented X4   Memory Within functional limits   Following Commands Follows all commands and directions without difficulty   RUE Assessment   RUE Assessment WFL   LUE Assessment   LUE Assessment WFL   RLE Assessment   RLE Assessment WFL   LLE Assessment   LLE Assessment (rom afl of hip precautions strength 3-/5)   Coordination   Movements are Fluid and Coordinated 1   Proprioception   RLE Proprioception Grossly intact   LLE Proprioception Grossly Intact   Bed Mobility   Supine to Sit 3  Moderate assistance   Additional items Assist x 2   Transfers   Sit to Stand 4  Minimal assistance   Additional items Assist x 2   Stand to Sit 4  Minimal assistance   Additional items Assist x 1   Stand pivot 4  Minimal assistance   Additional items Assist x 2   Ambulation/Elevation   Gait pattern Narrow DANIELLE; Forward Flexion; Inconsistent long;Decreased L stance; Short stride   Gait Assistance 4  Minimal assist   Additional items Assist x 1;Assist x 2   Assistive Device Rolling walker   Distance 6'   Wheelchair Activities   Wheelchair Cushion (bed pillow)   Balance   Static Sitting Good   Dynamic Sitting Fair   Static Standing Fair -   Dynamic Standing Fair -   Ambulatory Fair -   Endurance Deficit   Endurance Deficit No   Activity Tolerance   Activity Tolerance Patient tolerated treatment well   Nurse Made Aware yes 02 sat 91% on room after mobilizing   Assessment   Prognosis Good   Assessment Pt presents with impaired strength, transfer, gait  and balance s/p fall with L hip fx and hemiarthroplasty    Can benefit from skilled PT services to regain safe ind funcitonal mobility  Tx initiated afte eval for THR precautions adn trnasfer training with rw, requires nearly 100% v cues for correst walker management, wb and sequence  Will require in-pt rehab atd/c to recover ind funciton for home d/c  Will follow see goals  Barriers to Discharge Decreased caregiver support; Inaccessible home environment   Goals   Patient Goals get better  do what ever you say   STG Expiration Date 02/25/18   Short Term Goal #1 1) improve strength 1/2 grade L le 2) safe ind trnasfers with rw 3) safe ind amb with rw 150' level no sob 5) up/down 5 steps with rail  Plan   Treatment/Interventions ADL retraining;Functional transfer training;LE strengthening/ROM; Elevations; Therapeutic exercise;Patient/family training;Equipment eval/education; Bed mobility;Gait training; Endurance training;Spoke to nursing;Spoke to case management;OT   PT Frequency 5x/wk; Twice a day   Recommendation   Recommendation Short-term skilled PT   Equipment Recommended Walker   Modified [place] Scale   Modified [place] Scale 4   Barthel Index   Feeding 5   Bathing 0   Grooming Score 5   Dressing Score 5   Bladder Score 10   Bowels Score 10   Toilet Use Score 5   Transfers (Bed/Chair) Score 5   Mobility (Level Surface) Score 0   Stairs Score 0   Barthel Index Score 45   Dora Clemons, PT

## 2018-02-15 NOTE — OCCUPATIONAL THERAPY NOTE
Occupational Therapy Evaluation      [facility]    Patient Active Problem List   Diagnosis    Osteoporosis    Kyphosis    Severe protein-calorie malnutrition (HCC)    Continuous opioid dependence ([address] )    Paroxysmal a-fib (HCC)    Bilateral lower extremity edema    BPH (benign prostatic hyperplasia)    Closed fracture of neck of left femur (HCC)    Hypokalemia    Essential hypertension       Past Medical History:   Diagnosis Date    History of spinal fracture     Kyphosis     Loss of appetite     Loss of weight     Osteoarthritis     Osteoporosis        Past Surgical History:   Procedure Laterality Date    FINGER SURGERY      LUNG DECORTICATION Left 11/15/2017    Procedure: VATS DECORTICATION LUNG;  Surgeon: Glori Fothergill, MD;  [facility];  Service: Thoracic    [place] PARTIAL HIP REPLACEMENT Left 2/14/2018    Procedure: HEMIARTHROPLASTY HIP (BIPOLAR);   Surgeon: Pia Black MD;  Location:  MAIN OR;  Service: Orthopedics    SHOULDER DEBRIDEMENT        02/15/18 0926   Note Type   Note type Eval only   Restrictions/Precautions   Weight Bearing Precautions Per Order Yes   RUE Weight Bearing Per Order WBAT   LLE Weight Bearing Per Order WBAT   Pain Assessment   Pain Assessment FLACC   Pain Location Hip;Rib cage   Pain Orientation Left   Pain Rating: FLACC (Rest) - Face 0   Pain Rating: FLACC (Rest) - Legs 0   Pain Rating: FLACC (Rest) - Activity 0   Pain Rating: FLACC (Rest) - Cry 0   Pain Rating: FLACC (Rest) - Consolability 0   Score: FLACC (Rest) 0   Pain Rating: FLACC (Activity) - Face 1   Pain Rating: FLACC (Activity) - Legs 1   Pain Rating: FLACC (Activity) - Activity 1   Pain Rating: FLACC (Activity) - Cry 1   Pain Rating: FLACC (Activity) - Consolability 1   Score: FLACC (Activity) 5   Home Living   Type of Home Apartment   Home Layout Stairs to enter with rails  (2nd floor apartment )   Bathroom Shower/Tub Tub/shower unit   Prior Function   Level of [place] Independent with ADLs and functional mobility  (has been in str recently )   Lives With [address]   IADLs Needs assistance   Falls in the last 6 months 1 to 4   Vocational Retired   Lifestyle   Autonomy Patient reports he is normally I with aDLs and iadls, was recently discharged home from snf/rehab and was getting [email or web address] services    Reports he was unable to cook for himself so he ordered take out   Reciprocal Relationships Reports limited social support    Service to Others Retired     Psychosocial   Psychosocial (WDL) WDL   Subjective   Subjective "if you give me time to help myself move, I think i can"   ADL   Eating Assistance 6  Modified independent   Grooming Assistance 4  Minimal Assistance   UB Bathing Assistance 3  Moderate Assistance   LB Bathing Assistance 2  Maximal Assistance   UB Dressing Assistance 3  Moderate Assistance   LB Dressing Assistance 2  Maximal [address]  2  Maximal Assistance   Bed Mobility   Supine to Sit 3  Moderate assistance   Additional items Assist x 2   Transfers   Sit to Stand 4  Minimal assistance   Additional items Assist x 2   Stand to Sit 4  Minimal assistance   Additional items Assist x 2   Stand pivot 4  Minimal assistance   Additional items Assist x 2   Balance   Static Sitting Fair +   Dynamic Sitting Fair   Static Standing Fair -   Dynamic Standing Fair -   Activity Tolerance   Activity Tolerance Patient limited by fatigue;Patient limited by pain  (Activity Tolerance: fair- )   Medical Staff Made Aware OT spoke with Hiram Lundberg, PT    RUE Assessment   RUE Assessment WFL  (arthritic changes bues)   LUE Assessment   LUE Assessment WFL  (arthritic changes bues )   Hand Function   Gross Motor Coordination Functional   Fine Motor Coordination Functional   Sensation   Light Touch No apparent deficits   Vision-Basic Assessment   Current Vision Wears glasses all the time   Cognition Overall Cognitive Status WFL   Arousal/Participation Alert   Attention Within functional limits   Orientation Level Oriented X4   Memory Within functional limits   Following Commands Follows all commands and directions without difficulty   Assessment   Limitation Decreased ADL status; Decreased UE strength;Decreased Safe judgement during ADL;Decreased endurance;Decreased self-care trans;Decreased high-level ADLs   Prognosis Good   Assessment Patient is a 61 y/o male with hx of osteoporosis and kyophosis who underwent a L bipolar hemiarthroplasty s/p fall at home  Patient is alert and oriented, reports living home alone in 2nd floor apartment but has limited social support PTA  Patient also reports recent discharge home from rehab, reports he was having difficulty with managing at home due to weakness  Patient currently presents with decreased activity tolerance, decreased standing balance and  decreased awarness of THPS impacting occupational performance in areas of bathing,  dressing, aDL transfers, toileting and functional mobility  From OT standpoint, anticipate need for in-patient rehab before dischage home  will continue to follow for OT services to progress ADL skills to highest level of independence and safety with least amount of caregiver assitance  Goals   Patient Goals "i will do whatever you tell me, you are the expert"   LTG Time Frame 3-7   Long Term Goal #1 1  Patient will increase bed mobility to sba level; 2  Patient will increase toileting to min A x 1 with bs commode; 3   pAtient will increase LB ADL skills to min A level with aE and good carryover of thPs, 4  Patient will increase activity tolerance to G for ADL activities seated EOB ; 5   Patient will increase ub bathing/dressing to mod i level seated EOB orOOB    Plan   Treatment Interventions ADL retraining;Functional transfer training; Endurance training;Patient/family training   Goal Expiration Date 02/22/18   OT Frequency 3-5x/wk   Recommendation   Recommendation PT consult   OT Discharge Recommendation Short Term Rehab   OT - OK to Discharge Yes   Barthel Index   Feeding 5   Bathing 0   Grooming Score 0   Dressing Score 0   Bladder Score 0   Bowels Score 0   Toilet Use Score 0   Transfers (Bed/Chair) Score 0   Mobility (Level Surface) Score 0   Stairs Score 0   Barthel Index Score 5   Modified [place] Scale   Modified [place] Scale 4   Gina Chang, OT

## 2018-02-15 NOTE — PLAN OF CARE
Problem: PHYSICAL THERAPY ADULT  Goal: Performs mobility at highest level of function for planned discharge setting  See evaluation for individualized goals  Treatment/Interventions: ADL retraining, Functional transfer training, LE strengthening/ROM, Elevations, Therapeutic exercise, Patient/family training, Equipment eval/education, Bed mobility, Gait training, Endurance training, Spoke to nursing, Spoke to case management, OT  Equipment Recommended: Uriah Ga       See flowsheet documentation for full assessment, interventions and recommendations  Outcome: Progressing  Prognosis: Good     Assessment: Pt presents with impaired strength, transfer, gait  and balance s/p fall with L hip fx and hemiarthroplasty    Can benefit from skilled PT services to regain safe ind funcitonal mobility  Tx initiated afte eval for THR precautions adn trnasfer training with rw, requires nearly 100% v cues for correst walker management, wb and sequence  Will require in-pt rehab atd/c to recover ind funciton for home d/c  Will follow see goals  Barriers to Discharge: Decreased caregiver support, Inaccessible home environment     Recommendation: Short-term skilled PT          See flowsheet documentation for full assessment

## 2018-02-15 NOTE — PLAN OF CARE
Problem: PHYSICAL THERAPY ADULT  Goal: Performs mobility at highest level of function for planned discharge setting  See evaluation for individualized goals  Treatment/Interventions: ADL retraining, Functional transfer training, LE strengthening/ROM, Therapeutic exercise, Endurance training, Patient/family training, Equipment eval/education, Bed mobility, Gait training  Equipment Recommended: Jacque Nails       See flowsheet documentation for full assessment, interventions and recommendations  Outcome: Progressing  Prognosis: Good  Problem List: Decreased strength, Decreased endurance, Impaired balance, Decreased mobility, Decreased coordination, Orthopedic restrictions  Assessment: Good toleeance for ther ex, familiar with some from prior rehab  REveiwed THR precautions  SCDS reapplied after session needs in reach  Will ocntinue to progress as bennie  Continue to recommend in-pt rehab atd/c  Barriers to Discharge: Decreased caregiver support, Inaccessible home environment     Recommendation: Short-term skilled PT     PT - OK to Discharge: Yes    See flowsheet documentation for full assessment

## 2018-02-15 NOTE — PROGRESS NOTES
Progress Note - Orthopedics   Andre Finney 60 y o  male MRN: [id number]  Unit/Bed#: [address] [place] [id number] Encounter: [id number]      Subjective: 61 y/o M pod#1 s/p L hip hemiarthroplasty  Doing well  No n/v   Pain controlled  Wanting to know what he can do with his hip  Vitals: Blood pressure 137/63, pulse 79, temperature 98 8 °F (37 1 °C), temperature source Oral, resp  rate 18, height 5' 6" (1 676 m), weight 57 kg (125 lb 10 6 oz), SpO2 98 %  ,Body mass index is 20 28 kg/m²  Intake/Output Summary (Last 24 hours) at 02/15/18 0749  Last data filed at 02/15/18 0730   Gross per 24 hour   Intake             1100 ml   Output              350 ml   Net              750 ml         Physical Exam:  left LE:  Dressing c/d/i  Up/down toes  SITLT m/l/d/p foot  BCRx5  Calf soft/nontender  Abduction pillow in place    Lab, Imaging and other studies:   I have personally reviewed pertinent lab results    CBC:   Lab Results   Component Value Date    WBC 7 67 02/15/2018    HGB 9 3 (L) 02/15/2018    HCT 27 9 (L) 02/15/2018    MCV 89 02/15/2018     02/15/2018    MCH 29 5 02/15/2018    MCHC 33 3 02/15/2018    RDW 14 9 02/15/2018    MPV 9 6 02/15/2018     CMP:   Lab Results   Component Value Date     02/15/2018     02/15/2018    CO2 31 02/15/2018    ANIONGAP 6 02/15/2018    BUN 19 02/15/2018    CREATININE 0 84 02/15/2018    GLUCOSE 105 02/15/2018    CALCIUM 7 8 (L) 02/15/2018    EGFR 93 02/15/2018           Assessment:   A 60 y o  y/o male  1 Day Post-Op  s/p left MARIE      Plan:  - posterior hip percautions  - PT/OT  - dispo planning  - dressing change tomorrow  - pain control  - lovenox for DVT prophylaxis  - Weight bearing: WBAT RUE and LLE  - ABLA- follow H/H, drop >2 5 Hgb- no transfusion unless symptomatic  - nutrition consult for malnutrition        Elisa Guzmán MD

## 2018-02-15 NOTE — PROGRESS NOTES
Progress Note - Nellie Armando 60 y o  male MRN: [id number]    Unit/Bed#: [address] [place] [address] [facility] Encounter: [id number]      Assessment:  Principal Problem:    Closed fracture of neck of left femur (HCC)  Active Problems:    Osteoporosis    Kyphosis    Paroxysmal a-fib (HCC)    Bilateral lower extremity edema    BPH (benign prostatic hyperplasia)    Essential hypertension    Protein-calorie malnutrition, moderate (HCC)    Continuous opioid dependence (HCC)    Decubitus ulcer of back, stage 2  Resolved Problems:    Hypokalemia    Severe protein-calorie malnutrition (HCC)        Plan:  · Left femur fracture-will transfer to surgery service-add incentive spirometry  · Chest wall pain on the left he had reported feeling a pop sensation with recent fall  He is now tender on the costochondral area initial chest x-ray does not note any history of fracture  · Anemia-will repeat in the a m   · PAF-remains in sinus rhythm  · Emphysema changes on chest x-ray  · Decubitus ulcers-wound care as per nursing protocols- has been up chronic problem  · Malnutrition-patient's appetite is decreased and will have Nutrition see him for supplements    Subjective:   Patient complains of some left-sided rib tenderness which he relates to previous falls  Has osteopenia and kyphosis on his chest x-ray  Hip pain appears to be fairly well controlled  ROS  Comprehensive system review negative other than noted above    Objective:     Vitals: Blood pressure 119/66, pulse 77, temperature 97 8 °F (36 6 °C), temperature source Oral, resp  rate 18, height 5' 6" (1 676 m), weight 62 kg (136 lb 11 oz), SpO2 96 %  ,Body mass index is 22 06 kg/m²    Current Facility-Administered Medications   Medication Dose Route Frequency Provider Last Rate Last Dose    acetaminophen (TYLENOL) tablet 650 mg  650 mg Oral Q6H PRN Karin Ross MD        acetaminophen (TYLENOL) tablet 650 mg  650 mg Oral Q6H PRN Abel Hurtado PA-C        aluminum-magnesium hydroxide-simethicone (MYLANTA) 200-200-20 mg/5 mL oral suspension 15 mL  15 mL Oral Q6H PRN Tod Burrell PA-C        amiodarone tablet 200 mg  200 mg Oral Daily Suzan Flowers MD   200 mg at 02/15/18 1390    bisacodyl (DULCOLAX) rectal suppository 10 mg  10 mg Rectal Daily PRN Tod Burrell PA-C        calcium carbonate (TUMS) chewable tablet 1,000 mg  1,000 mg Oral Daily PRN Suzan Flowers MD        diazepam (VALIUM) tablet 5 mg  5 mg Oral HS Suzan Flowers MD   5 mg at 02/14/18 2153    diltiazem (CARDIZEM CD) 24 hr capsule 120 mg  120 mg Oral Daily Suzan Flowers MD   120 mg at 02/15/18 1862    docusate sodium (COLACE) capsule 100 mg  100 mg Oral BID Tod Burrell PA-C   100 mg at 02/15/18 0941    enoxaparin (LOVENOX) subcutaneous injection 40 mg  40 mg Subcutaneous Daily Tod Burrell PA-C   40 mg at 02/15/18 0941    [START ON 2/16/2018] fentaNYL (DURAGESIC) 50 mcg/hr TD 72 hr patch 1 patch  1 patch Transdermal Q48H MARY Lewis        furosemide (LASIX) tablet 20 mg  20 mg Oral BID Suzan Flowers MD   20 mg at 02/15/18 9032    gabapentin (NEURONTIN) capsule 100 mg  100 mg Oral TID Suzan Flowers MD   100 mg at 02/14/18 2153    HYDROmorphone (DILAUDID) injection 0 2 mg  0 2 mg Intravenous Q3H PRN Suzan Flowers MD   0 2 mg at 02/14/18 1833    HYDROmorphone (DILAUDID) injection 1 mg  1 mg Intravenous Q3H PRN Andi Varghese MD   1 mg at 02/15/18 1108    lactated ringers infusion  75 mL/hr Intravenous Continuous Tod Burrell PA-C 75 mL/hr at 02/14/18 1828 75 mL/hr at 02/14/18 1828    ondansetron (ZOFRAN) injection 4 mg  4 mg Intravenous Q6H PRN Suzan Flowers MD        oxyCODONE (ROXICODONE) IR tablet 10 mg  10 mg Oral Q6H PRN Tod Burrell PA-C   10 mg at 02/15/18 0551    oxyCODONE-acetaminophen (PERCOCET) 5-325 mg per tablet 1 tablet  1 tablet Oral Q4H PRN Tod Burrell PA-C        oxyCODONE-acetaminophen (PERCOCET) 5-325 mg per tablet 2 tablet  2 tablet Oral Q6H PRN Suzan Flowers MD   2 tablet at 02/15/18 0948    pantoprazole (PROTONIX) EC tablet 40 mg  40 mg Oral Daily SAEED Choi-C   40 mg at 02/15/18 0941    polyethylene glycol (MIRALAX) packet 17 g  17 g Oral Daily James Lagunas MD   17 g at 02/15/18 0939    potassium chloride (K-DUR,KLOR-CON) CR tablet 20 mEq  20 mEq Oral Daily James Lagunas MD   20 mEq at 02/15/18 9865    senna (SENOKOT) tablet 8 6 mg  1 tablet Oral Daily Joaquín Traylor PA-C   8 6 mg at 02/15/18 0941    simethicone (MYLICON) chewable tablet 80 mg  80 mg Oral 4x Daily PRN Joaquín Traylor PA-C        tamsulosin [facility]) capsule 0 4 mg  0 4 mg Oral Daily With Anette Baron MD   0 4 mg at 02/14/18 1833    traMADol (ULTRAM) tablet 50 mg  50 mg Oral Q6H [address] SAEED Choi-C   50 mg at 02/15/18 9617     Prescriptions Prior to Admission   Medication    amiodarone 200 mg tablet    diazepam (VALIUM) 5 mg tablet    diltiazem (CARDIZEM CD) 120 mg 24 hr capsule    fentaNYL (DURAGESIC) 50 mcg/hr    furosemide (LASIX) 20 mg tablet    HYDROcodone-acetaminophen (VICODIN HP)  MG per tablet    metoprolol tartrate (LOPRESSOR) 50 mg tablet    oxyCODONE-acetaminophen (PERCOCET)  mg per tablet    oxyCODONE-acetaminophen (PERCOCET)  MG per tablet    oxyCODONE-acetaminophen (PERCOCET) 5-325 mg per tablet    potassium chloride (K-DUR,KLOR-CON) 20 mEq tablet    tamsulosin (FLOMAX) 0 4 mg    traMADol-acetaminophen (ULTRACET) 37 5-325 mg per tablet    acetaminophen (TYLENOL) 325 mg tablet    gabapentin (NEURONTIN) 100 mg capsule         Intake/Output Summary (Last 24 hours) at 02/15/18 1242  Last data filed at 02/15/18 0730   Gross per 24 hour   Intake             1100 ml   Output              800 ml   Net              300 ml       Physical Exam:  General appearance: alert and mild distress  Neck: no adenopathy, no carotid bruit, no JVD, supple, symmetrical, trachea midline and thyroid not enlarged, symmetric, no tenderness/mass/nodules  Lungs: clear to auscultation bilaterally some chest wall tenderness at the costochondral joints at  4 in 5  Heart: regular rate and rhythm, S1, S2 normal, no murmur, click, rub or gallop  Abdomen: soft, non-tender; bowel sounds normal; no masses,  no organomegaly  Extremities: extremities normal, warm and well-perfused; no cyanosis, clubbing, or edema  Skin: Skin color, texture, turgor normal  No rashes or lesions  Neurologic: Grossly normal       Lab, Imaging and other studies: I have personally reviewed pertinent reports  Results from last 7 days  Lab Units 02/15/18  0447 02/14/18  0650   WBC Thousand/uL 7 67 10 98*   HEMOGLOBIN g/dL 9 3* 13 2   HEMATOCRIT % 27 9* 39 5   PLATELETS Thousands/uL 236 298   NEUTROS PCT %  --  80*   LYMPHS PCT %  --  14   MONOS PCT %  --  6   EOS PCT %  --  0       Results from last 7 days  Lab Units 02/15/18  0447 02/14/18  0650   SODIUM mmol/L 137 138   POTASSIUM mmol/L 3 8 3 2*   CHLORIDE mmol/L 100 101   CO2 mmol/L 31 28   BUN mg/dL 19 17   CREATININE mg/dL 0 84 0 83   CALCIUM mg/dL 7 8* 9 3   GLUCOSE RANDOM mg/dL 105 129     Lab Results   Component Value Date    TROPONINI <0 02 02/14/2018    TROPONINI <0 02 12/21/2017    TROPONINI <0 02 11/06/2017       Results from last 7 days  Lab Units 02/14/18  0650   INR  1 10     Lab Results   Component Value Date    BLOODCX No Growth After 5 Days  12/20/2017    BLOODCX No Growth After 5 Days  12/20/2017    BLOODCX No Growth After 5 Days  11/06/2017    SPUTUMCULTUR 2+ Growth of  11/06/2017       Imaging:  Results for orders placed during the hospital encounter of 02/14/18   XR chest portable    Narrative CHEST     INDICATION: Fall  Chest pain  COMPARISON:  December 20, 2017  EXAM PERFORMED/VIEWS:  XR CHEST PORTABLE    IMAGES:  2    FINDINGS:    Heart shadow is enlarged but unchanged from prior exam     No acute infiltrates  Small costophrenic angle effusions appear smaller when compared with the previous examination    Again noted are emphysematous changes with biapical pleural-parenchymal scarring  Osseous structures are osteopenic and kyphotic deformity is noted  No acute osseous abnormality  Impression Cardiomegaly, unchanged  Emphysematous changes are noted  No evidence of pneumonia  Workstation performed: [id number]       Results for orders placed during the hospital encounter of 12/20/17   XR chest 2 views    Narrative CHEST     INDICATION:  Recent PNA  COMPARISON:  11/22/2017  VIEWS:  Frontal and lateral projections    IMAGES:  4    FINDINGS:         Cardiomediastinal silhouette appears unremarkable  No acute infiltrates  Persistent blunting of the bilateral costophrenic angles consistent with small effusions versus pleural thickening  Visualized osseous structures appear within normal limits for the patient's age  Impression No acute pulmonary disease  Stable blunting of the bilateral costophrenic angles  Workstation performed: [id number]         PATIENT CENTERED ROUNDS: I have performed rounds with the nursing staff            Ciro Mg DO

## 2018-02-15 NOTE — PHYSICAL THERAPY NOTE
PT tx     02/15/18 1507   Pain Assessment   Pain Assessment 0-10   Pain Score 5   Pain Type Chronic pain   Pain Location Rib cage   Pain Orientation Anterior   Pain Descriptors Aching   Precautions   Total Hip Replacement ADduction; Internal rotation; Flexion   Restrictions/Precautions   LLE Weight Bearing Per Order WBAT   General   Chart Reviewed Yes   Cognition   Overall Cognitive Status WFL   Orientation Level Oriented X4   Subjective   Subjective FEels ok  needs to stand to urinate   Bed Mobility   Sit to Supine 2  Maximal assistance   Additional items Assist x 1   Transfers   Sit to Stand 4  Minimal assistance   Additional items Assist x 1;Verbal cues; Increased time required   Stand to Sit 4  Minimal assistance   Additional items Assist x 1;Verbal cues; Increased time required   Stand pivot 4  Minimal assistance   Additional items Assist x 1; Increased time required;Verbal cues   Ambulation/Elevation   Gait pattern Narrow DANIELLE; Forward Flexion;Decreased foot clearance; Inconsistent long; Short stride; Excessively slow   Gait Assistance 4  Minimal assist   Additional items Assist x 1   Assistive Device Rolling walker   Distance 5'   Balance   Static Sitting Fair   Dynamic Sitting Fair -   Static Standing Fair   Dynamic Standing Fair  (stood x 5 min to urinate)   Ambulatory Fair   Endurance Deficit   Endurance Deficit No   Activity Tolerance   Activity Tolerance Patient tolerated treatment well   Nurse Made Aware yes   Exercises   THR Supine;10 reps; Left;AAROM   Balance training  stood 5 min to urinate wiht rw   Assessment   Prognosis Good   Problem List Decreased strength;Decreased endurance; Impaired balance;Decreased mobility; Decreased coordination;Orthopedic restrictions   Assessment Good toleeance for ther ex, familiar with some from prior rehab  REveiwed THR precautions  SCDS reapplied after session needs in reach  Will ocntinue to progress as bennie   Continue to recommend in-pt rehab atd/c   Goals   Patient Goals get better   Plan   Treatment/Interventions ADL retraining;Functional transfer training;LE strengthening/ROM; Therapeutic exercise; Endurance training;Patient/family training;Equipment eval/education; Bed mobility;Gait training   Progress Progressing toward goals   PT Frequency 5x/wk; Twice a day   Recommendation   Recommendation Short-term skilled PT   Equipment Recommended Walker   PT - OK to Discharge Yes   Additional Comments (to STR)   Lindsay Vicente, PT

## 2018-02-16 PROBLEM — D62 ACUTE BLOOD LOSS ANEMIA: Status: ACTIVE | Noted: 2018-02-16

## 2018-02-16 LAB
ANION GAP SERPL CALCULATED.3IONS-SCNC: 6 MMOL/L (ref 4–13)
BUN SERPL-MCNC: 18 MG/DL (ref 5–25)
CALCIUM SERPL-MCNC: 8.1 MG/DL (ref 8.3–10.1)
CHLORIDE SERPL-SCNC: 100 MMOL/L (ref 100–108)
CO2 SERPL-SCNC: 28 MMOL/L (ref 21–32)
CREAT SERPL-MCNC: 0.58 MG/DL (ref 0.6–1.3)
ERYTHROCYTE [DISTWIDTH] IN BLOOD BY AUTOMATED COUNT: 14.9 % (ref 11.6–15.1)
GFR SERPL CREATININE-BSD FRML MDRD: 109 ML/MIN/1.73SQ M
GLUCOSE SERPL-MCNC: 109 MG/DL (ref 65–140)
HCT VFR BLD AUTO: 26.3 % (ref 36.5–49.3)
HGB BLD-MCNC: 8.8 G/DL (ref 12–17)
MCH RBC QN AUTO: 29.6 PG (ref 26.8–34.3)
MCHC RBC AUTO-ENTMCNC: 33.5 G/DL (ref 31.4–37.4)
MCV RBC AUTO: 89 FL (ref 82–98)
PLATELET # BLD AUTO: 202 THOUSANDS/UL (ref 149–390)
PMV BLD AUTO: 9 FL (ref 8.9–12.7)
POTASSIUM SERPL-SCNC: 4.1 MMOL/L (ref 3.5–5.3)
RBC # BLD AUTO: 2.97 MILLION/UL (ref 3.88–5.62)
SODIUM SERPL-SCNC: 134 MMOL/L (ref 136–145)
WBC # BLD AUTO: 9.43 THOUSAND/UL (ref 4.31–10.16)

## 2018-02-16 PROCEDURE — 80048 BASIC METABOLIC PNL TOTAL CA: CPT | Performed by: PHYSICIAN ASSISTANT

## 2018-02-16 PROCEDURE — 97110 THERAPEUTIC EXERCISES: CPT

## 2018-02-16 PROCEDURE — 99232 SBSQ HOSP IP/OBS MODERATE 35: CPT | Performed by: INTERNAL MEDICINE

## 2018-02-16 PROCEDURE — 99024 POSTOP FOLLOW-UP VISIT: CPT | Performed by: ORTHOPAEDIC SURGERY

## 2018-02-16 PROCEDURE — 85027 COMPLETE CBC AUTOMATED: CPT | Performed by: PHYSICIAN ASSISTANT

## 2018-02-16 PROCEDURE — 97530 THERAPEUTIC ACTIVITIES: CPT

## 2018-02-16 PROCEDURE — 97116 GAIT TRAINING THERAPY: CPT

## 2018-02-16 RX ADMIN — DIAZEPAM 5 MG: 5 TABLET ORAL at 21:16

## 2018-02-16 RX ADMIN — TAMSULOSIN HYDROCHLORIDE 0.4 MG: 0.4 CAPSULE ORAL at 17:41

## 2018-02-16 RX ADMIN — OXYCODONE HYDROCHLORIDE AND ACETAMINOPHEN 2 TABLET: 5; 325 TABLET ORAL at 08:53

## 2018-02-16 RX ADMIN — DOCUSATE SODIUM 100 MG: 100 CAPSULE, LIQUID FILLED ORAL at 08:53

## 2018-02-16 RX ADMIN — HYDROMORPHONE HYDROCHLORIDE 1 MG: 1 INJECTION, SOLUTION INTRAMUSCULAR; INTRAVENOUS; SUBCUTANEOUS at 01:39

## 2018-02-16 RX ADMIN — TRAMADOL HYDROCHLORIDE 50 MG: 50 TABLET, FILM COATED ORAL at 23:34

## 2018-02-16 RX ADMIN — GABAPENTIN 100 MG: 100 CAPSULE ORAL at 21:16

## 2018-02-16 RX ADMIN — FENTANYL 1 PATCH: 50 PATCH TRANSDERMAL at 08:52

## 2018-02-16 RX ADMIN — FUROSEMIDE 20 MG: 20 TABLET ORAL at 08:54

## 2018-02-16 RX ADMIN — GABAPENTIN 100 MG: 100 CAPSULE ORAL at 17:41

## 2018-02-16 RX ADMIN — TRAMADOL HYDROCHLORIDE 50 MG: 50 TABLET, FILM COATED ORAL at 11:53

## 2018-02-16 RX ADMIN — SENNOSIDES 8.6 MG: 8.6 TABLET, FILM COATED ORAL at 08:54

## 2018-02-16 RX ADMIN — OXYCODONE HYDROCHLORIDE AND ACETAMINOPHEN 2 TABLET: 5; 325 TABLET ORAL at 15:55

## 2018-02-16 RX ADMIN — TRAMADOL HYDROCHLORIDE 50 MG: 50 TABLET, FILM COATED ORAL at 17:41

## 2018-02-16 RX ADMIN — DOCUSATE SODIUM 100 MG: 100 CAPSULE, LIQUID FILLED ORAL at 17:41

## 2018-02-16 RX ADMIN — OXYCODONE HYDROCHLORIDE 10 MG: 5 TABLET ORAL at 19:26

## 2018-02-16 RX ADMIN — ENOXAPARIN SODIUM 40 MG: 40 INJECTION SUBCUTANEOUS at 08:54

## 2018-02-16 RX ADMIN — POTASSIUM CHLORIDE 20 MEQ: 20 TABLET, EXTENDED RELEASE ORAL at 08:54

## 2018-02-16 RX ADMIN — FUROSEMIDE 20 MG: 20 TABLET ORAL at 17:41

## 2018-02-16 RX ADMIN — TRAMADOL HYDROCHLORIDE 50 MG: 50 TABLET, FILM COATED ORAL at 00:40

## 2018-02-16 RX ADMIN — DILTIAZEM HYDROCHLORIDE 120 MG: 120 CAPSULE, COATED, EXTENDED RELEASE ORAL at 08:53

## 2018-02-16 RX ADMIN — TRAMADOL HYDROCHLORIDE 50 MG: 50 TABLET, FILM COATED ORAL at 05:42

## 2018-02-16 RX ADMIN — HYDROMORPHONE HYDROCHLORIDE 1 MG: 1 INJECTION, SOLUTION INTRAMUSCULAR; INTRAVENOUS; SUBCUTANEOUS at 11:53

## 2018-02-16 RX ADMIN — POLYETHYLENE GLYCOL 3350 17 G: 17 POWDER, FOR SOLUTION ORAL at 08:54

## 2018-02-16 RX ADMIN — AMIODARONE HYDROCHLORIDE 200 MG: 200 TABLET ORAL at 08:54

## 2018-02-16 RX ADMIN — PANTOPRAZOLE SODIUM 40 MG: 40 TABLET, DELAYED RELEASE ORAL at 06:04

## 2018-02-16 NOTE — PHYSICAL THERAPY NOTE
PT tx     02/16/18 1555   Pain Assessment   Pain Assessment 0-10   Pain Score 7   Pain Type Acute pain   Pain Location Rib cage   Pain Orientation Anterior   Precautions   Total Hip Replacement ADduction; Internal rotation; Flexion   Restrictions/Precautions   LLE Weight Bearing Per Order WBAT   General   Chart Reviewed Yes   Additional Pertinent History ate soup for lunch   Cognition   Overall Cognitive Status WFL   Orientation Level Oriented X4   Bed Mobility   Sit to Supine 3  Moderate assistance   Additional items Assist x 1;LE management; Increased time required   Transfers   Sit to Stand 6  Modified independent   Additional items Assist x 1   Stand to Sit 5  Supervision   Additional items Assist x 1;Verbal cues; Increased time required   Stand pivot 4  Minimal assistance   Additional items Assist x 1;Verbal cues;Armrests   Ambulation/Elevation   Gait pattern Narrow DANIELLE   Gait Assistance 4  Minimal assist   Additional items Assist x 1   Assistive Device Rolling walker   Distance 15'   Balance   Static Sitting Fair   Dynamic Sitting Fair -   Static Standing Fair   Dynamic Standing Fair   Ambulatory Fair   Endurance Deficit   Endurance Deficit No   Activity Tolerance   Activity Tolerance Patient tolerated treatment well   Nurse Made Aware yes   Exercises   THR Supine;10 reps;AAROM; Left   Balance training  stood 5 min to urinate with rw distant S   Assessment   Prognosis Good   Problem List Impaired balance;Decreased mobility; Decreased coordination;Decreased strength;Orthopedic restrictions   Assessment Pt progressing with gait trianing adn ther ex, Challenging to position upperbody due to kyphosis  Does well once up on feet  REady for rehab when medically cleared   Goals   Patient Goals getbetter   Plan   Treatment/Interventions ADL retraining;Functional transfer training;LE strengthening/ROM; Elevations; Therapeutic exercise; Endurance training;Cognitive reorientation;Patient/family training;Equipment eval/education; Bed mobility;Gait training;Continued evaluation;Spoke to MD;Spoke to nursing;Spoke to case management   Progress Progressing toward goals   PT Frequency 5x/wk; Twice a day   Recommendation   Recommendation Short-term skilled PT   Equipment Recommended Walker   PT - OK to Discharge Yes   Additional Comments (to STR)   Sayda Owens, PT

## 2018-02-16 NOTE — ASSESSMENT & PLAN NOTE
Patient's hemoglobin is 8 8 post surgery    Will monitor her acute blood loss anemia post left hip surgery

## 2018-02-16 NOTE — PROGRESS NOTES
Progress Note - Orthopedics   Andre Finney 60 y o  male MRN: [id number]  Unit/Bed#: [address] [place] [address] Encounter: [id number]      Subjective: 59 y/o M pod#2 s/p L hip hemiarthroplasty  Doing well  No n/v  Continues to c/o pain in the hip and chest       Vitals: Blood pressure 125/65, pulse 70, temperature 97 5 °F (36 4 °C), temperature source Tympanic, resp  rate 16, height 5' 6" (1 676 m), weight 58 kg (127 lb 13 9 oz), SpO2 99 %  ,Body mass index is 20 64 kg/m²  Intake/Output Summary (Last 24 hours) at 02/16/18 0814  Last data filed at 02/16/18 0516   Gross per 24 hour   Intake              900 ml   Output              400 ml   Net              500 ml         Physical Exam:  left LE:  Dressing c/d/i; dressing changed- incision c/d/i, no erythema  Up/down toes  SITLT m/l/d/p foot  BCRx5  Calf soft/nontender  Abduction pillow in place    Lab, Imaging and other studies:   I have personally reviewed pertinent lab results    CBC:   Lab Results   Component Value Date    WBC 9 43 02/16/2018    HGB 8 8 (L) 02/16/2018    HCT 26 3 (L) 02/16/2018    MCV 89 02/16/2018     02/16/2018    MCH 29 6 02/16/2018    MCHC 33 5 02/16/2018    RDW 14 9 02/16/2018    MPV 9 0 02/16/2018     CMP:   Lab Results   Component Value Date     (L) 02/16/2018     02/16/2018    CO2 28 02/16/2018    ANIONGAP 6 02/16/2018    BUN 18 02/16/2018    CREATININE 0 58 (L) 02/16/2018    GLUCOSE 109 02/16/2018    CALCIUM 8 1 (L) 02/16/2018    EGFR 109 02/16/2018           Assessment:   A 60 y o  y/o male  2 Day Post-Op  s/p left MARIE      Plan:  - posterior hip percautions  - PT/OT  - dispo planning  - dressing changed today  - pain control- d/c IV pain meds after today, discussed with patient  - lovenox for DVT prophylaxis  - Weight bearing: WBAT RUE and LLE  - ABLA- follow H/H, drop >2 5 Hgb- no transfusion unless symptomatic  - nutrition consult for malnutrition        Elisa Guzmán MD

## 2018-02-16 NOTE — PHYSICAL THERAPY NOTE
PT tx     02/16/18 1300   Pain Assessment   Pain Assessment 0-10   Pain Score 5   Pain Type Acute pain   Pain Location Rib cage   Pain Orientation Anterior   Precautions   Total Hip Replacement ADduction; Internal rotation; Flexion   Restrictions/Precautions   LLE Weight Bearing Per Order WBAT   General   Additional Pertinent History c/o stomach upset did eat a little breakfast   Cognition   Overall Cognitive Status WFL   Orientation Level Oriented X4   Subjective   Subjective Worried about paying his rent adn if his landlord knows he's here   Transfers   Sit to Stand 6  Modified independent   Additional items Assist x 1;Verbal cues   Stand to Sit 5  Supervision   Additional items Assist x 1;Verbal cues   Stand pivot 4  Minimal assistance   Additional items Assist x 1;Verbal cues; Increased time required   Ambulation/Elevation   Gait pattern Narrow DANIELLE; Decreased L stance; Inconsistent long; Short stride   Gait Assistance 4  Minimal assist   Additional items Assist x 1;Verbal cues; Tactile cues   Assistive Device Rolling walker   Distance 12'   Balance   Static Sitting Fair   Dynamic Sitting Fair -   Static Standing Fair   Dynamic Standing Fair   Ambulatory Fair   Activity Tolerance   Activity Tolerance Patient tolerated treatment well   Nurse Made Aware yes   Exercises   Knee AROM Long Arc Quad Sitting;10 reps;AROM; Right;Left   Balance training  stood x 5 min to urinate with rw   Assessment   Prognosis Good   Problem List Impaired balance;Decreased mobility   Assessment Pt able to stand with rw x 5 min to use utinal; Does well with gait for short diartance v cues for sequence and chair follow  Switched to elevated seat height chair to decrease strain no chestwall muscles during transfers  R lateral bolster provided for postural support while in chair  Will continue PT increase gait as bennie  Cont to recommend in-pt rehab atd/c     Goals   Patient Goals get better   Plan   Treatment/Interventions ADL retraining;Functional transfer training;LE strengthening/ROM; Therapeutic exercise; Endurance training;Cognitive reorientation;Patient/family training;Equipment eval/education; Bed mobility;Gait training;Spoke to nursing;Spoke to case management   Progress Progressing toward goals   PT Frequency 5x/wk; Twice a day   Recommendation   Recommendation Short-term skilled PT   Equipment Recommended Antonio Slaughter PT

## 2018-02-16 NOTE — DISCHARGE INSTRUCTIONS
1  WBAT to LLE with assistance  2  Continue PT/OT  3  Pain medication as needed  4  Continue DVT prophylaxis as prescribed  Lovenox for total of 28 days  5  May shower POD #4  Daily dressing change with gauze and tape  6  Follow up in office in 10-14 days

## 2018-02-16 NOTE — SOCIAL WORK
As per Dr Anna Pascual patient for discharge to [facility] on Saturday to continue rehab  He is approved for admission at [facility]  He has no BLS preference  Arranged [facility] to transport at 5:30 Saturday  Notified patient, Leigh, Admissions at [facility] and Deepika Van his nurse

## 2018-02-16 NOTE — PLAN OF CARE
Problem: PHYSICAL THERAPY ADULT  Goal: Performs mobility at highest level of function for planned discharge setting  See evaluation for individualized goals  Treatment/Interventions: ADL retraining, Functional transfer training, LE strengthening/ROM, Therapeutic exercise, Endurance training, Cognitive reorientation, Patient/family training, Equipment eval/education, Bed mobility, Gait training, Spoke to nursing, Spoke to case management  Equipment Recommended: Julio Benito       See flowsheet documentation for full assessment, interventions and recommendations  Outcome: Progressing  Prognosis: Good  Problem List: Impaired balance, Decreased mobility  Assessment: Pt able to stand with rw x 5 min to use utinal; Does well with gait for short diartance v cues for sequence and chair follow  Switched to elevated seat height chair to decrease strain no chestwall muscles during transfers  R lateral bolster provided for postural support while in chair  Will continue PT increase gait as bennie  Cont to recommend in-pt rehab atd/c   Barriers to Discharge: Decreased caregiver support, Inaccessible home environment     Recommendation: Short-term skilled PT     PT - OK to Discharge: Yes    See flowsheet documentation for full assessment

## 2018-02-16 NOTE — PLAN OF CARE
Problem: PHYSICAL THERAPY ADULT  Goal: Performs mobility at highest level of function for planned discharge setting  See evaluation for individualized goals  Treatment/Interventions: ADL retraining, Functional transfer training, LE strengthening/ROM, Therapeutic exercise, Endurance training, Cognitive reorientation, Patient/family training, Equipment eval/education, Bed mobility, Gait training, Spoke to nursing, Spoke to case management  Equipment Recommended: Bernabe Lee       See flowsheet documentation for full assessment, interventions and recommendations  Outcome: Progressing  Prognosis: Good  Problem List: Impaired balance, Decreased mobility, Decreased coordination, Decreased strength, Orthopedic restrictions  Assessment: Pt progressing with gait trianing adn ther ex, Challenging to position upperbody due to kyphosis  Does well once up on feet  REady for rehab when medically cleared  Barriers to Discharge: Decreased caregiver support, Inaccessible home environment     Recommendation: Short-term skilled PT     PT - OK to Discharge: Yes    See flowsheet documentation for full assessment

## 2018-02-16 NOTE — PROGRESS NOTES
Pt has Stage II pressure ulcer mid back, POA, in setting of severe kyphosis a/e/b nursing documentation-->partial thickness skin loss with surrounding blanchable erythema; two small spots > 0 5 cm requiring turning and repositioning, barrier cream prn and ongoing assessment

## 2018-02-16 NOTE — ASSESSMENT & PLAN NOTE
Stable postop  Will continue Lovenox for  DVT prevention  Patient is scheduled to be discharged to [facility] for rehab tomorrow at 5:30 p m

## 2018-02-16 NOTE — PROGRESS NOTES
I already documented Possible severe protein-calorie malnutrition in patient with severe kyphosis and chronic pain a/e/b 10# weight loss since 12/27/17, BMI 20 3, flaky skin, redness over bony prominences, trace peripheral edema (chronic) and mid back pressure ulcer per nursing requiring Nutrition consult (pending) and regular diet

## 2018-02-17 VITALS
RESPIRATION RATE: 16 BRPM | HEIGHT: 66 IN | BODY MASS INDEX: 20.55 KG/M2 | OXYGEN SATURATION: 91 % | DIASTOLIC BLOOD PRESSURE: 61 MMHG | WEIGHT: 127.87 LBS | TEMPERATURE: 98.4 F | HEART RATE: 71 BPM | SYSTOLIC BLOOD PRESSURE: 131 MMHG

## 2018-02-17 PROBLEM — L89.102 DECUBITUS ULCER OF BACK, STAGE 2 (HCC): Chronic | Status: RESOLVED | Noted: 2018-02-15 | Resolved: 2018-02-17

## 2018-02-17 PROBLEM — S72.002A CLOSED FRACTURE OF NECK OF LEFT FEMUR (HCC): Status: RESOLVED | Noted: 2018-02-14 | Resolved: 2018-02-17

## 2018-02-17 PROBLEM — R60.0 BILATERAL LOWER EXTREMITY EDEMA: Status: RESOLVED | Noted: 2017-12-20 | Resolved: 2018-02-17

## 2018-02-17 PROBLEM — D62 ACUTE BLOOD LOSS ANEMIA: Status: RESOLVED | Noted: 2018-02-16 | Resolved: 2018-02-17

## 2018-02-17 LAB
ANION GAP SERPL CALCULATED.3IONS-SCNC: 4 MMOL/L (ref 4–13)
BUN SERPL-MCNC: 20 MG/DL (ref 5–25)
CALCIUM SERPL-MCNC: 8.1 MG/DL (ref 8.3–10.1)
CHLORIDE SERPL-SCNC: 101 MMOL/L (ref 100–108)
CO2 SERPL-SCNC: 30 MMOL/L (ref 21–32)
CREAT SERPL-MCNC: 0.63 MG/DL (ref 0.6–1.3)
ERYTHROCYTE [DISTWIDTH] IN BLOOD BY AUTOMATED COUNT: 14.8 % (ref 11.6–15.1)
GFR SERPL CREATININE-BSD FRML MDRD: 105 ML/MIN/1.73SQ M
GLUCOSE SERPL-MCNC: 105 MG/DL (ref 65–140)
HCT VFR BLD AUTO: 24.4 % (ref 36.5–49.3)
HGB BLD-MCNC: 8.1 G/DL (ref 12–17)
MCH RBC QN AUTO: 29.7 PG (ref 26.8–34.3)
MCHC RBC AUTO-ENTMCNC: 33.2 G/DL (ref 31.4–37.4)
MCV RBC AUTO: 89 FL (ref 82–98)
PLATELET # BLD AUTO: 210 THOUSANDS/UL (ref 149–390)
PMV BLD AUTO: 9.3 FL (ref 8.9–12.7)
POTASSIUM SERPL-SCNC: 4.4 MMOL/L (ref 3.5–5.3)
RBC # BLD AUTO: 2.73 MILLION/UL (ref 3.88–5.62)
SODIUM SERPL-SCNC: 135 MMOL/L (ref 136–145)
WBC # BLD AUTO: 8.08 THOUSAND/UL (ref 4.31–10.16)

## 2018-02-17 PROCEDURE — 99238 HOSP IP/OBS DSCHRG MGMT 30/<: CPT | Performed by: INTERNAL MEDICINE

## 2018-02-17 PROCEDURE — 85027 COMPLETE CBC AUTOMATED: CPT | Performed by: PHYSICIAN ASSISTANT

## 2018-02-17 PROCEDURE — 99024 POSTOP FOLLOW-UP VISIT: CPT | Performed by: PHYSICIAN ASSISTANT

## 2018-02-17 PROCEDURE — 97116 GAIT TRAINING THERAPY: CPT

## 2018-02-17 PROCEDURE — 80048 BASIC METABOLIC PNL TOTAL CA: CPT | Performed by: PHYSICIAN ASSISTANT

## 2018-02-17 RX ORDER — OXYCODONE HYDROCHLORIDE AND ACETAMINOPHEN 5; 325 MG/1; MG/1
1 TABLET ORAL EVERY 4 HOURS PRN
Qty: 30 TABLET | Refills: 0 | Status: SHIPPED | OUTPATIENT
Start: 2018-02-17 | End: 2018-02-27

## 2018-02-17 RX ORDER — FENTANYL 50 UG/H
1 PATCH TRANSDERMAL
Qty: 5 PATCH | Refills: 0 | Status: SHIPPED | OUTPATIENT
Start: 2018-02-17 | End: 2018-02-27

## 2018-02-17 RX ORDER — DIAZEPAM 5 MG/1
5 TABLET ORAL
Qty: 15 TABLET | Refills: 0 | Status: SHIPPED | OUTPATIENT
Start: 2018-02-17 | End: 2018-05-21 | Stop reason: SDUPTHER

## 2018-02-17 RX ADMIN — OXYCODONE HYDROCHLORIDE AND ACETAMINOPHEN 2 TABLET: 5; 325 TABLET ORAL at 00:27

## 2018-02-17 RX ADMIN — DILTIAZEM HYDROCHLORIDE 120 MG: 120 CAPSULE, COATED, EXTENDED RELEASE ORAL at 08:50

## 2018-02-17 RX ADMIN — POLYETHYLENE GLYCOL 3350 17 G: 17 POWDER, FOR SOLUTION ORAL at 08:50

## 2018-02-17 RX ADMIN — AMIODARONE HYDROCHLORIDE 200 MG: 200 TABLET ORAL at 08:50

## 2018-02-17 RX ADMIN — ENOXAPARIN SODIUM 40 MG: 40 INJECTION SUBCUTANEOUS at 08:50

## 2018-02-17 RX ADMIN — OXYCODONE HYDROCHLORIDE 10 MG: 5 TABLET ORAL at 08:52

## 2018-02-17 RX ADMIN — OXYCODONE HYDROCHLORIDE AND ACETAMINOPHEN 2 TABLET: 5; 325 TABLET ORAL at 13:00

## 2018-02-17 RX ADMIN — PANTOPRAZOLE SODIUM 40 MG: 40 TABLET, DELAYED RELEASE ORAL at 06:04

## 2018-02-17 RX ADMIN — DOCUSATE SODIUM 100 MG: 100 CAPSULE, LIQUID FILLED ORAL at 08:50

## 2018-02-17 RX ADMIN — SENNOSIDES 8.6 MG: 8.6 TABLET, FILM COATED ORAL at 08:50

## 2018-02-17 RX ADMIN — POTASSIUM CHLORIDE 20 MEQ: 20 TABLET, EXTENDED RELEASE ORAL at 08:50

## 2018-02-17 RX ADMIN — TRAMADOL HYDROCHLORIDE 50 MG: 50 TABLET, FILM COATED ORAL at 06:04

## 2018-02-17 RX ADMIN — OXYCODONE HYDROCHLORIDE 10 MG: 5 TABLET ORAL at 01:54

## 2018-02-17 RX ADMIN — FUROSEMIDE 20 MG: 20 TABLET ORAL at 08:50

## 2018-02-17 RX ADMIN — GABAPENTIN 100 MG: 100 CAPSULE ORAL at 08:50

## 2018-02-17 NOTE — POST OP PROGRESS NOTES
Orthopedics   Shashi Deem 60 y o  male MRN: [id number]  Unit/Bed#: [address] [place] 216-01      Subjective:  62 y o male post operative day 3 left hip hemiarthroplasty  Pt doing well  Pain controlled      Labs:    0  Lab Value Date/Time   HCT 24 4 (L) 02/17/2018 0549   HCT 26 3 (L) 02/16/2018 0525   HCT 27 9 (L) 02/15/2018 0447   HGB 8 1 (L) 02/17/2018 0549   HGB 8 8 (L) 02/16/2018 0525   HGB 9 3 (L) 02/15/2018 0447   INR 1 10 02/14/2018 0650   WBC 8 08 02/17/2018 0549   WBC 9 43 02/16/2018 0525   WBC 7 67 02/15/2018 0447       Meds:    Current Facility-Administered Medications:     acetaminophen (TYLENOL) tablet 650 mg, 650 mg, Oral, Q6H PRN, Nadeen Gowers, MD    acetaminophen (TYLENOL) tablet 650 mg, 650 mg, Oral, Q6H PRN, Smooth Méndez PA-C    aluminum-magnesium hydroxide-simethicone (MYLANTA) 200-200-20 mg/5 mL oral suspension 15 mL, 15 mL, Oral, Q6H PRN, Smooth Méndez PA-C    amiodarone tablet 200 mg, 200 mg, Oral, Daily, Nadeen Gowers, MD, 200 mg at 02/17/18 0850    bisacodyl (DULCOLAX) rectal suppository 10 mg, 10 mg, Rectal, Daily PRN, Smooth Méndez PA-C    calcium carbonate (TUMS) chewable tablet 1,000 mg, 1,000 mg, Oral, Daily PRN, Nadeen Gowers, MD    diazepam (VALIUM) tablet 5 mg, 5 mg, Oral, HS, Nadeen Gowers, MD, 5 mg at 02/16/18 2116    diltiazem (CARDIZEM CD) 24 hr capsule 120 mg, 120 mg, Oral, Daily, Nadeen Gowers, MD, 120 mg at 02/17/18 0850    docusate sodium (COLACE) capsule 100 mg, 100 mg, Oral, BID, Smooth Méndez PA-C, 100 mg at 02/17/18 0850    enoxaparin (LOVENOX) subcutaneous injection 40 mg, 40 mg, Subcutaneous, Daily, Smooth Méndez PA-C, 40 mg at 02/17/18 0850    fentaNYL (DURAGESIC) 50 mcg/hr TD 72 hr patch 1 patch, 1 patch, Transdermal, Q48H, MARY Lewis, 1 patch at 02/16/18 2190    furosemide (LASIX) tablet 20 mg, 20 mg, Oral, BID, Nadeen Gowers, MD, 20 mg at 02/17/18 0850    gabapentin (NEURONTIN) capsule 100 mg, 100 mg, Oral, TID, Nadeen Gowers, MD, 100 mg at 02/17/18 0850    ondansetron [facility]) injection 4 mg, 4 mg, Intravenous, Q6H PRN, Zuleika Frazier MD    oxyCODONE (ROXICODONE) IR tablet 10 mg, 10 mg, Oral, Q6H PRN, Janki Prather PA-C, 10 mg at 02/17/18 7100    oxyCODONE-acetaminophen (PERCOCET) 5-325 mg per tablet 1 tablet, 1 tablet, Oral, Q4H PRN, Janki Prather PA-C    oxyCODONE-acetaminophen (PERCOCET) 5-325 mg per tablet 2 tablet, 2 tablet, Oral, Q6H PRN, Zuleika Frazier MD, 2 tablet at 02/17/18 0027    pantoprazole (PROTONIX) EC tablet 40 mg, 40 mg, Oral, Daily, Janki Prather PA-C, 40 mg at 02/17/18 0604    polyethylene glycol (MIRALAX) packet 17 g, 17 g, Oral, Daily, Zuleika Frazier MD, 17 g at 02/17/18 0850    potassium chloride (K-DUR,KLOR-CON) CR tablet 20 mEq, 20 mEq, Oral, Daily, Zuleika Frazier MD, 20 mEq at 02/17/18 0850    senna (SENOKOT) tablet 8 6 mg, 1 tablet, Oral, Daily, Janki Prather PA-C, 8 6 mg at 02/17/18 0850    simethicone (MYLICON) chewable tablet 80 mg, 80 mg, Oral, 4x Daily PRN, Janki Prather PA-C    tamsulosin [facility]) capsule 0 4 mg, 0 4 mg, Oral, Daily With Christ Farley MD, 0 4 mg at 02/16/18 1741    traMADol (ULTRAM) tablet 50 mg, 50 mg, Oral, Q6H [facility], Janki Prather PA-C, 50 mg at 02/17/18 0604    Ins and Outs:  I/O last 24 hours: In: 125 [P O :125]  Out: 920 [Urine:920]          Physical:  Vitals:    02/17/18 0807   BP: 131/61   Pulse: 71   Resp: 16   Temp: 98 4 °F (36 9 °C)   SpO2: 91%     left lower extremity  · Dressing clean dry intact  · Sensation intact L1-S1  · Motor intact to knee flexion/extension, EHL/FHL  · 2+ dorsalis pedis pulse    _*_*_*_*_*_*_*_*_*_*_*_*_*_*_*_*_*_*_*_*_*_*_*_*_*_*_*_*_*_*_*_*_*_*_*_*_*_*_*_*_*    Assessment: 62 y o male post operative day 3 left hip hemiarthroplasty       Plan:  · Up and out of bed  · Weightbearing as tolerated  · PT/OT  · DVT prophylaxis  · Analgesics  · Dispo: Hope Singh for discharge from ortho perspective  · Will continue to assess for acute blood loss anemia    Shan Valladares PA-C

## 2018-02-17 NOTE — DISCHARGE SUMMARY
Discharge- Jennifer Durant 1955, 60 y o  male MRN: [id number]    Unit/Bed#: [address] [place] [address] Encounter: [id number]    Primary Care Provider: Ramiro Mulligan MD   Date and time admitted to hospital: 2/14/2018  6:06 AM        Protein-calorie malnutrition, moderate ([address] )   Assessment & Plan    Will need outpatient dietary consult        * Closed fracture of neck of left femur [facility])   Assessment & Plan    Patient underwent left total hip arthroplasty by Orthopedics postop work also was unremarkable  Was discharged to nursing home for rehab        Continuous opioid dependence [facility])   Assessment & Plan    He has chronic pain due to severe kyphosis, scoliosis osteoporosis  Will continue fentanyl        Paroxysmal a-fib (HCC)   Assessment & Plan    Continue amiodarone and diltiazem  Patient has been normal sinus rhythm since admission        Essential hypertension   Assessment & Plan    In normal sinus rhythm on amiodarone and diltiazem  Will continue same          BPH (benign prostatic hyperplasia)   Assessment & Plan    Continue Flomax patient denies urinary tension        Acute blood loss anemia   Assessment & Plan    Patient's hemoglobin is 8 1 on discharge, does not need transfusion                  Hospital Course:     Jennifer Durant is a 60 y o  male patient who originally presented to the hospital on 2/14/2018 due to left hip pain due to fracture from fall    Please see above list of diagnoses and related plan for additional information  Condition at Discharge:  good      Discharge instructions/Information to patient and family:   See after visit summary for information provided to patient and family  Provisions for Follow-Up Care:  See after visit summary for information related to follow-up care and any pertinent home health orders  Disposition:     Ludivina Olguin Ike at retirement       Discharge Statement:  I spent 25 minutes discharging the patient   This time was spent on the day of discharge  I had direct contact with the patient on the day of discharge  Greater than 50% of the total time was spent examining patient, answering all patient questions, arranging and discussing plan of care with patient as well as directly providing post-discharge instructions  Additional time then spent on discharge activities  Discharge Medications:  See after visit summary for reconciled discharge medications provided to patient and family        ** Please Note: This note has been constructed using a voice recognition system **

## 2018-02-17 NOTE — ASSESSMENT & PLAN NOTE
Patient underwent left total hip arthroplasty by Orthopedics postop work also was unremarkable    Was discharged to nursing home for rehab

## 2018-02-17 NOTE — PHYSICAL THERAPY NOTE
PT tx     02/17/18 1135   Pain Assessment   Pain Assessment 0-10   Pain Score 7   Pain Location Rib cage   Pain Orientation Anterior   General   Chart Reviewed Yes   Additional Pertinent History for transfer to rehab today   Cognition   Overall Cognitive Status [facility]   Arousal/Participation Alert   Subjective   Subjective c/o rib pain   Transfers   Sit to Stand 5  Supervision   Additional items Assist x 1;Verbal cues   Stand to Sit 5  Supervision   Additional items Assist x 1;Verbal cues   Stand pivot 4  Minimal assistance   Additional items Assist x 1;Verbal cues;Armrests   Ambulation/Elevation   Gait pattern Narrow DANIELLE; Forward Flexion   Gait Assistance 4  Minimal assist   Additional items Assist x 1; Tactile cues; Verbal cues   Assistive Device Rolling walker   Distance 20'x2 standing rest midway   Exercises   THR 10 reps; Supine;AAROM; Left   Assessment   Prognosis Good   Problem List Decreased strength;Decreased endurance; Impaired balance;Decreased mobility; Decreased coordination   Assessment doing well with gait trianing/amb  REcalls 3/3 THR precautions with extra time  REady for transfer to rehab   Goals   Patient Goals get better   Plan   Treatment/Interventions ADL retraining;Functional transfer training;LE strengthening/ROM; Elevations; Therapeutic exercise; Endurance training;Patient/family training;Equipment eval/education; Bed mobility;Gait training;Spoke to nursing   Progress Improving as expected   PT Frequency 5x/wk; Twice a day   Recommendation   Recommendation Short-term skilled PT   Equipment Recommended Manny Burnette   PT - OK to Discharge Yes   Dex Watson PT

## 2018-02-17 NOTE — PLAN OF CARE
Problem: PHYSICAL THERAPY ADULT  Goal: Performs mobility at highest level of function for planned discharge setting  See evaluation for individualized goals  Treatment/Interventions: ADL retraining, Functional transfer training, LE strengthening/ROM, Therapeutic exercise, Endurance training, Cognitive reorientation, Patient/family training, Equipment eval/education, Bed mobility, Gait training, Spoke to nursing, Spoke to case management  Equipment Recommended: Uriah Ga       See flowsheet documentation for full assessment, interventions and recommendations  Outcome: Adequate for Discharge  Prognosis: Good  Problem List: Decreased strength, Decreased endurance, Impaired balance, Decreased mobility, Decreased coordination  Assessment: doing well with gait trianing/amb  REcalls 3/3 THR precautions with extra time  REady for transfer to rehab  Barriers to Discharge: Decreased caregiver support, Inaccessible home environment     Recommendation: Short-term skilled PT     PT - OK to Discharge: Yes    See flowsheet documentation for full assessment

## 2018-02-20 NOTE — CONSULTS
Orthopedics   [place] 60 [place] o  [place] MRN: [id number]  Unit/Bed#: [facility] 733-01      Chief Complaint:   right hip pain    HPI:   60 y o male complaining of right leg pain  This began after a fall approximately 1 month ago  He has had a 5 year history of back problems including kyphosis, scoliosis, and compression fractures  Denies numbness or tingling  Pain worse with flexion of the hip and is located over the medial and lateral thigh region  He denies groin pain  Review Of Systems:   · Skin: bilateral lower extremity venous status changes  · Neuro: See HPI  · Musculoskeletal: See HPI  · 14 point review of systems negative except as stated above     Past Medical History:   Past Medical History:   Diagnosis Date    History of spinal fracture     Kyphosis     Loss of appetite     Loss of weight     Osteoarthritis     Osteoporosis        Past Surgical History:   Past Surgical History:   Procedure Laterality Date    FINGER SURGERY      LUNG DECORTICATION Left 11/15/2017    Procedure: VATS DECORTICATION LUNG;  Surgeon: Luba Barber MD;  Location: [facility];  Service: Thoracic    SHOULDER DEBRIDEMENT         Family History:  Family history reviewed and non-contributory  Family History   Problem Relation Age of Onset    Heart disease Mother     Early death Mother    [place] Cancer Father     Cancer Brother        Social History:  Social History     Social History    Marital status:      Spouse name: N/A    Number of children: N/A    Years of education: N/A     Social History Main Topics    Smoking status: Never Smoker    Smokeless tobacco: Never Used    Alcohol use No    Drug use: No    Sexual activity: No     Other Topics Concern    None     Social History Narrative    None       Allergies:    Allergies   Allergen Reactions    Asa [Aspirin]     Carbapenems     Cephalosporins     Penicillins            Labs:    0  Lab Value Date/Time   HCT 30 1 (L) 12/23/2017 0527   HCT 31 3 (L) Problem: Pressure Injury, Risk for  Goal: # Skin remains intact  Outcome: Outcome Met, Continue evaluating goal progress toward completion  No open areas seen.  Pt able to reposition self independently in bed.    Problem: Delirium, Risk for  Goal: # No symptoms of delirium  Evaluate delirium symptoms under active problem when present   Outcome: Outcome Met, Continue evaluating goal progress toward completion   02/19/18 9189   --MENTAL STATUS--   Mental Status Assessment [facility]          12/21/2017 0612   HCT 30 5 (L) 12/20/2017 2128   HGB 10 1 (L) 12/23/2017 0527   HGB 10 5 (L) 12/21/2017 0612   HGB 10 2 (L) 12/20/2017 2128   INR 1 09 12/20/2017 2128   WBC 7 50 12/23/2017 0527   WBC 8 77 12/21/2017 0612   WBC 8 46 12/20/2017 2128       Meds:    Current Facility-Administered Medications:     acetaminophen (TYLENOL) tablet 325 mg, 325 mg, Oral, Q6H PRN, Brian PartMARY    amiodarone tablet 200 mg, 200 mg, Oral, Daily, James Tristan MD, 200 mg at 12/25/17 0816    calcium carbonate-vitamin D (OSCAL-D) 500 mg-200 units per tablet 1 tablet, 1 tablet, Oral, BID With Meals, James Tristan MD, 1 tablet at 12/25/17 1643    diazepam (VALIUM) tablet 5 mg, 5 mg, Oral, HS, James Tristan MD, 5 mg at 12/24/17 2118    diltiazem (CARDIZEM CD) 24 hr capsule 120 mg, 120 mg, Oral, Daily, Job Part, CRNP, 120 mg at 12/24/17 0805    docusate sodium (COLACE) capsule 200 mg, 200 mg, Oral, BID, James Tristan MD, 200 mg at 12/25/17 1643    enoxaparin (LOVENOX) subcutaneous injection 40 mg, 40 mg, Subcutaneous, Daily, James Tristan MD, 40 mg at 12/25/17 0817    fentaNYL (DURAGESIC) 50 mcg/hr TD 72 hr patch 1 patch, 1 patch, Transdermal, Q48H, Job Part, CRNP, 1 patch at 12/25/17 1046    furosemide (LASIX) tablet 20 mg, 20 mg, Oral, BID (diuretic), Hetul Giordano, DO, 20 mg at 12/25/17 1643    HYDROcodone-acetaminophen (NORCO) 5-325 mg per tablet 1 tablet, 1 tablet, Oral, Q4H PRN, Brian PartMARY, 1 tablet at 12/25/17 1736    hydrocortisone 0 5 % cream, , Topical, BID, Brian PartMARY    metoprolol tartrate (LOPRESSOR) tablet 50 mg, 50 mg, Oral, Q12H [facility], James Tristan MD, 50 mg at 12/24/17 2118    polyethylene glycol (MIRALAX) packet 17 g, 17 g, Oral, Daily PRN, James Tristan MD    potassium chloride (K-DUR,KLOR-CON) CR tablet 20 mEq, 20 mEq, Oral, Daily, Job Part, CRNP, 20 mEq at 12/25/17 0816    tamsulosin (FLOMAX) capsule 0 4 mg, 0 4 mg, Oral, Daily With Vanita Leal Brad Hendrickson MD, 0 4 mg at 12/25/17 1643    Blood Culture:   Lab Results   Component Value Date    BLOODCX No Growth After 4 Days  12/20/2017       Wound Culture:   No results found for: WOUNDCULT    Ins and Outs:  I/O last 24 hours: In: 690 [P O :690]  Out: 1475 [Urine:1475]          Physical Exam:   /78   Pulse 74   Temp 98 2 °F (36 8 °C) (Oral)   Resp 18   Ht 5' 8" (1 727 m)   Wt 62 kg (136 lb 11 oz)   SpO2 95%   BMI 20 78 kg/m²   Gen: Alert and oriented to person, place, time  HEENT: EOMI, eyes clear, moist mucus membranes, hearing intact  Respiratory: Bilateral chest rise  No audible wheezing found  Cardiovascular: no palpable arrhythmia  Abdomen: soft nontender/nondistended  Musculoskeletal: right lower extremity  · Skin pink dry and intact, no erythema  Lower legs in dressing with ACE bandage  · Negative log roll  · Positive straight leg raise test  · Pain in the medial thigh with internal rotation of the hip  · Sensation intact L1-S1  · 3/5 motor strength to hip flexion/extension  · 4+/5knee flexion/extension, ankle dorsi/plantar flexion  · 2+ DP pulse    Radiology:   I personally reviewed the films  X-rays of right hip shows no acute fracture    _*_*_*_*_*_*_*_*_*_*_*_*_*_*_*_*_*_*_*_*_*_*_*_*_*_*_*_*_*_*_*_*_*_*_*_*_*_*_*_*_*    Assessment:  62 y o male with right thigh pain       Plan:   · WBAT right lower extremity  · Follow up right femur Xray  · PT/OT  · Pain control  · Dispo: Ortho will follow  · Will refine plan with orthopaedic surgery team     Marshall Gray

## 2018-02-27 ENCOUNTER — OFFICE VISIT (OUTPATIENT)
Dept: OBGYN CLINIC | Facility: CLINIC | Age: 63
End: 2018-02-27

## 2018-02-27 VITALS — HEART RATE: 75 BPM | HEIGHT: 68 IN | DIASTOLIC BLOOD PRESSURE: 71 MMHG | SYSTOLIC BLOOD PRESSURE: 136 MMHG

## 2018-02-27 DIAGNOSIS — Z47.1 AFTERCARE FOLLOWING LEFT HIP JOINT REPLACEMENT SURGERY: Primary | ICD-10-CM

## 2018-02-27 DIAGNOSIS — Z96.642 AFTERCARE FOLLOWING LEFT HIP JOINT REPLACEMENT SURGERY: Primary | ICD-10-CM

## 2018-02-27 PROCEDURE — 99024 POSTOP FOLLOW-UP VISIT: CPT | Performed by: ORTHOPAEDIC SURGERY

## 2018-02-27 NOTE — ASSESSMENT & PLAN NOTE
Patient is doing well status post left hip hemiarthroplasty  Continue physical therapy and posterior hip precautions  Discussed importance of pushing through some of the pain to work with physical therapy  Follow-up in 4 weeks with repeat x-rays

## 2018-02-27 NOTE — PROGRESS NOTES
Assessment:     1  Aftercare following left hip joint replacement surgery        Plan:  The patient was seen and examined by Dr Zoila Ramires and myself  Problem List Items Addressed This Visit        Other    Aftercare following left hip joint replacement surgery - Primary     Patient is doing well status post left hip hemiarthroplasty  Continue physical therapy and posterior hip precautions  Discussed importance of pushing through some of the pain to work with physical therapy  Follow-up in 4 weeks with repeat x-rays  Subjective:     Patient ID: Adrianna Todd is a 60 y o  male  Chief Complaint: This is a 78-year-old white male who is status post left hip hemiarthroplasty for a femoral neck fracture on February 14, 2018  He is currently residing in a rehab facility  He is getting up with physical therapy and ambulating with a walker  He continues to have significant pain from chronic issues with his spine that he received pain medication for  His left hip pain continues to improve  Allergy:  Allergies   Allergen Reactions    Carbapenems Other (See Comments)    Asa [Aspirin] Rash    Cephalosporins Rash and Other (See Comments)    Penicillins Rash and Other (See Comments)     Medications:  all current active meds have been reviewed  Past Medical History:  Past Medical History:   Diagnosis Date    History of spinal fracture     Kyphosis     Loss of appetite     Loss of weight     Osteoarthritis     Osteoporosis      Past Surgical History:  Past Surgical History:   Procedure Laterality Date    FINGER SURGERY      HIP SURGERY      LUNG DECORTICATION Left 11/15/2017    Procedure: VATS DECORTICATION LUNG;  Surgeon: Ag Angulo MD;  Location:  MAIN OR;  Service: Thoracic    [place] PARTIAL HIP REPLACEMENT Left 2/14/2018    Procedure: HEMIARTHROPLASTY HIP (BIPOLAR);   Surgeon: Domenico Carranza MD;  Location:  MAIN OR;  Service: Orthopedics    SHOULDER DEBRIDEMENT       Family History:  Family History   Problem Relation Age of Onset    Heart disease Mother     Early death Mother     Cancer Father     Cancer Brother      Social History:  History   Alcohol Use No     History   Drug Use No     History   Smoking Status    Never Smoker   Smokeless Tobacco    Never Used     Review of Systems   HENT: Negative  Eyes: Negative  Respiratory: Negative  Cardiovascular: Negative  Gastrointestinal: Negative  Endocrine: Negative  Genitourinary: Negative  Musculoskeletal: Positive for arthralgias, back pain, gait problem and myalgias  Skin: Negative  Neurological: Positive for weakness and numbness  Hematological: Negative  Psychiatric/Behavioral: Negative  Objective:  BP Readings from Last 1 Encounters:   02/27/18 136/71      Wt Readings from Last 1 Encounters:   02/16/18 58 kg (127 lb 13 9 oz)      BMI:   Estimated body mass index is 20 64 kg/m² as calculated from the following:    Height as of 2/14/18: 5' 6" (1 676 m)  Weight as of 2/16/18: 58 kg (127 lb 13 9 oz)  BSA:   Estimated body surface area is 1 65 meters squared as calculated from the following:    Height as of 2/14/18: 5' 6" (1 676 m)  Weight as of 2/16/18: 58 kg (127 lb 13 9 oz)  Physical Exam   Constitutional: He is oriented to person, place, and time  He appears well-developed  HENT:   Head: Normocephalic and atraumatic  Eyes: EOM are normal  Pupils are equal, round, and reactive to light  Neck: Neck supple  Musculoskeletal: He exhibits no tenderness  Neurological: He is alert and oriented to person, place, and time  Skin: Skin is warm  Psychiatric: He has a normal mood and affect  Nursing note and vitals reviewed  Right Hip Exam   Right hip exam is normal        Left Hip Exam     Tenderness   The patient is experiencing no tenderness           Other   Erythema: absent  Sensation: normal  Pulse: present    Comments:  Healing incision with no evidence of infection  Resolving ecchymosis around incision  Thigh and calf soft, nontender  No pain with passive range of motion of the hip

## 2018-03-13 ENCOUNTER — TELEPHONE (OUTPATIENT)
Dept: FAMILY MEDICINE CLINIC | Facility: HOSPITAL | Age: 63
End: 2018-03-13

## 2018-03-13 NOTE — TELEPHONE ENCOUNTER
[address] called asking if Dr June Davis would take this patient on as his PCP  Patient was seeing Dr Bernie Reyes   He is being d/c to home on Thurs 3/15, and will need homecare orders

## 2018-03-15 DIAGNOSIS — I48.0 PAROXYSMAL A-FIB (HCC): ICD-10-CM

## 2018-03-15 DIAGNOSIS — I48.0 PAROXYSMAL A-FIB (HCC): Primary | ICD-10-CM

## 2018-03-15 DIAGNOSIS — N40.0 BENIGN PROSTATIC HYPERPLASIA, UNSPECIFIED WHETHER LOWER URINARY TRACT SYMPTOMS PRESENT: Chronic | ICD-10-CM

## 2018-03-15 RX ORDER — AMIODARONE HYDROCHLORIDE 200 MG/1
200 TABLET ORAL DAILY
Qty: 30 TABLET | Refills: 0 | Status: SHIPPED | OUTPATIENT
Start: 2018-03-15 | End: 2018-05-21 | Stop reason: ALTCHOICE

## 2018-03-15 RX ORDER — FUROSEMIDE 20 MG/1
20 TABLET ORAL 2 TIMES DAILY
Qty: 30 TABLET | Refills: 0
Start: 2018-03-15 | End: 2018-03-15 | Stop reason: SDUPTHER

## 2018-03-15 RX ORDER — FUROSEMIDE 20 MG/1
20 TABLET ORAL 2 TIMES DAILY
Qty: 30 TABLET | Refills: 0 | Status: SHIPPED | OUTPATIENT
Start: 2018-03-15

## 2018-03-15 RX ORDER — TAMSULOSIN HYDROCHLORIDE 0.4 MG/1
0.4 CAPSULE ORAL
Qty: 30 CAPSULE | Refills: 0
Start: 2018-03-15 | End: 2018-03-15 | Stop reason: SDUPTHER

## 2018-03-15 RX ORDER — DILTIAZEM HYDROCHLORIDE 120 MG/1
120 CAPSULE, COATED, EXTENDED RELEASE ORAL DAILY
Qty: 30 CAPSULE | Refills: 0
Start: 2018-03-15 | End: 2018-03-15 | Stop reason: SDUPTHER

## 2018-03-15 RX ORDER — TAMSULOSIN HYDROCHLORIDE 0.4 MG/1
0.4 CAPSULE ORAL
Qty: 30 CAPSULE | Refills: 0 | Status: SHIPPED | OUTPATIENT
Start: 2018-03-15 | End: 2018-05-21 | Stop reason: ALTCHOICE

## 2018-03-15 RX ORDER — DILTIAZEM HYDROCHLORIDE 120 MG/1
120 CAPSULE, COATED, EXTENDED RELEASE ORAL DAILY
Qty: 30 CAPSULE | Refills: 0 | Status: SHIPPED | OUTPATIENT
Start: 2018-03-15 | End: 2018-05-21 | Stop reason: ALTCHOICE

## 2018-03-15 RX ORDER — POTASSIUM CHLORIDE 20 MEQ/1
20 TABLET, EXTENDED RELEASE ORAL DAILY
Qty: 30 TABLET | Refills: 0 | Status: SHIPPED | OUTPATIENT
Start: 2018-03-15 | End: 2018-05-21 | Stop reason: ALTCHOICE

## 2018-03-15 RX ORDER — POTASSIUM CHLORIDE 20 MEQ/1
20 TABLET, EXTENDED RELEASE ORAL DAILY
Qty: 30 TABLET | Refills: 0
Start: 2018-03-15 | End: 2018-03-15 | Stop reason: SDUPTHER

## 2018-05-21 ENCOUNTER — OFFICE VISIT (OUTPATIENT)
Dept: FAMILY MEDICINE CLINIC | Facility: HOSPITAL | Age: 63
End: 2018-05-21
Payer: MEDICARE

## 2018-05-21 VITALS
HEART RATE: 79 BPM | DIASTOLIC BLOOD PRESSURE: 80 MMHG | WEIGHT: 103 LBS | BODY MASS INDEX: 16.55 KG/M2 | SYSTOLIC BLOOD PRESSURE: 142 MMHG | HEIGHT: 66 IN

## 2018-05-21 DIAGNOSIS — Z00.00 HEALTH CARE MAINTENANCE: ICD-10-CM

## 2018-05-21 DIAGNOSIS — I48.0 PAROXYSMAL A-FIB (HCC): ICD-10-CM

## 2018-05-21 DIAGNOSIS — F11.20 CONTINUOUS OPIOID DEPENDENCE (HCC): Chronic | ICD-10-CM

## 2018-05-21 DIAGNOSIS — I10 ESSENTIAL HYPERTENSION: Primary | ICD-10-CM

## 2018-05-21 DIAGNOSIS — D63.8 ANEMIA IN OTHER CHRONIC DISEASES CLASSIFIED ELSEWHERE: ICD-10-CM

## 2018-05-21 DIAGNOSIS — M40.204 KYPHOSIS OF THORACIC REGION, UNSPECIFIED KYPHOSIS TYPE: Chronic | ICD-10-CM

## 2018-05-21 PROCEDURE — 99203 OFFICE O/P NEW LOW 30 MIN: CPT | Performed by: INTERNAL MEDICINE

## 2018-05-21 RX ORDER — DIAZEPAM 5 MG/1
5 TABLET ORAL
Qty: 30 TABLET | Refills: 2 | Status: SHIPPED | OUTPATIENT
Start: 2018-05-21

## 2018-05-21 RX ORDER — FENTANYL 50 UG/H
1 PATCH TRANSDERMAL
Qty: 15 PATCH | Refills: 0 | Status: SHIPPED | OUTPATIENT
Start: 2018-05-21

## 2018-05-21 RX ORDER — HYDROCODONE BITARTRATE AND ACETAMINOPHEN 5; 325 MG/1; MG/1
1 TABLET ORAL 3 TIMES DAILY PRN
Qty: 90 TABLET | Refills: 0 | Status: SHIPPED | OUTPATIENT
Start: 2018-05-21 | End: 2018-06-20

## 2018-05-21 NOTE — PROGRESS NOTES
Assessment/Plan:    Continuous opioid dependence ([address] )  Will need to see pain management ongoing  Kyphosis  Severe and causes pain    Paroxysmal a-fib (HCC)  Stable current meds    Essential hypertension  stable    Anemia in other chronic diseases classified elsewhere  Note anemia on all last labs, ; here as new patient, needs further eval    Patient Active Problem List   Diagnosis    Osteoporosis    Kyphosis    Continuous opioid dependence ([address] )    Paroxysmal A-fib ([address] )    BPH (benign prostatic hyperplasia)    Essential hypertension    Protein-calorie malnutrition, moderate ([address] )    Aftercare following left hip joint replacement surgery    Anemia in other chronic diseases classified elsewhere     No orders of the defined types were placed in this encounter  Diagnoses and all orders for this visit:    Essential hypertension    Paroxysmal A-fib ([address] )    Kyphosis of thoracic region, unspecified kyphosis type    Continuous opioid dependence ([address] )    Anemia in other chronic diseases classified elsewhere          Subjective:      Patient ID: Shaniqua Mayfield is a 60 y o  male  New patient  Here to establish    Has chronic pain due to severe khyphosis and compression fx and recent hip fx with surgery    Is on narcotic regimen started with prior prescriber  Currently needs refills of this  The following portions of the patient's history were reviewed and updated as appropriate: allergies, current medications, past family history, past medical history, past social history, past surgical history and problem list     Review of Systems   Constitutional: Negative for fatigue and fever  HENT: Negative for hearing loss  Eyes: Negative for visual disturbance  Respiratory: Negative for cough, chest tightness, shortness of breath and wheezing  Cardiovascular: Negative for chest pain, palpitations and leg swelling  Gastrointestinal: Negative for abdominal pain, diarrhea and nausea  Genitourinary: Negative for dysuria and hematuria  Musculoskeletal: Negative for arthralgias  Neurological: Negative for dizziness, numbness and headaches  Psychiatric/Behavioral: Negative for confusion and dysphoric mood  All other systems reviewed and are negative  Objective:      Current Outpatient Prescriptions:     acetaminophen (TYLENOL) 325 mg tablet, Take 2 tablets by mouth every 6 (six) hours as needed for fever, Disp: 30 tablet, Rfl: 0    diazepam (VALIUM) 5 mg tablet, Take 5 mg by mouth daily at bedtime, Disp: , Rfl: 0    fentaNYL (DURAGESIC) 50 mcg/hr, apply 1 patch every 2 days, Disp: , Rfl: 0    HYDROcodone-acetaminophen (NORCO) 5-325 mg per tablet, Take 1 tablet by mouth every 8 (eight) hours, Disp: , Rfl: 0    RA VITAMIN D-3 2000 units CAPS, Take 1,000 Units by mouth 2 (two) times a day, Disp: , Rfl: 0    furosemide (LASIX) 20 mg tablet, Take 1 tablet (20 mg total) by mouth 2 (two) times a day, Disp: 30 tablet, Rfl: 0    gabapentin (NEURONTIN) 100 mg capsule, Take 1 capsule by mouth 3 (three) times a day, Disp: 12 capsule, Rfl: 0     Physical Exam   Constitutional: He is oriented to person, place, and time  He appears well-developed and well-nourished  HENT:   Head: Normocephalic and atraumatic  Eyes: Conjunctivae and EOM are normal  Pupils are equal, round, and reactive to light  Neck: Normal range of motion  Neck supple  No thyromegaly present  Cardiovascular: Normal rate, regular rhythm and normal heart sounds  Exam reveals no gallop  No murmur heard  Pulmonary/Chest: Effort normal and breath sounds normal  He has no wheezes  He has no rales  Abdominal: Soft  Bowel sounds are normal  There is no tenderness  There is no rebound and no guarding  Musculoskeletal: Normal range of motion  He exhibits no edema  Lymphadenopathy:     He has no cervical adenopathy  Neurological: He is alert and oriented to person, place, and time  No cranial nerve deficit  Coordination normal    Skin: Skin is warm and dry  No rash noted  Psychiatric: He has a normal mood and affect  His behavior is normal  Thought content normal    Nursing note and vitals reviewed

## 2018-06-22 DIAGNOSIS — M40.204 KYPHOSIS OF THORACIC REGION, UNSPECIFIED KYPHOSIS TYPE: Chronic | ICD-10-CM

## 2018-06-22 DIAGNOSIS — F11.20 CONTINUOUS OPIOID DEPENDENCE (HCC): Chronic | ICD-10-CM

## 2018-06-25 RX ORDER — FENTANYL 50 UG/H
1 PATCH TRANSDERMAL
Qty: 15 PATCH | Refills: 0 | OUTPATIENT
Start: 2018-06-25

## 2018-06-25 RX ORDER — HYDROCODONE BITARTRATE AND ACETAMINOPHEN 5; 325 MG/1; MG/1
1 TABLET ORAL EVERY 8 HOURS
Qty: 90 TABLET | Refills: 0 | OUTPATIENT
Start: 2018-06-25

## 2018-06-25 RX ORDER — DIAZEPAM 5 MG/1
5 TABLET ORAL
Qty: 30 TABLET | Refills: 0 | OUTPATIENT
Start: 2018-06-25

## 2018-06-25 NOTE — TELEPHONE ENCOUNTER
Lm that Dr Sherley Vicente is not comfortable filling these medications and that she discussed with him about following up with pain mg  - pw

## 2018-11-13 NOTE — H&P
H&P- Byron Peck 1955, 60 y o  male MRN: [id number]    Unit/Bed#: ED 05 Encounter: [id number]    Primary Care Provider: Margo Paul MD   Date and time admitted to hospital: 2/14/2018  6:06 AM        * Closed fracture of neck of left femur [facility])   Assessment & Plan    Patient may undergo the planned surgery this afternoon  He is probably low to moderate cardiovascular risk  Due to his chest deformity he is at increased risk for  Pneumonia  He has a history of PAF but currently he is in normal sinus rhythm  It is not certain that he takes amiodarone and diltiazem number last his blood pressure is high due to the pain that he is in from the hip fracture as well as chest wall contusion  Will continue fentanyl patch and I placed him on IV Dilaudid for better pain control before the surgery  Continuous opioid dependence ([facility] [address] )   Assessment & Plan    He has chronic pain due to severe kyphosis, scoliosis osteoporosis  Will continue fentanyl        Paroxysmal a-fib (HCC)   Assessment & Plan    Currently patient is in normal sinus rhythm he may not be taking amiodarone according to him        Essential hypertension   Assessment & Plan    Systolic blood pressures in the 160s  Blood pressure is worse most likely due to severe pain patient is in  Will give him IV Dilaudid before surgery to control his pain  If blood pressure is not better 2 will give IV Lopressor        BPH (benign prostatic hyperplasia)   Assessment & Plan    Continue Flomax perioperatively and will watch for urinary tension              VTE Prophylaxis: ordered  Code Status:  Level 1 DNR      Anticipated Length of Stay:  More than 2 midnights    Justification for Hospital Stay: see assessment and plan        Chief Complaint:   Left hip pain after fall    History of Present Illness:    Byron Peck is a 60 y o  male who presents with above chief compaint      Patient fell this morning in his home this was purely due to mechanical fall he denies any lightheadedness, dizziness, chest pain, shortness of breath, palpitations, focal extremity weakness or numbness before after the event  He came to the ER for evaluation where he is found to the left femoral fracture he is scheduled for OR this afternoon I discussed the case with Dr Sunshine Anaya  Patient complains of sharp anterior chest pain since he hit his chest during the fall  Is worse if he takes a deep breath or if he presses on his anterior chest   Chest x-ray described no rib fractures  He denies any history of coronary disease, CHF, CVA, TIA, DVT, urinary tension, diabetes  On review of old records in epic is a history of PAF, lower extremity edema, hypertension, continuous narcotic dependency due to spine pain from kyphosis and scoliosis  Review of Systems:    Review of Systems   Constitutional: Negative for fever  HENT: Negative for congestion  Eyes: Negative for visual disturbance  Respiratory: Negative for cough, shortness of breath and wheezing  Cardiovascular: Positive for chest pain (The anterior chest hurts since the fall  It is worse when patient presses on his chest review takes a deep breath) and leg swelling (Chronic leg swelling)  Negative for palpitations  Gastrointestinal: Positive for constipation  Negative for abdominal pain, blood in stool, diarrhea and vomiting  Endocrine: Negative for polydipsia and polyphagia  Genitourinary: Negative for difficulty urinating and dysuria  Musculoskeletal: Negative for neck stiffness  Left hip pain   Skin: Negative for rash  Neurological: Negative for weakness, numbness and headaches  Hematological: Does not bruise/bleed easily  Psychiatric/Behavioral: Negative for dysphoric mood  All other systems reviewed and are negative           Past Medical and Surgical History:     Past Medical History:   Diagnosis Date    History of spinal fracture     Kyphosis     Loss of appetite     Loss of weight     Osteoarthritis     Osteoporosis        Past Surgical History:   Procedure Laterality Date    FINGER SURGERY      LUNG DECORTICATION Left 11/15/2017    Procedure: VATS DECORTICATION LUNG;  Surgeon: Keturah Mcfarland MD;  Location: [facility];  Service: Thoracic    SHOULDER DEBRIDEMENT         Meds/Allergies:    Prior to Admission Medications   Prescriptions Last Dose Informant Patient Reported? Taking?    HYDROcodone-acetaminophen (VICODIN HP)  MG per tablet  Self Yes Yes   Sig: Take 1 tablet by mouth 2 (two) times a day   acetaminophen (TYLENOL) 325 mg tablet  Self No No   Sig: Take 2 tablets by mouth every 6 (six) hours as needed for fever   amiodarone 200 mg tablet  Self No Yes   Sig: Take 1 tablet by mouth daily   diazepam (VALIUM) 5 mg tablet  Self No Yes   Sig: Take 1 tablet by mouth daily at bedtime   diltiazem (CARDIZEM CD) 120 mg 24 hr capsule  Self No Yes   Sig: Take 1 capsule by mouth daily   fentaNYL (DURAGESIC) 50 mcg/hr  Self No Yes   Sig: Place 1 patch on the skin every other day Max Daily Amount: 1 patch   furosemide (LASIX) 20 mg tablet  Self No Yes   Sig: Take 1 tablet by mouth 2 (two) times a day   gabapentin (NEURONTIN) 100 mg capsule  Self No No   Sig: Take 1 capsule by mouth 3 (three) times a day   metoprolol tartrate (LOPRESSOR) 50 mg tablet  Self No Yes   Sig: Take 1 tablet by mouth every 12 (twelve) hours   oxyCODONE-acetaminophen (PERCOCET)  mg per tablet  Self Yes Yes   Sig: Take 1 tablet by mouth 2 (two) times a day   oxyCODONE-acetaminophen (PERCOCET)  MG per tablet  Self Yes Yes   Sig: Take 1 tablet by mouth daily   oxyCODONE-acetaminophen (PERCOCET) 5-325 mg per tablet  Self Yes Yes   Sig: Take 1 tablet by mouth every 8 (eight) hours as needed for moderate pain   potassium chloride (K-DUR,KLOR-CON) 20 mEq tablet  Self No Yes   Sig: Take 1 tablet by mouth daily   tamsulosin (FLOMAX) 0 4 mg  Self No Yes   Sig: Take 1 capsule by mouth daily with dinner   traMADol-acetaminophen (ULTRACET) 37 5-325 mg per tablet  Self Yes Yes   Sig: Take 1 tablet by mouth every 6 (six) hours as needed for moderate pain      Facility-Administered Medications: None       Allergies: Allergies   Allergen Reactions    Carbapenems     Asa [Aspirin] Rash    Cephalosporins Rash    Penicillins Rash       Social History:     History   Alcohol Use No     History   Smoking Status    Never Smoker   Smokeless Tobacco    Never Used     History   Drug Use No       Family History:    Family History   Problem Relation Age of Onset    Heart disease Mother     Early death Mother     Cancer Father     Cancer Brother        Physical Exam:     Vitals:   Blood Pressure: 160/78 (02/14/18 1000)  Pulse: 78 (02/14/18 1000)  Temperature: 97 9 °F (36 6 °C) (02/14/18 0609)  Temp Source: Temporal (02/14/18 2304)  Respirations: 18 (02/14/18 1000)  Weight - Scale: 56 7 kg (125 lb) (02/14/18 0609)  SpO2: 92 % (02/14/18 1000)    Physical Exam   Constitutional: He appears distressed (Due to left hip pain)  HENT:   Head: Normocephalic  Eyes: Conjunctivae are normal    Neck: Neck supple  Cardiovascular: Normal rate, regular rhythm and normal heart sounds  He has evidence of trace bilateral lower extremity edema with wrinkles of the skin  He does have chronic lower extremity edema on review of old records   Pulmonary/Chest: Effort normal  No respiratory distress  He has no wheezes  He has no rales  He has severe kyphosis and scoliosis   Abdominal: Soft  Bowel sounds are normal  He exhibits no distension  There is no tenderness  Musculoskeletal: He exhibits tenderness (Patient has reproducible tenderness of the anterior chest that he hit when he fell  He also has tenderness of the left hip area and his left lower extremity is externally rotated and shortened)  Lymphadenopathy:     He has no cervical adenopathy  Neurological: He is alert  No cranial nerve deficit     Skin: Skin is warm and dry  There is erythema (He has chronic lower leg erythematous without cellulitis or ulcers)  Psychiatric: He has a normal mood and affect  Additional Data:     Lab Results: I personally reviewed them      Results from last 7 days  Lab Units 02/14/18  0650   WBC Thousand/uL 10 98*   HEMOGLOBIN g/dL 13 2   HEMATOCRIT % 39 5   PLATELETS Thousands/uL 298   NEUTROS PCT % 80*   LYMPHS PCT % 14   MONOS PCT % 6   EOS PCT % 0       Results from last 7 days  Lab Units 02/14/18  0650   SODIUM mmol/L 138   POTASSIUM mmol/L 3 2*   CHLORIDE mmol/L 101   CO2 mmol/L 28   BUN mg/dL 17   CREATININE mg/dL 0 83   CALCIUM mg/dL 9 3   GLUCOSE RANDOM mg/dL 129       Results from last 7 days  Lab Units 02/14/18  0650   INR  1 10       Imaging: I personally reviewed them    XR hip/pelv 2-3 vws left if performed   Final Result by Gay Denver, MD (02/14 1010)      Acute displaced non-comminuted subcapital left femoral neck fracture  Workstation performed: [id number]         XR chest portable   ED Interpretation by Park Uriostegui DO (02/14 0730)   No acute abnl      Final Result by Gay Denver, MD (02/14 0901)      Cardiomegaly, unchanged  Emphysematous changes are noted  No evidence of pneumonia  Workstation performed: [id number]         CT pelvis wo contrast   Final Result by Danny Leslie MD (14/38 4028)    IMPRESSION:           1  There is an acute impacted subcapital left hip fracture  2   Healing pubic bone fractures on the right  Old left inferior pubic bone fracture  3   Large amount of feces in the colon including a fecal impaction  Workstation performed: [id number]             EKG : I personally reviewed      Flavia Ybarra MD    ** Please Note: This note has been constructed using a voice recognition system   ** Statement Selected

## 2023-07-11 NOTE — PLAN OF CARE
Addended by: MAME DOWLING on: 7/11/2023 11:06 AM     Modules accepted: Orders     Problem: PHYSICAL THERAPY ADULT  Goal: Performs mobility at highest level of function for planned discharge setting  See evaluation for individualized goals  Treatment/Interventions: Functional transfer training, LE strengthening/ROM, Elevations, Therapeutic exercise, Endurance training, Bed mobility, Gait training, Spoke to nursing, Spoke to case management  Equipment Recommended: Meet Nogueira (w/ (A))       See flowsheet documentation for full assessment, interventions and recommendations  Outcome: Not Progressing  Prognosis: Guarded  Problem List: Decreased strength, Decreased endurance, Impaired balance, Decreased mobility, Orthopedic restrictions, Pain  Assessment: Pt standing in bathroom with nurse upon my arrival  Pt only willing to ambulate 6' back to bedside chair with rw and min assist  Pt seated on chair with supervision and bue support  Pt performed arom ble in sitting with rest periods between each set due to fatigue  Upon completion of exercises pt reported need to use br  Pt transferred with close supervision and increased time and was able to ambulate 6' with rw and min assist  Pt stood with support of rw to use urinal in br for extended time period  Upon completion in br pt was encouraged to attempt gt  Pt self limited to 36' with rw and min assist due to c/o bilateral foot pain  PCA arrived with zackary stockings  Le elevated on leg rest and assisted PCA to dorys zackary stockings due to significant edema  Le placed on 2 pillows on floor as pt does not tolerated legs elevated on recliner due to back pain  Extended session due to pt's need for constant distraction from pain to increase activity during session  Pt will continue to benefit from rehab at d/c  Barriers to Discharge: Inaccessible home environment, Decreased caregiver support     Recommendation:  (rehab)     PT - OK to Discharge: Yes (to rehab when medically stable for d/c )    See flowsheet documentation for full assessment

## 2024-04-12 NOTE — PROGRESS NOTES
PA for Hydroxyzine HCL (ATARAX) 25 mg tablet   Approved     Date(s) approved 3-1-2024 - 12-      [facility]                      [facility] Phone call       Pharmacy advised by [x]Fax                                     []Phone call    Approval letter scanned into Media Yes       Progress Note - Infectious Disease   Christian Conteh 60 y o  male MRN: [id number]  Unit/Bed#: [facility] 733-01 Encounter: [id number]      Impression/Plan:  1   Bilateral lower extremity venous stasis changes-no clear clinical evidence of cellulitis   I suspect this is primarily and edema problem   The findings are symmetric, and the patient does not have any fever leukocytosis   Patient has difficulty with elevating his legs due to the severe kyphosis  -monitor off all antibiotics  -aggressive edema control measures with elevation as the patient is able to tolerate  -pressure dressing  -local care  -podiatry follow-up     2   Anemia-unclear etiology  The H&H has been relatively stable  No labs over the last few days  -monitor CBC with diff as needed  -additional workup and treatment as per the primary service     3   Recent pneumonia with complicated effusion-status post VATS with decortication for trapped lung      4   Severe kyphosis-is impairing the patient's ability to keep his legs elevated and control the edema      5   Proximal right leg pain-possibly most to related edema  however the patient does have subacute to chronic fractures involving the right pubic rami which may be playing a role     -edema control  -management of the fractures as per Orthopedics    No active infectious disease issues  We will see the patient as needed  Please call if questions  Antibiotics:  None    Subjective:  Patient has no fever, chills, sweats; no nausea, vomiting, diarrhea; no cough, shortness of breath; decreased right hip pain  No new symptoms  He tells me that the edema markedly improved with elevation of legs      Objective:  Vitals:  HR:  [58-66] 63  Resp:  [16-18] 16  BP: (108-156)/(61-75) 156/75  SpO2:  [93 %-98 %] 96 %  Temp (24hrs), Av 5 °F (36 9 °C), Min:98 1 °F (36 7 °C), Max:98 9 °F (37 2 °C)  Current: Temperature: 98 1 °F (36 7 °C)    Physical Exam:   General Appearance:  Alert, nontoxic, no acute distress  Throat: Oropharynx moist without lesions  Lungs:   Decreased breath sounds bilaterally; respirations unlabored   Heart:  RRR; no murmur, rub or gallop   Abdomen:   Soft, non-tender, non-distended, positive bowel sounds  Extremities: Bilateral lower extremity edema with venous stasis changes  Skin: No new rashes or lesions  No draining wounds noted  Labs, Imaging, & Other studies:   All pertinent labs and imaging studies were personally reviewed    Results from last 7 days  Lab Units 12/23/17  0527 12/21/17 0612 12/20/17 2128   WBC Thousand/uL 7 50 8 77 8 46   HEMOGLOBIN g/dL 10 1* 10 5* 10 2*   PLATELETS Thousands/uL 397* 420* 412*       Results from last 7 days  Lab Units 12/27/17  0530 12/23/17 0527 12/22/17 0644 12/20/17 2128   SODIUM mmol/L 134* 138 137  < > 136   POTASSIUM mmol/L 3 9 3 5 3 6  < > 4 0   CHLORIDE mmol/L 99* 102 101  < > 102   CO2 mmol/L 29 28 30  < > 30   ANION GAP mmol/L 6 8 6  < > 4   BUN mg/dL 14 13 12  < > 16   CREATININE mg/dL 0 75 0 59* 0 69  < > 0 80   EGFR ml/min/1 73sq m 98 108 101  < > 95   GLUCOSE RANDOM mg/dL 79 86 88  < > 104   CALCIUM mg/dL 8 4 8 0* 8 5  < > 8 2*   AST U/L 14 19  --   --  18   ALT U/L 18 15  --   --  17   ALK PHOS U/L 240* 238*  --   --  260*   TOTAL PROTEIN g/dL 6 3* 5 9*  --   --  6 2*   ALBUMIN g/dL 2 6* 2 2*  --   --  2 3*   BILIRUBIN TOTAL mg/dL 0 40 0 38  --   --  0 25   < > = values in this interval not displayed  Results from last 7 days  Lab Units 12/21/17  0037 12/20/17 2130 12/20/17 2128   BLOOD CULTURE   --  No Growth After 5 Days  No Growth After 5 Days  MRSA CULTURE ONLY  No Methicillin Resistant Staphlyococcus aureus (MRSA) isolated  --   --        Right femur x-ray-Subacute to chronic fractures of the superior and inferior pubic rami on the right

## (undated) DEVICE — SUT VICRYL 0 CT-1 27 IN J260H

## (undated) DEVICE — HEAVY DUTY TABLE COVER: Brand: CONVERTORS

## (undated) DEVICE — DOUBLE TUBING WITH LARGE CONNECTOR FOR THORACIC SUCTION SYSTEM PUMP: Brand: THOPAZ TUBING DOUBLE

## (undated) DEVICE — GLOVE SRG BIOGEL 8

## (undated) DEVICE — GLOVE INDICATOR PI UNDERGLOVE SZ 8.5 BLUE

## (undated) DEVICE — 2000CC GUARDIAN II: Brand: GUARDIAN

## (undated) DEVICE — CHLORAPREP HI-LITE 26ML ORANGE

## (undated) DEVICE — SYRINGE 20ML LL

## (undated) DEVICE — GAUZE SPONGES,16 PLY: Brand: CURITY

## (undated) DEVICE — 3M™ STERI-STRIP™ REINFORCED ADHESIVE SKIN CLOSURES, R1547, 1/2 IN X 4 IN (12 MM X 100 MM), 6 STRIPS/ENVELOPE: Brand: 3M™ STERI-STRIP™

## (undated) DEVICE — 3M™ TEGADERM™ TRANSPARENT FILM DRESSING FRAME STYLE, 1624W, 2-3/8 IN X 2-3/4 IN (6 CM X 7 CM), 100/CT 4CT/CASE: Brand: 3M™ TEGADERM™

## (undated) DEVICE — BLADE INTREX LRG BONE OSCILLATING

## (undated) DEVICE — MEDI-VAC YANKAUER SUCTION HANDLE W/BULBOUS AND CONTROL VENT: Brand: CARDINAL HEALTH

## (undated) DEVICE — SUT VICRYL 3-0 SH 27 IN J416H

## (undated) DEVICE — SUT VICRYL 2-0 CT-1 27 IN J259H

## (undated) DEVICE — INTENDED FOR TISSUE SEPARATION, AND OTHER PROCEDURES THAT REQUIRE A SHARP SURGICAL BLADE TO PUNCTURE OR CUT.: Brand: BARD-PARKER SAFETY BLADES SIZE 10, STERILE

## (undated) DEVICE — STERILE THORACIC PACK: Brand: CARDINAL HEALTH

## (undated) DEVICE — STOCKINETTE IMPERVIOUS LG

## (undated) DEVICE — HEWSON SUTURE RETRIEVER: Brand: HEWSON SUTURE RETRIEVER

## (undated) DEVICE — OASIS DRAIN, SINGLE, INLINE & ATS COMPATIBLE: Brand: OASIS

## (undated) DEVICE — GLOVE SRG BIOGEL ORTHOPEDIC 7

## (undated) DEVICE — OCCLUSIVE GAUZE STRIP,3% BISMUTH TRIBROMOPHENATE IN PETROLATUM BLEND: Brand: XEROFORM

## (undated) DEVICE — NEEDLE 18 G X 1 1/2

## (undated) DEVICE — GLOVE SRG BIOGEL 7

## (undated) DEVICE — YELLOW BOAT

## (undated) DEVICE — PREP SURGICAL PURPREP 26ML

## (undated) DEVICE — CAPIT HIP STEM POR PRIMARY

## (undated) DEVICE — NEEDLE 25G X 1 1/2

## (undated) DEVICE — PACK MAJOR ORTHO W/SPLITS PBDS

## (undated) DEVICE — CAPIT HIP BIPOLAR HEAD POR PRIMARY

## (undated) DEVICE — COBAN 6 IN STERILE

## (undated) DEVICE — GLOVE SRG BIOGEL ORTHOPEDIC 7.5

## (undated) DEVICE — THE SIMPULSE SOLO SYSTEM WITH ULTREX RETRACTABLE SPLASH SHIELD TIP: Brand: SIMPULSE SOLO

## (undated) DEVICE — SUT STRATAFIX SPIRAL 3-0 PGA/PCL 30 X 30 CM SXMD2B408

## (undated) DEVICE — ABDUCTION PILLOW FOAM POSITIONER: Brand: CARDINAL HEALTH

## (undated) DEVICE — 3M™ IOBAN™ 2 ANTIMICROBIAL INCISE DRAPE 6648EZ: Brand: IOBAN™ 2

## (undated) DEVICE — TUBING SUCTION 5MM X 12 FT

## (undated) DEVICE — SUT VICRYL 2-0 SH 27 IN UNDYED J417H

## (undated) DEVICE — 28 FR RIGHT ANGLE – SOFT PVC CATHETER: Brand: PVC THORACIC CATHETERS

## (undated) DEVICE — GLOVE INDICATOR PI UNDERGLOVE SZ 7 BLUE

## (undated) DEVICE — DRESSING TELFA 2 X 3 IN STRL

## (undated) DEVICE — DRAPE FLUID WARMER (BIRD BATH)

## (undated) DEVICE — 5065 IMPAD REGULAR PAIR: Brand: A-V IMPULSE

## (undated) DEVICE — INTENDED FOR TISSUE SEPARATION, AND OTHER PROCEDURES THAT REQUIRE A SHARP SURGICAL BLADE TO PUNCTURE OR CUT.: Brand: BARD-PARKER SAFETY BLADES SIZE 15, STERILE

## (undated) DEVICE — BIPOLAR SEALER 23-301-1 AQM MBS: Brand: AQUAMANTYS™

## (undated) DEVICE — GLOVE INDICATOR PI UNDERGLOVE SZ 7.5 BLUE

## (undated) DEVICE — SUT PROLENE 0 CT-1 30 IN 8424H

## (undated) DEVICE — 28 FR STRAIGHT – SOFT PVC CATHETER: Brand: PVC THORACIC CATHETERS

## (undated) DEVICE — ANTI-FOG SOLUTION WITH FOAM PAD: Brand: DEVON

## (undated) DEVICE — 3000CC GUARDIAN II: Brand: GUARDIAN

## (undated) DEVICE — REM POLYHESIVE ADULT PATIENT RETURN ELECTRODE: Brand: VALLEYLAB

## (undated) DEVICE — NEEDLE HYPO 22G X 1-1/2 IN

## (undated) DEVICE — 3M™ STERI-DRAPE™ U-DRAPE 1015: Brand: STERI-DRAPE™

## (undated) DEVICE — SUT MONOCRYL 4-0 PS-2 18 IN Y496G

## (undated) DEVICE — SUT ETHIBOND 5 V-37 30 IN MB66G

## (undated) DEVICE — HOOD: Brand: FLYTE, SURGICOOL